# Patient Record
Sex: FEMALE | Race: WHITE | NOT HISPANIC OR LATINO | Employment: FULL TIME | URBAN - METROPOLITAN AREA
[De-identification: names, ages, dates, MRNs, and addresses within clinical notes are randomized per-mention and may not be internally consistent; named-entity substitution may affect disease eponyms.]

---

## 2016-07-20 LAB — PAP-ABSTRACT: NORMAL

## 2017-03-07 ENCOUNTER — TRANSFERRED RECORDS (OUTPATIENT)
Dept: HEALTH INFORMATION MANAGEMENT | Facility: CLINIC | Age: 27
End: 2017-03-07

## 2017-11-27 LAB — PHQ9 SCORE: 13

## 2018-01-02 ENCOUNTER — TRANSFERRED RECORDS (OUTPATIENT)
Dept: HEALTH INFORMATION MANAGEMENT | Facility: CLINIC | Age: 28
End: 2018-01-02

## 2018-01-08 ENCOUNTER — TRANSFERRED RECORDS (OUTPATIENT)
Dept: HEALTH INFORMATION MANAGEMENT | Facility: CLINIC | Age: 28
End: 2018-01-08

## 2018-01-08 LAB
C TRACH DNA SPEC QL PROBE+SIG AMP: NEGATIVE
N GONORRHOEA DNA SPEC QL PROBE+SIG AMP: NEGATIVE
PAP-ABSTRACT: NORMAL
SPECIMEN DESCRIP: NORMAL
SPECIMEN DESCRIPTION: NORMAL

## 2018-08-01 ENCOUNTER — TRANSFERRED RECORDS (OUTPATIENT)
Dept: MULTI SPECIALTY CLINIC | Facility: CLINIC | Age: 28
End: 2018-08-01

## 2018-08-01 LAB — PAP SMEAR - HIM PATIENT REPORTED: NEGATIVE

## 2018-10-01 LAB — PHQ9 SCORE: 8

## 2018-11-02 ENCOUNTER — TRANSFERRED RECORDS (OUTPATIENT)
Dept: HEALTH INFORMATION MANAGEMENT | Facility: CLINIC | Age: 28
End: 2018-11-02

## 2019-01-29 ENCOUNTER — TRANSFERRED RECORDS (OUTPATIENT)
Dept: HEALTH INFORMATION MANAGEMENT | Facility: CLINIC | Age: 29
End: 2019-01-29

## 2019-01-29 LAB
ALT SERPL-CCNC: 13 IU/L (ref 5–46)
AST SERPL-CCNC: 14 IU/L (ref 10–41)
CHOLEST SERPL-MCNC: 169 MG/DL
CREAT SERPL-MCNC: 0.62 MG/DL (ref 0.6–1.4)
GFR SERPL CREATININE-BSD FRML MDRD: 123 ML/MIN/1.73M2
GLUCOSE SERPL-MCNC: 76 MG/DL (ref 65–99)
HBA1C MFR BLD: 7.8 % (ref 0–5.6)
HDLC SERPL-MCNC: 52 MG/DL
LDLC SERPL CALC-MCNC: 98 MG/DL
NONHDLC SERPL-MCNC: 117 MG/DL
POTASSIUM SERPL-SCNC: 4.1 MMOL/L (ref 3.6–5.3)
TRIGL SERPL-MCNC: 94 MG/DL

## 2019-02-05 LAB — PHQ9 SCORE: 7

## 2019-02-06 ENCOUNTER — TRANSFERRED RECORDS (OUTPATIENT)
Dept: HEALTH INFORMATION MANAGEMENT | Facility: CLINIC | Age: 29
End: 2019-02-06

## 2019-05-06 ENCOUNTER — TRANSFERRED RECORDS (OUTPATIENT)
Dept: HEALTH INFORMATION MANAGEMENT | Facility: CLINIC | Age: 29
End: 2019-05-06

## 2019-05-06 ENCOUNTER — MEDICAL CORRESPONDENCE (OUTPATIENT)
Dept: HEALTH INFORMATION MANAGEMENT | Facility: CLINIC | Age: 29
End: 2019-05-06

## 2019-05-06 LAB — PHQ9 SCORE: 4

## 2019-08-30 ENCOUNTER — OFFICE VISIT (OUTPATIENT)
Dept: FAMILY MEDICINE | Facility: CLINIC | Age: 29
End: 2019-08-30
Payer: COMMERCIAL

## 2019-08-30 VITALS
BODY MASS INDEX: 22.4 KG/M2 | DIASTOLIC BLOOD PRESSURE: 76 MMHG | SYSTOLIC BLOOD PRESSURE: 118 MMHG | TEMPERATURE: 97.9 F | HEIGHT: 71 IN | WEIGHT: 160 LBS

## 2019-08-30 DIAGNOSIS — Z00.00 ROUTINE GENERAL MEDICAL EXAMINATION AT A HEALTH CARE FACILITY: Primary | ICD-10-CM

## 2019-08-30 DIAGNOSIS — E10.65 TYPE 1 DIABETES MELLITUS WITH HYPERGLYCEMIA (H): ICD-10-CM

## 2019-08-30 DIAGNOSIS — F33.42 RECURRENT MAJOR DEPRESSIVE DISORDER, IN FULL REMISSION (H): ICD-10-CM

## 2019-08-30 DIAGNOSIS — F41.9 ANXIETY: ICD-10-CM

## 2019-08-30 PROCEDURE — 99204 OFFICE O/P NEW MOD 45 MIN: CPT | Performed by: FAMILY MEDICINE

## 2019-08-30 RX ORDER — ALPRAZOLAM 0.25 MG
0.25 TABLET ORAL DAILY PRN
Qty: 20 TABLET | Refills: 0 | Status: SHIPPED | OUTPATIENT
Start: 2019-08-30 | End: 2020-01-02

## 2019-08-30 RX ORDER — ALPRAZOLAM 0.25 MG
TABLET ORAL
COMMUNITY
Start: 2019-02-05 | End: 2020-01-02

## 2019-08-30 ASSESSMENT — ANXIETY QUESTIONNAIRES
6. BECOMING EASILY ANNOYED OR IRRITABLE: SEVERAL DAYS
7. FEELING AFRAID AS IF SOMETHING AWFUL MIGHT HAPPEN: NOT AT ALL
3. WORRYING TOO MUCH ABOUT DIFFERENT THINGS: SEVERAL DAYS
GAD7 TOTAL SCORE: 5
1. FEELING NERVOUS, ANXIOUS, OR ON EDGE: SEVERAL DAYS
2. NOT BEING ABLE TO STOP OR CONTROL WORRYING: SEVERAL DAYS
5. BEING SO RESTLESS THAT IT IS HARD TO SIT STILL: NOT AT ALL
IF YOU CHECKED OFF ANY PROBLEMS ON THIS QUESTIONNAIRE, HOW DIFFICULT HAVE THESE PROBLEMS MADE IT FOR YOU TO DO YOUR WORK, TAKE CARE OF THINGS AT HOME, OR GET ALONG WITH OTHER PEOPLE: SOMEWHAT DIFFICULT

## 2019-08-30 ASSESSMENT — PATIENT HEALTH QUESTIONNAIRE - PHQ9
5. POOR APPETITE OR OVEREATING: SEVERAL DAYS
SUM OF ALL RESPONSES TO PHQ QUESTIONS 1-9: 4

## 2019-08-30 ASSESSMENT — MIFFLIN-ST. JEOR: SCORE: 1551.89

## 2019-08-30 NOTE — Clinical Note
This smart phrase will be removed from your smart phrase list on September 9th. Please use the system smart phrase: .abstractdata which has been updated.Please abstract the following data from this visit with this patient into the appropriate field in Epic:Tests that can be patient reported without a hard copy:Pap smear done on this date: 8/2018 (approximately), by this group: in [place], results were normal. Other Tests found in the patient's chart through Chart Review/Care Everywhere:Note to Abstraction: If this section is blank, no results were found via Chart Review/Care Everywhere.

## 2019-08-30 NOTE — PATIENT INSTRUCTIONS
I would like to see in about 4 months to follow up on your mood.  If things are going well, then we can see each other every 6 months.    Please call to schedule your eye exam.    Patient Education     [facility]   Discharged by : Ariana Harris MA    Paper scripts provided to patient : no     If you have any questions regarding your visit please contact your care team:     Team Gold                Clinic Hours Telephone Number     Dr. Abiola Ross, CNP 7am-7pm  Monday - Thursday   7am-5pm  Fridays  [phone]   (Appointment scheduling available 24/7)     RN Line  [phone] option 2     Urgent Care - [place] - 11am-9pm Monday-Friday Saturday-Sunday- 9am-5pm     [place] -   5pm-9pm Monday-Friday Saturday-Sunday- 9am-5pm    [phone] - [place]    [phone] - [place]     For a Price Quote for your services, please call our Consumer Price Line at [phone].     What options do I have for visits at the clinic other than the traditional office visit?     To expand how we care for you, many of our providers are utilizing electronic visits (e-visits) and telephone visits, when medically appropriate, for interactions with their patients rather than a visit in the clinic. We also offer nurse visits for many medical concerns. Just like any other service, we will bill your insurance company for this type of visit based on time spent on the phone with your provider. Not all insurance companies cover these visits. Please check with your medical insurance if this type of visit is covered. You will be responsible for any charges that are not paid by your insurance.   E-visits via Pandorama: generally incur a $45.00 fee.     Telephone visits:  Time spent on the phone: *charged based on time that is spent on the phone in increments of 10 minutes. Estimated cost:   5-10 mins $30.00   11-20 mins. $59.00   21-30 mins.  $85.00     Use Huixiaoer (secure email communication and access to your chart) to send your primary care provider a message or make an appointment. Ask someone on your Team how to sign up for Huixiaoer.     As always, Thank you for trusting us with your health care needs!    [facility]:    Scheduling Appointments  [facility]  Call: [phone]    [facility]  Call: [phone]    [facility]  Call: [phone]    For Gastroenterology referrals   [facility]   Clinics and Surgery Center, 4th Floor   [address] [place] [address]   Appointments: [phone]    WHERE TO GO FOR CARE?  Clinic    Make an appointment if you:       Are sick (cold, cough, flu, sore throat, earache or in pain).       Have a small injury (sprain, small cut, burn or broken bone).       Need a physical exam, Pap smear, vaccine or prescription refill.       Have questions about your health or medicines.    To reach us:      Call [phone] ([phone]). Open 24 hours every day. (For counseling services, call [phone].)    Log into Huixiaoer at [email or web address]. (Visit [email or web address] to create an account.) Hospital emergency room    An emergency is a serious or life- threatening problem that must be treated right away.    Call [phone] or get to the hospital if you have:      Very bad or sudden:            - Chest pain or pressure         - Bleeding         - Head or belly pain         - Dizziness or trouble seeing, walking or                          Speaking      Problems breathing      Blood in your vomit or you are coughing up blood      A major injury (knocked out, loss of a finger or limb, rape, broken bone protruding from skin)    A mental health crisis. (Or call the Mental Health Crisis line at 1-664.653.9103 or Suicide Prevention Hotline at 1-518.250.1757.)    Open 24 hours every day. You don't need an  appointment.     Urgent care    Visit urgent care for sickness or small injuries when the clinic is closed. You don't need an appointment. To check hours or find an urgent care near you, visit [email or web address]. Online care    Get [facility] from [facility] for more than 70 common problems, like colds, allergies and infections. Open 24 hours every day at:   [email or web address]   Need help deciding?    For advice about where to be seen, you may call your clinic and ask to speak with a nurse. We're here for you 24 hours every day.         If you are deaf or hard of hearing, please let us know. We provide many free services including sign language interpreters, oral interpreters, TTYs, telephone amplifiers, note takers and written materials.

## 2019-08-30 NOTE — PROGRESS NOTES
Subjective     Preeti Fox is a 29 year old female who presents to clinic today for the following health issues:    HPI     New patient presenting to the clinic to establish care. She moved [facility] about 2-3 months ago. 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     Diabetes Follow-up  Gets medication from endocrinologist Dr. Darlene Rivero in [place]. Wears glucose monitor on arm. She notes that her last A1C was 7.9. Her biggest pitfalls with managing diabetes is taking CGm for about 1.5 years.     How often are you checking your blood sugar? Three times daily    What time of day are you checking your blood sugars (select all that apply)?  When she wakes up, before a meal and at bedtime    Have you had any blood sugars above 200?  Yes     Have you had any blood sugars below 70?  Yes     What symptoms do you notice when your blood sugar is low?  Shaky and Other: sweaty palms    What concerns do you have today about your diabetes? None, Blood sugar is often over 200 and Low blood sugar     Do you have any of these symptoms? (Select all that apply)  No numbness or tingling in feet.  No redness, sores or blisters on feet.  No complaints of excessive thirst.  No reports of blurry vision.  No significant changes to weight.     Have you had a diabetic eye exam in the last 12 months? No  over due will need referral been under 2 years. Had lasik a year and a half ago      BP Readings from Last 2 Encounters:   08/30/19 118/76     No results found for: A1C, LDL    Diabetes Management Resources  Depression and Anxiety Follow-Up  Patient on sertraline for about 6 years, medication dose adjusted last year.  She notes anxiety since her childhood years. Patient is taking alprazolam as needed. Symptoms more present since the move. However, her anxiety is under control.     How are you doing with your depression since your last visit? No change pretty fine    How are you doing with your anxiety since  your last visit?  About the same    Are you having other symptoms that might be associated with depression or anxiety? No    Have you had a significant life event? OTHER: moved here from [place]     Do you have any concerns with your use of alcohol or other drugs? No      IUD in place and expiring next month. OBGYN appointment scheduled for next week.    Other Concerns  Patient would like an ophthalmology referral.       Social History     Tobacco Use     Smoking status: Never Smoker     Smokeless tobacco: Never Used   Substance Use Topics     Alcohol use: Yes     Comment: 1-2 drinks per week      Drug use: Never     PHQ 8/30/2019   PHQ-9 Total Score 4   Q9: Thoughts of better off dead/self-harm past 2 weeks Not at all     ROSANNA-7 SCORE 8/30/2019   Total Score 5         Suicide Assessment Five-step Evaluation and Treatment (SAFE-T)      How many servings of fruits and vegetables do you eat daily?  4 or more    On average, how many sweetened beverages do you drink each day (soda, juice, sweet tea, etc)?    How many days per week do you miss taking your medication? 0-1    What makes it hard for you to take your medications?  remembering to take    Patient Active Problem List   Diagnosis     Type 1 diabetes mellitus with hyperglycemia (H)     Anxiety     Recurrent major depressive disorder, in full remission (H)     History reviewed. No pertinent surgical history.    Social History     Tobacco Use     Smoking status: Never Smoker     Smokeless tobacco: Never Used   Substance Use Topics     Alcohol use: Yes     Comment: 1-2 drinks per week      Family History   Problem Relation Age of Onset     Anxiety Disorder Mother      Depression Mother      Lung Cancer Father          Current Outpatient Medications   Medication Sig Dispense Refill     ALPRAZolam (XANAX) 0.25 MG tablet        ALPRAZolam (XANAX) 0.25 MG tablet Take 1 tablet (0.25 mg) by mouth daily as needed for anxiety 20 tablet 0     HUMALOG 100 UNIT/ML injection INJ 90  "UNITS SC D VIA INSULIN PUMP  3     sertraline (ZOLOFT) 50 MG tablet TK 1 T PO D UTD  1     sertraline (ZOLOFT) 50 MG tablet Take 1 tablet (50 mg) by mouth daily 90 tablet 3     BP Readings from Last 3 Encounters:   08/30/19 118/76    Wt Readings from Last 3 Encounters:   08/30/19 72.6 kg (160 lb)              Reviewed and updated as needed this visit by Provider    Review of Systems   ROS COMP: Constitutional, HEENT, cardiovascular, pulmonary, GI, , musculoskeletal, neuro, skin, endocrine and psych systems are negative, except as otherwise noted.    This document serves as a record of the services and decisions personally performed by Roshni Cortez MD. It was created on her behalf by Janeen Duran, a trained medical scribe. The creation of this document is based on the provider's statements to the medical scribe. Janeen Duran August 30, 2019 10:55 AM        Objective    /76 (BP Location: Right arm, Patient Position: Sitting, Cuff Size: Adult Regular)   Temp 97.9  F (36.6  C) (Oral)   Ht 1.803 m (5' 11\")   Wt 72.6 kg (160 lb)   BMI 22.32 kg/m    Body mass index is 22.32 kg/m .  Physical Exam   GENERAL: healthy, alert and no distress  EYES: Eyes grossly normal to inspection, PERRL and conjunctivae and sclerae normal  HENT: ear canals and TM's normal, nose and mouth without ulcers or lesions  NECK: no adenopathy, no asymmetry, masses, or scars and thyroid normal to palpation  RESP: lungs clear to auscultation - no rales, rhonchi or wheezes  CV: regular rate and rhythm, normal S1 S2, no S3 or S4, no murmur, click or rub, no peripheral edema and peripheral pulses strong  ABDOMEN: soft, nontender, no hepatosplenomegaly, no masses and bowel sounds normal  MS: no gross musculoskeletal defects noted, no edema  SKIN: no suspicious lesions or rashes  PSYCH: mentation appears normal, affect normal/bright    Diagnostic Test Results:  Labs reviewed in Epic        Assessment & Plan     (Z00.00) Routine " general medical examination at a health care facility  (primary encounter diagnosis)  Comment: Negative screening exam; up-to-date on preventive services.  Plan: Follow in 1 year for physical   Will be seeing GYN for IUD replacement soon.  Is not due for pap as reporting last pap was within past year in [place].  Will obtain old records with [facility]    (E10.65) Type 1 diabetes mellitus with hyperglycemia (H)  Comment: Patient followed by endocrinology. She notes that her most recent A1C was 7.9. She is also wishing to schedule an eye exam and would like a referral.   Plan: OPHTHALMOLOGY ADULT REFERRAL        Continue follow ups with Dr. Darlene Rivero at [facility]    (F41.9) Anxiety  Comment: Refill requested. Patient taking medication as needed and uses sparingly.  checked,  Plan: sertraline (ZOLOFT) 50 MG tablet, ALPRAZolam         (XANAX) 0.25 MG tablet        Refill provided.  Advised to follow up in 4 months for anxiety    (F33.42) Recurrent major depressive disorder, in full remission (H)  Comment: Stable. Refill requested.  Plan: sertraline (ZOLOFT) 50 MG tablet        Follow up in 4 months, if stable then can go to q 6 months.         Patient Instructions     I would like to see in about 4 months to follow up on your mood.  If things are going well, then we can see each other every 6 months.    Please call to schedule your eye exam.    Patient Education     [facility]   Discharged by : Ariana Harris MA    Paper scripts provided to patient : no     If you have any questions regarding your visit please contact your care team:     Team Gold                Clinic Hours Telephone Number     Dr. Abiola Ross, CNP 7am-7pm  Monday - Thursday   7am-5pm  Fridays  [phone]   (Appointment scheduling available 24/7)     RN Line  [phone] option 2     Urgent Care - [place] - 11am-9pm  Monday-Friday Saturday-Sunday- 9am-5pm     [place] -   5pm-9pm Monday-Friday Saturday-Sunday- 9am-5pm    [phone] - [place]    [phone] - [place]     For a Price Quote for your services, please call our Consumer Price Line at [phone].     What options do I have for visits at the clinic other than the traditional office visit?     To expand how we care for you, many of our providers are utilizing electronic visits (e-visits) and telephone visits, when medically appropriate, for interactions with their patients rather than a visit in the clinic. We also offer nurse visits for many medical concerns. Just like any other service, we will bill your insurance company for this type of visit based on time spent on the phone with your provider. Not all insurance companies cover these visits. Please check with your medical insurance if this type of visit is covered. You will be responsible for any charges that are not paid by your insurance.   E-visits via Anchor Intelligence: generally incur a $45.00 fee.     Telephone visits:  Time spent on the phone: *charged based on time that is spent on the phone in increments of 10 minutes. Estimated cost:   5-10 mins $30.00   11-20 mins. $59.00   21-30 mins. $85.00     Use Bar Passt (secure email communication and access to your chart) to send your primary care provider a message or make an appointment. Ask someone on your Team how to sign up for Anchor Intelligence.     As always, Thank you for trusting us with your health care needs!    [facility]:    Scheduling Appointments  [facility]ju[facility]ard [facility]  Call: [phone]    [facility]  Call: [phone]    [facility]  Call: [phone]    For Gastroenterology referrals   [facility]enter[facility]   Clinics and Surgery [place], 4th Floor   [address] [place] [id number]   Appointments: [phone]    WHERE TO GO FOR CARE?  Clinic    Make an  appointment if you:       Are sick (cold, cough, flu, sore throat, earache or in pain).       Have a small injury (sprain, small cut, burn or broken bone).       Need a physical exam, Pap smear, vaccine or prescription refill.       Have questions about your health or medicines.    To reach us:      Call [phone] ([phone]). Open 24 hours every day. (For counseling services, call [phone].)    Log into [email or web address] at [employer]. (Visit ZÃ¼m XR to create an account.) Hospital emergency room    An emergency is a serious or life- threatening problem that must be treated right away.    Call [phone] or get to the hospital if you have:      Very bad or sudden:            - Chest pain or pressure         - Bleeding         - Head or belly pain         - Dizziness or trouble seeing, walking or                          Speaking      Problems breathing      Blood in your vomit or you are coughing up blood      A major injury (knocked out, loss of a finger or limb, rape, broken bone protruding from skin)    A mental health crisis. (Or call the Mental Health Crisis line at 1-950.829.7063 or Suicide Prevention Hotline at 1-229.980.3903.)    Open 24 hours every day. You don't need an appointment.     Urgent care    Visit urgent care for sickness or small injuries when the clinic is closed. You don't need an appointment. To check hours or find an urgent care near you, visit [email or web address]. Online care    Get [facility] care from [facility] for more than 70 common problems, like colds, allergies and infections. Open 24 hours every day at:   [email or web address]   Need help deciding?    For advice about where to be seen, you may call your clinic and ask to speak with a nurse. We're here for you 24 hours every day.         If you are deaf or hard of hearing, please let us know. We provide many free services including sign language interpreters, oral interpreters, TTYs, telephone amplifiers, note takers and written  materials.               Return in about 4 months (around 12/30/2019) for Annual Adult Physical, Mood/Mental Health Visit.    The information in this document, created by the medical scribe for me, accurately reflects the services I personally performed and the decisions made by me. I have reviewed and approved this document for accuracy.     Roshni Nevarez MD  [facility]

## 2019-08-31 ASSESSMENT — ANXIETY QUESTIONNAIRES: GAD7 TOTAL SCORE: 5

## 2019-09-03 ENCOUNTER — OFFICE VISIT (OUTPATIENT)
Dept: OBGYN | Facility: CLINIC | Age: 29
End: 2019-09-03
Payer: COMMERCIAL

## 2019-09-03 VITALS
BODY MASS INDEX: 25.11 KG/M2 | SYSTOLIC BLOOD PRESSURE: 136 MMHG | HEIGHT: 67 IN | HEART RATE: 101 BPM | DIASTOLIC BLOOD PRESSURE: 76 MMHG | WEIGHT: 160 LBS | OXYGEN SATURATION: 100 %

## 2019-09-03 DIAGNOSIS — Z30.432 ENCOUNTER FOR IUD REMOVAL: ICD-10-CM

## 2019-09-03 DIAGNOSIS — Z30.430 ENCOUNTER FOR IUD INSERTION: Primary | ICD-10-CM

## 2019-09-03 LAB — HCG UR QL: NEGATIVE

## 2019-09-03 PROCEDURE — 81025 URINE PREGNANCY TEST: CPT | Performed by: OBSTETRICS & GYNECOLOGY

## 2019-09-03 PROCEDURE — 58300 INSERT INTRAUTERINE DEVICE: CPT | Performed by: OBSTETRICS & GYNECOLOGY

## 2019-09-03 PROCEDURE — 58301 REMOVE INTRAUTERINE DEVICE: CPT | Performed by: OBSTETRICS & GYNECOLOGY

## 2019-09-03 ASSESSMENT — MIFFLIN-ST. JEOR: SCORE: 1488.39

## 2019-09-03 NOTE — NURSING NOTE
"Chief Complaint   Patient presents with     Contraception     IUD REMOVAL AND REINSERTION. LOT: NDC: EXP: .       Initial /76   Pulse 101   Ht 1.702 m (5' 7\")   Wt 72.6 kg (160 lb)   SpO2 100%   Breastfeeding? No   BMI 25.06 kg/m   Estimated body mass index is 25.06 kg/m  as calculated from the following:    Height as of this encounter: 1.702 m (5' 7\").    Weight as of this encounter: 72.6 kg (160 lb).  BP completed using cuff size: regular    Questioned patient about current smoking habits.  Pt. has never smoked.      No obstetric history on file.    The following HM Due: Vaccinations: TDAP      The following patient reported/Care Every where data was sent to:  P ABSTRACT QUALITY INITIATIVES [71765]  N/A      patient has appointment for today              "

## 2019-09-03 NOTE — PROGRESS NOTES
"S:  Preeti presents for Mary remove/replace.  Planning pregnancy in next 3 years.    O:  Vitals:    19 1330   BP: 136/76   Pulse: 101   SpO2: 100%   Weight: 72.6 kg (160 lb)   Height: 1.702 m (5' 7\")     Gen: well appearing    A/P:  29 year old  with Mary IUD for remove/replace.  Removed with forceps due to missing strings  Replaced without difficulty.   RTC for removal when ready to conceive. Start PNVs prior.    Procedure IUD REMOVAL:    Bimanual exam was performed.  The IUD strings were not palpable.  Speculum introduced with patient in the dorsal lithotomy position.  The IUD string was not visualized.  The IUD was removed with the Rhame packing forceps, multiple attempts.  Pt tolerated well.      IUD INSERTION PROCEDURE    Preeti Fox is a 29 year old female  who presents for Mary IUD insertion.  Indication for IUD insertion is contraception.  No LMP recorded. (Menstrual status: IUD). .  The patient is currently using Mary IUD for contraception.  She is in a monogamous sexual relationship.     No results found for: PAP    Results for orders placed or performed in visit on 19   HCG qualitative urine   Result Value Ref Range    HCG Qual Urine Negative NEG^Negative       A complete discussion of the risks and benefits of IUD use and the details of the insertion procedure was held with the patient.    All questions were answered.  A consent form was signed.    Prior to the beginning of the procedure the team paused to verify the patient's identity, as well as the procedure to be performed and the site.  All equipment required was ready and available. The patient was positioned appropriately.     IUD Lot # LOT: [id number] NDC: 72493-531-46 EXP: 2022.    The patient was placed in low lithotomy.  A bimanual exam was performed and the uterus noted to be anteverted.  A speculum was placed and the cervix swabbed with Betadine.  A tenaculum was placed on the anterior cervical lip. A " paracervical block was performed.  The fundus sounded to 8 cm. The Mary IUD was placed to the uterine fundus without difficulty.  The strings were cut to 3 cms.  The tenaculum was removed and hemostasis was ensured.  The speculum was removed.  The patient tolerated the procedure well.    PLAN:   The patient was asked to contact the clinic for any fever/chills/severe pelvic or abdominal pain or heavy bleeding. She was instructed in how to palpate her IUD strings.    FOLLOW-UP:  She was asked to follow up prn, and for her routine annual screening.

## 2019-09-16 ENCOUNTER — OFFICE VISIT (OUTPATIENT)
Dept: OPHTHALMOLOGY | Facility: CLINIC | Age: 29
End: 2019-09-16
Payer: COMMERCIAL

## 2019-09-16 DIAGNOSIS — H52.13 MYOPIA OF BOTH EYES: ICD-10-CM

## 2019-09-16 DIAGNOSIS — E10.65 TYPE 1 DIABETES MELLITUS WITH HYPERGLYCEMIA (H): ICD-10-CM

## 2019-09-16 DIAGNOSIS — E10.3393 MODERATE NONPROLIFERATIVE DIABETIC RETINOPATHY OF BOTH EYES WITHOUT MACULAR EDEMA ASSOCIATED WITH TYPE 1 DIABETES MELLITUS (H): Primary | ICD-10-CM

## 2019-09-16 DIAGNOSIS — Z98.890 S/P LASIK SURGERY: ICD-10-CM

## 2019-09-16 DIAGNOSIS — H52.222 REGULAR ASTIGMATISM OF LEFT EYE: ICD-10-CM

## 2019-09-16 PROCEDURE — 92004 COMPRE OPH EXAM NEW PT 1/>: CPT | Performed by: STUDENT IN AN ORGANIZED HEALTH CARE EDUCATION/TRAINING PROGRAM

## 2019-09-16 PROCEDURE — 92015 DETERMINE REFRACTIVE STATE: CPT | Performed by: STUDENT IN AN ORGANIZED HEALTH CARE EDUCATION/TRAINING PROGRAM

## 2019-09-16 ASSESSMENT — VISUAL ACUITY
OD_SC+: -1
METHOD: SNELLEN - LINEAR
CORRECTION_TYPE: GLASSES
OD_SC: 20/20
OS_SC: 20/20

## 2019-09-16 ASSESSMENT — CONF VISUAL FIELD
OD_NORMAL: 1
METHOD: COUNTING FINGERS
OS_NORMAL: 1

## 2019-09-16 ASSESSMENT — REFRACTION_MANIFEST
OS_CYLINDER: +0.25
OS_SPHERE: -0.25
OS_AXIS: 152
OD_SPHERE: -0.50
OD_CYLINDER: SPHERE

## 2019-09-16 ASSESSMENT — TONOMETRY
IOP_METHOD: APPLANATION
OS_IOP_MMHG: 13
OD_IOP_MMHG: 12

## 2019-09-16 ASSESSMENT — EXTERNAL EXAM - LEFT EYE: OS_EXAM: NORMAL

## 2019-09-16 ASSESSMENT — CUP TO DISC RATIO
OD_RATIO: 0.35
OS_RATIO: 0.35

## 2019-09-16 ASSESSMENT — EXTERNAL EXAM - RIGHT EYE: OD_EXAM: NORMAL

## 2019-09-16 ASSESSMENT — SLIT LAMP EXAM - LIDS
COMMENTS: NORMAL
COMMENTS: NORMAL

## 2019-09-16 NOTE — PROGRESS NOTES
Current Eye Medications:  none     Subjective:  Complete eye exam. Vision is doing well both eyes. No eye pain or discomfort in either eye.    History of Lasik both eyes about 2 years ago.   Diabetic for 23 years. Blood sugar has been doing well, last A1C was done about 1 month ago was 7.6 or 7.8  Lab Results   Component Value Date    A1C 7.8 01/29/2019     She has a history of having a corneal ulcer from sleeping in contact lenses, then had LASIK both eyes after that resolved.     Objective:  See Ophthalmology Exam.       Assessment:  Preeti Fox is a 29 year old female who presents with:   Encounter Diagnoses   Name Primary?     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    Aristeo Monroe MD  [phone]

## 2019-09-16 NOTE — LETTER
9/16/2019       RE: Preeti Fox  [address]  [place] [address]      Dear Dr. Cortez,    Thank you for referring your patient, Preeti Fox, to the [facility].     She has moderate non-proliferative diabetic retinopathy in both eyes. Please see a copy of my visit note below.     Current Eye Medications:  none     Subjective:  Complete eye exam. Vision is doing well both eyes. No eye pain or discomfort in either eye.    History of Lasik both eyes about 2 years ago.   Diabetic for 23 years. Blood sugar has been doing well, last A1C was done about 1 month ago was 7.6 or 7.8  Lab Results   Component Value Date    A1C 7.8 01/29/2019     She has a history of having a corneal ulcer from sleeping in contact lenses, then had LASIK both eyes after that resolved.     Objective:  See Ophthalmology Exam.       Assessment:  Preeti Fox is a 29 year old female who presents with:   Encounter Diagnoses   Name Primary?     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    Aristeo Monroe MD  [phone]        Again, thank you for allowing me to participate in the care of your patient.        Sincerely,        Aristeo Monroe MD

## 2019-09-16 NOTE — PATIENT INSTRUCTIONS
Glasses prescription given - optional to update    Keep blood sugars and blood pressure under good control.    Aristeo Monroe MD  [phone]    Patient Education   Diabetes weakens the blood vessels all over the body, including the eyes. Damage to the blood vessels in the eyes can cause swelling or bleeding into part of the eye (called the retina). This is called diabetic retinopathy (MICHAEL-tin--puh-thee). If not treated, this disease can cause vision loss or blindness.   Symptoms may include blurred or distorted vision, but many people have no symptoms. It's important to see your eye doctor regularly to check for problems.   Early treatment and good control can help protect your vision. Here are the things you can do to help prevent vision loss:      1. Keep your blood sugar levels under tight control.      2. Bring high blood pressure under control.      3. No smoking.      4. Have yearly dilated eye exams.

## 2020-01-02 ENCOUNTER — OFFICE VISIT (OUTPATIENT)
Dept: FAMILY MEDICINE | Facility: CLINIC | Age: 30
End: 2020-01-02
Payer: COMMERCIAL

## 2020-01-02 VITALS
TEMPERATURE: 97.5 F | SYSTOLIC BLOOD PRESSURE: 126 MMHG | BODY MASS INDEX: 24.8 KG/M2 | WEIGHT: 158 LBS | DIASTOLIC BLOOD PRESSURE: 80 MMHG | HEIGHT: 67 IN | HEART RATE: 100 BPM

## 2020-01-02 DIAGNOSIS — Z11.4 SCREENING FOR HIV (HUMAN IMMUNODEFICIENCY VIRUS): ICD-10-CM

## 2020-01-02 DIAGNOSIS — F33.42 RECURRENT MAJOR DEPRESSIVE DISORDER, IN FULL REMISSION (H): ICD-10-CM

## 2020-01-02 DIAGNOSIS — E10.65 TYPE 1 DIABETES MELLITUS WITH HYPERGLYCEMIA (H): Primary | ICD-10-CM

## 2020-01-02 DIAGNOSIS — S46.912A MUSCLE STRAIN OF LEFT SCAPULAR REGION, INITIAL ENCOUNTER: ICD-10-CM

## 2020-01-02 DIAGNOSIS — F41.9 ANXIETY: ICD-10-CM

## 2020-01-02 LAB — HBA1C MFR BLD: 8.4 % (ref 0–5.6)

## 2020-01-02 PROCEDURE — 87389 HIV-1 AG W/HIV-1&-2 AB AG IA: CPT | Performed by: FAMILY MEDICINE

## 2020-01-02 PROCEDURE — 84443 ASSAY THYROID STIM HORMONE: CPT | Performed by: FAMILY MEDICINE

## 2020-01-02 PROCEDURE — 83036 HEMOGLOBIN GLYCOSYLATED A1C: CPT | Performed by: FAMILY MEDICINE

## 2020-01-02 PROCEDURE — 36415 COLL VENOUS BLD VENIPUNCTURE: CPT | Performed by: FAMILY MEDICINE

## 2020-01-02 PROCEDURE — 99214 OFFICE O/P EST MOD 30 MIN: CPT | Performed by: FAMILY MEDICINE

## 2020-01-02 PROCEDURE — 82043 UR ALBUMIN QUANTITATIVE: CPT | Performed by: FAMILY MEDICINE

## 2020-01-02 PROCEDURE — 80048 BASIC METABOLIC PNL TOTAL CA: CPT | Performed by: FAMILY MEDICINE

## 2020-01-02 RX ORDER — ALPRAZOLAM 0.25 MG
0.25 TABLET ORAL DAILY PRN
Qty: 20 TABLET | Refills: 0 | Status: SHIPPED | OUTPATIENT
Start: 2020-01-02 | End: 2020-09-11

## 2020-01-02 ASSESSMENT — ANXIETY QUESTIONNAIRES
GAD7 TOTAL SCORE: 5
6. BECOMING EASILY ANNOYED OR IRRITABLE: SEVERAL DAYS
IF YOU CHECKED OFF ANY PROBLEMS ON THIS QUESTIONNAIRE, HOW DIFFICULT HAVE THESE PROBLEMS MADE IT FOR YOU TO DO YOUR WORK, TAKE CARE OF THINGS AT HOME, OR GET ALONG WITH OTHER PEOPLE: SOMEWHAT DIFFICULT
2. NOT BEING ABLE TO STOP OR CONTROL WORRYING: SEVERAL DAYS
3. WORRYING TOO MUCH ABOUT DIFFERENT THINGS: SEVERAL DAYS
1. FEELING NERVOUS, ANXIOUS, OR ON EDGE: SEVERAL DAYS
5. BEING SO RESTLESS THAT IT IS HARD TO SIT STILL: NOT AT ALL
7. FEELING AFRAID AS IF SOMETHING AWFUL MIGHT HAPPEN: NOT AT ALL

## 2020-01-02 ASSESSMENT — PATIENT HEALTH QUESTIONNAIRE - PHQ9
5. POOR APPETITE OR OVEREATING: SEVERAL DAYS
SUM OF ALL RESPONSES TO PHQ QUESTIONS 1-9: 8

## 2020-01-02 ASSESSMENT — MIFFLIN-ST. JEOR: SCORE: 1479.31

## 2020-01-02 NOTE — PATIENT INSTRUCTIONS
Please call Dr. Richards's office in [place] to set up a diabetes appointment.    If your left shoulder/neck pain is not improving in the next 7-10 days, please call or send me a message and we can get you set up to see a physical therapist.

## 2020-01-02 NOTE — PROGRESS NOTES
Subjective     Preeti Fox is a 29 year old female who presents to clinic today for the following health issues:    HPI   Depression and Anxiety Follow-Up    How are you doing with your depression since your last visit? No change    How are you doing with your anxiety since your last visit?  No change    Are you having other symptoms that might be associated with depression or anxiety? No    Have you had a significant life event? No     Do you have any concerns with your use of alcohol or other drugs? No    Social History     Tobacco Use     Smoking status: Never Smoker     Smokeless tobacco: Never Used   Substance Use Topics     Alcohol use: Yes     Comment: 1-2 drinks per week      Drug use: Never     PHQ 8/30/2019   PHQ-9 Total Score 4   Q9: Thoughts of better off dead/self-harm past 2 weeks Not at all     ROSANNA-7 SCORE 8/30/2019   Total Score 5     Last PHQ-9 1/2/2020   1.  Little interest or pleasure in doing things 1   2.  Feeling down, depressed, or hopeless 2   3.  Trouble falling or staying asleep, or sleeping too much 2   4.  Feeling tired or having little energy 1   5.  Poor appetite or overeating 0   6.  Feeling bad about yourself 2   7.  Trouble concentrating 0   8.  Moving slowly or restless 0   Q9: Thoughts of better off dead/self-harm past 2 weeks 0   PHQ-9 Total Score 8   Difficulty at work, home, or with people Somewhat difficult     ROSANNA-7  1/2/2020   1. Feeling nervous, anxious, or on edge 1   2. Not being able to stop or control worrying 1   3. Worrying too much about different things 1   4. Trouble relaxing 1   5. Being so restless that it is hard to sit still 0   6. Becoming easily annoyed or irritable 1   7. Feeling afraid, as if something awful might happen 0   ROSANNA-7 Total Score 5   If you checked any problems, how difficult have they made it for you to do your work, take care of things at home, or get along with other people? Somewhat difficult         Suicide Assessment Five-step Evaluation  "and Treatment (SAFE-T)      How many servings of fruits and vegetables do you eat daily?  2-3    On average, how many sweetened beverages do you drink each day (Examples: soda, juice, sweet tea, etc.  Do NOT count diet or artificially sweetened beverages)?   0    How many days per week do you miss taking your medication? 0    Was traveling for work - had to stay in [place] for a month.  That caused significant worsening of her anxiety.  She increased her zoloft to 100 mg during that time which helped.     Now she is home and does not anticipate traveling again for a while for work.    Used lorazepam some more while being gone.  But has not had to use any while she has been home.      Last visit to endocrine was 8/2019.  Is supposed to go back every 4-6 months.  But would like to switch to a new endocrinologist.    Has had some pain in left neck and around left shoulder blade for about 2.5 weeks.  Felt like she may have pulled something while at work.  But isn't sure, woke up the next morning with soreness.  Has been improving, but is not resolved.  No numbness or tingling down arm.  Full range of motion of left arm, but sometimes will trigger pain.  Heat helps.    BP Readings from Last 3 Encounters:   01/02/20 126/80   09/03/19 136/76   08/30/19 118/76    Wt Readings from Last 3 Encounters:   01/02/20 71.7 kg (158 lb)   09/03/19 72.6 kg (160 lb)   08/30/19 72.6 kg (160 lb)                    Reviewed and updated as needed this visit by Provider  Allergies  Meds  Problems         Review of Systems   ROS COMP: Constitutional, HEENT, cardiovascular, pulmonary, gi and gu systems are negative, except as otherwise noted.      Objective    /80   Pulse 100   Temp 97.5  F (36.4  C) (Oral)   Ht 1.702 m (5' 7\")   Wt 71.7 kg (158 lb)   BMI 24.75 kg/m    Body mass index is 24.75 kg/m .  Physical Exam   GENERAL: healthy, alert and no distress  MS: LUE exam shows normal strength and muscle mass, no deformities and " tenderness of left upper periscapular muscles.  Tightness of left upper trapezius muscles. Full range of motion of left shoulder without pain.  PSYCH: mentation appears normal, affect normal/bright    Diagnostic Test Results:  Labs reviewed in Epic  No results found for this or any previous visit (from the past 24 hour(s)).        Assessment & Plan     (E10.65) Type 1 diabetes mellitus with hyperglycemia (H)    Comment:   Plan: ENDOCRINOLOGY ADULT REFERRAL, Hemoglobin A1c,         Basic metabolic panel, TSH with free T4 reflex,        Albumin Random Urine Quantitative with Creat         Ratio        Desires to see new endocrinologist.  Referral provided.    Will update labs today - in anticipation of that visit.    (F41.9) Anxiety  Comment: worsened while away from home for one month, now improved.  And overall feels improved in general   Plan: ALPRAZolam (XANAX) 0.25 MG tablet         checked. Uses alprazolam infrequently typically.    (F33.42) Recurrent major depressive disorder, in full remission (H)  Comment: on zoloft, doing well overall  Plan: Continue current medication      (S46.652A) Muscle strain of left scapular region, initial encounter  Comment:   Plan: demonstrated some home exercises for stretching.  Advised heat after work, but not while in bed at night.  If not improving over next 7-10 days have advised her to call or message me and will refer to PT    (Z11.4) Screening for HIV (human immunodeficiency virus)  Comment: agrees to screening.  Plan: HIV Antigen Antibody Combo       adjust therapy based on labs           See Patient Instructions    Return in about 6 months (around 7/2/2020) for Mood/Mental Health Visit.    Roshni Nevarez MD  [facility]

## 2020-01-03 ENCOUNTER — TELEPHONE (OUTPATIENT)
Dept: FAMILY MEDICINE | Facility: CLINIC | Age: 30
End: 2020-01-03

## 2020-01-03 LAB
ANION GAP SERPL CALCULATED.3IONS-SCNC: 7 MMOL/L (ref 3–14)
BUN SERPL-MCNC: 16 MG/DL (ref 7–30)
CALCIUM SERPL-MCNC: 8.3 MG/DL (ref 8.5–10.1)
CHLORIDE SERPL-SCNC: 106 MMOL/L (ref 94–109)
CO2 SERPL-SCNC: 23 MMOL/L (ref 20–32)
CREAT SERPL-MCNC: 0.7 MG/DL (ref 0.52–1.04)
CREAT UR-MCNC: 36 MG/DL
GFR SERPL CREATININE-BSD FRML MDRD: >90 ML/MIN/{1.73_M2}
GLUCOSE SERPL-MCNC: 415 MG/DL (ref 70–99)
HIV 1+2 AB+HIV1 P24 AG SERPL QL IA: NONREACTIVE
MICROALBUMIN UR-MCNC: <5 MG/L
MICROALBUMIN/CREAT UR: NORMAL MG/G CR (ref 0–25)
POTASSIUM SERPL-SCNC: 4.2 MMOL/L (ref 3.4–5.3)
SODIUM SERPL-SCNC: 136 MMOL/L (ref 133–144)
TSH SERPL DL<=0.005 MIU/L-ACNC: 1.11 MU/L (ref 0.4–4)

## 2020-01-03 ASSESSMENT — ANXIETY QUESTIONNAIRES: GAD7 TOTAL SCORE: 5

## 2020-01-03 NOTE — TELEPHONE ENCOUNTER
Called pt due to critical BS of 415  Pt is aware, she ate chinese yesterday  States under control at this time.    Jessica Gutierrez MD

## 2020-01-21 ENCOUNTER — MYC MEDICAL ADVICE (OUTPATIENT)
Dept: FAMILY MEDICINE | Facility: CLINIC | Age: 30
End: 2020-01-21

## 2020-01-21 DIAGNOSIS — F33.42 RECURRENT MAJOR DEPRESSIVE DISORDER, IN FULL REMISSION (H): ICD-10-CM

## 2020-01-21 DIAGNOSIS — F41.9 ANXIETY: ICD-10-CM

## 2020-01-21 NOTE — TELEPHONE ENCOUNTER
Routing to PCP to please advise.    Patient would like to increase her sertraline increased to 100 mg.  Patient has been taking 100 mg recently and is finding improvement in her symptoms.    Last OV 1/2/20   PHQ9 - 8  ROSANNA-7- 5    Medication pended if agreeable.    Lubna Del Castillo RN

## 2020-01-22 RX ORDER — SERTRALINE HYDROCHLORIDE 100 MG/1
100 TABLET, FILM COATED ORAL DAILY
Qty: 90 TABLET | Refills: 1 | Status: SHIPPED | OUTPATIENT
Start: 2020-01-22 | End: 2020-09-04

## 2020-01-22 NOTE — TELEPHONE ENCOUNTER
Patient/family was instructed to return call to [facility], directly on the RN Call back line at [phone].    Deshawn Malik RN

## 2020-01-22 NOTE — TELEPHONE ENCOUNTER
Patient returns call to RN line, was notified of increase in med dose and Rx sent with understanding voiced.  She'll f/u with PCP in clinic in early July as previously recommended per last visit note.    Marlin Joyner RN

## 2020-03-11 ENCOUNTER — HEALTH MAINTENANCE LETTER (OUTPATIENT)
Age: 30
End: 2020-03-11

## 2020-08-14 ENCOUNTER — MYC MEDICAL ADVICE (OUTPATIENT)
Dept: FAMILY MEDICINE | Facility: CLINIC | Age: 30
End: 2020-08-14

## 2020-08-14 DIAGNOSIS — L98.9 SKIN LESION: Primary | ICD-10-CM

## 2020-08-17 NOTE — TELEPHONE ENCOUNTER
Patient was last seen by PCP 1/2/20 for diabetes.    I see she has video visit with endocrine 8/19/20.   Has current endocrine referral dated 1/2/20.     Plan:    See Patient Instructions     Return in about 6 months (around 7/2/2020) for Mood/Mental Health Visit.     Roshni Nevarez MD  [facility]    I routed [employer] message back to patient advising she is due for office visit with PCP for mental health visit.    Routed to PCP to address request for dermatology referral, sounds like skin check.    Kimberly Goldman RN  [facility]

## 2020-08-17 NOTE — TELEPHONE ENCOUNTER
Called patient and also sent a mychart message with Dermatology referral information and also letting patient know she needs to schedule a virtual mood follow up with Dr Cortez.    Ruthy Cardoza

## 2020-08-19 ENCOUNTER — VIRTUAL VISIT (OUTPATIENT)
Dept: ENDOCRINOLOGY | Facility: CLINIC | Age: 30
End: 2020-08-19
Payer: COMMERCIAL

## 2020-08-19 DIAGNOSIS — E11.3299 NPDR (NONPROLIFERATIVE DIABETIC RETINOPATHY) (H): ICD-10-CM

## 2020-08-19 DIAGNOSIS — E10.65 TYPE 1 DIABETES MELLITUS WITH HYPERGLYCEMIA (H): Primary | ICD-10-CM

## 2020-08-19 PROCEDURE — 99203 OFFICE O/P NEW LOW 30 MIN: CPT | Mod: 95 | Performed by: INTERNAL MEDICINE

## 2020-08-19 NOTE — NURSING NOTE
"Chief Complaint   Patient presents with     New Patient     DM       Initial There were no vitals taken for this visit. Estimated body mass index is 24.75 kg/m  as calculated from the following:    Height as of 1/2/20: 1.702 m (5' 7\").    Weight as of 1/2/20: 71.7 kg (158 lb).  BP completed using cuff size: NA (Not Taken)  Medications and allergies reviewed.      Geri JARAMILLO MA    "

## 2020-08-19 NOTE — PROGRESS NOTES
"Preeti Fox is a 29 year old female who is being evaluated via a billable video visit.      The patient has been notified of following:     \"This video visit will be conducted via a call between you and your physician/provider. We have found that certain health care needs can be provided without the need for an in-person physical exam.  This service lets us provide the care you need with a video conversation.  If a prescription is necessary we can send it directly to your pharmacy.  If lab work is needed we can place an order for that and you can then stop by our lab to have the test done at a later time.    Video visits are billed at different rates depending on your insurance coverage.  Please reach out to your insurance provider with any questions.    If during the course of the call the physician/provider feels a video visit is not appropriate, you will not be charged for this service.\"    Patient has given verbal consent for Video visit? Yes  How would you like to obtain your AVS? MyChart  If you are dropped from the video visit, the video invite should be resent to: Other e-mail: Virtual Web  Will anyone else be joining your video visit? No        Video-Visit Details    Type of service:  Video Visit    Video Start Time: 10:58 AM  Video End Time: 11:22 AM    Originating Location (pt. Location): Home    Distant Location (provider location):  [facility]     Platform used for Video Visit: Norm      CC: DM.     HPI:   Patient presents for management of DM.   Diagnosed at age 6.     She is using a Medtronic 670 G pump.   She does not use auto-mode and tends to not wear her CGM much.   She shields not feel the CGM helps much as she can sense when she is heading low.   She does not have a [employer] account.     Notes she runs a higher basal rate as she does not bolus for her food much.   She does not drop low when she boluses.     ROS: 10 point ROS neg other than the symptoms noted above in the HPI.    PMH: "   Patient Active Problem List   Diagnosis     Type 1 diabetes mellitus with hyperglycemia (H)     Anxiety     Recurrent major depressive disorder, in full remission (H)       Meds:  Current Outpatient Medications   Medication     ALPRAZolam (XANAX) 0.25 MG tablet     HUMALOG 100 UNIT/ML injection     sertraline (ZOLOFT) 100 MG tablet     Current Facility-Administered Medications   Medication     levonorgestrel (FIDEL) 13.5 MG IUD 13.5 mg     FHX:   Great grandmother had type 1 DM.     SHX:   for [place].   Moved from [place] 1 year ago.     Exam:   GENERAL: Healthy, alert and no distress  EYES: Eyes grossly normal to inspection.  No discharge or erythema, or obvious scleral/conjunctival abnormalities.  HENT: Normal cephalic/atraumatic.  External ears, nose and mouth without ulcers or lesions.  No nasal drainage visible.  RESP: No audible wheeze, cough, or visible cyanosis.  No visible retractions or increased work of breathing.    MS: No gross musculoskeletal defects noted.  Normal range of motion.  No visible edema.  SKIN: Visible skin clear. No significant rash, abnormal pigmentation or lesions.  NEURO: Cranial nerves grossly intact.  Mentation and speech appropriate for age.  PSYCH: Mentation appears normal, affect normal/bright, judgement and insight intact, normal speech and appearance well-groomed.      A/P:   Type 1 DM - Needs help with using her pump and CGM. Admits to being over-reliant on her basal rate. She admits to avoiding looking at her glucoses because she does not like seeing the reading. She is getting  and wants to have children. Discussed how goal HbA1C is 6.5% or less.   -I placed her in contact with a  with type 1 DM.   -Schedule labs.   -Patient provided permission to connect her with Aundrea Marti.   -ASA not indicated.  -BP: normal on last check.  -Lipids: HDL 52, Trg 94, LDL 98 in 1/2019. Statin not indicated.   Repeat lab.   -Microalbumin negative in 1/2020. ACEi not  indicated.   -TSH normal in 1/2020.   -Eyes: moderate NDPR in 9/2019. Waiting to get back on regular insurance and off [employer].   -Smoking: none.         Dada Richards MD on 8/19/2020 at 11:23 AM

## 2020-08-20 DIAGNOSIS — E10.65 TYPE 1 DIABETES MELLITUS WITH HYPERGLYCEMIA (H): ICD-10-CM

## 2020-08-20 LAB — HBA1C MFR BLD: 9.4 % (ref 0–5.6)

## 2020-08-20 PROCEDURE — 36415 COLL VENOUS BLD VENIPUNCTURE: CPT | Performed by: INTERNAL MEDICINE

## 2020-08-20 PROCEDURE — 80061 LIPID PANEL: CPT | Performed by: INTERNAL MEDICINE

## 2020-08-20 PROCEDURE — 83036 HEMOGLOBIN GLYCOSYLATED A1C: CPT | Performed by: INTERNAL MEDICINE

## 2020-08-21 LAB
CHOLEST SERPL-MCNC: 193 MG/DL
HDLC SERPL-MCNC: 39 MG/DL
LDLC SERPL CALC-MCNC: 132 MG/DL
NONHDLC SERPL-MCNC: 154 MG/DL
TRIGL SERPL-MCNC: 108 MG/DL

## 2020-08-24 ENCOUNTER — MYC MEDICAL ADVICE (OUTPATIENT)
Dept: ENDOCRINOLOGY | Facility: CLINIC | Age: 30
End: 2020-08-24

## 2020-08-31 DIAGNOSIS — F41.9 ANXIETY: ICD-10-CM

## 2020-08-31 DIAGNOSIS — F33.42 RECURRENT MAJOR DEPRESSIVE DISORDER, IN FULL REMISSION (H): ICD-10-CM

## 2020-08-31 NOTE — TELEPHONE ENCOUNTER
Routing refill request to provider for review/approval because:  PHQ-9 score:    PHQ 1/2/2020   PHQ-9 Total Score 8   Q9: Thoughts of better off dead/self-harm past 2 weeks Not at all     Needs review.     Eunice Glynn, RN, BSN, PHN  [facility] [place]

## 2020-09-01 NOTE — TELEPHONE ENCOUNTER
Please recheck PHQ-9   If score below 5 okay to refill.  If score 5 or above, please provide tyrone refill and schedule virtual visit.

## 2020-09-04 RX ORDER — SERTRALINE HYDROCHLORIDE 100 MG/1
100 TABLET, FILM COATED ORAL DAILY
Qty: 90 TABLET | Refills: 0 | Status: SHIPPED | OUTPATIENT
Start: 2020-09-04 | End: 2020-09-11

## 2020-09-04 NOTE — TELEPHONE ENCOUNTER
"PHQ-9 score:    PHQ 9/3/2020   PHQ-9 Total Score 9   Q9: Thoughts of better off dead/self-harm past 2 weeks Not at all             Routing refill request to provider for review/approval because:  PHQ9 > 5    Requested Prescriptions   Pending Prescriptions Disp Refills     sertraline (ZOLOFT) 100 MG tablet 90 tablet 1     Sig: Take 1 tablet (100 mg) by mouth daily       SSRIs Protocol Failed - 9/3/2020  2:52 PM        Failed - PHQ-9 score less than 5 in past 6 months     Please review last PHQ-9 score.           Failed - No positive pregnancy test in last 12 months        Passed - Medication is active on med list        Passed - Patient is age 18 or older        Passed - No active pregnancy on record        Passed - Recent (6 mo) or future (30 days) visit within the authorizing provider's specialty     Patient had office visit in the last 6 months or has a visit in the next 30 days with authorizing provider or within the authorizing provider's specialty.  See \"Patient Info\" tab in inbasket, or \"Choose Columns\" in Meds & Orders section of the refill encounter.                           "

## 2020-09-11 ENCOUNTER — VIRTUAL VISIT (OUTPATIENT)
Dept: FAMILY MEDICINE | Facility: CLINIC | Age: 30
End: 2020-09-11
Payer: COMMERCIAL

## 2020-09-11 DIAGNOSIS — F41.9 ANXIETY: ICD-10-CM

## 2020-09-11 DIAGNOSIS — F33.42 RECURRENT MAJOR DEPRESSIVE DISORDER, IN FULL REMISSION (H): ICD-10-CM

## 2020-09-11 PROCEDURE — 99213 OFFICE O/P EST LOW 20 MIN: CPT | Mod: 95 | Performed by: FAMILY MEDICINE

## 2020-09-11 RX ORDER — SERTRALINE HYDROCHLORIDE 100 MG/1
150 TABLET, FILM COATED ORAL DAILY
Qty: 135 TABLET | Refills: 1 | Status: SHIPPED | OUTPATIENT
Start: 2020-09-11 | End: 2020-11-10

## 2020-09-11 RX ORDER — ALPRAZOLAM 0.25 MG
0.25 TABLET ORAL DAILY PRN
Qty: 20 TABLET | Refills: 0 | Status: SHIPPED | OUTPATIENT
Start: 2020-09-11 | End: 2020-11-10

## 2020-09-11 ASSESSMENT — PATIENT HEALTH QUESTIONNAIRE - PHQ9
5. POOR APPETITE OR OVEREATING: SEVERAL DAYS
SUM OF ALL RESPONSES TO PHQ QUESTIONS 1-9: 7

## 2020-09-11 ASSESSMENT — ANXIETY QUESTIONNAIRES
7. FEELING AFRAID AS IF SOMETHING AWFUL MIGHT HAPPEN: NOT AT ALL
1. FEELING NERVOUS, ANXIOUS, OR ON EDGE: SEVERAL DAYS
6. BECOMING EASILY ANNOYED OR IRRITABLE: NEARLY EVERY DAY
3. WORRYING TOO MUCH ABOUT DIFFERENT THINGS: NEARLY EVERY DAY
5. BEING SO RESTLESS THAT IT IS HARD TO SIT STILL: NOT AT ALL
GAD7 TOTAL SCORE: 11
IF YOU CHECKED OFF ANY PROBLEMS ON THIS QUESTIONNAIRE, HOW DIFFICULT HAVE THESE PROBLEMS MADE IT FOR YOU TO DO YOUR WORK, TAKE CARE OF THINGS AT HOME, OR GET ALONG WITH OTHER PEOPLE: SOMEWHAT DIFFICULT
2. NOT BEING ABLE TO STOP OR CONTROL WORRYING: NEARLY EVERY DAY

## 2020-09-11 NOTE — PROGRESS NOTES
"Preeti Fox is a 30 year old female who is being evaluated via a billable video visit.      The patient has been notified of following:     \"This video visit will be conducted via a call between you and your physician/provider. We have found that certain health care needs can be provided without the need for an in-person physical exam.  This service lets us provide the care you need with a video conversation.  If a prescription is necessary we can send it directly to your pharmacy.  If lab work is needed we can place an order for that and you can then stop by our lab to have the test done at a later time.    Video visits are billed at different rates depending on your insurance coverage.  Please reach out to your insurance provider with any questions.    If during the course of the call the physician/provider feels a video visit is not appropriate, you will not be charged for this service.\"    Patient has given verbal consent for Video visit? Yes  How would you like to obtain your AVS? MyChart  If you are dropped from the video visit, the video invite should be resent to: Text to cell phone: [phone]  Will anyone else be joining your video visit? No    Subjective     Preeti Fox is a 30 year old female who presents today via video visit for the following health issues:    HPI    Depression and Anxiety Follow-Up    How are you doing with your depression since your last visit? No change    How are you doing with your anxiety since your last visit?  No change    Are you having other symptoms that might be associated with depression or anxiety? No    Have you had a significant life event? Job Concerns, lost job in March     Do you have any concerns with your use of alcohol or other drugs? No    Social History     Tobacco Use     Smoking status: Never Smoker     Smokeless tobacco: Never Used   Substance Use Topics     Alcohol use: Yes     Comment: 1-2 drinks per week      Drug use: Never     PHQ 8/30/2019 " 1/2/2020 9/3/2020   PHQ-9 Total Score 4 8 9   Q9: Thoughts of better off dead/self-harm past 2 weeks Not at all Not at all Not at all     ROSANNA-7 SCORE 8/30/2019 1/2/2020   Total Score 5 5     Last PHQ-9 9/11/2020   1.  Little interest or pleasure in doing things 1   2.  Feeling down, depressed, or hopeless 1   3.  Trouble falling or staying asleep, or sleeping too much 0   4.  Feeling tired or having little energy 3   5.  Poor appetite or overeating 0   6.  Feeling bad about yourself 1   7.  Trouble concentrating 1   8.  Moving slowly or restless 0   Q9: Thoughts of better off dead/self-harm past 2 weeks 0   PHQ-9 Total Score 7   Difficulty at work, home, or with people Somewhat difficult     ROSANNA-7  9/11/2020   1. Feeling nervous, anxious, or on edge 1   2. Not being able to stop or control worrying 3   3. Worrying too much about different things 3   4. Trouble relaxing 1   5. Being so restless that it is hard to sit still 0   6. Becoming easily annoyed or irritable 3   7. Feeling afraid, as if something awful might happen 0   ROSANNA-7 Total Score 11   If you checked any problems, how difficult have they made it for you to do your work, take care of things at home, or get along with other people? Somewhat difficult       Suicide Assessment Five-step Evaluation and Treatment (SAFE-T)      How many servings of fruits and vegetables do you eat daily?  2-3    On average, how many sweetened beverages do you drink each day (Examples: soda, juice, sweet tea, etc.  Do NOT count diet or artificially sweetened beverages)?   0    How many days per week do you exercise enough to make your heart beat faster? 3 or less    How many minutes a day do you exercise enough to make your heart beat faster? 9 or less  How many days per week do you miss taking your medication? 1    What makes it hard for you to take your medications?  remembering to take         Video Start Time: 11:28 AM    Because of loss of job in March she did feel more  down.  She didn't realize how much she relied on work for focus and motivation.  She is now working part time at a restaurant.  And that is going well.  But she still feels she is in a transition.  Is living with her fiance and he has been very supportive.    Review of Systems   Constitutional, HEENT, cardiovascular, pulmonary, gi and gu systems are negative, except as otherwise noted.      Objective           Vitals:  No vitals were obtained today due to virtual visit.    Physical Exam     GENERAL: Healthy, alert and no distress  EYES: Eyes grossly normal to inspection.  No discharge or erythema, or obvious scleral/conjunctival abnormalities.  RESP: No audible wheeze, cough, or visible cyanosis.  No visible retractions or increased work of breathing.    SKIN: Visible skin clear. No significant rash, abnormal pigmentation or lesions.  NEURO: Cranial nerves grossly intact.  Mentation and speech appropriate for age.  PSYCH: Mentation appears normal, affect normal/bright, judgement and insight intact, normal speech and appearance well-groomed.              Assessment & Plan     Anxiety  Increased.  Uses alprazolam very sparingly. But likes to have some on hand for panic symptoms.   checked.  Refill provided.  - sertraline (ZOLOFT) 100 MG tablet; Take 1.5 tablets (150 mg) by mouth daily  - ALPRAZolam (XANAX) 0.25 MG tablet; Take 1 tablet (0.25 mg) by mouth daily as needed for anxiety    Recurrent major depressive disorder, in full remission (H)  Will increase dosing to 150mg to better control low mood and increased anxiety.  Follow up in 6-8 weeks.  - sertraline (ZOLOFT) 100 MG tablet; Take 1.5 tablets (150 mg) by mouth daily       See Patient Instructions    No follow-ups on file.    Roshni Nevarez MD  [facility]      Video-Visit Details    Type of service:  Video Visit    Video End Time:11:34 AM    Originating Location (pt. Location): Home    Distant Location (provider location):  [place]  [facility]     Platform used for Video Visit: Norm

## 2020-09-11 NOTE — PATIENT INSTRUCTIONS
Let's increase your zoloft to 150 mg daily (which is 1.5 tablets daily).  And then let's plan to see each other (via video) in 6-8 weeks to touch base about how that dosage change is helping.

## 2020-09-12 ASSESSMENT — ANXIETY QUESTIONNAIRES: GAD7 TOTAL SCORE: 11

## 2020-09-16 ENCOUNTER — TELEPHONE (OUTPATIENT)
Dept: DERMATOLOGY | Facility: CLINIC | Age: 30
End: 2020-09-16

## 2020-09-16 NOTE — TELEPHONE ENCOUNTER
I called to speak with the patient and see if she has any lesions of concern or if she just wanted a base line skin exam. Patient states that she has multiple family history of melanoma including her mother. Patient has a lesion of concern. I will keep appointment in clinic, patient advised.     Geri OTT CMA

## 2020-09-22 ENCOUNTER — OFFICE VISIT (OUTPATIENT)
Dept: DERMATOLOGY | Facility: CLINIC | Age: 30
End: 2020-09-22
Payer: COMMERCIAL

## 2020-09-22 DIAGNOSIS — Z80.8 FAMILY HISTORY OF MELANOMA: ICD-10-CM

## 2020-09-22 DIAGNOSIS — L73.9 FOLLICULITIS: ICD-10-CM

## 2020-09-22 DIAGNOSIS — D23.9 DERMATOFIBROMA: ICD-10-CM

## 2020-09-22 DIAGNOSIS — D22.9 MULTIPLE BENIGN NEVI: ICD-10-CM

## 2020-09-22 DIAGNOSIS — D48.9 NEOPLASM OF UNCERTAIN BEHAVIOR: Primary | ICD-10-CM

## 2020-09-22 ASSESSMENT — PAIN SCALES - GENERAL
PAINLEVEL: NO PAIN (0)
PAINLEVEL: NO PAIN (0)

## 2020-09-22 NOTE — LETTER
9/22/2020       RE: Preeti Fox  [address] [place] [facility] [address]     Dear Colleague,    Thank you for referring your patient, Preeti Fox, to the [facility] DERMATOLOGY at [facility]. Please see a copy of my visit note below.    Formerly [facility] Dermatology Note      Dermatology Problem List:  1. Family history of melanoma.  - Mother, maternal uncle, 2 grandparents (maternal GM and paternal GM)  - Also breast cancer in maternal GM and paternal GM; colon cancer in maternal GM and maternal GF  - No pancreatic or ovarian cancer  - Consider genetics referral  2. Folliculitis. BP 5% wash.  # Neoplasm of uncertain behavior, right thigh, s/p shave bx 9/22/2020.    CC:   Chief Complaint   Patient presents with     Skin Check     Preeti is here today for a skin check family hx of melanoma          Encounter Date: Sep 22, 2020    History of Present Illness:  Ms. Preeti Fox is a 30 year old female who presents for evaluation of skin check and family history of melanoma. Patient has many relatives with melanoma including mom, maternal uncle, and 2 grandparents (maternal GM and paternal GM). She also has family history of  breast cancer in maternal GM and paternal GM and colon cancer in maternal GM and maternal GF. No pancreatic or ovarian cancer.    She notes a few concerns today. Has a few spots on her lefts that are newer. None are changing or growing. Never has had prior biopsies. No personal history of skin cancer.    Otherwise feeling well, no additional skin concerns.     Past Medical History:   Patient Active Problem List   Diagnosis     Type 1 diabetes mellitus with hyperglycemia (H)     Anxiety     Recurrent major depressive disorder, in full remission (H)     Past Medical History:   Diagnosis Date     Anxiety      Depressive disorder      Major depression      Type 1 diabetes mellitus with hyperglycemia (H)      History reviewed. No pertinent surgical  history.    Social History:  Patient reports that she has never smoked. She has never used smokeless tobacco. She reports current alcohol use. She reports that she does not use drugs.   She is engaged, will be getting  next June.    Family History:  Family History   Problem Relation Age of Onset     Anxiety Disorder Mother      Depression Mother         Mother     Skin Cancer Mother      Melanoma Mother      Lung Cancer Father      Melanoma Maternal Grandmother      Skin Cancer Maternal Grandmother      Melanoma Paternal Grandmother      Skin Cancer Paternal Grandmother      Melanoma Maternal Uncle      Skin Cancer Maternal Uncle      Glaucoma No family hx of      Macular Degeneration No family hx of        Medications:  Current Outpatient Medications   Medication Sig Dispense Refill     ALPRAZolam (XANAX) 0.25 MG tablet Take 1 tablet (0.25 mg) by mouth daily as needed for anxiety 20 tablet 0     HUMALOG 100 UNIT/ML injection INJ 90 UNITS SC D VIA INSULIN PUMP  3     sertraline (ZOLOFT) 100 MG tablet Take 1.5 tablets (150 mg) by mouth daily 135 tablet 1     No Known Allergies      Review of Systems:  -Constitutional: Otherwise feeling well today, in usual state of health.  -Skin: As above in HPI. No additional skin concerns.    Physical exam:  GEN: This is a well developed, well-nourished female in no acute distress, in a pleasant mood.    SKIN: Total skin excluding the undergarment areas was performed. The exam included the head/face, neck, both arms, chest, back, abdomen, both legs, digits and/or nails.   -Hernandez skin type: II  -many light brown macules on trunk and extremities, overall uniform and reassuring.  -light pink macules/papules and pustules on back diffusely.  -right thigh with slightly firm brown somewhat irregular papule, no clear network on dermoscopy.  -There is a firm tan/flesh colored papule that dimples with lateral pressure on the left thigh.  -No other lesions of concern on areas  examined.     Impression/Plan:  1. Neoplasm of uncertain behavior, R thigh. Given new appearance, offered bx versus photomonitoring. She would prefer bx. Ddx DF versus top of cyst versus dysplastic nevus?   - Shave biopsy:  After discussion of benefits and risks including but not limited to bleeding/bruising, pain/swelling, infection, scar, incomplete removal, nerve damage/numbness, recurrence, and non-diagnostic biopsy, written consent, verbal consent and photographs were obtained. Time-out was performed. The area was cleaned with isopropyl alcohol. 0.5ml of 1% lidocaine with 1:100,000 epinephrine was injected to obtain adequate anesthesia. A shave biopsy was performed. Hemostasis was achieved with aluminium chloride. Vaseline and a sterile dressing were applied. The patient tolerated the procedure and no complications were noted. The patient was provided with verbal and written post care instructions.    2. Multiple benign nevi. Counseled on ABCDEs of melanoma and sun protection. Asked patient to return sooner if noticing changing or symptomatic lesions.    3. Family history of melanoma.   - Recommended to consider genetic testing, she is considering this currently and may decide to proceed; she will let me know if she needs referral    4. Folliculitis.  - Start benzoyl peroxide 5% wash daily in shower. Counseled can bleach towels and clothing.    5. Benign lesions: dermatofibroma. Discussed the natural history and benign nature of this lesion. Reassurance provided that no additional treatment is necessary.     Follow-up in 1 year, earlier for new or changing lesions.       Staff Involved:  Staff Only    Mikki Trevino MD    Department of Dermatology  [facility]: Phone: [phone], Fax: [phone]  9/24/2020

## 2020-09-22 NOTE — NURSING NOTE
Lidocaine-epinephrine 1-1:837946 % injection   1.5mL once for one use, starting 9/22/2020 ending 9/22/2020,  2mL disp, R-0, injection  Injected by Nano Baker LPN

## 2020-09-22 NOTE — PATIENT INSTRUCTIONS
Skin looks good today!  We will check one spot.    Scar on left thigh = dermatofibroma.    For back, can start benzoyl peroxide 5% wash daily in shower. This can be purchased over the counter at [employer] or [employer] ([employer] makes this product). It can be found in a purple tube in the acne aisle. Be sure to rinse thoroughly with use as it can bleach towels and clothing.    Keep an eye out for changing moles, let me know if any issues, otherwise return in 1 year, sooner if concerns.    The ABCDEs of Melanoma  Asymmetry, Border (irregularity), Color (not uniform, changes in color), Diameter (greater than 6 mm which is about the size of a pencil eraser), and Evolving (any changes in preexisting moles)    Skin cancer can develop anywhere on the skin. Ask someone for help when checking your skin, especially in hard to see places. If you notice a mole different from others, or that changes, enlarges, itches, or bleeds (even if it is small), you should see a dermatologist.      Wound Care After a Biopsy    What is a skin biopsy?  A skin biopsy allows the doctor to examine a very small piece of tissue under the microscope to determine the diagnosis and the best treatment for the skin condition. A local anesthetic (numbing medicine)  is injected with a very small needle into the skin area to be tested. A small piece of skin is taken from the area. Sometimes a suture (stitch) is used.     What are the risks of a skin biopsy?  I will experience scar, bleeding, swelling, pain, crusting and redness. I may experience incomplete removal or recurrence. Risks of this procedure are excessive bleeding, bruising, infection, nerve damage, numbness, thick (hypertrophic or keloidal) scar and non-diagnostic biopsy.    How should I care for my wound for the first 24 hours?    Keep the wound dry and covered for 24 hours    If it bleeds, hold direct pressure on the area for 15 minutes. If bleeding does not stop then go to the emergency  room    Avoid strenuous exercise the first 1-2 days or as your doctor instructs you    How should I care for the wound after 24 hours?    After 24 hours, remove the bandage    You may bathe or shower as normal    If you had a scalp biopsy, you can shampoo as usual and can use shower water to clean the biopsy site daily    Clean the wound twice a day with gentle soap and water    Do not scrub, be gentle    Apply white petroleum/Vaseline after cleaning the wound with a cotton swab or a clean finger, and keep the site covered with a Bandaid /bandage. Bandages are not necessary with a scalp biopsy    If you are unable to cover the site with a Bandaid /bandage, re-apply ointment 2-3 times a day to keep the site moist. Moisture will help with healing    Avoid strenuous activity for first 1-2 days    Avoid lakes, rivers, pools, and oceans until the stitches are removed or the site is healed    How do I clean my wound?    Wash hands thoroughly with soap or use hand  before all wound care    Clean the wound with gentle soap and water    Apply white petroleum/Vaseline  to wound after it is clean    Replace the Bandaid /bandage to keep the wound covered for the first few days or as instructed by your doctor    If you had a scalp biopsy, warm shower water to the area on a daily basis should suffice    What should I use to clean my wound?     Cotton-tipped applicators (Qtips )    White petroleum jelly (Vaseline ). Use a clean new container and use Q-tips to apply.    Bandaids   as needed    Gentle soap     How should I care for my wound long term?    Do not get your wound dirty    Keep up with wound care for one week or until the area is healed.    A small scab will form and fall off by itself when the area is completely healed. The area will be red and will become pink in color as it heals. Sun protection is very important for how your scar will turn out. Sunscreen with an SPF 30 or greater is recommended once the area  is healed.    If you have stitches, stitches need to be removed in 14 days. You may return to our clinic for this or you may have it done locally at your doctor s office.    You should have some soreness but it should be mild and slowly go away over several days. Talk to your doctor about using tylenol for pain,    When should I call my doctor?  If you have increased:     Pain or swelling    Pus or drainage (clear or slightly yellow drainage is ok)    Temperature over 100F    Spreading redness or warmth around wound    When will I hear about my results?  The biopsy results can take 2-3 weeks to come back. The clinic will call you with the results, send you a [employer] message, or have you schedule a follow-up clinic or phone time to discuss the results. Contact our clinics if you do not hear from us in 3 weeks.     Who should I call with questions?    [facility]: [phone]     [facility]: [phone]    For urgent needs outside of business hours call the [facility] at [phone] and ask for the dermatology resident on call

## 2020-09-22 NOTE — NURSING NOTE
Dermatology Rooming Note    Preeti Fox's goals for this visit include:   Chief Complaint   Patient presents with     Skin Check     Preeti is here today for a skin check family hx of melanoma      COLTEN Alfaro

## 2020-09-24 LAB — COPATH REPORT: NORMAL

## 2020-09-24 NOTE — PROGRESS NOTES
[facility] Dermatology Note      Dermatology Problem List:  1. Family history of melanoma.  - Mother, maternal uncle, 2 grandparents (maternal GM and paternal GM)  - Also breast cancer in maternal GM and paternal GM; colon cancer in maternal GM and maternal GF  - No pancreatic or ovarian cancer  - Consider genetics referral  2. Folliculitis. BP 5% wash.  # Neoplasm of uncertain behavior, right thigh, s/p shave bx 9/22/2020.    CC:   Chief Complaint   Patient presents with     Skin Check     Preeti is here today for a skin check family hx of melanoma          Encounter Date: Sep 22, 2020    History of Present Illness:  Ms. Preeti Fox is a 30 year old female who presents for evaluation of skin check and family history of melanoma. Patient has many relatives with melanoma including mom, maternal uncle, and 2 grandparents (maternal GM and paternal GM). She also has family history of  breast cancer in maternal GM and paternal GM and colon cancer in maternal GM and maternal GF. No pancreatic or ovarian cancer.    She notes a few concerns today. Has a few spots on her lefts that are newer. None are changing or growing. Never has had prior biopsies. No personal history of skin cancer.    Otherwise feeling well, no additional skin concerns.     Past Medical History:   Patient Active Problem List   Diagnosis     Type 1 diabetes mellitus with hyperglycemia (H)     Anxiety     Recurrent major depressive disorder, in full remission (H)     Past Medical History:   Diagnosis Date     Anxiety      Depressive disorder      Major depression      Type 1 diabetes mellitus with hyperglycemia (H)      History reviewed. No pertinent surgical history.    Social History:  Patient reports that she has never smoked. She has never used smokeless tobacco. She reports current alcohol use. She reports that she does not use drugs.   She is engaged, will be getting  next June.    Family History:  Family History    Problem Relation Age of Onset     Anxiety Disorder Mother      Depression Mother         Mother     Skin Cancer Mother      Melanoma Mother      Lung Cancer Father      Melanoma Maternal Grandmother      Skin Cancer Maternal Grandmother      Melanoma Paternal Grandmother      Skin Cancer Paternal Grandmother      Melanoma Maternal Uncle      Skin Cancer Maternal Uncle      Glaucoma No family hx of      Macular Degeneration No family hx of        Medications:  Current Outpatient Medications   Medication Sig Dispense Refill     ALPRAZolam (XANAX) 0.25 MG tablet Take 1 tablet (0.25 mg) by mouth daily as needed for anxiety 20 tablet 0     HUMALOG 100 UNIT/ML injection INJ 90 UNITS SC D VIA INSULIN PUMP  3     sertraline (ZOLOFT) 100 MG tablet Take 1.5 tablets (150 mg) by mouth daily 135 tablet 1     No Known Allergies      Review of Systems:  -Constitutional: Otherwise feeling well today, in usual state of health.  -Skin: As above in HPI. No additional skin concerns.    Physical exam:  GEN: This is a well developed, well-nourished female in no acute distress, in a pleasant mood.    SKIN: Total skin excluding the undergarment areas was performed. The exam included the head/face, neck, both arms, chest, back, abdomen, both legs, digits and/or nails.   -Hernandez skin type: II  -many light brown macules on trunk and extremities, overall uniform and reassuring.  -light pink macules/papules and pustules on back diffusely.  -right thigh with slightly firm brown somewhat irregular papule, no clear network on dermoscopy.  -There is a firm tan/flesh colored papule that dimples with lateral pressure on the left thigh.  -No other lesions of concern on areas examined.     Impression/Plan:  1. Neoplasm of uncertain behavior, R thigh. Given new appearance, offered bx versus photomonitoring. She would prefer bx. Ddx DF versus top of cyst versus dysplastic nevus?   - Shave biopsy:  After discussion of benefits and risks including  but not limited to bleeding/bruising, pain/swelling, infection, scar, incomplete removal, nerve damage/numbness, recurrence, and non-diagnostic biopsy, written consent, verbal consent and photographs were obtained. Time-out was performed. The area was cleaned with isopropyl alcohol. 0.5ml of 1% lidocaine with 1:100,000 epinephrine was injected to obtain adequate anesthesia. A shave biopsy was performed. Hemostasis was achieved with aluminium chloride. Vaseline and a sterile dressing were applied. The patient tolerated the procedure and no complications were noted. The patient was provided with verbal and written post care instructions.    2. Multiple benign nevi. Counseled on ABCDEs of melanoma and sun protection. Asked patient to return sooner if noticing changing or symptomatic lesions.    3. Family history of melanoma.   - Recommended to consider genetic testing, she is considering this currently and may decide to proceed; she will let me know if she needs referral    4. Folliculitis.  - Start benzoyl peroxide 5% wash daily in shower. Counseled can bleach towels and clothing.    5. Benign lesions: dermatofibroma. Discussed the natural history and benign nature of this lesion. Reassurance provided that no additional treatment is necessary.     Follow-up in 1 year, earlier for new or changing lesions.       Staff Involved:  Staff Only    Mikki Trevino MD    Department of Dermatology  [facility]: Phone: [phone], Fax: [phone]  9/24/2020

## 2020-11-01 ENCOUNTER — MYC MEDICAL ADVICE (OUTPATIENT)
Dept: FAMILY MEDICINE | Facility: CLINIC | Age: 30
End: 2020-11-01

## 2020-11-01 DIAGNOSIS — F41.9 ANXIETY: Primary | ICD-10-CM

## 2020-11-01 DIAGNOSIS — F33.42 RECURRENT MAJOR DEPRESSIVE DISORDER, IN FULL REMISSION (H): ICD-10-CM

## 2020-11-02 NOTE — TELEPHONE ENCOUNTER
I have signed the referral for counseling.    And at her last virtual visit, I recommended that she and I have a virtual visit again 6-8 weeks from her last visit, because we adjusted medication.  Can you be sure we help set that up?

## 2020-11-02 NOTE — TELEPHONE ENCOUNTER
Patient scheduled for video visit on 11/10/20 at 3pm. [employer] sent and closing encounter.    Marlin Joyner RN

## 2020-11-10 ENCOUNTER — VIRTUAL VISIT (OUTPATIENT)
Dept: FAMILY MEDICINE | Facility: CLINIC | Age: 30
End: 2020-11-10
Payer: COMMERCIAL

## 2020-11-10 DIAGNOSIS — F41.9 ANXIETY: ICD-10-CM

## 2020-11-10 DIAGNOSIS — F33.42 RECURRENT MAJOR DEPRESSIVE DISORDER, IN FULL REMISSION (H): ICD-10-CM

## 2020-11-10 PROCEDURE — 99213 OFFICE O/P EST LOW 20 MIN: CPT | Mod: 95 | Performed by: FAMILY MEDICINE

## 2020-11-10 RX ORDER — ALPRAZOLAM 0.25 MG
0.25 TABLET ORAL DAILY PRN
Qty: 20 TABLET | Refills: 0 | Status: SHIPPED | OUTPATIENT
Start: 2020-11-10 | End: 2021-01-14

## 2020-11-10 RX ORDER — SERTRALINE HYDROCHLORIDE 100 MG/1
200 TABLET, FILM COATED ORAL DAILY
Qty: 180 TABLET | Refills: 1 | Status: SHIPPED | OUTPATIENT
Start: 2020-11-10 | End: 2021-05-09

## 2020-11-10 ASSESSMENT — PATIENT HEALTH QUESTIONNAIRE - PHQ9
SUM OF ALL RESPONSES TO PHQ QUESTIONS 1-9: 10
5. POOR APPETITE OR OVEREATING: MORE THAN HALF THE DAYS

## 2020-11-10 ASSESSMENT — ANXIETY QUESTIONNAIRES
6. BECOMING EASILY ANNOYED OR IRRITABLE: SEVERAL DAYS
3. WORRYING TOO MUCH ABOUT DIFFERENT THINGS: MORE THAN HALF THE DAYS
5. BEING SO RESTLESS THAT IT IS HARD TO SIT STILL: SEVERAL DAYS
GAD7 TOTAL SCORE: 11
1. FEELING NERVOUS, ANXIOUS, OR ON EDGE: MORE THAN HALF THE DAYS
2. NOT BEING ABLE TO STOP OR CONTROL WORRYING: MORE THAN HALF THE DAYS
IF YOU CHECKED OFF ANY PROBLEMS ON THIS QUESTIONNAIRE, HOW DIFFICULT HAVE THESE PROBLEMS MADE IT FOR YOU TO DO YOUR WORK, TAKE CARE OF THINGS AT HOME, OR GET ALONG WITH OTHER PEOPLE: SOMEWHAT DIFFICULT
7. FEELING AFRAID AS IF SOMETHING AWFUL MIGHT HAPPEN: SEVERAL DAYS

## 2020-11-10 NOTE — PROGRESS NOTES
"Preeti Fox is a 30 year old female who is being evaluated via a billable video visit.      The patient has been notified of following:     \"This video visit will be conducted via a call between you and your physician/provider. We have found that certain health care needs can be provided without the need for an in-person physical exam.  This service lets us provide the care you need with a video conversation.  If a prescription is necessary we can send it directly to your pharmacy.  If lab work is needed we can place an order for that and you can then stop by our lab to have the test done at a later time.    Video visits are billed at different rates depending on your insurance coverage.  Please reach out to your insurance provider with any questions.    If during the course of the call the physician/provider feels a video visit is not appropriate, you will not be charged for this service.\"    Patient has given verbal consent for Video visit? Yes  How would you like to obtain your AVS? MyChart  If you are dropped from the video visit, the video invite should be resent to: Mychart  Will anyone else be joining your video visit? No    Subjective     Preeti Fox is a 30 year old female who presents today via video visit for the following health issues:    HPI           Depression and Anxiety Follow-Up    How are you doing with your depression since your last visit? Slightly worse     How are you doing with your anxiety since your last visit?  Same or worse     Are you having other symptoms that might be associated with depression or anxiety? Hard time sleeping due to her anxiety. Gets 5-6 hours of sleep on average. Hard time falling asleep. Uses melatonin gummies sometimes    Have you had a significant life event? Work status.    Do you have any concerns with your use of alcohol or other drugs? No     Loved her previous job.  But was laid off in March due to COVID.  Is working part time, but hasn't found other " work.  Isn't certain of what kind of other work she might want to look for.  Lives with her fiance, who is supportive.    Has to apply for new health insurance, and finds that stressful.      Has 4-5 tablet left of Alprazolam    No therapy opening til December    Has had alprazolam on hand for anxiety since age 18.    Social History     Tobacco Use     Smoking status: Never Smoker     Smokeless tobacco: Never Used   Substance Use Topics     Alcohol use: Yes     Comment: 1-2 drinks per week      Drug use: Never     PHQ 1/2/2020 9/3/2020 9/11/2020   PHQ-9 Total Score 8 9 7   Q9: Thoughts of better off dead/self-harm past 2 weeks Not at all Not at all Not at all     ROSANNA-7 SCORE 8/30/2019 1/2/2020 9/11/2020   Total Score 5 5 11     Last PHQ-9 11/10/2020   1.  Little interest or pleasure in doing things 1   2.  Feeling down, depressed, or hopeless 2   3.  Trouble falling or staying asleep, or sleeping too much 2   4.  Feeling tired or having little energy 1   5.  Poor appetite or overeating 0   6.  Feeling bad about yourself 3   7.  Trouble concentrating 0   8.  Moving slowly or restless 1   Q9: Thoughts of better off dead/self-harm past 2 weeks 0   PHQ-9 Total Score 10   Difficulty at work, home, or with people Somewhat difficult     ROSANNA-7  11/10/2020   1. Feeling nervous, anxious, or on edge 2   2. Not being able to stop or control worrying 2   3. Worrying too much about different things 2   4. Trouble relaxing 2   5. Being so restless that it is hard to sit still 1   6. Becoming easily annoyed or irritable 1   7. Feeling afraid, as if something awful might happen 1   ROSANNA-7 Total Score 11   If you checked any problems, how difficult have they made it for you to do your work, take care of things at home, or get along with other people? Somewhat difficult       Suicide Assessment Five-step Evaluation and Treatment (SAFE-T)        How many days per week do you miss taking your medication? 0           Video Start Time:  3:17 pm        Review of Systems   Constitutional, HEENT, cardiovascular, pulmonary, gi and gu systems are negative, except as otherwise noted.      Objective           Vitals:  No vitals were obtained today due to virtual visit.    Physical Exam     GENERAL: Healthy, alert and no distress  EYES: Eyes grossly normal to inspection.  No discharge or erythema, or obvious scleral/conjunctival abnormalities.  RESP: No audible wheeze, cough, or visible cyanosis.  No visible retractions or increased work of breathing.    SKIN: Visible skin clear. No significant rash, abnormal pigmentation or lesions.  NEURO: Cranial nerves grossly intact.  Mentation and speech appropriate for age.  PSYCH: Mentation appears normal, affect normal/bright, judgement and insight intact, normal speech and appearance well-groomed.              Assessment & Plan     Anxiety  situationally worsened.  Does have therapy appointment set up.  Will increase zoloft to 200 mg.  And consider adding sunlamp  Uses xanax sparingly.  Has needed it more recently due to situational stressors.  - ALPRAZolam (XANAX) 0.25 MG tablet  Dispense: 20 tablet; Refill: 0  - sertraline (ZOLOFT) 100 MG tablet  Dispense: 180 tablet; Refill: 1    Recurrent major depressive disorder, in full remission (H)  As above  - sertraline (ZOLOFT) 100 MG tablet  Dispense: 180 tablet; Refill: 1          See Patient Instructions    Return in about 8 weeks (around 1/5/2021) for Mood/Mental Health Visit.    Roshni Nevarez MD  [facility]      Video-Visit Details    Type of service:  Video Visit    Video End Time:3:29 PM    Originating Location (pt. Location): Home    Distant Location (provider location):  [facility]     Platform used for Video Visit: [employer]

## 2020-11-11 ENCOUNTER — TELEPHONE (OUTPATIENT)
Dept: FAMILY MEDICINE | Facility: CLINIC | Age: 30
End: 2020-11-11

## 2020-11-11 ASSESSMENT — ANXIETY QUESTIONNAIRES: GAD7 TOTAL SCORE: 11

## 2020-11-11 NOTE — TELEPHONE ENCOUNTER
Roshni Cortez MD  [facility] [employer]             Please assist with scheduling virtual visit in 8 weeks      Copied from CC chart.    Ruthy Cardoza

## 2020-11-11 NOTE — TELEPHONE ENCOUNTER
Left detail message on voice mail. Informed Preeti that I was looking at PCP next opening which would be  01/14/2020 at 2:40. I already scheduled her an appointment. Told her to call us back if she needs to reschedule.

## 2020-12-16 ENCOUNTER — VIRTUAL VISIT (OUTPATIENT)
Dept: PSYCHOLOGY | Facility: CLINIC | Age: 30
End: 2020-12-16
Attending: FAMILY MEDICINE
Payer: COMMERCIAL

## 2020-12-16 DIAGNOSIS — F33.9 EPISODE OF RECURRENT MAJOR DEPRESSIVE DISORDER, UNSPECIFIED DEPRESSION EPISODE SEVERITY (H): Primary | ICD-10-CM

## 2020-12-16 DIAGNOSIS — F41.9 ANXIETY: ICD-10-CM

## 2020-12-16 PROCEDURE — 90791 PSYCH DIAGNOSTIC EVALUATION: CPT | Mod: 95 | Performed by: PSYCHOLOGIST
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
## 2020-12-22 PROBLEM — F33.9 RECURRENT MAJOR DEPRESSIVE DISORDER (H): Status: ACTIVE | Noted: 2019-08-30

## 2020-12-22 ASSESSMENT — COLUMBIA-SUICIDE SEVERITY RATING SCALE - C-SSRS
5. HAVE YOU STARTED TO WORK OUT OR WORKED OUT THE DETAILS OF HOW TO KILL YOURSELF? DO YOU INTEND TO CARRY OUT THIS PLAN?: NO
4. HAVE YOU HAD THESE THOUGHTS AND HAD SOME INTENTION OF ACTING ON THEM?: NO
6. HAVE YOU EVER DONE ANYTHING, STARTED TO DO ANYTHING, OR PREPARED TO DO ANYTHING TO END YOUR LIFE?: NO
6. HAVE YOU EVER DONE ANYTHING, STARTED TO DO ANYTHING, OR PREPARED TO DO ANYTHING TO END YOUR LIFE?: NO
TOTAL  NUMBER OF INTERRUPTED ATTEMPTS LIFETIME: NO
2. HAVE YOU ACTUALLY HAD ANY THOUGHTS OF KILLING YOURSELF?: NO
TOTAL  NUMBER OF ABORTED OR SELF INTERRUPTED ATTEMPTS PAST 3 MONTHS: NO
5. HAVE YOU STARTED TO WORK OUT OR WORKED OUT THE DETAILS OF HOW TO KILL YOURSELF? DO YOU INTEND TO CARRY OUT THIS PLAN?: NO
2. HAVE YOU ACTUALLY HAD ANY THOUGHTS OF KILLING YOURSELF LIFETIME?: NO
4. HAVE YOU HAD THESE THOUGHTS AND HAD SOME INTENTION OF ACTING ON THEM?: NO
1. IN THE PAST MONTH, HAVE YOU WISHED YOU WERE DEAD OR WISHED YOU COULD GO TO SLEEP AND NOT WAKE UP?: NO
TOTAL  NUMBER OF ABORTED OR SELF INTERRUPTED ATTEMPTS PAST LIFETIME: NO
ATTEMPT PAST THREE MONTHS: NO
1. IN THE PAST MONTH, HAVE YOU WISHED YOU WERE DEAD OR WISHED YOU COULD GO TO SLEEP AND NOT WAKE UP?: NO
3. HAVE YOU BEEN THINKING ABOUT HOW YOU MIGHT KILL YOURSELF?: NO
TOTAL  NUMBER OF INTERRUPTED ATTEMPTS PAST 3 MONTHS: NO
ATTEMPT LIFETIME: NO

## 2020-12-29 ENCOUNTER — VIRTUAL VISIT (OUTPATIENT)
Dept: PSYCHOLOGY | Facility: CLINIC | Age: 30
End: 2020-12-29
Payer: COMMERCIAL

## 2020-12-29 DIAGNOSIS — F41.9 ANXIETY: ICD-10-CM

## 2020-12-29 DIAGNOSIS — F33.9 EPISODE OF RECURRENT MAJOR DEPRESSIVE DISORDER, UNSPECIFIED DEPRESSION EPISODE SEVERITY (H): Primary | ICD-10-CM

## 2020-12-29 PROCEDURE — 90834 PSYTX W PT 45 MINUTES: CPT | Mod: 95 | Performed by: PSYCHOLOGIST
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
## 2021-01-03 ENCOUNTER — HEALTH MAINTENANCE LETTER (OUTPATIENT)
Age: 31
End: 2021-01-03

## 2021-01-12 ENCOUNTER — VIRTUAL VISIT (OUTPATIENT)
Dept: PSYCHOLOGY | Facility: CLINIC | Age: 31
End: 2021-01-12
Payer: COMMERCIAL

## 2021-01-12 DIAGNOSIS — F33.9 EPISODE OF RECURRENT MAJOR DEPRESSIVE DISORDER, UNSPECIFIED DEPRESSION EPISODE SEVERITY (H): Primary | ICD-10-CM

## 2021-01-12 DIAGNOSIS — F41.9 ANXIETY: ICD-10-CM

## 2021-01-12 PROCEDURE — 90834 PSYTX W PT 45 MINUTES: CPT | Mod: 95 | Performed by: PSYCHOLOGIST
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
## 2021-01-14 ENCOUNTER — VIRTUAL VISIT (OUTPATIENT)
Dept: FAMILY MEDICINE | Facility: CLINIC | Age: 31
End: 2021-01-14
Payer: COMMERCIAL

## 2021-01-14 DIAGNOSIS — F33.9 EPISODE OF RECURRENT MAJOR DEPRESSIVE DISORDER, UNSPECIFIED DEPRESSION EPISODE SEVERITY (H): Primary | ICD-10-CM

## 2021-01-14 DIAGNOSIS — F41.9 ANXIETY: ICD-10-CM

## 2021-01-14 PROCEDURE — 99213 OFFICE O/P EST LOW 20 MIN: CPT | Mod: 95 | Performed by: FAMILY MEDICINE

## 2021-01-14 RX ORDER — ALPRAZOLAM 0.25 MG
0.25 TABLET ORAL DAILY PRN
Qty: 20 TABLET | Refills: 0 | Status: SHIPPED | OUTPATIENT
Start: 2021-01-14 | End: 2021-09-28

## 2021-01-14 ASSESSMENT — PATIENT HEALTH QUESTIONNAIRE - PHQ9
SUM OF ALL RESPONSES TO PHQ QUESTIONS 1-9: 10
5. POOR APPETITE OR OVEREATING: NEARLY EVERY DAY

## 2021-01-14 ASSESSMENT — ANXIETY QUESTIONNAIRES
5. BEING SO RESTLESS THAT IT IS HARD TO SIT STILL: SEVERAL DAYS
1. FEELING NERVOUS, ANXIOUS, OR ON EDGE: MORE THAN HALF THE DAYS
6. BECOMING EASILY ANNOYED OR IRRITABLE: SEVERAL DAYS
IF YOU CHECKED OFF ANY PROBLEMS ON THIS QUESTIONNAIRE, HOW DIFFICULT HAVE THESE PROBLEMS MADE IT FOR YOU TO DO YOUR WORK, TAKE CARE OF THINGS AT HOME, OR GET ALONG WITH OTHER PEOPLE: SOMEWHAT DIFFICULT
GAD7 TOTAL SCORE: 11
7. FEELING AFRAID AS IF SOMETHING AWFUL MIGHT HAPPEN: NOT AT ALL
3. WORRYING TOO MUCH ABOUT DIFFERENT THINGS: MORE THAN HALF THE DAYS
2. NOT BEING ABLE TO STOP OR CONTROL WORRYING: MORE THAN HALF THE DAYS

## 2021-01-14 NOTE — PATIENT INSTRUCTIONS
Please be sure to schedule your follow up with Dr. Richards.    And I will look forward to seeing you in about 4 months

## 2021-01-14 NOTE — PROGRESS NOTES
Preeti is a 30 year old who is being evaluated via a billable video visit.      How would you like to obtain your AVS? MyChart  If the video visit is dropped, the invitation should be resent by: Text to cell phone: [phone]  Will anyone else be joining your video visit? No    Video Start Time: 2:44 PM  Assessment & Plan     Anxiety  Well controlled.  Has only used about 10 of her last RX of xanax.  But likes to be sure she keeps some on hand.  Has been working with therapist every 2 weeks and is finding it helpful.  Also doing well on 200 mg zoloft and not having any side effects.  Continue current medication    - ALPRAZolam (XANAX) 0.25 MG tablet; Take 1 tablet (0.25 mg) by mouth daily as needed for anxiety    Episode of recurrent major depressive disorder, unspecified depression episode severity (H)  Continue current medication                             Follow up in 4 months    No follow-ups on file.    Roshni Nevarez MD  [facility]     Preeti is a 30 year old who presents to clinic today for the following health issues     HPI       Depression and Anxiety Follow-Up    How are you doing with your depression since your last visit? Improved     How are you doing with your anxiety since your last visit?  Improved     Are you having other symptoms that might be associated with depression or anxiety? No    Have you had a significant life event? Job Concerns     Do you have any concerns with your use of alcohol or other drugs? No    Social History     Tobacco Use     Smoking status: Never Smoker     Smokeless tobacco: Never Used   Substance Use Topics     Alcohol use: Yes     Comment: 1-2 drinks per week      Drug use: Never     PHQ 9/3/2020 9/11/2020 11/10/2020   PHQ-9 Total Score 9 7 10   Q9: Thoughts of better off dead/self-harm past 2 weeks Not at all Not at all Not at all   Some encounter information is confidential and restricted. Go to Review Flowsheets  activity to see all data.     ROSANNA-7 SCORE 9/11/2020 11/10/2020 12/14/2020   Total Score - - 17 (severe anxiety)   Total Score 11 11 -   Some encounter information is confidential and restricted. Go to Review Flowsheets activity to see all data.     Last PHQ-9 1/14/2021   1.  Little interest or pleasure in doing things 1   2.  Feeling down, depressed, or hopeless 2   3.  Trouble falling or staying asleep, or sleeping too much 3   4.  Feeling tired or having little energy 1   5.  Poor appetite or overeating 0   6.  Feeling bad about yourself 2   7.  Trouble concentrating 1   8.  Moving slowly or restless 0   Q9: Thoughts of better off dead/self-harm past 2 weeks 0   PHQ-9 Total Score 10   Difficulty at work, home, or with people Somewhat difficult     ROSANNA-7  1/14/2021   1. Feeling nervous, anxious, or on edge 2   2. Not being able to stop or control worrying 2   3. Worrying too much about different things 2   4. Trouble relaxing 3   5. Being so restless that it is hard to sit still 1   6. Becoming easily annoyed or irritable 1   7. Feeling afraid, as if something awful might happen 0   ROSANNA-7 Total Score 11   If you checked any problems, how difficult have they made it for you to do your work, take care of things at home, or get along with other people? Somewhat difficult       Suicide Assessment Five-step Evaluation and Treatment (SAFE-T)            Review of Systems   Constitutional, HEENT, cardiovascular, pulmonary, gi and gu systems are negative, except as otherwise noted.      Objective           Vitals:  No vitals were obtained today due to virtual visit.    Physical Exam   GENERAL: Healthy, alert and no distress  EYES: Eyes grossly normal to inspection.  No discharge or erythema, or obvious scleral/conjunctival abnormalities.  RESP: No audible wheeze, cough, or visible cyanosis.  No visible retractions or increased work of breathing.    SKIN: Visible skin clear. No significant rash, abnormal pigmentation or  lesions.  NEURO: Cranial nerves grossly intact.  Mentation and speech appropriate for age.  PSYCH: Mentation appears normal, affect normal/bright, judgement and insight intact, normal speech and appearance well-groomed.                Video-Visit Details    Type of service:  Video Visit    Video End Time:2:52 PM    Originating Location (pt. Location): Home    Distant Location (provider location):  [facility]     Platform used for Video Visit: DevinWell

## 2021-01-14 NOTE — Clinical Note
Please schedule Preeti with me in about 4 months to follow up on mood - can also schedule physical at that time if she is willing to come in.  Otherwise we can do video visit for mood if she prefers not to come in just yet.

## 2021-01-15 ASSESSMENT — ANXIETY QUESTIONNAIRES: GAD7 TOTAL SCORE: 11

## 2021-01-18 ENCOUNTER — TELEPHONE (OUTPATIENT)
Dept: FAMILY MEDICINE | Facility: CLINIC | Age: 31
End: 2021-01-18

## 2021-01-18 NOTE — TELEPHONE ENCOUNTER
Roshni Cortez MD  [facility] [employer]             Please schedule Preeti with me in about 4 months to follow up on mood - can also schedule physical at that time if she is willing to come in.   Otherwise we can do video visit for mood if she prefers not to come in just yet.      Copied from CC chart.    Ruthy Cardoza [facility]

## 2021-01-18 NOTE — TELEPHONE ENCOUNTER
Called patient and asked if I could help her scheduled. She said this was not a good time for her to schedule and she would call back and schedule later today.    SALO Holland  [facility]

## 2021-01-26 ENCOUNTER — VIRTUAL VISIT (OUTPATIENT)
Dept: PSYCHOLOGY | Facility: CLINIC | Age: 31
End: 2021-01-26
Payer: COMMERCIAL

## 2021-01-26 DIAGNOSIS — F33.9 EPISODE OF RECURRENT MAJOR DEPRESSIVE DISORDER, UNSPECIFIED DEPRESSION EPISODE SEVERITY (H): Primary | ICD-10-CM

## 2021-01-26 PROCEDURE — 90834 PSYTX W PT 45 MINUTES: CPT | Mod: 95 | Performed by: PSYCHOLOGIST
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
## 2021-02-09 ENCOUNTER — VIRTUAL VISIT (OUTPATIENT)
Dept: PSYCHOLOGY | Facility: CLINIC | Age: 31
End: 2021-02-09
Payer: COMMERCIAL

## 2021-02-09 DIAGNOSIS — F33.9 EPISODE OF RECURRENT MAJOR DEPRESSIVE DISORDER, UNSPECIFIED DEPRESSION EPISODE SEVERITY (H): Primary | ICD-10-CM

## 2021-02-09 DIAGNOSIS — F41.9 ANXIETY: ICD-10-CM

## 2021-02-09 PROCEDURE — 90834 PSYTX W PT 45 MINUTES: CPT | Mod: 95 | Performed by: PSYCHOLOGIST
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
## 2021-02-23 ENCOUNTER — VIRTUAL VISIT (OUTPATIENT)
Dept: PSYCHOLOGY | Facility: CLINIC | Age: 31
End: 2021-02-23
Payer: COMMERCIAL

## 2021-02-23 DIAGNOSIS — F33.9 EPISODE OF RECURRENT MAJOR DEPRESSIVE DISORDER, UNSPECIFIED DEPRESSION EPISODE SEVERITY (H): Primary | ICD-10-CM

## 2021-02-23 DIAGNOSIS — F41.9 ANXIETY: ICD-10-CM

## 2021-02-23 PROCEDURE — 90834 PSYTX W PT 45 MINUTES: CPT | Mod: 95 | Performed by: PSYCHOLOGIST
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
## 2021-03-23 ENCOUNTER — VIRTUAL VISIT (OUTPATIENT)
Dept: PSYCHOLOGY | Facility: CLINIC | Age: 31
End: 2021-03-23
Payer: COMMERCIAL

## 2021-03-23 DIAGNOSIS — F33.9 EPISODE OF RECURRENT MAJOR DEPRESSIVE DISORDER, UNSPECIFIED DEPRESSION EPISODE SEVERITY (H): Primary | ICD-10-CM

## 2021-03-23 PROCEDURE — 90834 PSYTX W PT 45 MINUTES: CPT | Mod: 95 | Performed by: PSYCHOLOGIST
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
## 2021-04-25 ENCOUNTER — HEALTH MAINTENANCE LETTER (OUTPATIENT)
Age: 31
End: 2021-04-25

## 2021-05-04 ENCOUNTER — VIRTUAL VISIT (OUTPATIENT)
Dept: PSYCHOLOGY | Facility: CLINIC | Age: 31
End: 2021-05-04
Payer: COMMERCIAL

## 2021-05-04 DIAGNOSIS — F33.9 EPISODE OF RECURRENT MAJOR DEPRESSIVE DISORDER, UNSPECIFIED DEPRESSION EPISODE SEVERITY (H): Primary | ICD-10-CM

## 2021-05-04 PROCEDURE — 90834 PSYTX W PT 45 MINUTES: CPT | Mod: 95 | Performed by: PSYCHOLOGIST
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
## 2021-05-06 ENCOUNTER — TELEPHONE (OUTPATIENT)
Dept: FAMILY MEDICINE | Facility: CLINIC | Age: 31
End: 2021-05-06

## 2021-05-06 DIAGNOSIS — F33.42 RECURRENT MAJOR DEPRESSIVE DISORDER, IN FULL REMISSION (H): ICD-10-CM

## 2021-05-06 DIAGNOSIS — F41.9 ANXIETY: ICD-10-CM

## 2021-05-07 NOTE — TELEPHONE ENCOUNTER
Routing refill request to provider for review/approval because:  PHQ    PHQ-9 score:    PHQ 1/14/2021   PHQ-9 Total Score 10   Q9: Thoughts of better off dead/self-harm past 2 weeks Not at all   Some encounter information is confidential and restricted. Go to Review Flowsheets activity to see all data.                     Pending Prescriptions:                       Disp   Refills    sertraline (ZOLOFT) 100 MG tablet          180 ta*1        Sig: Take 2 tablets (200 mg) by mouth daily        Deshawn Malik RN

## 2021-05-09 RX ORDER — SERTRALINE HYDROCHLORIDE 100 MG/1
200 TABLET, FILM COATED ORAL DAILY
Qty: 180 TABLET | Refills: 0 | Status: SHIPPED | OUTPATIENT
Start: 2021-05-09 | End: 2021-08-09

## 2021-05-11 ENCOUNTER — MEDICAL CORRESPONDENCE (OUTPATIENT)
Dept: HEALTH INFORMATION MANAGEMENT | Facility: CLINIC | Age: 31
End: 2021-05-11

## 2021-05-19 ENCOUNTER — TELEPHONE (OUTPATIENT)
Dept: ENDOCRINOLOGY | Facility: CLINIC | Age: 31
End: 2021-05-19

## 2021-05-19 ENCOUNTER — MYC MEDICAL ADVICE (OUTPATIENT)
Dept: ENDOCRINOLOGY | Facility: CLINIC | Age: 31
End: 2021-05-19

## 2021-05-19 DIAGNOSIS — E10.65 TYPE 1 DIABETES MELLITUS WITH HYPERGLYCEMIA (H): Primary | ICD-10-CM

## 2021-05-19 NOTE — TELEPHONE ENCOUNTER
Reason for Call: Request for an order or referral:    Order or referral being requested: Prior Autorization for Ominopod`    Date needed: as soon as possible    Has the patient been seen by the PCP for this problem? Not Applicable    Additional comments: [facility] called and stated they sent over the request 7 days and would like to know if approved or disapproved.  It will not be covered otherwise.    Phone number Patient can be reached at:  Other phone number:  [phone]    Best Time:  Anytime    Can we leave a detailed message on this number?  YES    Call taken on 5/19/2021 at 9:56 AM by Marylin Garner

## 2021-05-19 NOTE — TELEPHONE ENCOUNTER
Fwd to team to see if any word has been heard.    Nelia BRENNAN RN Specialty Triage 5/19/2021 1:36 PM

## 2021-05-19 NOTE — LETTER
Preeti Fox  [address] [facility] [address]      June 2, 2021      To whom it may concern,    I am writing in appeal of a decision to deny coverage for the Omnipod Dash pods. Denial states patient must try Lifescan or Yajaira meters first. The Omnipod Dash is not a meter, it is an insulin pump system. Therefore, the reason for denial is not valid as the two are not equivalent systems. Please reconsider your decision.       Sincerely,    Dada Richards MD

## 2021-05-19 NOTE — TELEPHONE ENCOUNTER
Endo department has not received anything in regards to approval or denial. Still waiting a response.     Geri JARAMILLO MA

## 2021-05-20 RX ORDER — PROCHLORPERAZINE 25 MG/1
SUPPOSITORY RECTAL
Qty: 3 EACH | Refills: 11 | Status: SHIPPED | OUTPATIENT
Start: 2021-05-20 | End: 2022-06-11

## 2021-05-20 NOTE — TELEPHONE ENCOUNTER
Sensor pended okay to [facility]? Instructions for getting started with the G6 sensor?    Nelia BRENNAN RN Specialty Triage 5/20/2021 7:31 AM

## 2021-05-21 ENCOUNTER — TELEPHONE (OUTPATIENT)
Dept: FAMILY MEDICINE | Facility: CLINIC | Age: 31
End: 2021-05-21

## 2021-05-21 NOTE — TELEPHONE ENCOUNTER
Prior Authorization Retail Medication Request    Medication/Dose: Continuous Blood Gluc Sensor (DEXCOM G6 SENSOR) MISC  ICD code (if different than what is on RX):  unknown  Previously Tried and Failed:  unknown  Rationale:  unknown    Insurance Name:  unknown  Insurance ID:  Key: [id number]      Pharmacy Information (if different than what is on RX)  Name:  [facility]  Phone:  [phone]

## 2021-05-24 NOTE — TELEPHONE ENCOUNTER
Central Prior Authorization Team   Phone: [phone]      Prior Authorization Approval    Authorization Effective Date: 4/24/2021  Authorization Expiration Date: 5/23/2024  Medication: Continuous Blood Gluc Sensor (DEXCOM G6 SENSOR) MISC - APPROVED  Approved Dose/Quantity: 3 FOR 30  Reference #:     Insurance Company: [email or web address] - Phone [phone] Fax [phone]  Expected CoPay:       CoPay Card Available:      Foundation Assistance Needed:    Which Pharmacy is filling the prescription (Not needed for infusion/clinic administered): [id number] - [facility] [address] AT [facility] [address] [facility]  Pharmacy Notified: Yes  Patient Notified: Yes (**Instructed pharmacy to notify patient when script is ready to /ship.**)

## 2021-05-25 RX ORDER — INSULIN PUMP CART,CONT INF,RF
1 CARTRIDGE (EA) SUBCUTANEOUS
Qty: 3 EACH | Refills: 11 | Status: SHIPPED | OUTPATIENT
Start: 2021-05-25 | End: 2022-04-14

## 2021-05-25 NOTE — TELEPHONE ENCOUNTER
There is no order for this in the patient's chart. Please update chart with current directions and diagnosis and route back to PA Team.

## 2021-05-25 NOTE — TELEPHONE ENCOUNTER
Received fax from [facility] (Ph. [phone]):  Please clarify if the paient is using Omnipod dash or Omnipod [place]. Thank you.

## 2021-05-25 NOTE — TELEPHONE ENCOUNTER
Received fax from pharmacy stating patient requires Prior Authorization for Omnipod 5 Timothy Pods.     Insurance information:   Name:   Phone number:   ID number:     Initiate prior authorization or change medication? Dash/CMM Key: [id number]    If a prior authorization is to be initiated, please list the following:    -any medications the patient has tried and failed or any contraindications.  -is the patient currently on this medication, or has tried before?  -What is the diagnosis?  -Justification or other information that may be helpful.

## 2021-05-27 ENCOUNTER — TELEPHONE (OUTPATIENT)
Dept: ENDOCRINOLOGY | Facility: CLINIC | Age: 31
End: 2021-05-27

## 2021-05-27 NOTE — TELEPHONE ENCOUNTER
Central Prior Authorization Team   Phone: [phone]      PA Initiation    Medication: Omnipod 5 Timothy Pods - INITIATED  Insurance Company: [employer] - Phone [phone] Fax [phone]  Pharmacy Filling the Rx: [id number] - [facility] [address] AT [facility] [address]  Filling Pharmacy Phone: [phone]  Filling Pharmacy Fax: [phone]  Start Date: 5/27/2021

## 2021-05-27 NOTE — TELEPHONE ENCOUNTER
PA team can we look into her status of her omnipod dash PA? Thank you    Nelia BRENNAN RN Specialty Triage 5/27/2021 2:45 PM

## 2021-06-01 ENCOUNTER — TELEPHONE (OUTPATIENT)
Dept: ENDOCRINOLOGY | Facility: CLINIC | Age: 31
End: 2021-06-01

## 2021-06-01 NOTE — TELEPHONE ENCOUNTER
Called insurance.   They are transferring me to [facility].   [phone].  Appeal placed.     Dada Richards MD on 6/1/2021 at 1:46 PM

## 2021-06-01 NOTE — TELEPHONE ENCOUNTER
Central Prior Authorization Team   Phone: [phone]      Denied per CMM - insurance will refax denial letter.

## 2021-06-01 NOTE — TELEPHONE ENCOUNTER
Central Prior Authorization Team   Phone: [phone]      PRIOR AUTHORIZATION DENIED    Medication: Omnipod 5 Timothy Pods - DENIED    Denial Date: 5/27/2021    Denial Rational:       Appeal Information:     [phone]   PHONE [phone]

## 2021-06-29 ENCOUNTER — VIRTUAL VISIT (OUTPATIENT)
Dept: PSYCHOLOGY | Facility: CLINIC | Age: 31
End: 2021-06-29
Payer: COMMERCIAL

## 2021-06-29 DIAGNOSIS — F41.9 ANXIETY: ICD-10-CM

## 2021-06-29 DIAGNOSIS — F33.9 EPISODE OF RECURRENT MAJOR DEPRESSIVE DISORDER, UNSPECIFIED DEPRESSION EPISODE SEVERITY (H): Primary | ICD-10-CM

## 2021-06-29 PROCEDURE — 90834 PSYTX W PT 45 MINUTES: CPT | Mod: 95 | Performed by: PSYCHOLOGIST
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
## 2021-06-29 NOTE — PROGRESS NOTES
Error - double notes      Tika Diaz, LP  June 29, 2021

## 2021-07-26 ENCOUNTER — E-VISIT (OUTPATIENT)
Dept: OBGYN | Facility: CLINIC | Age: 31
End: 2021-07-26
Payer: COMMERCIAL

## 2021-07-26 DIAGNOSIS — N89.8 VAGINAL DISCHARGE: Primary | ICD-10-CM

## 2021-07-26 PROCEDURE — 99207 PR NON-BILLABLE SERV PER CHARTING: CPT | Performed by: OBSTETRICS & GYNECOLOGY

## 2021-07-28 ENCOUNTER — OFFICE VISIT (OUTPATIENT)
Dept: OBGYN | Facility: CLINIC | Age: 31
End: 2021-07-28
Payer: COMMERCIAL

## 2021-07-28 VITALS
WEIGHT: 150 LBS | HEART RATE: 100 BPM | SYSTOLIC BLOOD PRESSURE: 138 MMHG | BODY MASS INDEX: 23.54 KG/M2 | OXYGEN SATURATION: 97 % | HEIGHT: 67 IN | DIASTOLIC BLOOD PRESSURE: 98 MMHG

## 2021-07-28 DIAGNOSIS — B96.89 BV (BACTERIAL VAGINOSIS): ICD-10-CM

## 2021-07-28 DIAGNOSIS — Z01.411 ENCOUNTER FOR GYNECOLOGICAL EXAMINATION WITH ABNORMAL FINDING: Primary | ICD-10-CM

## 2021-07-28 DIAGNOSIS — Z12.4 SCREENING FOR MALIGNANT NEOPLASM OF CERVIX: ICD-10-CM

## 2021-07-28 DIAGNOSIS — N89.8 VAGINAL DISCHARGE: ICD-10-CM

## 2021-07-28 DIAGNOSIS — N76.0 BV (BACTERIAL VAGINOSIS): ICD-10-CM

## 2021-07-28 DIAGNOSIS — E10.65 TYPE 1 DIABETES MELLITUS WITH HYPERGLYCEMIA (H): ICD-10-CM

## 2021-07-28 LAB
CLUE CELLS: PRESENT
TRICHOMONAS, WET PREP: ABNORMAL
WBC'S/HIGH POWER FIELD, WET PREP: ABNORMAL
YEAST, WET PREP: ABNORMAL

## 2021-07-28 PROCEDURE — 87591 N.GONORRHOEAE DNA AMP PROB: CPT | Performed by: OBSTETRICS & GYNECOLOGY

## 2021-07-28 PROCEDURE — 87491 CHLMYD TRACH DNA AMP PROBE: CPT | Performed by: OBSTETRICS & GYNECOLOGY

## 2021-07-28 PROCEDURE — 99213 OFFICE O/P EST LOW 20 MIN: CPT | Mod: 25 | Performed by: OBSTETRICS & GYNECOLOGY

## 2021-07-28 PROCEDURE — 99395 PREV VISIT EST AGE 18-39: CPT | Performed by: OBSTETRICS & GYNECOLOGY

## 2021-07-28 PROCEDURE — G0145 SCR C/V CYTO,THINLAYER,RESCR: HCPCS | Performed by: OBSTETRICS & GYNECOLOGY

## 2021-07-28 PROCEDURE — 87210 SMEAR WET MOUNT SALINE/INK: CPT | Performed by: OBSTETRICS & GYNECOLOGY

## 2021-07-28 RX ORDER — METRONIDAZOLE 7.5 MG/G
1 GEL VAGINAL DAILY
Qty: 70 G | Refills: 0 | Status: SHIPPED | OUTPATIENT
Start: 2021-07-28 | End: 2021-08-02

## 2021-07-28 ASSESSMENT — PATIENT HEALTH QUESTIONNAIRE - PHQ9
SUM OF ALL RESPONSES TO PHQ QUESTIONS 1-9: 10
5. POOR APPETITE OR OVEREATING: SEVERAL DAYS

## 2021-07-28 ASSESSMENT — ANXIETY QUESTIONNAIRES
GAD7 TOTAL SCORE: 10
5. BEING SO RESTLESS THAT IT IS HARD TO SIT STILL: MORE THAN HALF THE DAYS
1. FEELING NERVOUS, ANXIOUS, OR ON EDGE: MORE THAN HALF THE DAYS
7. FEELING AFRAID AS IF SOMETHING AWFUL MIGHT HAPPEN: NOT AT ALL
6. BECOMING EASILY ANNOYED OR IRRITABLE: SEVERAL DAYS
2. NOT BEING ABLE TO STOP OR CONTROL WORRYING: MORE THAN HALF THE DAYS
3. WORRYING TOO MUCH ABOUT DIFFERENT THINGS: MORE THAN HALF THE DAYS

## 2021-07-28 ASSESSMENT — MIFFLIN-ST. JEOR: SCORE: 1438.03

## 2021-07-28 NOTE — PATIENT INSTRUCTIONS
Thank you for choosing us for your care. I think an in-clinic visit would be best next steps based on your symptoms. Please schedule a clinic appointment; you won t be charged for this eVisit.      You can schedule an appointment right here in [facility], or call [phone]

## 2021-07-28 NOTE — PROGRESS NOTES
Preeti is a 30 year old  female who presents for annual exam.     Menses are rare.  No LMP recorded. (Menstrual status: IUD).. Using IUD for contraception.  She is not currently considering pregnancy.  Besides routine health maintenance,  she would like to discuss vaginal odor.  Has developed over the last several days.  Accompanied by vaginal discharge.  Has never had this previously.  Has Mary IUD in place.  Feels strings are longer than previously.   has mentioned that he can feel them more than previously.    Recently  and hoping to start trying to conceive after the new year.  Has not had any evaluation of her diabetes over the last year.  Aware that her last hemoglobin A1c was significantly higher than would be recommended prior to conception.  Has started continuous glucose monitoring during the last year.  Feels too nervous to get labs done today as she feels like she has to psych herself up for blood draws.  She does not remember when her last EKG was.  She feels like it was probably in high school.  Same with echocardiogram.  Blood pressure is significantly elevated today.  She states that this has not been true in the past.  She feels it is a function of getting nervous at the doctor's office.  She does not have a blood pressure cuff at home.  GYNECOLOGIC HISTORY:  Menarche:   Preeti is sexually active with 1male partner(s) and is currently in monogamous relationship.    History sexually transmitted infections:Chlamydia  STI testing offered?  Accepted  TYSON exposure: Unknown  History of abnormal Pap smear: NO - age 21-29 PAP every 3 years recommended  Family history of breast CA: Yes (Please explain): pgm  Family history of uterine/ovarian CA: yes-pgm    Family history of colon CA: Yes (Please explain): mgm, mgf, pgm    HEALTH MAINTENANCE:  Cholesterol: (  Cholesterol   Date Value Ref Range Status   2020 193 <200 mg/dL Final   2019 169 <=199 mg/dL Final    History of  abnormal lipids: No  Mammo: na . History of abnormal Mammo: No.  Regular Self Breast Exams: No  Calcium/Vitamin D intake: source:  dairy, dietary supplement(s) Adequate? Yes  TSH: (  TSH   Date Value Ref Range Status   2020 1.11 0.40 - 4.00 mU/L Final    )  Pap; (No results found for: PAP )    HISTORY:  OB History    Para Term  AB Living   0 0 0 0 0 0   SAB TAB Ectopic Multiple Live Births   0 0 0 0 0     Past Medical History:   Diagnosis Date     Anxiety      Depressive disorder      Major depression      Type 1 diabetes mellitus with hyperglycemia (H)      No past surgical history on file.  Family History   Problem Relation Age of Onset     Anxiety Disorder Mother      Depression Mother         Mother     Skin Cancer Mother      Melanoma Mother      Lung Cancer Father      Melanoma Maternal Grandmother      Skin Cancer Maternal Grandmother      Melanoma Paternal Grandmother      Skin Cancer Paternal Grandmother      Melanoma Maternal Uncle      Skin Cancer Maternal Uncle      Glaucoma No family hx of      Macular Degeneration No family hx of      Social History     Socioeconomic History     Marital status: Single     Spouse name: None     Number of children: None     Years of education: None     Highest education level: None   Occupational History     None   Tobacco Use     Smoking status: Never Smoker     Smokeless tobacco: Never Used   Substance and Sexual Activity     Alcohol use: Yes     Comment: 1-2 drinks per week      Drug use: Never     Sexual activity: Yes     Partners: Male     Birth control/protection: I.U.D.   Other Topics Concern     Parent/sibling w/ CABG, MI or angioplasty before 65F 55M? No   Social History Narrative     None     Social Determinants of Health     Financial Resource Strain:      Difficulty of Paying Living Expenses:    Food Insecurity:      Worried About Running Out of Food in the Last Year:      Ran Out of Food in the Last Year:    Transportation Needs:       Lack of Transportation (Medical):      Lack of Transportation (Non-Medical):    Physical Activity:      Days of Exercise per Week:      Minutes of Exercise per Session:    Stress:      Feeling of Stress :    Social Connections:      Frequency of Communication with Friends and Family:      Frequency of Social Gatherings with Friends and Family:      Attends [employer] Services:      Active Member of Clubs or Organizations:      Attends Club or Organization Meetings:      Marital Status:    Intimate Partner Violence:      Fear of Current or Ex-Partner:      Emotionally Abused:      Physically Abused:      Sexually Abused:        Current Outpatient Medications:      ALPRAZolam (XANAX) 0.25 MG tablet, Take 1 tablet (0.25 mg) by mouth daily as needed for anxiety, Disp: 20 tablet, Rfl: 0     Continuous Blood Gluc Sensor (DEXCOM G6 SENSOR) MISC, Change every 10 days., Disp: 3 each, Rfl: 11     Continuous Blood Gluc Transmit (DEXCOM G6 TRANSMITTER) MISC, Change every 3 months., Disp: 1 each, Rfl: 3     HUMALOG 100 UNIT/ML injection, INJ 90 UNITS SC D VIA INSULIN PUMP, Disp: 8 vial, Rfl: 3     Insulin Disposable Pump (OMNIPOD 5 PACK) MISC, 1 each every 3 days, Disp: 3 each, Rfl: 11     sertraline (ZOLOFT) 100 MG tablet, Take 2 tablets (200 mg) by mouth daily, Disp: 180 tablet, Rfl: 0    Current Facility-Administered Medications:      levonorgestrel (FIDEL) 13.5 MG IUD 13.5 mg, 1 each, Intrauterine, Once, Lubna Glynn MD   No Known Allergies    Past medical, surgical, social and family history were reviewed and updated in EPIC.    ROS:   C:     NEGATIVE for fever, chills, change in weight  I:       NEGATIVE for worrisome rashes, moles or lesions  E:     NEGATIVE for vision changes or irritation  E/M: NEGATIVE for ear, mouth and throat problems  R:     NEGATIVE for significant cough or SOB  CV:   NEGATIVE for chest pain, palpitations or peripheral edema  GI:     NEGATIVE for nausea, abdominal pain, heartburn, or  "change in bowel habits  :   NEGATIVE for frequency, dysuria, hematuria, vaginal discharge, or irregular bleeding  M:     NEGATIVE for significant arthralgias or myalgia  N:      NEGATIVE for weakness, dizziness or paresthesias  E:      NEGATIVE for temperature intolerance, skin/hair changes  P:      NEGATIVE for changes in mood or affect.    EXAM:  Pulse 100   Ht 1.702 m (5' 7\")   Wt 68 kg (150 lb)   SpO2 97%   BMI 23.49 kg/m     BMI: Body mass index is 23.49 kg/m .  Constitutional: healthy, alert and no distress  Head: Normocephalic. No masses, lesions, tenderness or abnormalities  Neck: Neck supple. Trachea midline. No adenopathy. Thyroid symmetric, normal size.   Cardiovascular: RRR.   Respiratory: Negative.   Breast: Breasts reveal mild symmetric fibrocystic densities, but there are no dominant, discrete, fixed or suspicious masses found.  Gastrointestinal: Abdomen soft, non-tender, non-distended. No masses, organomegaly.  :  Vulva:  No external lesions, normal female hair distribution, no inguinal adenopathy.    Urethra:  Midline, non-tender, well supported, no discharge  Vagina:  Moist, pink, thin grey vaginal discharge, no lesions. IUD strings at os - kind of crinkly. Wrapped them back around cervix with swab.   Uterus:  Normal size, anteverted , non-tender, freely mobile.  Ovaries:  No masses appreciated, non-tender, mobile  Rectal Exam: deferred  Musculoskeletal: extremities normal  Skin: no suspicious lesions or rashes  Psychiatric: Affect appropriate, cooperative,mentation appears normal.     COUNSELING:   Reviewed preventive health counseling, as reflected in patient instructions       Regular exercise       Healthy diet/nutrition       Family planning   reports that she has never smoked. She has never used smokeless tobacco.    Body mass index is 23.49 kg/m .    FRAX Risk Assessment    ASSESSMENT:  30 year old female with satisfactory annual exam  (Z01.411) Encounter for gynecological " examination with abnormal finding  (primary encounter diagnosis)  Comment:   Plan: Pap today.   Not up-to-date on health maintenance for diabetes.  Will return to endocrinologist.    (N89.8) Vaginal discharge  Comment:   Plan: Wet preparation, CHLAMYDIA TRACHOMATIS PCR,         NEISSERIA GONORRHOEA PCR, CANCELED: NEISSERIA         GONORRHOEA PCR, CANCELED: CHLAMYDIA TRACHOMATIS        PCR            (E10.65) Type 1 diabetes mellitus with hyperglycemia (H)  Comment:   Plan: Discussed preconception optimization of diabetes for best outcomes during pregnancy.  Discussed pregestational diabetes is a high risk condition in pregnancy.  Recommend returning to her endocrinologist and completing comprehensive diabetes care now to prepare for pregnancy.  Recommend A1c less than 6 ideally prior to pregnancy.  Should have updated TSH, EKG, ophthalmology evaluation prior to pregnancy.  Recommend getting a blood pressure cuff and checking her blood pressures at home.    (Z12.4) Screening for malignant neoplasm of cervix  Comment:   Plan: Pap imaged thin layer screen reflex to HPV if         ASCUS - recommend age 25 - 29            (N76.0,  B96.89) BV (bacterial vaginosis)  Comment:   Plan: metroNIDAZOLE (METROGEL) 0.75 % vaginal gel        Discussed bacterial vaginosis as evidenced by clue cells on wet prep and discussed options for management.  She would like to proceed with MetroGel.  Cautioned patient on occurrence of thick chunky discharge nearing completion of MetroGel course, which is normal.    Recent Results (from the past 24 hour(s))   Wet preparation    Collection Time: 07/28/21  2:10 PM    Specimen: Vagina; Swab   Result Value Ref Range    Trichomonas Absent Absent    Yeast Absent Absent    Clue Cells Present (A) Absent    WBCs/high power field 1+ (A) None

## 2021-07-29 LAB
C TRACH DNA SPEC QL NAA+PROBE: NEGATIVE
N GONORRHOEA DNA SPEC QL NAA+PROBE: NEGATIVE

## 2021-07-29 ASSESSMENT — ANXIETY QUESTIONNAIRES: GAD7 TOTAL SCORE: 10

## 2021-07-30 LAB
BKR LAB AP GYN ADEQUACY: NORMAL
BKR LAB AP GYN INTERPRETATION: NORMAL
BKR LAB AP HPV REFLEX: NORMAL
BKR LAB AP PREVIOUS ABNORMAL: NORMAL
PATH REPORT.COMMENTS IMP SPEC: NORMAL
PATH REPORT.RELEVANT HX SPEC: NORMAL

## 2021-08-08 DIAGNOSIS — F41.9 ANXIETY: ICD-10-CM

## 2021-08-08 DIAGNOSIS — F33.42 RECURRENT MAJOR DEPRESSIVE DISORDER, IN FULL REMISSION (H): ICD-10-CM

## 2021-08-09 RX ORDER — SERTRALINE HYDROCHLORIDE 100 MG/1
TABLET, FILM COATED ORAL
Qty: 180 TABLET | Refills: 0 | Status: SHIPPED | OUTPATIENT
Start: 2021-08-09 | End: 2021-09-28

## 2021-08-09 NOTE — TELEPHONE ENCOUNTER
Routing refill request to provider for review/approval because:  Patient needs to be seen because:  recent 6 month or future 30 day visit- pt scheduled for follow up 9/28/21    PHQ9 less then 5 in past 6 months    [facility] Follow-up to PHQ 11/10/2020 1/14/2021 7/28/2021   PHQ-9 9. Suicide Ideation past 2 weeks Not at all Not at all Not at all   Some encounter information is confidential and restricted. Go to Review Flowsheets activity to see all data.       Holly Brownlee RN

## 2021-08-15 ENCOUNTER — HEALTH MAINTENANCE LETTER (OUTPATIENT)
Age: 31
End: 2021-08-15

## 2021-09-08 ENCOUNTER — TELEPHONE (OUTPATIENT)
Dept: FAMILY MEDICINE | Facility: CLINIC | Age: 31
End: 2021-09-08

## 2021-09-14 ENCOUNTER — OFFICE VISIT (OUTPATIENT)
Dept: DERMATOLOGY | Facility: CLINIC | Age: 31
End: 2021-09-14
Payer: COMMERCIAL

## 2021-09-14 DIAGNOSIS — Z80.8 FAMILY HISTORY OF MELANOMA: ICD-10-CM

## 2021-09-14 DIAGNOSIS — D48.5 NEOPLASM OF UNCERTAIN BEHAVIOR OF SKIN: ICD-10-CM

## 2021-09-14 DIAGNOSIS — D22.9 MULTIPLE BENIGN NEVI: ICD-10-CM

## 2021-09-14 DIAGNOSIS — D48.9 NEOPLASM OF UNCERTAIN BEHAVIOR: Primary | ICD-10-CM

## 2021-09-14 PROCEDURE — 99213 OFFICE O/P EST LOW 20 MIN: CPT | Mod: 25 | Performed by: DERMATOLOGY

## 2021-09-14 PROCEDURE — 11102 TANGNTL BX SKIN SINGLE LES: CPT | Performed by: DERMATOLOGY

## 2021-09-14 PROCEDURE — 88305 TISSUE EXAM BY PATHOLOGIST: CPT | Performed by: DERMATOLOGY

## 2021-09-14 PROCEDURE — 11103 TANGNTL BX SKIN EA SEP/ADDL: CPT | Performed by: DERMATOLOGY

## 2021-09-14 ASSESSMENT — PAIN SCALES - GENERAL: PAINLEVEL: NO PAIN (0)

## 2021-09-14 NOTE — NURSING NOTE
Chief Complaint   Patient presents with     Skin Check     annual     Derm Problem     moles     PT states she has a mole under her right breast, on her right thigh and on her back. Pt states the moles have been there around 8 years.  Family history of melanoma, mother, maternal uncle and 2 grandparents.

## 2021-09-14 NOTE — PATIENT INSTRUCTIONS
Wound Care After a Biopsy    What is a skin biopsy?  A skin biopsy allows the doctor to examine a very small piece of tissue under the microscope to determine the diagnosis and the best treatment for the skin condition. A local anesthetic (numbing medicine)  is injected with a very small needle into the skin area to be tested. A small piece of skin is taken from the area. Sometimes a suture (stitch) is used.     What are the risks of a skin biopsy?  I will experience scar, bleeding, swelling, pain, crusting and redness. I may experience incomplete removal or recurrence. Risks of this procedure are excessive bleeding, bruising, infection, nerve damage, numbness, thick (hypertrophic or keloidal) scar and non-diagnostic biopsy.    How should I care for my wound for the first 24 hours?    Keep the wound dry and covered for 24 hours    If it bleeds, hold direct pressure on the area for 15 minutes. If bleeding does not stop then go to the emergency room    Avoid strenuous exercise the first 1-2 days or as your doctor instructs you    How should I care for the wound after 24 hours?    After 24 hours, remove the bandage    You may bathe or shower as normal    If you had a scalp biopsy, you can shampoo as usual and can use shower water to clean the biopsy site daily    Clean the wound twice a day with gentle soap and water    Do not scrub, be gentle    Apply white petroleum/Vaseline after cleaning the wound with a cotton swab or a clean finger, and keep the site covered with a Bandaid /bandage. Bandages are not necessary with a scalp biopsy    If you are unable to cover the site with a Bandaid /bandage, re-apply ointment 2-3 times a day to keep the site moist. Moisture will help with healing    Avoid strenuous activity for first 1-2 days    Avoid lakes, rivers, pools, and oceans until the stitches are removed or the site is healed    How do I clean my wound?    Wash hands thoroughly with soap or use hand  before all  wound care    Clean the wound with gentle soap and water    Apply white petroleum/Vaseline  to wound after it is clean    Replace the Bandaid /bandage to keep the wound covered for the first few days or as instructed by your doctor    If you had a scalp biopsy, warm shower water to the area on a daily basis should suffice    What should I use to clean my wound?     Cotton-tipped applicators (Qtips )    White petroleum jelly (Vaseline ). Use a clean new container and use Q-tips to apply.    Bandaids   as needed    Gentle soap     How should I care for my wound long term?    Do not get your wound dirty    Keep up with wound care for one week or until the area is healed.    A small scab will form and fall off by itself when the area is completely healed. The area will be red and will become pink in color as it heals. Sun protection is very important for how your scar will turn out. Sunscreen with an SPF 30 or greater is recommended once the area is healed.    If you have stitches, stitches need to be removed in 14 days. You may return to our clinic for this or you may have it done locally at your doctor s office.    You should have some soreness but it should be mild and slowly go away over several days. Talk to your doctor about using tylenol for pain,    When should I call my doctor?  If you have increased:     Pain or swelling    Pus or drainage (clear or slightly yellow drainage is ok)    Temperature over 100F    Spreading redness or warmth around wound    When will I hear about my results?  The biopsy results can take 2 weeks to come back.  Your results will automatically release to [employer] before your provider has even reviewed them.  The clinic will call you with the results, send you a [employer] [place] [employer] message, or have you schedule a follow-up clinic or phone time to discuss the results.  Contact our clinics if you do not hear from us in 2 weeks.    Who should I call with questions?    [facility],  [place]: [phone]    [facility]: [phone]    For urgent needs outside of business hours call the [facility] at [phone] and ask for the dermatology resident on call      Patient Education     Checking for Skin Cancer  You can find cancer early by checking your skin each month. There are 3 kinds of skin cancer. They are melanoma, basal cell carcinoma, and squamous cell carcinoma. Doing monthly skin checks is the best way to find new marks or skin changes. Follow the instructions below for checking your skin.   The ABCDEs of checking moles for melanoma   Check your moles or growths for signs of melanoma using ABCDE:     Asymmetry: the sides of the mole or growth don t match    Border: the edges are ragged, notched, or blurred    Color: the color within the mole or growth varies    Diameter: the mole or growth is larger than 6 mm (size of a pencil eraser)    Evolving: the size, shape, or color of the mole or growth is changing (evolving is not shown in the images below)    Checking for other types of skin cancer  Basal cell carcinoma or squamous cell carcinoma have symptoms such as:       A spot or mole that looks different from all other marks on your skin    Changes in how an area feels, such as itching, tenderness, or pain    Changes in the skin's surface, such as oozing, bleeding, or scaliness    A sore that does not heal    New swelling or redness beyond the border of a mole    Who s at risk?  Anyone can get skin cancer. But you are at greater risk if you have:     Fair skin, light-colored hair, or light-colored eyes    Many moles or abnormal moles on your skin    A history of sunburns from sunlight or tanning beds    A family history of skin cancer    A history of exposure to radiation or chemicals    A weakened immune system  If you have had skin cancer in the past, you are at risk for recurring skin cancer.   How to check your skin  Do your monthly skin  checkups in front of a full-length mirror. Check all parts of your body, including your:     Head (ears, face, neck, and scalp)    Torso (front, back, and sides)    Arms (tops, undersides, upper, and lower armpits)    Hands (palms, backs, and fingers, including under the nails)    Buttocks and genitals    Legs (front, back, and sides)    Feet (tops, soles, toes, including under the nails, and between toes)  If you have a lot of moles, take digital photos of them each month. Make sure to take photos both up close and from a distance. These can help you see if any moles change over time.   Most skin changes are not cancer. But if you see any changes in your skin, call your doctor right away. Only he or she can diagnose a problem. If you have skin cancer, seeing your doctor can be the first step toward getting the treatment that could save your life.   [employer] last reviewed this educational content on 4/1/2019 2000-2020 [employer]. [address] [place] [address]. All rights reserved. This information is not intended as a substitute for professional medical care. Always follow your healthcare professional's instructions.       When should I call my doctor?    If you are worsening or not improving, please, contact us or seek urgent care as noted below.     Who should I call with questions (adults)?    [facility] (adult and pediatric): [phone]    [facility] (adult): [phone]    For urgent needs outside of business hours call the [facility] at [phone] and ask for the dermatology resident on call to be paged    If this is a medical emergency and you are unable to reach an ER, Call [phone]    Who should I call with questions (pediatric)?  [facility]- Pediatric Dermatology  Dr. Jen Escobar, Dr. Mago Garcia, Dr. Sabiha Friend, TAHMINA Win, Dr. Denisha Ac, Dr. Cecy Willis  & Dr. Reese Mariee  Non-urgent nurse triage line; [phone]- Tonya and Maria Eugenia RN Care Coordinators   Deepali (/Complex ) [phone]    If you need a prescription refill, please contact your pharmacy. Refills are approved or denied by our Physicians during normal business hours, Monday through Fridays  Per office policy, refills will not be granted if you have not been seen within the past year (or sooner depending on your child's condition)    Scheduling Information:  Pediatric Appointment Scheduling and Call Center [phone]  Radiology Scheduling- [phone]  Sedation Unit Scheduling- [phone]  [place] Scheduling- General [phone]; Pediatric Dermatology [phone]  Main  Services: [phone]  Albanian: [phone]  Liberian: [phone]  Hmong/Prydeinig/Cameroonian: [phone]  Preadmission Nursing Department Fax Number: [phone] (Fax all pre-operative paperwork to this number)    For urgent matters arising during evenings, weekends, or holidays that cannot wait for normal business hours please call [phone] and ask for the dermatology resident on call to be paged.

## 2021-09-14 NOTE — LETTER
9/14/2021       RE: Preeti Leiva  [address]  [facility] [address]     Dear Colleague,    Thank you for referring your patient, Preeti Leiva, to the [facility] DERMATOLOGY [facility]IC [place] at [facility]. Please see a copy of my visit note below.    [facility] Dermatology Note  Encounter Date: Sep 14, 2021  Office Visit     Dermatology Problem List:  #. Family history of melanoma.  - Mother, maternal uncle, 2 grandparents (maternal GM and paternal GM)  - Also breast cancer in maternal GM and paternal GM; colon cancer in maternal GM and maternal GF, someone also with ovarian cancer  - No pancreatic cancer  - Genetics referral 9/14/21  #. Folliculitis. BP 5% wash.  #. Intradermal melanocytic nevus with congenital features, right thigh, s/p shave bx 9/22/2020.  ____________________________________________    Assessment & Plan:    # NUBs:  - L medial thigh, s/p shave bx 9/14/21 - ddx symptomatic DF  - R inframammary fold, s/p shave bx 9/14/21 - ddx symptomatic nevus  - Central mid back, s/p shave bx 9/14/21 - ddx symptomatic nevus    #. Family history of melanoma as well as breast and ovarian cancer in multiple family members.  - Genetics referral    # Multiple benign nevi.   - No concerning lesions today  - Counseled on ABCDEs of melanoma and sun protection - recommend SPF 30 or higher with frequent application   - Return sooner if noticing changing or symptomatic lesions    Procedures Performed:   - Shave biopsy procedure note, location(s): see above. After discussion of benefits and risks including but not limited to bleeding, infection, scar, incomplete removal, recurrence, and non-diagnostic biopsy, written consent and photographs were obtained. The area was cleaned with isopropyl alcohol. 0.5mL of 1% lidocaine with epinephrine was injected to obtain adequate anesthesia of lesion(s). Shave biopsy at site(s) performed.  Hemostasis was achieved with aluminium chloride. Petrolatum ointment and a sterile dressing were applied. The patient tolerated the procedure and no complications were noted. The patient was provided with verbal and written post care instructions.     Follow-up: 1 year, sooner if concerns.     Staff and Scribe:     Provider Disclosure:   The documentation recorded by the scribe accurately reflects the services I personally performed and the decisions made by me.    Mikki Trevino MD    Department of Dermatology  [facility]: Phone: [phone], Fax: [phone]  9/23/2021       Scribe Disclosure:  I, Yuko Pettylokesh, am serving as a scribe to document services personally performed by Mikki Trevino MD based on data collection and the provider's statements to me.   ____________________________________________    CC: Skin Check (annual) and Derm Problem (moles)      HPI:  Ms. Preeti Leiva is a(n) 31 year old female who presents today as a return patient for FBSE.    The patient was last seen on 9/22/20, at which time a NUB on her right thigh was biopsied, which revealed intradermal melanocytic nevus with congenital features. She was also advised to start BPO wash daily for treatment of folliculitis, and had an otherwise benign FBSE.    Today, the patient makes note of two spots of concern. The first is on her left thigh which has been present for about 8 years now, it initially grew and has not changed recently, and it is often irritated by clothing and bathing. The second spot is below her right breast and is often caught on her bra, causing her discomfort. There is a similar spot on her back which also gets caught on her bra.     Patient is otherwise feeling well, without additional skin concerns. She recently got .     Labs Reviewed:  N/A    Physical Exam:  Vitals: There were no vitals taken for this  visit.  SKIN: Full body skin exam excluding the genitals was performed including face, scalp, neck, ears, chest, back, bilateral arms, hands, bilateral legs, feet, and buttocks.   - Left medial thigh pink papule with central scar-like area  - Central mid back fleshy pedunculated papule  - Right inframammary fold fleshy pedunculated papule  - Multiple regular brown pigmented macules and papules are identified on the trunk and extremities.   - No other lesions of concern on areas examined.     Medications:  Current Outpatient Medications   Medication     ALPRAZolam (XANAX) 0.25 MG tablet     Continuous Blood Gluc Sensor (DEXCOM G6 SENSOR) MISC     Continuous Blood Gluc Transmit (DEXCOM G6 TRANSMITTER) MISC     HUMALOG 100 UNIT/ML injection     Insulin Disposable Pump (OMNIPOD 5 PACK) MISC     sertraline (ZOLOFT) 100 MG tablet     Current Facility-Administered Medications   Medication     levonorgestrel (FIDEL) 13.5 MG IUD 13.5 mg        Past Medical History:   Patient Active Problem List   Diagnosis     Type 1 diabetes mellitus with hyperglycemia (H)     Anxiety     Recurrent major depressive disorder (H)     Past Medical History:   Diagnosis Date     Anxiety      Depressive disorder      Major depression      Type 1 diabetes mellitus with hyperglycemia (H)         CC No referring provider defined for this encounter. on close of this encounter.

## 2021-09-14 NOTE — PROGRESS NOTES
[facility] Dermatology Note  Encounter Date: Sep 14, 2021  Office Visit     Dermatology Problem List:  #. Family history of melanoma.  - Mother, maternal uncle, 2 grandparents (maternal GM and paternal GM)  - Also breast cancer in maternal GM and paternal GM; colon cancer in maternal GM and maternal GF, someone also with ovarian cancer  - No pancreatic cancer  - Genetics referral 9/14/21  #. Folliculitis. BP 5% wash.  #. Intradermal melanocytic nevus with congenital features, right thigh, s/p shave bx 9/22/2020.  ____________________________________________    Assessment & Plan:    # NUBs:  - L medial thigh, s/p shave bx 9/14/21 - ddx symptomatic DF  - R inframammary fold, s/p shave bx 9/14/21 - ddx symptomatic nevus  - Central mid back, s/p shave bx 9/14/21 - ddx symptomatic nevus    #. Family history of melanoma as well as breast and ovarian cancer in multiple family members.  - Genetics referral    # Multiple benign nevi.   - No concerning lesions today  - Counseled on ABCDEs of melanoma and sun protection - recommend SPF 30 or higher with frequent application   - Return sooner if noticing changing or symptomatic lesions    Procedures Performed:   - Shave biopsy procedure note, location(s): see above. After discussion of benefits and risks including but not limited to bleeding, infection, scar, incomplete removal, recurrence, and non-diagnostic biopsy, written consent and photographs were obtained. The area was cleaned with isopropyl alcohol. 0.5mL of 1% lidocaine with epinephrine was injected to obtain adequate anesthesia of lesion(s). Shave biopsy at site(s) performed. Hemostasis was achieved with aluminium chloride. Petrolatum ointment and a sterile dressing were applied. The patient tolerated the procedure and no complications were noted. The patient was provided with verbal and written post care instructions.     Follow-up: 1 year, sooner if concerns.     Staff and Scribe:     Provider  Disclosure:   The documentation recorded by the scribe accurately reflects the services I personally performed and the decisions made by me.    Mikki Trevino MD    Department of Dermatology  [facility]: Phone: [phone], Fax: [phone]  9/23/2021       Scribe Disclosure:  I, Yuko Obrien, am serving as a scribe to document services personally performed by Mikki Trevino MD based on data collection and the provider's statements to me.   ____________________________________________    CC: Skin Check (annual) and Derm Problem (moles)      HPI:  Ms. Preeti Leiva is a(n) 31 year old female who presents today as a return patient for FBSE.    The patient was last seen on 9/22/20, at which time a NUB on her right thigh was biopsied, which revealed intradermal melanocytic nevus with congenital features. She was also advised to start BPO wash daily for treatment of folliculitis, and had an otherwise benign FBSE.    Today, the patient makes note of two spots of concern. The first is on her left thigh which has been present for about 8 years now, it initially grew and has not changed recently, and it is often irritated by clothing and bathing. The second spot is below her right breast and is often caught on her bra, causing her discomfort. There is a similar spot on her back which also gets caught on her bra.     Patient is otherwise feeling well, without additional skin concerns. She recently got .     Labs Reviewed:  N/A    Physical Exam:  Vitals: There were no vitals taken for this visit.  SKIN: Full body skin exam excluding the genitals was performed including face, scalp, neck, ears, chest, back, bilateral arms, hands, bilateral legs, feet, and buttocks.   - Left medial thigh pink papule with central scar-like area  - Central mid back fleshy pedunculated papule  - Right inframammary fold fleshy  pedunculated papule  - Multiple regular brown pigmented macules and papules are identified on the trunk and extremities.   - No other lesions of concern on areas examined.     Medications:  Current Outpatient Medications   Medication     ALPRAZolam (XANAX) 0.25 MG tablet     Continuous Blood Gluc Sensor (DEXCOM G6 SENSOR) MISC     Continuous Blood Gluc Transmit (DEXCOM G6 TRANSMITTER) MISC     HUMALOG 100 UNIT/ML injection     Insulin Disposable Pump (OMNIPOD 5 PACK) MISC     sertraline (ZOLOFT) 100 MG tablet     Current Facility-Administered Medications   Medication     levonorgestrel (FIDEL) 13.5 MG IUD 13.5 mg        Past Medical History:   Patient Active Problem List   Diagnosis     Type 1 diabetes mellitus with hyperglycemia (H)     Anxiety     Recurrent major depressive disorder (H)     Past Medical History:   Diagnosis Date     Anxiety      Depressive disorder      Major depression      Type 1 diabetes mellitus with hyperglycemia (H)         CC No referring provider defined for this encounter. on close of this encounter.

## 2021-09-16 LAB
PATH REPORT.COMMENTS IMP SPEC: NORMAL
PATH REPORT.COMMENTS IMP SPEC: NORMAL
PATH REPORT.FINAL DX SPEC: NORMAL
PATH REPORT.GROSS SPEC: NORMAL
PATH REPORT.MICROSCOPIC SPEC OTHER STN: NORMAL
PATH REPORT.RELEVANT HX SPEC: NORMAL

## 2021-09-24 ENCOUNTER — MYC MEDICAL ADVICE (OUTPATIENT)
Dept: FAMILY MEDICINE | Facility: CLINIC | Age: 31
End: 2021-09-24

## 2021-09-24 NOTE — TELEPHONE ENCOUNTER
Routing My Chart message to Dr. Richards's team.      Patient requesting clearance for nelda diving due to diabetes.    Appears Dr. Richards is the one that manages this.    RN replied to patient via [employer]. See message for details.     Feliz Tyler RN, BSN, PHN  [facility]: [place]

## 2021-09-28 ENCOUNTER — OFFICE VISIT (OUTPATIENT)
Dept: FAMILY MEDICINE | Facility: CLINIC | Age: 31
End: 2021-09-28
Payer: COMMERCIAL

## 2021-09-28 VITALS
BODY MASS INDEX: 21.56 KG/M2 | SYSTOLIC BLOOD PRESSURE: 118 MMHG | TEMPERATURE: 98.1 F | HEART RATE: 91 BPM | WEIGHT: 154 LBS | OXYGEN SATURATION: 100 % | HEIGHT: 71 IN | DIASTOLIC BLOOD PRESSURE: 70 MMHG

## 2021-09-28 DIAGNOSIS — F41.9 ANXIETY: ICD-10-CM

## 2021-09-28 DIAGNOSIS — E10.65 TYPE 1 DIABETES MELLITUS WITH HYPERGLYCEMIA (H): ICD-10-CM

## 2021-09-28 DIAGNOSIS — Z11.59 NEED FOR HEPATITIS C SCREENING TEST: ICD-10-CM

## 2021-09-28 DIAGNOSIS — Z00.00 ROUTINE GENERAL MEDICAL EXAMINATION AT A HEALTH CARE FACILITY: Primary | ICD-10-CM

## 2021-09-28 DIAGNOSIS — M26.609 TMJ (TEMPOROMANDIBULAR JOINT SYNDROME): ICD-10-CM

## 2021-09-28 DIAGNOSIS — Z97.5 IUD (INTRAUTERINE DEVICE) IN PLACE: ICD-10-CM

## 2021-09-28 DIAGNOSIS — F33.42 RECURRENT MAJOR DEPRESSIVE DISORDER, IN FULL REMISSION (H): ICD-10-CM

## 2021-09-28 DIAGNOSIS — Z13.220 SCREENING FOR HYPERLIPIDEMIA: ICD-10-CM

## 2021-09-28 LAB
HBA1C MFR BLD: 7.2 % (ref 0–5.6)
HCV AB SERPL QL IA: NONREACTIVE

## 2021-09-28 PROCEDURE — 90471 IMMUNIZATION ADMIN: CPT | Performed by: FAMILY MEDICINE

## 2021-09-28 PROCEDURE — 99395 PREV VISIT EST AGE 18-39: CPT | Mod: 25 | Performed by: FAMILY MEDICINE

## 2021-09-28 PROCEDURE — 36415 COLL VENOUS BLD VENIPUNCTURE: CPT | Performed by: FAMILY MEDICINE

## 2021-09-28 PROCEDURE — 86803 HEPATITIS C AB TEST: CPT | Performed by: FAMILY MEDICINE

## 2021-09-28 PROCEDURE — 80048 BASIC METABOLIC PNL TOTAL CA: CPT | Performed by: FAMILY MEDICINE

## 2021-09-28 PROCEDURE — 90686 IIV4 VACC NO PRSV 0.5 ML IM: CPT | Performed by: FAMILY MEDICINE

## 2021-09-28 PROCEDURE — 99214 OFFICE O/P EST MOD 30 MIN: CPT | Mod: 25 | Performed by: FAMILY MEDICINE

## 2021-09-28 PROCEDURE — 83036 HEMOGLOBIN GLYCOSYLATED A1C: CPT | Performed by: FAMILY MEDICINE

## 2021-09-28 PROCEDURE — 80061 LIPID PANEL: CPT | Performed by: FAMILY MEDICINE

## 2021-09-28 PROCEDURE — 82043 UR ALBUMIN QUANTITATIVE: CPT | Performed by: FAMILY MEDICINE

## 2021-09-28 RX ORDER — SERTRALINE HYDROCHLORIDE 100 MG/1
TABLET, FILM COATED ORAL
Qty: 180 TABLET | Refills: 1 | Status: SHIPPED | OUTPATIENT
Start: 2021-09-28 | End: 2022-03-10

## 2021-09-28 RX ORDER — ALPRAZOLAM 0.25 MG
0.25 TABLET ORAL DAILY PRN
Qty: 10 TABLET | Refills: 0 | Status: SHIPPED | OUTPATIENT
Start: 2021-09-28 | End: 2021-11-15

## 2021-09-28 ASSESSMENT — ENCOUNTER SYMPTOMS
FEVER: 0
HEADACHES: 1
SORE THROAT: 0
DIARRHEA: 0
NERVOUS/ANXIOUS: 1
HEMATOCHEZIA: 0
DIZZINESS: 0
DYSURIA: 0
NAUSEA: 0
ARTHRALGIAS: 0
CHILLS: 0
JOINT SWELLING: 0
MYALGIAS: 0
EYE PAIN: 0
WEAKNESS: 0
HEMATURIA: 0
SHORTNESS OF BREATH: 0
PARESTHESIAS: 0
FREQUENCY: 0
CONSTIPATION: 0
COUGH: 0
HEARTBURN: 0
PALPITATIONS: 0
BREAST MASS: 0
ABDOMINAL PAIN: 0

## 2021-09-28 ASSESSMENT — ANXIETY QUESTIONNAIRES
2. NOT BEING ABLE TO STOP OR CONTROL WORRYING: NEARLY EVERY DAY
3. WORRYING TOO MUCH ABOUT DIFFERENT THINGS: NEARLY EVERY DAY
GAD7 TOTAL SCORE: 13
7. FEELING AFRAID AS IF SOMETHING AWFUL MIGHT HAPPEN: NOT AT ALL
1. FEELING NERVOUS, ANXIOUS, OR ON EDGE: MORE THAN HALF THE DAYS
6. BECOMING EASILY ANNOYED OR IRRITABLE: MORE THAN HALF THE DAYS
IF YOU CHECKED OFF ANY PROBLEMS ON THIS QUESTIONNAIRE, HOW DIFFICULT HAVE THESE PROBLEMS MADE IT FOR YOU TO DO YOUR WORK, TAKE CARE OF THINGS AT HOME, OR GET ALONG WITH OTHER PEOPLE: SOMEWHAT DIFFICULT
5. BEING SO RESTLESS THAT IT IS HARD TO SIT STILL: MORE THAN HALF THE DAYS

## 2021-09-28 ASSESSMENT — PATIENT HEALTH QUESTIONNAIRE - PHQ9
SUM OF ALL RESPONSES TO PHQ QUESTIONS 1-9: 9
5. POOR APPETITE OR OVEREATING: SEVERAL DAYS

## 2021-09-28 ASSESSMENT — MIFFLIN-ST. JEOR: SCORE: 1509.41

## 2021-09-28 NOTE — LETTER
My Depression Action Plan  Name: Preeti Leiva   Date of Birth 1990  Date: 9/28/2021    My doctor: Roshni Cortez   My clinic: [address] [id number] [phone]          GREEN    ZONE   Good Control    What it looks like:     Things are going generally well. You have normal ups and downs. You may even feel depressed from time to time, but bad moods usually last less than a day.   What you need to do:  1. Continue to care for yourself (see self care plan)  2. Check your depression survival kit and update it as needed  3. Follow your physician s recommendations including any medication.  4. Do not stop taking medication unless you consult with your physician first.           YELLOW         ZONE Getting Worse    What it looks like:     Depression is starting to interfere with your life.     It may be hard to get out of bed; you may be starting to isolate yourself from others.    Symptoms of depression are starting to last most all day and this has happened for several days.     You may have suicidal thoughts but they are not constant.   What you need to do:     1. Call your care team. Your response to treatment will improve if you keep your care team informed of your progress. Yellow periods are signs an adjustment may need to be made.     2. Continue your self-care.  Just get dressed and ready for the day.  Don't give yourself time to talk yourself out of it.    3. Talk to someone in your support network.    4. Open up your Depression Self-Care Plan/Wellness Kit.           RED    ZONE Medical Alert - Get Help    What it looks like:     Depression is seriously interfering with your life.     You may experience these or other symptoms: You can t get out of bed most days, can t work or engage in other necessary activities, you have trouble taking care of basic hygiene, or basic responsibilities, thoughts of suicide or  death that will not go away, self-injurious behavior.     What you need to do:  1. Call your care team and request a same-day appointment. If they are not available (weekends or after hours) call your local crisis line, emergency room or 911.          Depression Self-Care Plan / Wellness Kit    Many people find that medication and therapy are helpful treatments for managing depression. In addition, making small changes to your everyday life can help to boost your mood and improve your wellbeing. Below are some tips for you to consider. Be sure to talk with your medical provider and/or behavioral health consultant if your symptoms are worsening or not improving.     Sleep   Sleep hygiene  means all of the habits that support good, restful sleep. It includes maintaining a consistent bedtime and wake time, using your bedroom only for sleeping or sex, and keeping the bedroom dark and free of distractions like a computer, smartphone, or television.     Develop a Healthy Routine  Maintain good hygiene. Get out of bed in the morning, make your bed, brush your teeth, take a shower, and get dressed. Don t spend too much time viewing media that makes you feel stressed. Find time to relax each day.    Exercise  Get some form of exercise every day. This will help reduce pain and release endorphins, the  feel good  chemicals in your brain. It can be as simple as just going for a walk or doing some gardening, anything that will get you moving.      Diet  Strive to eat healthy foods, including fruits and vegetables. Drink plenty of water. Avoid excessive sugar, caffeine, alcohol, and other mood-altering substances.     Stay Connected with Others  Stay in touch with friends and family members.    Manage Your Mood  Try deep breathing, massage therapy, biofeedback, or meditation. Take part in fun activities when you can. Try to find something to smile about each day.     Psychotherapy  Be open to working with a therapist if your  provider recommends it.     Medication  Be sure to take your medication as prescribed. Most anti-depressants need to be taken every day. It usually takes several weeks for medications to work. Not all medicines work for all people. It is important to follow-up with your provider to make sure you have a treatment plan that is working for you. Do not stop your medication abruptly without first discussing it with your provider.    Crisis Resources   These hotlines are for both adults and children. They and are open 24 hours a day, 7 days a week unless noted otherwise.      National Suicide Prevention Lifeline   4-517-889-MZOC (6096)      Crisis Text Line    www.crisistextline.org  Text HOME to 532854 from anywhere in the [place], anytime, about any type of crisis. A live, trained crisis counselor will receive the text and respond quickly.      [facility] Lifeline for LGBTQ Youth  A national crisis intervention and suicide lifeline for LGBTQ youth under 25. Provides a safe place to talk without judgement. Call 1-562.135.3878; text START to 650133 or visit www.thetrevorproject.org to talk to a trained counselor.      For [facility] crisis numbers, visit the [facility] website at:  https://[place].gov/dhs/people-we-serve/adults/health-care/mental-health/resources/crisis-contacts.jsp

## 2021-09-28 NOTE — PROGRESS NOTES
SUBJECTIVE:   CC: Preeti Leiva is an 31 year old woman who presents for preventive health visit.       Patient has been advised of split billing requirements and indicates understanding: Yes  Healthy Habits:     Getting at least 3 servings of Calcium per day:  Yes    Bi-annual eye exam:  Yes    Dental care twice a year:  NO    Sleep apnea or symptoms of sleep apnea:  None    Diet:  Regular (no restrictions)    Frequency of exercise:  2-3 days/week    Duration of exercise:  15-30 minutes    Taking medications regularly:  No    Medication side effects:  None    PHQ-2 Total Score: 2    Additional concerns today:  No    Had dental work done a few months ago - had a crown done.  And has noticed jaw pain and headaches since then.  Headaches around her temple areas.  Crunchy foods make it worse.    Has very light periods due to her IUD.    Non-fasting: snacking  Has dexcom - and a new pump. Seems to have been helping. Can see her sugar on her apple watch.    Has not seen endocrine yet this year.    Today's PHQ-2 Score:   PHQ-2 ( 1999 [employer]) 9/28/2021   Q1: Little interest or pleasure in doing things 1   Q2: Feeling down, depressed or hopeless 1   PHQ-2 Score 2   Q1: Little interest or pleasure in doing things Several days   Q2: Feeling down, depressed or hopeless Several days   PHQ-2 Score 2       Abuse: Current or Past (Physical, Sexual or Emotional) - No  Do you feel safe in your environment? Yes    Have you ever done Advance Care Planning? (For example, a Health Directive, POLST, or a discussion with a medical provider or your loved ones about your wishes): No, advance care planning information given to patient to review.  Patient declined advance care planning discussion at this time.    Social History     Tobacco Use     Smoking status: Never Smoker     Smokeless tobacco: Never Used   Substance Use Topics     Alcohol use: Yes     Comment:  5 drinks per week      If you drink alcohol do you typically have >3  drinks per day or >7 drinks per week? No    Alcohol Use 9/28/2021   Prescreen: >3 drinks/day or >7 drinks/week? No   Prescreen: >3 drinks/day or >7 drinks/week? -       Reviewed orders with patient.  Reviewed health maintenance and updated orders accordingly - Yes  Lab work is in process    Breast Cancer Screening:  Any new diagnosis of family breast, ovarian, or bowel cancer? No    FHS-7:   Breast CA Risk Assessment (FHS-7) 9/28/2021   Did any of your first-degree relatives have breast or ovarian cancer? No   Did any of your relatives have bilateral breast cancer? No   Did any man in your family have breast cancer? No   Did any woman in your family have breast and ovarian cancer? No   Did any woman in your family have breast cancer before age 50 y? Unknown   Do you have 2 or more relatives with breast and/or ovarian cancer? No   Do you have 2 or more relatives with breast and/or bowel cancer? Yes       Patient under 40 years of age: Routine Mammogram Screening not recommended.   Pertinent mammograms are reviewed under the imaging tab.    History of abnormal Pap smear: NO - age 30- 65 PAP every 3 years recommended  PAP / HPV Latest Ref Rng & Units 7/28/2021   PAP   Negative for Intraepithelial Lesion or Malignancy (NILM)     Reviewed and updated as needed this visit by clinical staff  Tobacco  Allergies  Meds   Med Hx  Surg Hx  Fam Hx  Soc Hx        Reviewed and updated as needed this visit by Provider        Fam Hx             Review of Systems   Constitutional: Negative for chills and fever.   HENT: Negative for congestion, ear pain, hearing loss and sore throat.    Eyes: Negative for pain and visual disturbance.   Respiratory: Negative for cough and shortness of breath.    Cardiovascular: Negative for chest pain, palpitations and peripheral edema.   Gastrointestinal: Negative for abdominal pain, constipation, diarrhea, heartburn, hematochezia and nausea.   Breasts:  Negative for tenderness, breast mass and  "discharge.   Genitourinary: Negative for dysuria, frequency, genital sores, hematuria, pelvic pain, urgency, vaginal bleeding and vaginal discharge.   Musculoskeletal: Negative for arthralgias, joint swelling and myalgias.   Skin: Negative for rash.   Neurological: Positive for headaches. Negative for dizziness, weakness and paresthesias.   Psychiatric/Behavioral: Positive for mood changes. The patient is nervous/anxious.           OBJECTIVE:   /70 (BP Location: Right arm, Patient Position: Sitting, Cuff Size: Adult Regular)   Pulse 91   Temp 98.1  F (36.7  C) (Oral)   Ht 1.795 m (5' 10.67\")   Wt 69.9 kg (154 lb)   SpO2 100%   BMI 21.68 kg/m    Physical Exam  GENERAL: healthy, alert and no distress  EYES: Eyes grossly normal to inspection, PERRL and conjunctivae and sclerae normal  HENT: normal cephalic/atraumatic, ear canals and TM's normal and tenderness over TMJ's   NECK: no adenopathy, no asymmetry, masses, or scars and thyroid normal to palpation  RESP: lungs clear to auscultation - no rales, rhonchi or wheezes  BREAST: normal without masses, tenderness or nipple discharge and no palpable axillary masses or adenopathy  CV: regular rate and rhythm, normal S1 S2, no S3 or S4, no murmur, click or rub, no peripheral edema and peripheral pulses strong  ABDOMEN: soft, nontender, no hepatosplenomegaly, no masses and bowel sounds normal  MS: no gross musculoskeletal defects noted, no edema  SKIN: no suspicious lesions or rashes  SKIN: no suspicious lesions or rashes and healing biopsy site on left thigh  NEURO: Normal strength and tone, mentation intact and speech normal  PSYCH: mentation appears normal, affect normal/bright    Diagnostic Test Results:  Labs reviewed in Epic    ASSESSMENT/PLAN:   (Z00.00) Routine general medical examination at a health care facility  (primary encounter diagnosis)  Comment: stable health  Plan: discussed anticipated skin healing  Discussed warning signs/symptoms for which " "she needs followup.      (E10.65) Type 1 diabetes mellitus with hyperglycemia (H)  Comment: overdue for endocrine visit  Plan: HEMOGLOBIN A1C, BASIC METABOLIC PANEL, Albumin         Random Urine Quantitative with Creat Ratio,         Lipid panel reflex to direct LDL Non-fasting,         Adult Endocrinology Referral        Encouraged to schedule.    (F41.9) Anxiety  Comment: stable, uses xanax infrequently.  Plan: ALPRAZolam (XANAX) 0.25 MG tablet, sertraline         (ZOLOFT) 100 MG tablet        Continue current medication      (F33.42) Recurrent major depressive disorder, in full remission (H)  Comment: doing well  Plan: sertraline (ZOLOFT) 100 MG tablet        Continue current medication      (Z11.59) Need for hepatitis C screening test  Comment: consents to testing, as advised by CDC guidelines   Plan: Hepatitis C Screen Reflex to HCV RNA Quant and         Genotype        adjust therapy based on labs      (Z13.220) Screening for hyperlipidemia  Comment:   Plan: adjust therapy based on labs      (M26.609) TMJ (temporomandibular joint syndrome)  Comment: discussed symptomatic relief  Plan: patient will contact her dentist.  But if he does not feel comfortable managing she can contact me for referral to TMJ specialist     (Z97.5) IUD (intrauterine device) in place  Comment:   Plan:     Patient has been advised of split billing requirements and indicates understanding: Yes  COUNSELING:  Reviewed preventive health counseling, as reflected in patient instructions    Estimated body mass index is 21.68 kg/m  as calculated from the following:    Height as of this encounter: 1.795 m (5' 10.67\").    Weight as of this encounter: 69.9 kg (154 lb).        She reports that she has never smoked. She has never used smokeless tobacco.      Counseling Resources:  ATP IV Guidelines  Pooled Cohorts Equation Calculator  Breast Cancer Risk Calculator  BRCA-Related Cancer Risk Assessment: FHS-7 Tool  FRAX Risk Assessment  ICSI " Preventive Guidelines  Dietary Guidelines for Americans, 2010  USDA's MyPlate  ASA Prophylaxis  Lung CA Screening    Roshni Nevarez MD  [facility]

## 2021-09-28 NOTE — PATIENT INSTRUCTIONS

## 2021-09-29 LAB
ANION GAP SERPL CALCULATED.3IONS-SCNC: 5 MMOL/L (ref 3–14)
BUN SERPL-MCNC: 16 MG/DL (ref 7–30)
CALCIUM SERPL-MCNC: 9.2 MG/DL (ref 8.5–10.1)
CHLORIDE BLD-SCNC: 106 MMOL/L (ref 94–109)
CHOLEST SERPL-MCNC: 174 MG/DL
CO2 SERPL-SCNC: 28 MMOL/L (ref 20–32)
CREAT SERPL-MCNC: 0.72 MG/DL (ref 0.52–1.04)
CREAT UR-MCNC: 296 MG/DL
FASTING STATUS PATIENT QL REPORTED: NO
GFR SERPL CREATININE-BSD FRML MDRD: >90 ML/MIN/1.73M2
GLUCOSE BLD-MCNC: 99 MG/DL (ref 70–99)
HDLC SERPL-MCNC: 48 MG/DL
LDLC SERPL CALC-MCNC: 110 MG/DL
MICROALBUMIN UR-MCNC: 40 MG/L
MICROALBUMIN/CREAT UR: 13.51 MG/G CR (ref 0–25)
NONHDLC SERPL-MCNC: 126 MG/DL
POTASSIUM BLD-SCNC: 3.6 MMOL/L (ref 3.4–5.3)
SODIUM SERPL-SCNC: 139 MMOL/L (ref 133–144)
TRIGL SERPL-MCNC: 78 MG/DL

## 2021-09-29 ASSESSMENT — ANXIETY QUESTIONNAIRES: GAD7 TOTAL SCORE: 13

## 2021-10-26 ENCOUNTER — FCC EXTENDED DOCUMENTATION (OUTPATIENT)
Dept: PSYCHOLOGY | Facility: CLINIC | Age: 31
End: 2021-10-26

## 2021-10-27 NOTE — PATIENT INSTRUCTIONS
Farhan Álvarez,    I will be leaving the agency. It was great working with you!    Future care that you need canl be covered by other Health Care Providers. Just contact the intake office: [phone].      Take care,  Tika Diaz
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
## 2021-11-03 ENCOUNTER — VIRTUAL VISIT (OUTPATIENT)
Dept: ONCOLOGY | Facility: CLINIC | Age: 31
End: 2021-11-03
Attending: DERMATOLOGY
Payer: COMMERCIAL

## 2021-11-03 DIAGNOSIS — Z80.0 FAMILY HISTORY OF COLON CANCER: ICD-10-CM

## 2021-11-03 DIAGNOSIS — Z80.49 FAMILY HISTORY OF UTERINE CANCER: ICD-10-CM

## 2021-11-03 DIAGNOSIS — Z80.3 FAMILY HISTORY OF MALIGNANT NEOPLASM OF BREAST: Primary | ICD-10-CM

## 2021-11-03 DIAGNOSIS — Z80.8 FAMILY HISTORY OF MELANOMA: ICD-10-CM

## 2021-11-03 PROCEDURE — 96040 HC GENETIC COUNSELING, EACH 30 MINUTES: CPT | Mod: GT,95 | Performed by: GENETIC COUNSELOR, MS

## 2021-11-03 NOTE — PATIENT INSTRUCTIONS
Assessing Cancer Risk  Only about 5-10% of cancers are thought to be due to an inherited cancer susceptibility gene.    These families often have:    Several people with the same or related types of cancer    Cancers diagnosed at a young age (before age 50)    Individuals with more than one primary cancer    Multiple generations of the family affected with cancer    Some people may be candidates for genetic testing of more than one gene.  For these families, genetic testing using a cancer panel may be offered.  These panels will test different genes known to increase the risk for breast, ovarian, uterine, and/or other cancers. All of the genes discussed below have published clinical management guidelines for individuals who are found to carry a mutation. The purpose of this handout is to serve as a brief summary of the genes analyzed by the panels used to inquire about hereditary breast and gynecologic cancer:  CHANTAL, BRCA1, BRCA2, BRIP1, CDH1, CHEK2, MLH1, MSH2, MSH6, PMS2, EPCAM, PTEN, PALB2, RAD51C, RAD51D, and TP53.  ______________________________________________________________________________  Hereditary Breast and Ovarian Cancer Syndrome   (BRCA1 and BRCA2)  A single mutation in one of the copies of BRCA1 or BRCA2 increases the risk for breast and ovarian cancer, among others.  The risk for pancreatic cancer and melanoma may also be slightly increased in some families.  The chart below shows the chance that someone with a BRCA mutation would develop cancer in his or her lifetime1,2,3,4.        A person s ethnic background is also important to consider, as individuals of Ashkenazi Oriental orthodox ancestry have a higher chance of having a BRCA gene mutation.  There are three BRCA mutations that occur more frequently in this population.    Otero Syndrome   (MLH1, MSH2, MSH6, PMS2, and EPCAM)  Currently five genes are known to cause Otero Syndrome: MLH1, MSH2, MSH6, PMS2, and EPCAM.  A single mutation in one of the  Otero Syndrome genes increases the risk for colon, endometrial, ovarian, and stomach cancers.  Other cancers that occur less commonly in Otero Syndrome include urinary tract, skin, and brain cancers.  The chart below shows the chance that a person with Otero syndrome would develop cancer in his or her lifetime5.      *Cancer risk varies depending on Otero syndrome gene found    Cowden Syndrome   (PTEN)  Cowden syndrome is a hereditary condition that increases the risk for breast, thyroid, endometrial, colon, and kidney cancer.  Cowden syndrome is caused by a mutation in the PTEN gene.  A single mutation in one of the copies of PTEN causes Cowden syndrome and increases cancer risk.  The chart below shows the chance that someone with a PTEN mutation would develop cancer in their lifetime6,7.  Other benign features seen in some individuals with Cowden syndrome include benign skin lesions (facial papules, keratoses, lipomas), learning disability, autism, thyroid nodules, colon polyps, and larger head size.      *One recent study found breast cancer risk to be increased to 85%    Li-Fraumeni Syndrome   (TP53)  Li-Fraumeni Syndrome (LFS) is a cancer predisposition syndrome caused by a mutation in the TP53 gene. A single mutation in one of the copies of TP53 increases the risk for multiple cancers. Individuals with LFS are at an increased risk for developing cancer at a young age. The lifetime risk for development of a LFS-associated cancer is 50% by age 30 and 90% by age 60.   Core Cancers: Sarcomas, Breast, Brain, Lung, Leukemias/Lymphomas, Adrenocortical carcinomas  Other Cancers: Gastrointestinal, Thyroid, Skin, Genitourinary    Hereditary Diffuse Gastric Cancer   (CDH1)  Currently, one gene is known to cause hereditary diffuse gastric cancer (HDGC): CDH1.  Individuals with HDGC are at increased risk for diffuse gastric cancer and lobular breast cancer. Of people diagnosed with HDGC, 30-50% have a mutation in the CDH1  gene.  This suggests there are likely other genes that may cause HDGC that have not been identified yet.      Lifetime Cancer Risks    General Population HDGC    Diffuse Gastric  <1% ~80%   Breast 12% 39-52%         Additional Genes  CHANTAL  CHANTAL is a moderate-risk breast cancer gene. Women who have a mutation in CHANTAL can have between a 2-4 fold increased risk for breast cancer compared to the general population8. CHANTAL mutations have also been associated with increased risk for pancreatic cancer, however an estimate of this cancer risk is not well understood9. Individuals who inherit two CHANTAL mutations have a condition called ataxia-telangiectasia (AT).  This rare autosomal recessive condition affects the nervous system and immune system, and is associated with progressive cerebellar ataxia beginning in childhood.  Individuals with ataxia-telangiectasia often have a weakened immune system and have an increased risk for childhood cancers.    PALB2  Mutations in PALB2 have been shown to increase the risk of breast cancer up to 33-58% in some families; where individuals fall within this risk range is dependent upon family [id number]. PALB2 mutations have also been associated with increased risk for pancreatic cancer, although this risk has not been quantified yet.  Individuals who inherit two PALB2 mutations--one from their mother and one from their father--have a condition called Fanconi Anemia.  This rare autosomal recessive condition is associated with short stature, developmental delay, bone marrow failure, and increased risk for childhood cancers.    CHEK2   CHEK2 is a moderate-risk breast cancer gene.  Women who have a mutation in CHEK2 have around a 2-fold increased risk for breast cancer compared to the general population, and this risk may be higher depending upon family history.11,12,13 Mutations in CHEK2 have also been shown to increase the risk of a number of other cancers, including colon and prostate, however  these cancer risks are currently not well understood.    BRIP1, RAD51C and RAD51D  Mutations in BRIP1, RAD51C, and RAD51D have been shown to increase the risk of ovarian cancer and possibly female breast cancer as well14,15 .       Lifetime Cancer Risk    General Population BRIP1 RAD51C RAD51D   Ovarian 1-2% ~5-8% ~5-9% ~7-15%           Inheritance  All of the cancer syndromes reviewed above are inherited in an autosomal dominant pattern.  This means that if a parent has a mutation, each of his or her children will have a 50% chance of inheriting that same mutation.  Therefore, each child--male or female--would have a 50% chance of being at increased risk for developing cancer.      Image obtained from Genetics Home Reference, 2013     Mutations in some genes can occur de noel, which means that a person s mutation occurred for the first time in them and was not inherited from a parent.  Now that they have the mutation, however, it can be passed on to future generations.    Genetic Testing  Genetic testing involves a blood test and will look at the genetic information in the CHANTAL, BRCA1, BRCA2, BRIP1, CDH1, CHEK2, MLH1, MSH2, MSH6, PMS2, EPCAM, PTEN, PALB2, RAD51C, RAD51D, and TP53 genes for any harmful mutations that are associated with increased cancer risk.  If possible, it is recommended that the person(s) who has had cancer be tested before other family members.  That person will give us the most useful information about whether or not a specific gene is associated with the cancer in the family.    Results  There are three possible results of genetic testing:    Positive--a harmful mutation was identified in one or more of the genes    Negative--no mutation was identified in any of the genes on this panel    Variant of unknown significance--a variation in one of the genes was identified, but it is unclear how this impacts cancer risk in the family    Advantages and Disadvantages   There are advantages and  disadvantages to genetic testing.    Advantages    May clarify your cancer risk    Can help you make medical decisions    May explain the cancers in your family    May give useful information to your family members (if you share your results)    Disadvantages    Possible negative emotional impact of learning about inherited cancer risk    Uncertainty in interpreting a negative test result in some situations    Possible genetic discrimination concerns (see below)    Genetic Information Nondiscrimination Act (BABS)  BABS is a federal law that protects individuals from health insurance or employment discrimination based on a genetic test result alone.  Although rare, there are currently no legal discrimination protections in terms of life insurance, long term care, or disability insurances.  Visit the [employer] [place] website to learn more.    Reducing Cancer Risk  All of the genes described above have nationally recognized cancer screening guidelines that would be recommended for individuals who test positive.  In addition to increased cancer screening, surgeries may be offered or recommended to reduce cancer risk.  Recommendations are based upon an individual s genetic test result as well as their personal and family history of cancer.    Questions to Think About Regarding Genetic Testing:    What effect will the test result have on me and my relationship with my family members if I have an inherited gene mutation?  If I don t have a gene mutation?    Should I share my test results, and how will my family react to this news, which may also affect them?    Are my children ready to learn new information that may one day affect their own health?    Hereditary Cancer Resources    FORCE: Facing Our Risk of Cancer Empowered facingourrisk.org   Bright Pink bebrightpink.org   Li-Fraumeni Syndrome Association lfsassociation.org   PTEN World PTENworld.com   No stomach for cancer, Inc.  nostomachforcancer.org   Stomach cancer relief network Scrnet.org   Collaborative Group of the Americas on Inherited Colorectal Cancer (CGA) cgaicc.com    Cancer Care cancercare.org   American Cancer Society (ACS) cancer.org   National Cancer [place] (NCI) cancer.gov     Please call us if you have any questions or concerns.   Cancer Risk Management Program [phone] ([phone])  ? Phoenix Bermudez, MS, [facility] [phone]  ? Sagrario Gtz, MS, [facility]  [phone]  ? Juana Gary, MS, [facility]  [phone]  ? Nisa Piter, MS, [facility] [phone]  ? Vicky Sepideh, MS, [facility] [phone]  ? Mehnaz Marques, MS, [facility]  [phone]  ? Viviana Murphy, MS  [phone]    References  1. Ca BRENNAN, Brianne PDP, Dina S, Jeremie CHANG, Leora JE, Suyapa JL, Winston N, Anegl H, Shantell O, Kaylin A, Siri B, Radijada P, Manviri S, Alesia DM, Eller N, Barbara E, Dasha H, Ruben E, Svetlana J, Gronpepe J, Elver B, Medhat H, Thorlacius S, Eerola H, Nevwilianna H, Mariela K, Mary OP. Average risks of breast and ovarian cancer associated with BRCA1 or BRCA2 mutations detected in case series unselected for family history: a combined analysis of 222 studies. Am J Hum [place]ell. 2003;72:1117-30.  2. Emilia N, Betty M, Rj G.  BRCA1 and BRCA2 Hereditary Breast and Ovarian Cancer. Gene Reviews online. 2013.  3. Randolph YC, Nimo S, Agusto G, Shah S. Breast cancer risk among male BRCA1 and BRCA2 mutation carriers. J Natl Cancer Inst. 2007;99:1811-4.  4. Wally BARROS, Alonso I, Cholo J, Melina E, Tim ER, Stephany F. Risk of breast cancer in male BRCA2 carriers. J Med Maribell. 2010;47:710-1.  5. National Comprehensive Cancer Network. Clinical practice guidelines in oncology, colorectal cancer screening. Available online (registration required). 2015.  6. Red STOUT, Maliha J, Abida J, Aaron LA, Ryland RAMÍREZ, Eng C. Lifetime cancer risks in individuals with germline PTEN mutations. Clin Cancer Res. 2012;18:400-7.  7. Pilarski R. Cowden  Syndrome: A Critical Review of the Clinical Literature. J Maribell . 2009:18:13-27.  8. Raheem A, Sid D, Enid S, Anayeli P, Doron T, Ryan M, Prince B, Christine H, Martinez R, Amena K, Raza L, Wally DG, Alesia D, Enrike DF, Edilia MR, The Breast Cancer Susceptibility Collaboration (UK) & Saskia BOYLE. CHANTAL mutations that cause ataxia-telangiectasia are breast cancer susceptibility alleles. Nature Genetics. 2006;38:873-875  9. Indio N , Cori Y, Vernell J, Shabnam L, Tucker GM , Patrick ML, Gallinger S, Siegel AG, Syngal S, Kellie ML, Hanna J , Garcia R, Preston SZ, Kyung JR, Pinky VE, Latoya M, Voalfred B, Ganga N, Adrienne RH, Jovan KW, and Kirstie AP. CHANTAL mutations in patients with hereditary pancreatic cancer. Cancer Discover. 2012;2:41-46  10. Ca DIAZ, et al. Breast-Cancer Risk in Families with Mutations in PALB2. NEJM. 2014; 371(6):497-506.  11. CHEK2 Breast Cancer Case-Control Consortium. CHEK2*1100delC and susceptibility to breast cancer: A collaborative analysis involving 10,860 breast cancer cases and 9,065 controls from 10 studies. Am J Hum [place], 74 (2004), pp. 3000-6824  12. Laxmi T, Migue S, Danyell K, et al. Spectrum of Mutations in BRCA1, BRCA2, CHEK2, and TP53 in Families at High Risk of Breast Cancer. DARRYL. 2006;295(12):0334-1291.   13. Jason C, Roger D, Duran A, et al. Risk of breast cancer in women with a CHEK2 mutation with and without a family history of breast cancer. J Clin Oncol. 2011;29:1177-2234.  14. Dheeraj H, Eliseo E, Venkat SJ, et al. Contribution of germline mutations in the RAD51B, RAD51C, and RAD51D genes to ovarian cancer in the population. J Clin Oncol. 2015;33(26):9800-9938. Doi:10.1200/JCO.2015.61.2408.  15. Lucas T, Altagracia CABRAL, Paula P, et al. Mutations in BRIP1 confer high risk of ovarian cancer. Holly Maribell. 2011;43(11):8974-0574. doi:10.1038/ng.955.

## 2021-11-03 NOTE — PROGRESS NOTES
11/3/2021    Referring Provider: Mikki Trevino MD    Presenting Information:   I met with Preeti Leiva today for genetic counseling at the Cancer Risk Management Program (virtual visit) to discuss her family history of melanoma, breast, colon and uterine cancers.  She is here today to review this history, cancer screening recommendations, and available genetic testing options.    Personal History:  Preeti is a 31 year old female. She has a diagnosis of Type 1 Diabetes.  She had her first menstrual period at ate 17 and has her ovaries, fallopian tubes and uterus in place.  She is interested in genetic counseling/testing for family planning.  Preeti sees a dermatologist annually and has had several benign moles.      Family History: (Please see scanned pedigree for detailed family history information)    Preeti does not currently have biological children.    She has one brother, age 35, who has two daughters (ages 3 and 1)    Her mother is 59 and has had skin cancer     One maternal uncle had a history of melanoma diagnosed in his 50's-60's    Her maternal grandmother was diagnosed with colon cancer at age 45 and passed away at age 83    Her maternal grandfather had a history of colon cancer diagnosed at age 65, and passed away at age 81    Her father passed away at age 56 and had a history of lung cancer and smoking    One paternal aunt was diagnosed with breast cancer at age 50 and is now 65    One paternal aunt was diagnosed with uterine cancer at age 50 and is now 68    One paternal aunt is 70 and has a history of diabetes.     Her paternal grandmother was diagnosed with breast cancer at age 45 and passed away at age 90    Her paternal grandfather had a history of lung cancer diagnosed at age 75    Her ethnicity is Anguillan/Ukrainian and less than 10% Ashkenazi Moravian (based on 23 and Me).      Discussion:    Preeti's family history of several cancers, including early onset breast, uterine and colon, is  suggestive of a possible hereditary cancer syndrome(s).    We reviewed the features of sporadic, familial, and hereditary cancers. Based on her family history, Preeti meets current National Comprehensive Cancer Network (NCCN) criteria for genetic testing of high risk breast cancer susceptibility genes (including BRCA1, BRCA2, PTEN, TP53, CDH1, PALB2).      We discussed the natural history and genetics of Hereditary Breast and Ovarian Cancer syndrome caused by mutations in the BRCA1/2 genes. A detailed handout regarding hereditary breast and gynecologic cancer and the information we discussed can be found in the after visit summary. Topics included: inheritance pattern, cancer risks, cancer screening recommendations, and also risks, benefits and limitations of testing.    We discussed that there are additional genes that could cause increased risk for melanoma, breast, gynecologic, and colon cancer. As many of these genes present with overlapping features in a family and accurate cancer risk cannot always be established based upon the pedigree analysis alone, it would be reasonable for Preeti to consider panel genetic testing to analyze multiple genes at once.    We discussed available testing, including genes related to the cancers in her family, and expanded panel options.  Preeti elected to proceed with a custom panel of genes related to breast, gynecologic, colorectal, melanoma, and related cancers (CustomNext-Cancer).    Verbal consent was obtained and genetic testing for BRCA1/2 with the 40 gene CustomNext-Cancer (CancerNext + MelanomaNext) panel will be ordered through [facility]: APC, CHANTAL, AXIN2, BAP1, BARD1, BMPR1A, BRCA1, BRCA2, BRIP1, CDH1, CDK4, CDKN2A, CHEK2, DICER1, EPCAM, GREM1, HOXB13, MITF, MLH1, MSH2, MSH3, MSH6, MUTYH, NBN, NF1, NTHL1, PALB2, PMS2, POLD1, POLE, POT1, PTEN, RAD51C, RAD51D, RB1, RECQL, SMAD4, SMARCA4, STK11, TP53.  A saliva kit will be provided by mail from [employer].   Test turn around time: 3-4 weeks.     Medical Management: For Preeti, we reviewed that the information from genetic testing may determine:    additional cancer screening for which Preeti may qualify (i.e. mammogram and breast MRI, more frequent colonoscopies, more frequent dermatologic exams, etc.),    options for risk reducing surgeries Preeti could consider (i.e. bilateral mastectomy, surgery to remove her ovaries and/or uterus, etc.),      and targeted chemotherapies for certain cancers in the future (i.e. immunotherapy for individuals with Otero syndrome, PARP inhibitors, etc.).     These recommendations will be discussed in detail once genetic testing is completed.     Plan:  1) Today Preeti elected to proceed with a custom panel of genes related to the cancers in her family (CustomNext-Cancer, a combination of CancerNext and MelanomaNext).  2) This information should be available in 4 weeks.  3) Preeti will return to clinic to discuss the results.  She is also interested in preconception genetic counseling, and a visit with maternal fetal medicine can be requested (referral needed from primary care provider/MD)    Face to face time: 45 minutes    Juana Gary MS, [facility]  Licensed, Certified Genetic Counselor  [facility]  Phone: [phone]

## 2021-11-03 NOTE — LETTER
11/3/2021         RE: Preeti Leiva  [address] [facility] [address]        Dear Colleague,    Thank you for referring your patient, Preeti Leiva, to the [facility]. Please see a copy of my visit note below.    11/3/2021    Referring Provider: Mikki Trevino MD    Presenting Information:   I met with Preeti Leiva today for genetic counseling at the Cancer Risk Management Program (virtual visit) to discuss her family history of melanoma, breast, colon and uterine cancers.  She is here today to review this history, cancer screening recommendations, and available genetic testing options.    Personal History:  Preeti is a 31 year old female. She has a diagnosis of Type 1 Diabetes.  She had her first menstrual period at ate 17 and has her ovaries, fallopian tubes and uterus in place.  She is interested in genetic counseling/testing for family planning.  Preeti sees a dermatologist annually and has had several benign moles.      Family History: (Please see scanned pedigree for detailed family history information)    Preeti does not currently have biological children.    She has one brother, age 35, who has two daughters (ages 3 and 1)    Her mother is 59 and has had skin cancer     One maternal uncle had a history of melanoma diagnosed in his 50's-60's    Her maternal grandmother was diagnosed with colon cancer at age 45 and passed away at age 83    Her maternal grandfather had a history of colon cancer diagnosed at age 65, and passed away at age 81    Her father passed away at age 56 and had a history of lung cancer and smoking    One paternal aunt was diagnosed with breast cancer at age 50 and is now 65    One paternal aunt was diagnosed with uterine cancer at age 50 and is now 68    One paternal aunt is 70 and has a history of diabetes.     Her paternal grandmother was diagnosed with breast cancer at age 45 and passed away at age 90    Her paternal  grandfather had a history of lung cancer diagnosed at age 75    Her ethnicity is Georgian/[place] and less than 10% Ashkenazi Moravian (based on 23 and Me).      Discussion:    Preeti's family history of several cancers, including early onset breast, uterine and colon, is suggestive of a possible hereditary cancer syndrome(s).    We reviewed the features of sporadic, familial, and hereditary cancers. Based on her family history, Preeti meets current National Comprehensive Cancer Network (NCCN) criteria for genetic testing of high risk breast cancer susceptibility genes (including BRCA1, BRCA2, PTEN, TP53, CDH1, PALB2).      We discussed the natural history and genetics of Hereditary Breast and Ovarian Cancer syndrome caused by mutations in the BRCA1/2 genes. A detailed handout regarding hereditary breast and gynecologic cancer and the information we discussed can be found in the after visit summary. Topics included: inheritance pattern, cancer risks, cancer screening recommendations, and also risks, benefits and limitations of testing.    We discussed that there are additional genes that could cause increased risk for melanoma, breast, gynecologic, and colon cancer. As many of these genes present with overlapping features in a family and accurate cancer risk cannot always be established based upon the pedigree analysis alone, it would be reasonable for Preeti to consider panel genetic testing to analyze multiple genes at once.    We discussed available testing, including genes related to the cancers in her family, and expanded panel options.  Preeti elected to proceed with a custom panel of genes related to breast, gynecologic, colorectal, melanoma, and related cancers (CustomNext-Cancer).    Verbal consent was obtained and genetic testing for BRCA1/2 with the 40 gene CustomNext-Cancer (CancerNext + MelanomaNext) panel will be ordered through [employer]: APC, CHANTAL, AXIN2, BAP1, BARD1, BMPR1A, BRCA1, BRCA2, BRIP1,  CDH1, CDK4, CDKN2A, CHEK2, DICER1, EPCAM, GREM1, HOXB13, MITF, MLH1, MSH2, MSH3, MSH6, MUTYH, NBN, NF1, NTHL1, PALB2, PMS2, POLD1, POLE, POT1, PTEN, RAD51C, RAD51D, RB1, RECQL, SMAD4, SMARCA4, STK11, TP53.  A saliva kit will be provided by mail from [employer].  Test turn around time: 3-4 weeks.     Medical Management: For Preeti, we reviewed that the information from genetic testing may determine:    additional cancer screening for which Preeti may qualify (i.e. mammogram and breast MRI, more frequent colonoscopies, more frequent dermatologic exams, etc.),    options for risk reducing surgeries Preeti could consider (i.e. bilateral mastectomy, surgery to remove her ovaries and/or uterus, etc.),      and targeted chemotherapies for certain cancers in the future (i.e. immunotherapy for individuals with Otero syndrome, PARP inhibitors, etc.).     These recommendations will be discussed in detail once genetic testing is completed.     Plan:  1) Today Preeti elected to proceed with a custom panel of genes related to the cancers in her family (CustomNext-Cancer, a combination of CancerNext and MelanomaNext).  2) This information should be available in 4 weeks.  3) Preeti will return to clinic to discuss the results.  She is also interested in preconception genetic counseling, and a visit with maternal fetal medicine can be requested (referral needed from primary care provider/MD)    Face to face time: 45 minutes    Juana Gary MS, [facility] [place]  Licensed, Certified Genetic Counselor  [facility]  Phone: [phone]                Again, thank you for allowing me to participate in the care of your patient.        Sincerely,        Juana Gary GC

## 2021-11-22 ENCOUNTER — MEDICAL CORRESPONDENCE (OUTPATIENT)
Dept: HEALTH INFORMATION MANAGEMENT | Facility: CLINIC | Age: 31
End: 2021-11-22
Payer: COMMERCIAL

## 2021-11-23 ENCOUNTER — OFFICE VISIT (OUTPATIENT)
Dept: ENDOCRINOLOGY | Facility: CLINIC | Age: 31
End: 2021-11-23
Attending: FAMILY MEDICINE
Payer: COMMERCIAL

## 2021-11-23 VITALS
WEIGHT: 156.2 LBS | SYSTOLIC BLOOD PRESSURE: 144 MMHG | DIASTOLIC BLOOD PRESSURE: 84 MMHG | BODY MASS INDEX: 21.87 KG/M2 | HEART RATE: 107 BPM | OXYGEN SATURATION: 97 % | HEIGHT: 71 IN

## 2021-11-23 DIAGNOSIS — E10.65 TYPE 1 DIABETES MELLITUS WITH HYPERGLYCEMIA (H): ICD-10-CM

## 2021-11-23 DIAGNOSIS — Z46.81 INSULIN PUMP TITRATION: Primary | ICD-10-CM

## 2021-11-23 PROCEDURE — 99214 OFFICE O/P EST MOD 30 MIN: CPT | Performed by: INTERNAL MEDICINE

## 2021-11-23 ASSESSMENT — MIFFLIN-ST. JEOR: SCORE: 1524.65

## 2021-11-23 NOTE — PROGRESS NOTES
"S:   Patient presents for management of DM.   Diagnosed at age 6.     Now on Omni Pod and DEXCOM.     More fluctuation in readings recently.   She has had an URTI.     Also concerned that she is giving insulin pre-meal but still seeing significant rise in glucose once she begins to eat.     Pump Settings:  Basal - MN 1.0, 0700 1.5  Carb - 13  Bolus - 50  Active insulin time - 4 hours  Meter:  TDI 36.5.  82% is basal.    She is calculating the doses in her head and putting them into the pump.     CGM: large PP rises.     ROS: 10 point ROS neg other than the symptoms noted above in the HPI.    Exam:   Vital signs:      BP: (!) 144/84 Pulse: 107     SpO2: 97 %     Height: 180.3 cm (5' 11\") Weight: 70.9 kg (156 lb 3.2 oz)  Estimated body mass index is 21.79 kg/m  as calculated from the following:    Height as of this encounter: 1.803 m (5' 11\").    Weight as of this encounter: 70.9 kg (156 lb 3.2 oz).  Gen: In NAD.   HEENT: no proptosis or lid lag, EOMI, no palpable thyroid tissue.  Card: S1 S2 RRR no m/r/g.  Pulm: CTA b/l.   Ext: no LE edema.   Neuro: no tremor, +2 DTR's. Normal monofilament.       A/P:   Type 1 DM - Needs help with using her pump and CGM. Admits to being over-reliant on her basal rate. She admits to avoiding looking at her glucoses because she does not like seeing the reading. She is getting  and wants to have children. Discussed how goal HbA1C is 6.5% or less.   In 11/2021, now on Omni Pod and DEXCOM. She is calculating boluses on her own and not telling the pump her glucose or carb data. Long discussion of pump functions and how it will lead to improved control. Of note, she sometimes skips boluses if glucose lower and eating a small meal. Basal total is 30 units. Thus ICR would be 8 if she were getting ~50% of her insulin from bolus insulin.   -I would highly suggest you try inputting your carb data and glucose data (from the DEXCOM) into your pump.   -No change to settings today.   -Labs in " 3 months.   -ASA not indicated.  -BP: mild elevation. Repeat 130/84.   -Lipids: HDL 52, Trg 94, LDL 98 in 1/2019. Statin not indicated.    in 9/2021. No statin needed.   -Microalbumin negative in 1/2020. ACEi not indicated.    Negative in 9/2021.   -TSH normal in 1/2020.    Check with next labs.   -Eyes: moderate NDPR in 9/2019. Waiting to get back on regular insurance and off Cobra.  Schedule an eye exam when able.   -Smoking: none.     Insulin pump - as above.     Dada Richards MD on 11/24/2021 at 7:06 AM

## 2021-11-23 NOTE — LETTER
"    11/23/2021         RE: Preeti Leiva  [address] [address]        Dear Colleague,    Thank you for referring your patient, Preeti Leiva, to the [facility]. Please see a copy of my visit note below.    S:   Patient presents for management of DM.   Diagnosed at age 6.     Now on Omni Pod and DEXCOM.     More fluctuation in readings recently.   She has had an URTI.     Also concerned that she is giving insulin pre-meal but still seeing significant rise in glucose once she begins to eat.     Pump Settings:  Basal - MN 1.0, 0700 1.5  Carb - 13  Bolus - 50  Active insulin time - 4 hours  Meter:  TDI 36.5.  82% is basal.    She is calculating the doses in her head and putting them into the pump.     CGM: large PP rises.     ROS: 10 point ROS neg other than the symptoms noted above in the HPI.    Exam:   Vital signs:      BP: (!) 144/84 Pulse: 107     SpO2: 97 %     Height: 180.3 cm (5' 11\") Weight: 70.9 kg (156 lb 3.2 oz)  Estimated body mass index is 21.79 kg/m  as calculated from the following:    Height as of this encounter: 1.803 m (5' 11\").    Weight as of this encounter: 70.9 kg (156 lb 3.2 oz).  Gen: In NAD.   HEENT: no proptosis or lid lag, EOMI, no palpable thyroid tissue.  Card: S1 S2 RRR no m/r/g.  Pulm: CTA b/l.   Ext: no LE edema.   Neuro: no tremor, +2 DTR's. Normal monofilament.       A/P:   Type 1 DM - Needs help with using her pump and CGM. Admits to being over-reliant on her basal rate. She admits to avoiding looking at her glucoses because she does not like seeing the reading. She is getting  and wants to have children. Discussed how goal HbA1C is 6.5% or less.   In 11/2021, now on Omni Pod and DEXCOM. She is calculating boluses on her own and not telling the pump her glucose or carb data. Long discussion of pump functions and how it will lead to improved control. Of note, she sometimes skips boluses if glucose lower and eating a small meal. " Basal total is 30 units. Thus ICR would be 8 if she were getting ~50% of her insulin from bolus insulin.   -I would highly suggest you try inputting your carb data and glucose data (from the DEXCOM) into your pump.   -No change to settings today.   -Labs in 3 months.   -ASA not indicated.  -BP: mild elevation. Repeat 130/84.   -Lipids: HDL 52, Trg 94, LDL 98 in 1/2019. Statin not indicated.    in 9/2021. No statin needed.   -Microalbumin negative in 1/2020. ACEi not indicated.    Negative in 9/2021.   -TSH normal in 1/2020.    Check with next labs.   -Eyes: moderate NDPR in 9/2019. Waiting to get back on regular insurance and off Cobra.  Schedule an eye exam when able.   -Smoking: none.     Insulin pump - as above.     Dada Richards MD on 11/24/2021 at 7:06 AM                  Again, thank you for allowing me to participate in the care of your patient.        Sincerely,        Dada Richards MD

## 2021-11-23 NOTE — PATIENT INSTRUCTIONS
-Schedule an eye exam when able.   -I would highly suggest you try inputting your carb data and glucose data (from the DEXCOM) into your pump.   -No change to settings today.   -Labs in 3 months.   -Send me a My Chart Message in 4 weeks after you have uploaded to [employer].

## 2021-12-03 ENCOUNTER — VIRTUAL VISIT (OUTPATIENT)
Dept: FAMILY MEDICINE | Facility: CLINIC | Age: 31
End: 2021-12-03
Payer: COMMERCIAL

## 2021-12-03 ENCOUNTER — TELEPHONE (OUTPATIENT)
Dept: FAMILY MEDICINE | Facility: CLINIC | Age: 31
End: 2021-12-03

## 2021-12-03 DIAGNOSIS — F33.9 EPISODE OF RECURRENT MAJOR DEPRESSIVE DISORDER, UNSPECIFIED DEPRESSION EPISODE SEVERITY (H): Primary | ICD-10-CM

## 2021-12-03 DIAGNOSIS — F41.9 ANXIETY: ICD-10-CM

## 2021-12-03 PROCEDURE — 99214 OFFICE O/P EST MOD 30 MIN: CPT | Mod: 95 | Performed by: FAMILY MEDICINE

## 2021-12-03 RX ORDER — HYDROXYZINE HYDROCHLORIDE 10 MG/1
10 TABLET, FILM COATED ORAL EVERY 8 HOURS PRN
Qty: 60 TABLET | Refills: 0 | Status: SHIPPED | OUTPATIENT
Start: 2021-12-03 | End: 2022-04-25

## 2021-12-03 ASSESSMENT — ANXIETY QUESTIONNAIRES
7. FEELING AFRAID AS IF SOMETHING AWFUL MIGHT HAPPEN: SEVERAL DAYS
GAD7 TOTAL SCORE: 15
GAD7 TOTAL SCORE: 15
2. NOT BEING ABLE TO STOP OR CONTROL WORRYING: NEARLY EVERY DAY
6. BECOMING EASILY ANNOYED OR IRRITABLE: MORE THAN HALF THE DAYS
GAD7 TOTAL SCORE: 15
4. TROUBLE RELAXING: MORE THAN HALF THE DAYS
3. WORRYING TOO MUCH ABOUT DIFFERENT THINGS: NEARLY EVERY DAY
5. BEING SO RESTLESS THAT IT IS HARD TO SIT STILL: SEVERAL DAYS
7. FEELING AFRAID AS IF SOMETHING AWFUL MIGHT HAPPEN: SEVERAL DAYS
8. IF YOU CHECKED OFF ANY PROBLEMS, HOW DIFFICULT HAVE THESE MADE IT FOR YOU TO DO YOUR WORK, TAKE CARE OF THINGS AT HOME, OR GET ALONG WITH OTHER PEOPLE?: VERY DIFFICULT
1. FEELING NERVOUS, ANXIOUS, OR ON EDGE: NEARLY EVERY DAY

## 2021-12-03 ASSESSMENT — PATIENT HEALTH QUESTIONNAIRE - PHQ9
10. IF YOU CHECKED OFF ANY PROBLEMS, HOW DIFFICULT HAVE THESE PROBLEMS MADE IT FOR YOU TO DO YOUR WORK, TAKE CARE OF THINGS AT HOME, OR GET ALONG WITH OTHER PEOPLE: VERY DIFFICULT
SUM OF ALL RESPONSES TO PHQ QUESTIONS 1-9: 16
SUM OF ALL RESPONSES TO PHQ QUESTIONS 1-9: 16

## 2021-12-03 NOTE — PROGRESS NOTES
Preeti is a 31 year old who is being evaluated via a billable video visit.      How would you like to obtain your AVS? MyChart  If the video visit is dropped, the invitation should be resent by: Send to e-mail at: [email or web address]  Will anyone else be joining your video visit? No      Video Start Time: 11:39 AM    Assessment & Plan     (F33.9) Episode of recurrent major depressive disorder, unspecified depression episode severity (H)  (primary encounter diagnosis)  Comment: situationally worsened  Plan: MENTAL HEALTH REFERRAL  - Adult; Outpatient         Treatment; Individual/Couples/Family/Group         Therapy/Health Psychology; [facility] [phone]; We will contact you to         schedule the appointment or please call with         any questions        Will connect her with a therapist more urgently    (F41.9) Anxiety  Comment: see above  Plan: MENTAL HEALTH REFERRAL  - Adult; Outpatient         Treatment; Individual/Couples/Family/Group         Therapy/Health Psychology; [facility] [phone]; We will contact you to         schedule the appointment or please call with         any questions, hydrOXYzine (ATARAX) 10 MG         tablet        I discussed with the patient risks and benefits of the new medications prescribed including potential side effects.  The patient had opportunity to ask questions and is comfortable with and interested in medications as prescribed.  And will closely follow                Depression Screening Follow Up    PHQ 12/3/2021   PHQ-9 Total Score 16   Q9: Thoughts of better off dead/self-harm past 2 weeks Several days   F/U: Thoughts of suicide or self-harm No   F/U: Safety concerns No     Last PHQ-9 12/3/2021   1.  Little interest or pleasure in doing things 2   2.  Feeling down, depressed, or hopeless 2   3.  Trouble falling or staying asleep, or sleeping too much 2   4.  Feeling tired or having little energy 2   5.  Poor  appetite or overeating 2   6.  Feeling bad about yourself 2   7.  Trouble concentrating 2   8.  Moving slowly or restless 1   Q9: Thoughts of better off dead/self-harm past 2 weeks 1   PHQ-9 Total Score 16   Difficulty at work, home, or with people -   In the past two weeks have you had thoughts of suicide or self harm? No   Do you have concerns about your personal safety or the safety of others? No               Follow Up      Follow Up Actions Taken  Mental Health Referral placed  close follow up with me    Discussed the following ways the patient can remain in a safe environment:  be around others      Return today (on 12/3/2021) for Mood/Mental Health, with me, using a video visit.    Roshni Nevarez MD  [facility]    Anna Álvarez is a 31 year old who presents for the following health issues   Patient notes she had a traumatic event occur during young childhood.   And kept that to herself until last year  - when she told her brother.  That telling has now lead to some other changes in her life - for which she is carrying guilt (unnecessary guilt)    She also would like to make a career change but is finding that stressful at this time.    History of Present Illness       Mental Health Follow-up:  Patient presents to follow-up on Depression & Anxiety.Patient's depression since last visit has been:  Worse  The patient is having other symptoms associated with depression.  Patient's anxiety since last visit has been:  Worse  The patient is having other symptoms associated with anxiety.  Any significant life events: job concerns and other  Patient is feeling anxious or having panic attacks.  Patient has no concerns about alcohol or drug use.     Social History  Tobacco Use    Smoking status: Never Smoker    Smokeless tobacco: Never Used  Vaping Use    Vaping Use: Never used  Alcohol use: Yes    Comment:  5 drinks per week   Drug use: Never      Today's PHQ-9         PHQ-9 Total  Score:     (P) 16   PHQ-9 Q9 Thoughts of better off dead/self-harm past 2 weeks :   (P) Several days   Thoughts of suicide or self harm:  (P) No   Self-harm Plan:        Self-harm Action:          Safety concerns for self or others: (P) No         She eats 0-1 servings of fruits and vegetables daily.She consumes 1 sweetened beverage(s) daily.She exercises with enough effort to increase her heart rate 9 or less minutes per day.  She exercises with enough effort to increase her heart rate 3 or less days per week. She is missing 1 dose(s) of medications per week.  She is not taking prescribed medications regularly due to remembering to take.     Patient does not wish to hurt herself/does not have a suicide plan.  She sometimes wishes she could just escape her current situation.          Review of Systems   Constitutional, HEENT, cardiovascular, pulmonary, gi and gu systems are negative, except as otherwise noted.      Objective           Vitals:  No vitals were obtained today due to virtual visit.    Physical Exam   GENERAL: healthy, alert, no distress and tearful  EYES: Eyes grossly normal to inspection.  No discharge or erythema, or obvious scleral/conjunctival abnormalities.  RESP: No audible wheeze, cough, or visible cyanosis.  No visible retractions or increased work of breathing.    SKIN: Visible skin clear. No significant rash, abnormal pigmentation or lesions.  NEURO: Cranial nerves grossly intact.  Mentation and speech appropriate for age.  PSYCH: Mentation appears normal, affect normal/bright, judgement and insight intact, normal speech and appearance well-groomed.                Video-Visit Details    Type of service:  Video Visit    Video End Time:11:58 AM    Originating Location (pt. Location): Home    Distant Location (provider location):  [facility]     Platform used for Video Visit: [employer]

## 2021-12-04 ASSESSMENT — ANXIETY QUESTIONNAIRES: GAD7 TOTAL SCORE: 15

## 2021-12-04 ASSESSMENT — PATIENT HEALTH QUESTIONNAIRE - PHQ9: SUM OF ALL RESPONSES TO PHQ QUESTIONS 1-9: 16

## 2021-12-06 ENCOUNTER — TELEPHONE (OUTPATIENT)
Dept: FAMILY MEDICINE | Facility: CLINIC | Age: 31
End: 2021-12-06

## 2021-12-06 ENCOUNTER — VIRTUAL VISIT (OUTPATIENT)
Dept: ONCOLOGY | Facility: CLINIC | Age: 31
End: 2021-12-06
Attending: GENETIC COUNSELOR, MS
Payer: COMMERCIAL

## 2021-12-06 DIAGNOSIS — Z80.49 FAMILY HISTORY OF UTERINE CANCER: ICD-10-CM

## 2021-12-06 DIAGNOSIS — Z80.8 FAMILY HISTORY OF MELANOMA: ICD-10-CM

## 2021-12-06 DIAGNOSIS — E10.65 TYPE 1 DIABETES MELLITUS WITH HYPERGLYCEMIA (H): Primary | ICD-10-CM

## 2021-12-06 DIAGNOSIS — Z80.3 FAMILY HISTORY OF MALIGNANT NEOPLASM OF BREAST: Primary | ICD-10-CM

## 2021-12-06 DIAGNOSIS — Z80.0 FAMILY HISTORY OF COLON CANCER: ICD-10-CM

## 2021-12-06 PROCEDURE — 999N000069 HC STATISTIC GENETIC COUNSELING, < 16 MIN: Mod: GT,95 | Performed by: GENETIC COUNSELOR, MS

## 2021-12-06 NOTE — LETTER
12/6/2021         RE: Preeti Leiva  [address] [address]        Dear Colleague,    Thank you for referring your patient, Preeti Leiva, to the [facility]. Please see a copy of my visit note below.    12/6/2021    Referring Provider: Mikki Trevino MD    Presenting Information:  Preeti Leiva returned to the Cancer Risk Management Program (virtual visit) to discuss her genetic testing results.  She was previously seen for genetic counseling 11/3/2021 to discuss her family history of breast, melanoma, colon and uterine cancer, and elected to proceed with genetic testing through [employer] ([facility]ve[facility]-Cancer 40 gene panel).    Genetic Testing Result: LILLIAM Álvarez is negative for mutations in the 40 gene CustomNext-Cancer panel through [employer].  No pathogenic mutations, variants of unknown significance, or gross deletions or duplications were detected.    Genes Analyzed (40 total): APC, CHANTAL, AXIN2, BAP1, BARD1, BMPR1A, BRCA1, BRCA2, BRIP1, CDH1, CDK4, CDKN2A, CHEK2, DICER1, MLH1, MSH2, MSH3, MSH6, MUTYH, NBN, NF1, NTHL1, PALB2, PMS2, POT1, PTEN, RAD51C, RAD51D, RB1, RECQL, SMAD4, SMARCA4, STK11 and TP53 (sequencing and deletion/duplication); HOXB13, MITF, POLD1 and POLE (sequencing only); EPCAM and GREM1 (deletion/duplication only).     A copy of the test report can be found in the Laboratory tab, dated 11/11/2021.     Interpretation:  We discussed several different interpretations of this negative test result.    1. One explanation may be that there is a different gene or combination of genes and environment that are associated with the cancers in this family.  2. It remains possible that a cancer susceptibility gene may be in Preeti's family which she did not inherit.    3. There is also a small possibility that there is a mutation in one of these genes, and the testing laboratory could not find it with their current testing methods.        Screening:  Based on this negative test result, it is important for Preeti and her relatives to refer back to the family history for appropriate cancer screening.      Preeti and her close female relatives remain at increased risk for breast cancer given their family history. Breast cancer screening is generally recommended to begin approximately 10 years younger than the earliest age of breast cancer diagnosis in the family, or at age 40, whichever comes first. Due to her paternal grandmother's breast cancer diagnosis at age 45, it would be reasonable for Preeti to start breast screening at age 36.  Breast screening options should be discussed with an individual's primary care provider and a genetic counselor, to determine at what age to begin screening, what screening is appropriate, and if additional screening (such as breast MRI) is necessary based on personal/family history factors.      Based on her personal and family history, Preeit has a 18.4-19.5% lifetime risk of developing breast cancer based on the Mark and Roxana8 model. Preeti does not currently meet National Comprehensive Cancer Network (NCCN) guidelines for high risk breast screening (offered to women with a 20% lifetime risk or higher, 10 years prior to the youngest breast cancer in the family).  We discussed that these recommendations should be revisited once Preeti is 36.  Preeti is encouraged to discuss breast screening with her physicians.     Other population cancer screening options, such as those recommended by the American Cancer Society and the National Comprehensive Cancer Network (NCCN), are also appropriate for Preeti at this time. These screening recommendations may change if there are changes to Preeti's personal and/or family history of cancer. Final screening recommendations should be made by each individual's primary care provider.    Inheritance:  We reviewed the autosomal dominant inheritance of mutations in these  40 genes.  We discussed that she cannot pass on a currently identifiable mutation in any of the 40 genes tested.  Mutations in these genes do not skip generations.      Additional Testing Considerations:  Although Preeti's genetic testing result was negative, other relatives may still carry a gene mutation associated with cancer risk. Genetic counseling is recommended for close relatives on both sides of her family due to the significant history of cancer (including her maternal family history of early onset colon cancer, and paternal family history of breast and uterine cancer).  If any of these relatives do pursue genetic testing, Preeti is encouraged to contact me so that we may review the impact of their test results on her.    Summary:  We do not have an explanation for Preeti's family history of cancer. While no genetic changes were identified, Preeti may still be at risk for certain cancers due to family history, environmental factors, or other genetic causes not identified by this test.  Because of that, it is important that she continue with cancer screening based on her personal and family history as discussed above.    Genetic testing is rapidly advancing, and new cancer susceptibility genes will most likely be identified in the future. Therefore, I encouraged Preeti to contact me annually or if there are changes in her personal or family history. This may change how we assess her cancer risk, screening, and the testing we would offer.    Plan:  1.  I provided Preeti with her genetic test results as part of the Cancer Risk Management Program.  2. She plans to follow-up with her managing physicians.  3. She should contact me regularly, or sooner if her family history changes.    If Preeti has any further questions, I encouraged her to contact me at [phone].    Time spent face to face: 5 minutes    Juana Gary MS, [facility] [place]  Licensed, Certified Genetic Counselor  [facility]

## 2021-12-06 NOTE — LETTER
Cancer Risk Management  Program [facility]  [facility]  Mailing Address  Cancer Risk Management Program  [address]  [place] [address]    New patient appointments  [phone]  December 6, 2021    Preeti Leiva  [address]  [facility] [address]      Dear Preeti,    It was a pleasure meeting with you on 12/6/2021. Here is a copy of the progress note from our discussion. If you have any additional questions, please feel free to call.    Referring Provider: Mikki Trevino MD    Presenting Information:  Preeti Leiva returned to the Cancer Risk Management Program (virtual visit) to discuss her genetic testing results.  She was previously seen for genetic counseling 11/3/2021 to discuss her family history of breast, melanoma, colon and uterine cancer, and elected to proceed with genetic testing through [employer] (CustomNext-Cancer 40 gene panel).    Genetic Testing Result: NEGATIVE  Preeti is negative for mutations in the 40 gene CustomNext-Cancer panel through [employer].  No pathogenic mutations, variants of unknown significance, or gross deletions or duplications were detected.    Genes Analyzed (40 total): APC, CHANTAL, AXIN2, BAP1, BARD1, BMPR1A, BRCA1, BRCA2, BRIP1, CDH1, CDK4, CDKN2A, CHEK2, DICER1, MLH1, MSH2, MSH3, MSH6, MUTYH, NBN, NF1, NTHL1, PALB2, PMS2, POT1, PTEN, RAD51C, RAD51D, RB1, RECQL, SMAD4, SMARCA4, STK11 and TP53 (sequencing and deletion/duplication); HOXB13, MITF, POLD1 and POLE (sequencing only); EPCAM and GREM1 (deletion/duplication only).     Interpretation:  We discussed several different interpretations of this negative test result.    1. One explanation may be that there is a different gene or combination of genes and environment that are  associated with the cancers in this family.  2. It remains possible that a cancer susceptibility gene may be in Preeti's family which she did not inherit.    3. There is also a small possibility that there is a mutation in one of these genes, and the testing laboratory could not find it with their current testing methods.       Screening:  Based on this negative test result, it is important for Preeti and her relatives to refer back to the family history for appropriate cancer screening.      Preeti and her close female relatives remain at increased risk for breast cancer given their family history. Breast cancer screening is generally recommended to begin approximately 10 years younger than the earliest age of breast cancer diagnosis in the family, or at age 40, whichever comes first. Due to her paternal grandmother's breast cancer diagnosis at age 45, it would be reasonable for Preeti to start breast screening at age 36.  Breast screening options should be discussed with an individual's primary care provider and a genetic counselor, to determine at what age to begin screening, what screening is appropriate, and if additional screening (such as breast MRI) is necessary based on personal/family history factors.      Based on her personal and family history, Preeti has a 18.4-19.5% lifetime risk of developing breast cancer based on the Mark and IBISv8 model. Preeti does not currently meet National Comprehensive Cancer Network (NCCN) guidelines for high risk breast screening (offered to women with a 20% lifetime risk or higher, 10 years prior to the youngest breast cancer in the family).  We discussed that these recommendations should be revisited once Preeti is 36.  Preeti is encouraged to discuss breast screening with her physicians.     Other population cancer screening options, such as those recommended by the American Cancer Society and the National Comprehensive Cancer Network (NCCN), are also  appropriate for Preeti at this time. These screening recommendations may change if there are changes to Preeti's personal and/or family history of cancer. Final screening recommendations should be made by each individual's primary care provider.    Inheritance:  We reviewed the autosomal dominant inheritance of mutations in these 40 genes.  We discussed that she cannot pass on a currently identifiable mutation in any of the 40 genes tested.  Mutations in these genes do not skip generations.      Additional Testing Considerations:  Although Preeti's genetic testing result was negative, other relatives may still carry a gene mutation associated with cancer risk. Genetic counseling is recommended for close relatives on both sides of her family due to the significant history of cancer (including her maternal family history of early onset colon cancer, and paternal family history of breast and uterine cancer).  If any of these relatives do pursue genetic testing, Preeti is encouraged to contact me so that we may review the impact of their test results on her.    Summary:  We do not have an explanation for Preeti's family history of cancer. While no genetic changes were identified, Preeti may still be at risk for certain cancers due to family history, environmental factors, or other genetic causes not identified by this test.  Because of that, it is important that she continue with cancer screening based on her personal and family history as discussed above.    Genetic testing is rapidly advancing, and new cancer susceptibility genes will most likely be identified in the future. Therefore, I encouraged Preeti to contact me annually or if there are changes in her personal or family history. This may change how we assess her cancer risk, screening, and the testing we would offer.    Plan:  1.  I provided Preeti with her genetic test results as part of the Cancer Risk Management Program.  2. She plans to follow-up  with her managing physicians.  3. She should contact me regularly, or sooner if her family history changes.    If Preeti has any further questions, I encouraged her to contact me at [phone].    Juana Gary MS, [facility]  Licensed, Certified Genetic Counselor  [facility]

## 2021-12-06 NOTE — TELEPHONE ENCOUNTER
PA Initiation    Medication: HUMALOG 100 UNIT/ML injection  Insurance Company: [employer] - Phone [phone] Fax [phone]  Pharmacy Filling the Rx: [id number] [place] [address] AT [facility] [address] [facility]  Filling Pharmacy Phone: [phone]  Filling Pharmacy Fax: [phone]  Start Date: 12/6/2021

## 2021-12-06 NOTE — TELEPHONE ENCOUNTER
Prior Authorization Retail Medication Request    Medication/Dose:   ICD code (if different than what is on RX):  E10.65  Previously Tried and Failed:    Rationale:      Insurance Name:  [facility] PLANS  Insurance ID:  [id number]       Pharmacy Information (if different than what is on RX)  Name:  [facility]  Phone:  [phone]

## 2021-12-06 NOTE — PROGRESS NOTES
12/6/2021    Referring Provider: Mikki Trevino MD    Presenting Information:  Preeti Leiva returned to the Cancer Risk Management Program (virtual visit) to discuss her genetic testing results.  She was previously seen for genetic counseling 11/3/2021 to discuss her family history of breast, melanoma, colon and uterine cancer, and elected to proceed with genetic testing through [employer] (CustomNext-Cancer 40 gene panel).    Genetic Testing Result: NEGATIVE  Preeti is negative for mutations in the 40 gene CustomNext-Cancer panel through [employer].  No pathogenic mutations, variants of unknown significance, or gross deletions or duplications were detected.    Genes Analyzed (40 total): APC, CHANTAL, AXIN2, BAP1, BARD1, BMPR1A, BRCA1, BRCA2, BRIP1, CDH1, CDK4, CDKN2A, CHEK2, DICER1, MLH1, MSH2, MSH3, MSH6, MUTYH, NBN, NF1, NTHL1, PALB2, PMS2, POT1, PTEN, RAD51C, RAD51D, RB1, RECQL, SMAD4, SMARCA4, STK11 and TP53 (sequencing and deletion/duplication); HOXB13, MITF, POLD1 and POLE (sequencing only); EPCAM and GREM1 (deletion/duplication only).     A copy of the test report can be found in the Laboratory tab, dated 11/11/2021.     Interpretation:  We discussed several different interpretations of this negative test result.    1. One explanation may be that there is a different gene or combination of genes and environment that are associated with the cancers in this family.  2. It remains possible that a cancer susceptibility gene may be in Preeti's family which she did not inherit.    3. There is also a small possibility that there is a mutation in one of these genes, and the testing laboratory could not find it with their current testing methods.       Screening:  Based on this negative test result, it is important for Preeti and her relatives to refer back to the family history for appropriate cancer screening.      Preeti and her close female relatives remain at increased risk for breast cancer given  their family history. Breast cancer screening is generally recommended to begin approximately 10 years younger than the earliest age of breast cancer diagnosis in the family, or at age 40, whichever comes first. Due to her paternal grandmother's breast cancer diagnosis at age 45, it would be reasonable for Preeti to start breast screening at age 36.  Breast screening options should be discussed with an individual's primary care provider and a genetic counselor, to determine at what age to begin screening, what screening is appropriate, and if additional screening (such as breast MRI) is necessary based on personal/family history factors.      Based on her personal and family history, Preeti has a 18.4-19.5% lifetime risk of developing breast cancer based on the Mark and ALYSSAv8 model. Preeti does not currently meet National Comprehensive Cancer Network (NCCN) guidelines for high risk breast screening (offered to women with a 20% lifetime risk or higher, 10 years prior to the youngest breast cancer in the family).  We discussed that these recommendations should be revisited once Preeti is 36.  Preeti is encouraged to discuss breast screening with her physicians.     Other population cancer screening options, such as those recommended by the American Cancer Society and the National Comprehensive Cancer Network (NCCN), are also appropriate for Preeti at this time. These screening recommendations may change if there are changes to Preeti's personal and/or family history of cancer. Final screening recommendations should be made by each individual's primary care provider.    Inheritance:  We reviewed the autosomal dominant inheritance of mutations in these 40 genes.  We discussed that she cannot pass on a currently identifiable mutation in any of the 40 genes tested.  Mutations in these genes do not skip generations.      Additional Testing Considerations:  Although Preeti's genetic testing result was negative,  other relatives may still carry a gene mutation associated with cancer risk. Genetic counseling is recommended for close relatives on both sides of her family due to the significant history of cancer (including her maternal family history of early onset colon cancer, and paternal family history of breast and uterine cancer).  If any of these relatives do pursue genetic testing, Preeti is encouraged to contact me so that we may review the impact of their test results on her.    Summary:  We do not have an explanation for Preeti's family history of cancer. While no genetic changes were identified, Preeti may still be at risk for certain cancers due to family history, environmental factors, or other genetic causes not identified by this test.  Because of that, it is important that she continue with cancer screening based on her personal and family history as discussed above.    Genetic testing is rapidly advancing, and new cancer susceptibility genes will most likely be identified in the future. Therefore, I encouraged Preeti to contact me annually or if there are changes in her personal or family history. This may change how we assess her cancer risk, screening, and the testing we would offer.    Plan:  1.  I provided Preeti with her genetic test results as part of the Cancer Risk Management Program.  2. She plans to follow-up with her managing physicians.  3. She should contact me regularly, or sooner if her family history changes.    If Preeti has any further questions, I encouraged her to contact me at [phone].    Time spent face to face: 5 minutes    Juana Gary MS, [facility]  Licensed, Certified Genetic Counselor  [facility]

## 2021-12-07 NOTE — TELEPHONE ENCOUNTER
PRIOR AUTHORIZATION DENIED    Medication: HUMALOG 100 UNIT/ML injection    Denial Date: 12/7/2021    Denial Rationale:         Appeal Information: If provider would like to appeal this decision we will need a detailed letter of medical necessity to start the process. Then re-route this request back to the PA pool.

## 2021-12-08 NOTE — TELEPHONE ENCOUNTER
Routing to RN to send Rx for Novolog per insurance purposes.       Ok to change to novolog.   Dada Richards MD on 12/8/2021 at 11:39 AM         Vicky Cortez MA

## 2021-12-08 NOTE — TELEPHONE ENCOUNTER
Ok to change insulin to formulary option Novolog or to appeal denial? Please advise.     Vicky Cortez MA

## 2021-12-08 NOTE — TELEPHONE ENCOUNTER
ERX CHANGED TO 90U every day VA PUMP. Note to pharmacy replacement for her humalog.    Nelia BRENNAN RN Specialty Triage 12/8/2021 12:42 PM

## 2022-01-30 ENCOUNTER — HEALTH MAINTENANCE LETTER (OUTPATIENT)
Age: 32
End: 2022-01-30

## 2022-02-10 ENCOUNTER — OFFICE VISIT (OUTPATIENT)
Dept: OBGYN | Facility: CLINIC | Age: 32
End: 2022-02-10
Payer: COMMERCIAL

## 2022-02-10 VITALS
SYSTOLIC BLOOD PRESSURE: 134 MMHG | HEIGHT: 71 IN | HEART RATE: 117 BPM | BODY MASS INDEX: 21.42 KG/M2 | WEIGHT: 153 LBS | DIASTOLIC BLOOD PRESSURE: 90 MMHG

## 2022-02-10 DIAGNOSIS — Z30.432 ENCOUNTER FOR IUD REMOVAL: Primary | ICD-10-CM

## 2022-02-10 DIAGNOSIS — E10.65 TYPE 1 DIABETES MELLITUS WITH HYPERGLYCEMIA (H): ICD-10-CM

## 2022-02-10 LAB — HBA1C MFR BLD: 8.6 % (ref 0–5.6)

## 2022-02-10 PROCEDURE — 99212 OFFICE O/P EST SF 10 MIN: CPT | Mod: 25 | Performed by: OBSTETRICS & GYNECOLOGY

## 2022-02-10 PROCEDURE — 83036 HEMOGLOBIN GLYCOSYLATED A1C: CPT | Performed by: OBSTETRICS & GYNECOLOGY

## 2022-02-10 PROCEDURE — 36415 COLL VENOUS BLD VENIPUNCTURE: CPT | Performed by: OBSTETRICS & GYNECOLOGY

## 2022-02-10 PROCEDURE — 84443 ASSAY THYROID STIM HORMONE: CPT | Performed by: OBSTETRICS & GYNECOLOGY

## 2022-02-10 PROCEDURE — 58301 REMOVE INTRAUTERINE DEVICE: CPT | Performed by: OBSTETRICS & GYNECOLOGY

## 2022-02-10 ASSESSMENT — MIFFLIN-ST. JEOR: SCORE: 1510.13

## 2022-02-10 NOTE — PROGRESS NOTES
Assessment & Plan     Encounter for IUD removal  Start PNV  Start tracking cycles  H/o irregular menses then 10+ years of contraception.   Let me know in 3-4 months if periods irregular.  - REMOVE INTRAUTERINE DEVICE    Type 1 diabetes mellitus with hyperglycemia (H)  Discussed options for endocrinology  - Hemoglobin A1c; Future    Ordering of each unique test             No follow-ups on file.    Lubna Glynn MD  [facility] EMERALD Álvarez is a 31 year old who presents for the following health issues     HPI   Presents for iud removal  Ready to try to conceive  Not taking PNV  Recent A1c improved from previous  Just found out her endocrinologist is leaving the practice. She has liked him and has not had a good experience with previous endocrinologists (outside of [place]).     Past Medical History:   Diagnosis Date     Anxiety      Depressive disorder      Major depression      Type 1 diabetes mellitus with hyperglycemia (H)        No past surgical history on file.    Family History   Problem Relation Age of Onset     Anxiety Disorder Mother      Depression Mother         Mother     Skin Cancer Mother      Melanoma Mother      Lung Cancer Father      Melanoma Maternal Grandmother      Skin Cancer Maternal Grandmother      Melanoma Paternal Grandmother      Skin Cancer Paternal Grandmother      Melanoma Maternal Uncle      Skin Cancer Maternal Uncle      Glaucoma No family hx of      Macular Degeneration No family hx of        Social History     Socioeconomic History     Marital status: Single     Spouse name: Not on file     Number of children: Not on file     Years of education: Not on file     Highest education level: Not on file   Occupational History     Not on file   Tobacco Use     Smoking status: Never Smoker     Smokeless tobacco: Never Used   Vaping Use     Vaping Use: Never used   Substance and Sexual Activity     Alcohol use: Yes     Comment:  5 drinks  "per week      Drug use: Never     Sexual activity: Yes     Partners: Male     Birth control/protection: I.U.D.   Other Topics Concern     Parent/sibling w/ CABG, MI or angioplasty before 65F 55M? No   Social History Narrative     Not on file     Social Determinants of Health     Financial Resource Strain: Not on file   Food Insecurity: Not on file   Transportation Needs: Not on file   Physical Activity: Not on file   Stress: Not on file   Social Connections: Not on file   Intimate Partner Violence: Not on file   Housing Stability: Not on file       Current Outpatient Medications   Medication     Continuous Blood Gluc Sensor (DEXCOM G6 SENSOR) MISC     Continuous Blood Gluc Transmit (DEXCOM G6 TRANSMITTER) MISC     Glucagon, rDNA, (GLUCAGON EMERGENCY) 1 MG KIT     HUMALOG 100 UNIT/ML injection     hydrOXYzine (ATARAX) 10 MG tablet     insulin aspart (NOVOLOG VIAL) 100 UNITS/ML vial     Insulin Disposable Pump (OMNIPOD 5 PACK) MISC     sertraline (ZOLOFT) 100 MG tablet     ALPRAZolam (XANAX) 0.25 MG tablet     Current Facility-Administered Medications   Medication     levonorgestrel (FIDEL) 13.5 MG IUD 13.5 mg        No Known Allergies      Review of Systems   Constitutional, HEENT, cardiovascular, pulmonary, gi and gu systems are negative, except as otherwise noted.      Objective    BP (!) 134/90 (BP Location: Right arm, Patient Position: Sitting, Cuff Size: Adult Regular)   Pulse 117   Ht 1.803 m (5' 11\")   Wt 69.4 kg (153 lb)   BMI 21.34 kg/m    Body mass index is 21.34 kg/m .  Physical Exam   GENERAL: healthy, alert and no distress   (female): normal female external genitalia, normal urethral meatus, vaginal mucosa, normal cervix/adnexa/uterus without masses or discharge  MS: no gross musculoskeletal defects noted, no edema  PSYCH: mentation appears normal, affect normal/bright            Procedure IUD REMOVAL:    Bimanual exam was performed.  The IUD strings were palpable.  Speculum introduced with patient " in the dorsal lithotomy position.  The IUD string was visualized and grasped.  The IUD was removed easily.  Pt tolerated well.

## 2022-02-13 LAB — TSH SERPL DL<=0.005 MIU/L-ACNC: 1.2 MU/L (ref 0.4–4)

## 2022-03-01 DIAGNOSIS — E10.65 TYPE 1 DIABETES MELLITUS WITH HYPERGLYCEMIA (H): ICD-10-CM

## 2022-03-04 NOTE — CONFIDENTIAL NOTE
Pending Prescriptions:                       Disp   Refills    insulin lispro (HUMALOG VIAL) 100 UNIT/ML*80 mL  0            Sig: INJECT 90 UNITS UNDER THE SKIN EVERY DAY VIA           INSULIN PUMP    RN refilled medication per G Refill Protocol.       Anika VELASCO RN, Specialty Clinic 03/04/22 10:37 AM

## 2022-03-07 ENCOUNTER — TELEPHONE (OUTPATIENT)
Dept: ENDOCRINOLOGY | Facility: CLINIC | Age: 32
End: 2022-03-07
Payer: COMMERCIAL

## 2022-03-09 ENCOUNTER — TELEPHONE (OUTPATIENT)
Dept: FAMILY MEDICINE | Facility: CLINIC | Age: 32
End: 2022-03-09
Payer: COMMERCIAL

## 2022-03-09 DIAGNOSIS — F33.42 RECURRENT MAJOR DEPRESSIVE DISORDER, IN FULL REMISSION (H): ICD-10-CM

## 2022-03-09 DIAGNOSIS — E10.65 TYPE 1 DIABETES MELLITUS WITH HYPERGLYCEMIA (H): ICD-10-CM

## 2022-03-09 DIAGNOSIS — F41.9 ANXIETY: ICD-10-CM

## 2022-03-09 NOTE — CONFIDENTIAL NOTE
Pending Prescriptions:                       Disp   Refills    insulin lispro (HUMALOG) 100 UNIT/ML vial*80 mL  0            Sig: INJECT 90 UNITS UNDER THE SKIN DAILY VIA PUMP    Just refilled, patient has to establish care with new endocrinologist.

## 2022-03-10 RX ORDER — SERTRALINE HYDROCHLORIDE 100 MG/1
TABLET, FILM COATED ORAL
Qty: 180 TABLET | Refills: 1 | Status: SHIPPED | OUTPATIENT
Start: 2022-03-10 | End: 2022-11-10

## 2022-03-10 NOTE — TELEPHONE ENCOUNTER
She is due for follow up with me to check in - can you please assist with scheduling a virtual visit?    And did she ever receive a call from mental health to schedule therapy/counseling?  If not, I can place another referral - and I apologize.

## 2022-03-10 NOTE — TELEPHONE ENCOUNTER
Routing refill request to provider for review/approval because:  PHQ-9    PHQ-9 score:    PHQ 12/3/2021   PHQ-9 Total Score 16   Q9: Thoughts of better off dead/self-harm past 2 weeks Several days   F/U: Thoughts of suicide or self-harm No   F/U: Safety concerns No   Some encounter information is confidential and restricted. Go to Review Flowsheets activity to see all data.                      Pending Prescriptions:                       Disp   Refills    sertraline (ZOLOFT) 100 MG tablet [Pharmac*180 ta*1        Sig: TAKE 2 TABLETS(200 MG) BY MOUTH DAILY        Deshawn Malik RN

## 2022-03-11 NOTE — TELEPHONE ENCOUNTER
I placed another mental health referral.  So they should reach out to her to schedule. Or she can all them at [phone].

## 2022-03-11 NOTE — TELEPHONE ENCOUNTER
Called and advised patient of number to call to schedule mental health appointment when she is able as below. Patient expressed understanding and took down number.    Supriya DIAS CMA

## 2022-03-14 ENCOUNTER — TELEPHONE (OUTPATIENT)
Dept: BEHAVIORAL HEALTH | Facility: CLINIC | Age: 32
End: 2022-03-14
Payer: COMMERCIAL

## 2022-03-14 NOTE — TELEPHONE ENCOUNTER
Spoke with pt to remind them about their video visit on Wednesday 3/16/22 at 10:30 am, that will be connect through via My Chart.    Writer also mention to pt to login and complete e-check in questions 15 - 30 min prior to appt time.

## 2022-03-14 NOTE — TELEPHONE ENCOUNTER
PA Initiation    Medication: Insulin Disposable Pump (OMNIPOD 5 PACK) MISC  Insurance Company:    Pharmacy Filling the Rx: [id number] - [facility] [address] AT [facility] OF [address] [facility]  Filling Pharmacy Phone: [phone]  Filling Pharmacy Fax:    Start Date: 3/14/2022

## 2022-03-14 NOTE — TELEPHONE ENCOUNTER
Prior Authorization Not Needed per Insurance    Medication: Insulin Disposable Pump (OMNIPOD 5 PACK) MISC  Insurance Company:    Expected CoPay:      Pharmacy Filling the Rx: [facility][id number] - [facility] [address] AT [facility] OF [place] & [facility]  Pharmacy Notified: YesComment:  PHARMACY TECHNICIAN TRIED TO RUN THOUGH [facility] TOO SOON JUST FILLED 3/7/2022  Patient Notified: YesComment:  PHARMACY HAS ALREADY NOTIFIED     CALLED PHARMACY TO HAVE SUBMIT CLAIM TO VERIFY MESSAGE RECEIVED ABOVE FROM [facility]. PHARMACY STATED ALREADY FILLED 3/7/2022 THROUGH INSURANCE AND HAVE NOTIFIED PATIENT.

## 2022-03-16 ENCOUNTER — VIRTUAL VISIT (OUTPATIENT)
Dept: PSYCHOLOGY | Facility: CLINIC | Age: 32
End: 2022-03-16
Attending: FAMILY MEDICINE
Payer: COMMERCIAL

## 2022-03-16 DIAGNOSIS — F41.9 ANXIETY: ICD-10-CM

## 2022-03-16 DIAGNOSIS — F33.42 RECURRENT MAJOR DEPRESSIVE DISORDER, IN FULL REMISSION (H): ICD-10-CM

## 2022-03-16 PROCEDURE — 90834 PSYTX W PT 45 MINUTES: CPT | Mod: GT | Performed by: SOCIAL WORKER

## 2022-03-16 ASSESSMENT — PATIENT HEALTH QUESTIONNAIRE - PHQ9
SUM OF ALL RESPONSES TO PHQ QUESTIONS 1-9: 12
10. IF YOU CHECKED OFF ANY PROBLEMS, HOW DIFFICULT HAVE THESE PROBLEMS MADE IT FOR YOU TO DO YOUR WORK, TAKE CARE OF THINGS AT HOME, OR GET ALONG WITH OTHER PEOPLE: SOMEWHAT DIFFICULT
SUM OF ALL RESPONSES TO PHQ QUESTIONS 1-9: 12
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
## 2022-03-17 ASSESSMENT — PATIENT HEALTH QUESTIONNAIRE - PHQ9: SUM OF ALL RESPONSES TO PHQ QUESTIONS 1-9: 12

## 2022-03-26 ASSESSMENT — ANXIETY QUESTIONNAIRES
2. NOT BEING ABLE TO STOP OR CONTROL WORRYING: NEARLY EVERY DAY
GAD7 TOTAL SCORE: 11
4. TROUBLE RELAXING: SEVERAL DAYS
1. FEELING NERVOUS, ANXIOUS, OR ON EDGE: MORE THAN HALF THE DAYS
GAD7 TOTAL SCORE: 11
5. BEING SO RESTLESS THAT IT IS HARD TO SIT STILL: SEVERAL DAYS
6. BECOMING EASILY ANNOYED OR IRRITABLE: SEVERAL DAYS
3. WORRYING TOO MUCH ABOUT DIFFERENT THINGS: NEARLY EVERY DAY
7. FEELING AFRAID AS IF SOMETHING AWFUL MIGHT HAPPEN: NOT AT ALL
7. FEELING AFRAID AS IF SOMETHING AWFUL MIGHT HAPPEN: NOT AT ALL
GAD7 TOTAL SCORE: 11

## 2022-03-27 ASSESSMENT — ANXIETY QUESTIONNAIRES: GAD7 TOTAL SCORE: 11

## 2022-03-28 ENCOUNTER — VIRTUAL VISIT (OUTPATIENT)
Dept: PSYCHOLOGY | Facility: CLINIC | Age: 32
End: 2022-03-28
Payer: COMMERCIAL

## 2022-03-28 DIAGNOSIS — F33.42 RECURRENT MAJOR DEPRESSIVE DISORDER, IN FULL REMISSION (H): ICD-10-CM

## 2022-03-28 DIAGNOSIS — F41.9 ANXIETY: Primary | ICD-10-CM

## 2022-03-28 PROCEDURE — 90837 PSYTX W PT 60 MINUTES: CPT | Mod: GT | Performed by: SOCIAL WORKER
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
## 2022-04-14 DIAGNOSIS — O24.911 DIABETES MELLITUS AFFECTING PREGNANCY IN FIRST TRIMESTER: Primary | ICD-10-CM

## 2022-04-18 ENCOUNTER — LAB (OUTPATIENT)
Dept: LAB | Facility: CLINIC | Age: 32
End: 2022-04-18
Payer: COMMERCIAL

## 2022-04-18 ENCOUNTER — VIRTUAL VISIT (OUTPATIENT)
Dept: PSYCHOLOGY | Facility: CLINIC | Age: 32
End: 2022-04-18
Payer: COMMERCIAL

## 2022-04-18 DIAGNOSIS — O24.911 DIABETES MELLITUS AFFECTING PREGNANCY IN FIRST TRIMESTER: ICD-10-CM

## 2022-04-18 DIAGNOSIS — F41.9 ANXIETY: Primary | ICD-10-CM

## 2022-04-18 DIAGNOSIS — F33.9 EPISODE OF RECURRENT MAJOR DEPRESSIVE DISORDER, UNSPECIFIED DEPRESSION EPISODE SEVERITY (H): ICD-10-CM

## 2022-04-18 PROCEDURE — 36415 COLL VENOUS BLD VENIPUNCTURE: CPT

## 2022-04-18 PROCEDURE — 84702 CHORIONIC GONADOTROPIN TEST: CPT

## 2022-04-18 PROCEDURE — 90837 PSYTX W PT 60 MINUTES: CPT | Mod: GT | Performed by: SOCIAL WORKER
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
## 2022-04-19 LAB — B-HCG SERPL-ACNC: 503 IU/L (ref 0–5)

## 2022-04-21 ENCOUNTER — LAB (OUTPATIENT)
Dept: LAB | Facility: CLINIC | Age: 32
End: 2022-04-21
Payer: COMMERCIAL

## 2022-04-21 DIAGNOSIS — O24.911 DIABETES MELLITUS AFFECTING PREGNANCY IN FIRST TRIMESTER: ICD-10-CM

## 2022-04-21 PROCEDURE — 84702 CHORIONIC GONADOTROPIN TEST: CPT

## 2022-04-21 PROCEDURE — 36415 COLL VENOUS BLD VENIPUNCTURE: CPT

## 2022-04-22 LAB — B-HCG SERPL-ACNC: 1597 IU/L (ref 0–5)

## 2022-04-25 ENCOUNTER — TELEPHONE (OUTPATIENT)
Dept: FAMILY MEDICINE | Facility: CLINIC | Age: 32
End: 2022-04-25
Payer: COMMERCIAL

## 2022-04-25 DIAGNOSIS — F41.9 ANXIETY: ICD-10-CM

## 2022-04-25 RX ORDER — HYDROXYZINE HYDROCHLORIDE 10 MG/1
TABLET, FILM COATED ORAL
Qty: 60 TABLET | Refills: 0 | Status: SHIPPED | OUTPATIENT
Start: 2022-04-25 | End: 2022-09-21

## 2022-04-25 NOTE — TELEPHONE ENCOUNTER
Routing refill request to provider for review/approval because:  Per last refill request 3/9/22, patient overdue for follow up visit with PCP, did not schedule.     Willing to continue refill?     Eunice Glynn RN, BSN, PHN  [facility]: [place]

## 2022-04-27 ENCOUNTER — PRENATAL OFFICE VISIT (OUTPATIENT)
Dept: NURSING | Facility: CLINIC | Age: 32
End: 2022-04-27
Payer: COMMERCIAL

## 2022-04-27 VITALS — WEIGHT: 153 LBS | BODY MASS INDEX: 21.42 KG/M2 | HEIGHT: 71 IN

## 2022-04-27 DIAGNOSIS — O09.91 ENCOUNTER FOR SUPERVISION OF HIGH RISK PREGNANCY IN FIRST TRIMESTER, ANTEPARTUM: Primary | ICD-10-CM

## 2022-04-27 DIAGNOSIS — Z23 NEED FOR TDAP VACCINATION: ICD-10-CM

## 2022-04-27 PROCEDURE — 99207 PR NO CHARGE NURSE ONLY: CPT

## 2022-04-27 NOTE — PROGRESS NOTES
Important Information for Provider:     [facility] ob nurse intake for first pregnancy. High risk pregnancy, patient is type 1 diabetic.  Handouts reviewed and mailed. Ordered genetic screening/ consult.  Ultrasound scheduled for 5/10/2022 with Dr Paris to review results. NOB with Dr Glynn at [facility] 6/09/2022    Caffeine intake/servings daily - 2 diet sodas  Calcium intake/servings daily - 3  Exercise 5 times weekly - describe ; walks at work, precautions given  Sunscreen used - Yes  Seatbelts used - Yes  Guns stored in the home - No  Self Breast Exam - Yes  Pap test up to date -  Yes  Eye exam up to date -  Yes  Dental exam up to date -  Yes  Immunizations reviewed and up to date - Yes  Abuse: Current or Past (Physical, Sexual or Emotional) - No  Do you feel safe in your environment - Yes  Do you cope well with stress - Yes  Do you suffer from insomnia - No        Prenatal OB Questionnaire  Patient supplied answers from flow sheet for:  Prenatal OB Questionnaire.  Past Medical History  Have you ever recieved care for your mental health? : (!) Yes  Have you ever been in a major accident or suffered serious trauma?: No  Within the last year, has anyone hit, slapped, kicked or otherwise hurt you?: No  In the last year, has anyone forced you to have sex when you didn't want to?: No    Past Medical History 2   Have you ever received a blood transfusion?: No  Would you accept a blood transfusion if was medically recommended?: Yes  Does anyone in your home smoke?: No   Is your blood type Rh negative?: Unknown  Have you ever ?: No  Have you been hospitalized for a nonsurgical reason excluding normal delivery?: No  Have you ever had an abnormal pap smear?: No    Past Medical History (Continued)  Do you have a history of abnormalities of the uterus?: No  Did your mother take TYSON or any other hormones when she was pregnant with you?: No  Do you have any other problems we have not asked about which you feel may be  important to this pregnancy?: No              Allergies as of 4/27/2022:    Allergies as of 04/27/2022     (No Known Allergies)       Current medications are:  Current Outpatient Medications   Medication Sig Dispense Refill     Continuous Blood Gluc Sensor (DEXCOM G6 SENSOR) MISC Change every 10 days. 3 each 11     Continuous Blood Gluc Transmit (DEXCOM G6 TRANSMITTER) MISC Change every 3 months. 1 each 3     Glucagon, rDNA, (GLUCAGON EMERGENCY) 1 MG KIT Inject 1 mg as directed daily as needed (hypoglycemia) 2 kit 11     insulin aspart (NOVOLOG VIAL) 100 UNITS/ML vial INJ 90 UNITS every day VIA PUMP 60 mL 3     Insulin Disposable Pump (OMNIPOD DASH 5 PACK PODS) MISC 1 pod every 72 hours 30 each 0     Prenatal Vit-DSS-Fe Cbn-FA (PRENATAL AD PO)        sertraline (ZOLOFT) 100 MG tablet TAKE 2 TABLETS(200 MG) BY MOUTH DAILY 180 tablet 1     hydrOXYzine (ATARAX) 10 MG tablet TAKE 1 TABLET(10 MG) BY MOUTH EVERY 8 HOURS AS NEEDED FOR ANXIETY (Patient not taking: Reported on 4/27/2022) 60 tablet 0     insulin lispro (HUMALOG VIAL) 100 UNIT/ML vial INJECT 90 UNITS UNDER THE SKIN EVERY DAY VIA INSULIN PUMP (Patient not taking: Reported on 4/27/2022) 80 mL 0         Early ultrasound screening tool:    Does patient have irregular periods?  No  Did patient use hormonal birth control in the three months prior to positive urine pregnancy test? No  Is the patient breastfeeding?  No  Is the patient 10 weeks or greater at time of education visit?  No

## 2022-05-10 ENCOUNTER — VIRTUAL VISIT (OUTPATIENT)
Dept: FAMILY MEDICINE | Facility: CLINIC | Age: 32
End: 2022-05-10
Payer: COMMERCIAL

## 2022-05-10 ENCOUNTER — ANCILLARY PROCEDURE (OUTPATIENT)
Dept: ULTRASOUND IMAGING | Facility: CLINIC | Age: 32
End: 2022-05-10
Attending: OBSTETRICS & GYNECOLOGY
Payer: COMMERCIAL

## 2022-05-10 ENCOUNTER — TRANSCRIBE ORDERS (OUTPATIENT)
Dept: MATERNAL FETAL MEDICINE | Facility: CLINIC | Age: 32
End: 2022-05-10

## 2022-05-10 ENCOUNTER — OFFICE VISIT (OUTPATIENT)
Dept: OBGYN | Facility: CLINIC | Age: 32
End: 2022-05-10
Attending: OBSTETRICS & GYNECOLOGY
Payer: COMMERCIAL

## 2022-05-10 ENCOUNTER — VIRTUAL VISIT (OUTPATIENT)
Dept: PSYCHOLOGY | Facility: CLINIC | Age: 32
End: 2022-05-10
Payer: COMMERCIAL

## 2022-05-10 VITALS
BODY MASS INDEX: 22.23 KG/M2 | HEART RATE: 81 BPM | SYSTOLIC BLOOD PRESSURE: 128 MMHG | WEIGHT: 159.4 LBS | DIASTOLIC BLOOD PRESSURE: 82 MMHG

## 2022-05-10 DIAGNOSIS — F41.9 ANXIETY: Primary | ICD-10-CM

## 2022-05-10 DIAGNOSIS — O09.90 HIGH-RISK PREGNANCY, UNSPECIFIED TRIMESTER: ICD-10-CM

## 2022-05-10 DIAGNOSIS — O09.91 ENCOUNTER FOR SUPERVISION OF HIGH RISK PREGNANCY IN FIRST TRIMESTER, ANTEPARTUM: ICD-10-CM

## 2022-05-10 DIAGNOSIS — E10.65 TYPE 1 DIABETES MELLITUS WITH HYPERGLYCEMIA (H): ICD-10-CM

## 2022-05-10 DIAGNOSIS — O26.90 PREGNANCY RELATED CONDITION: Primary | ICD-10-CM

## 2022-05-10 DIAGNOSIS — F33.9 EPISODE OF RECURRENT MAJOR DEPRESSIVE DISORDER, UNSPECIFIED DEPRESSION EPISODE SEVERITY (H): ICD-10-CM

## 2022-05-10 DIAGNOSIS — F33.9 EPISODE OF RECURRENT MAJOR DEPRESSIVE DISORDER, UNSPECIFIED DEPRESSION EPISODE SEVERITY (H): Primary | ICD-10-CM

## 2022-05-10 DIAGNOSIS — O24.911 DIABETES MELLITUS AFFECTING PREGNANCY IN FIRST TRIMESTER: ICD-10-CM

## 2022-05-10 LAB
ABO/RH(D): NORMAL
ALBUMIN UR-MCNC: NEGATIVE MG/DL
ANTIBODY SCREEN: NEGATIVE
APPEARANCE UR: CLEAR
BILIRUB UR QL STRIP: NEGATIVE
COLOR UR AUTO: YELLOW
ERYTHROCYTE [DISTWIDTH] IN BLOOD BY AUTOMATED COUNT: 13.3 % (ref 10–15)
GLUCOSE UR STRIP-MCNC: >=1000 MG/DL
HBV SURFACE AG SERPL QL IA: NONREACTIVE
HCT VFR BLD AUTO: 40.8 % (ref 35–47)
HCV AB SERPL QL IA: NONREACTIVE
HGB BLD-MCNC: 13.9 G/DL (ref 11.7–15.7)
HGB UR QL STRIP: NEGATIVE
HIV 1+2 AB+HIV1 P24 AG SERPL QL IA: NONREACTIVE
KETONES UR STRIP-MCNC: NEGATIVE MG/DL
LEUKOCYTE ESTERASE UR QL STRIP: NEGATIVE
MCH RBC QN AUTO: 30 PG (ref 26.5–33)
MCHC RBC AUTO-ENTMCNC: 34.1 G/DL (ref 31.5–36.5)
MCV RBC AUTO: 88 FL (ref 78–100)
NITRATE UR QL: NEGATIVE
PH UR STRIP: 5.5 [PH] (ref 5–7)
PLATELET # BLD AUTO: 201 10E3/UL (ref 150–450)
RBC # BLD AUTO: 4.64 10E6/UL (ref 3.8–5.2)
RUBV IGG SERPL QL IA: 1.22 INDEX
RUBV IGG SERPL QL IA: POSITIVE
SP GR UR STRIP: <=1.005 (ref 1–1.03)
SPECIMEN EXPIRATION DATE: NORMAL
T PALLIDUM AB SER QL: NONREACTIVE
UROBILINOGEN UR STRIP-ACNC: 0.2 E.U./DL
WBC # BLD AUTO: 7.3 10E3/UL (ref 4–11)

## 2022-05-10 PROCEDURE — 86803 HEPATITIS C AB TEST: CPT | Performed by: OBSTETRICS & GYNECOLOGY

## 2022-05-10 PROCEDURE — 86780 TREPONEMA PALLIDUM: CPT | Performed by: OBSTETRICS & GYNECOLOGY

## 2022-05-10 PROCEDURE — 81003 URINALYSIS AUTO W/O SCOPE: CPT | Performed by: OBSTETRICS & GYNECOLOGY

## 2022-05-10 PROCEDURE — 76801 OB US < 14 WKS SINGLE FETUS: CPT | Performed by: OBSTETRICS & GYNECOLOGY

## 2022-05-10 PROCEDURE — 87086 URINE CULTURE/COLONY COUNT: CPT | Performed by: OBSTETRICS & GYNECOLOGY

## 2022-05-10 PROCEDURE — 87389 HIV-1 AG W/HIV-1&-2 AB AG IA: CPT | Performed by: OBSTETRICS & GYNECOLOGY

## 2022-05-10 PROCEDURE — 99213 OFFICE O/P EST LOW 20 MIN: CPT | Mod: GT | Performed by: FAMILY MEDICINE

## 2022-05-10 PROCEDURE — 85027 COMPLETE CBC AUTOMATED: CPT | Performed by: OBSTETRICS & GYNECOLOGY

## 2022-05-10 PROCEDURE — 86900 BLOOD TYPING SEROLOGIC ABO: CPT | Performed by: OBSTETRICS & GYNECOLOGY

## 2022-05-10 PROCEDURE — 99213 OFFICE O/P EST LOW 20 MIN: CPT | Performed by: OBSTETRICS & GYNECOLOGY

## 2022-05-10 PROCEDURE — 86850 RBC ANTIBODY SCREEN: CPT | Performed by: OBSTETRICS & GYNECOLOGY

## 2022-05-10 PROCEDURE — 86901 BLOOD TYPING SEROLOGIC RH(D): CPT | Performed by: OBSTETRICS & GYNECOLOGY

## 2022-05-10 PROCEDURE — 86762 RUBELLA ANTIBODY: CPT | Performed by: OBSTETRICS & GYNECOLOGY

## 2022-05-10 PROCEDURE — 36415 COLL VENOUS BLD VENIPUNCTURE: CPT | Performed by: OBSTETRICS & GYNECOLOGY

## 2022-05-10 PROCEDURE — 90837 PSYTX W PT 60 MINUTES: CPT | Mod: GT | Performed by: SOCIAL WORKER

## 2022-05-10 PROCEDURE — 87340 HEPATITIS B SURFACE AG IA: CPT | Performed by: OBSTETRICS & GYNECOLOGY

## 2022-05-10 ASSESSMENT — ANXIETY QUESTIONNAIRES
7. FEELING AFRAID AS IF SOMETHING AWFUL MIGHT HAPPEN: SEVERAL DAYS
7. FEELING AFRAID AS IF SOMETHING AWFUL MIGHT HAPPEN: SEVERAL DAYS
3. WORRYING TOO MUCH ABOUT DIFFERENT THINGS: SEVERAL DAYS
7. FEELING AFRAID AS IF SOMETHING AWFUL MIGHT HAPPEN: SEVERAL DAYS
5. BEING SO RESTLESS THAT IT IS HARD TO SIT STILL: MORE THAN HALF THE DAYS
7. FEELING AFRAID AS IF SOMETHING AWFUL MIGHT HAPPEN: SEVERAL DAYS
2. NOT BEING ABLE TO STOP OR CONTROL WORRYING: MORE THAN HALF THE DAYS
GAD7 TOTAL SCORE: 10
2. NOT BEING ABLE TO STOP OR CONTROL WORRYING: MORE THAN HALF THE DAYS
6. BECOMING EASILY ANNOYED OR IRRITABLE: SEVERAL DAYS
4. TROUBLE RELAXING: SEVERAL DAYS
GAD7 TOTAL SCORE: 10
8. IF YOU CHECKED OFF ANY PROBLEMS, HOW DIFFICULT HAVE THESE MADE IT FOR YOU TO DO YOUR WORK, TAKE CARE OF THINGS AT HOME, OR GET ALONG WITH OTHER PEOPLE?: SOMEWHAT DIFFICULT
1. FEELING NERVOUS, ANXIOUS, OR ON EDGE: MORE THAN HALF THE DAYS
GAD7 TOTAL SCORE: 10
6. BECOMING EASILY ANNOYED OR IRRITABLE: SEVERAL DAYS
5. BEING SO RESTLESS THAT IT IS HARD TO SIT STILL: MORE THAN HALF THE DAYS
1. FEELING NERVOUS, ANXIOUS, OR ON EDGE: MORE THAN HALF THE DAYS
GAD7 TOTAL SCORE: 10
8. IF YOU CHECKED OFF ANY PROBLEMS, HOW DIFFICULT HAVE THESE MADE IT FOR YOU TO DO YOUR WORK, TAKE CARE OF THINGS AT HOME, OR GET ALONG WITH OTHER PEOPLE?: SOMEWHAT DIFFICULT
3. WORRYING TOO MUCH ABOUT DIFFERENT THINGS: SEVERAL DAYS
4. TROUBLE RELAXING: SEVERAL DAYS

## 2022-05-10 ASSESSMENT — PATIENT HEALTH QUESTIONNAIRE - PHQ9
10. IF YOU CHECKED OFF ANY PROBLEMS, HOW DIFFICULT HAVE THESE PROBLEMS MADE IT FOR YOU TO DO YOUR WORK, TAKE CARE OF THINGS AT HOME, OR GET ALONG WITH OTHER PEOPLE: SOMEWHAT DIFFICULT
SUM OF ALL RESPONSES TO PHQ QUESTIONS 1-9: 11
10. IF YOU CHECKED OFF ANY PROBLEMS, HOW DIFFICULT HAVE THESE PROBLEMS MADE IT FOR YOU TO DO YOUR WORK, TAKE CARE OF THINGS AT HOME, OR GET ALONG WITH OTHER PEOPLE: SOMEWHAT DIFFICULT

## 2022-05-10 NOTE — Clinical Note
Hey - I see that you are seeing Preeti for her high risk pregnancy. I am working on getting her into endocrinology within the next month.  Happy to help with any mood symptoms as needed during pregnancy.

## 2022-05-10 NOTE — PROGRESS NOTES
S; Preeti Leiva is a 31 year old  at 8w0d who presents after ultrasound today.  She reports overall feeling ok, mild nausea in the morning, seems to be managed for now with sparkling water. She denies pain or bleeding.  She is a T1DM, her endocrinologist left in February and she has attempted to get in with Dr. Duenas, but first available (with any endo) is in august.  She can tell she is more resistant to her insulin already and is worried about not having endo care.  She is also interested in genetic screening, most likely the NIPT test.    O: /82   Pulse 81   Wt 72.3 kg (159 lb 6.4 oz)   LMP 03/15/2022   BMI 22.23 kg/m      Gen: NAD    Dating (mm/dd/yyyy):   LMP: 03/15/22                 EDC:  22  GA by LMP:         8w1d     Current Scan On:  5/10/2022          EDC:  22  GA by Current Scan:        7w5d  The calculation of the gestational age by current scan was based on CRL.     Anatomy Scan:  Reyes gestation.     Biometry:  CRL                       1.4 cm                  7w5d                      Yolk Sac               2.5 mm                                                   Fetal heart activity:  Rate and rhythm is within normal limits.  Fetal heart rate: 162 bpm  Findings: Viable IUP     Maternal Structures:  Cervix: The cervix appears long and closed.  Right Ovary: Wnl  Left Ovary: Wnl     Impression:   Early reyes viable intrauterine pregnancy with yolk sac and cardiac activity, measures 7w 5d by today's ultrasound, Estimated Date of Delivery remains Dec 20, 2022 .     Edel Olivera MD     A/P: Live IUP at 8w0d, T!DM   Reviewed us report and confirmation of EDC.  Will send message to [place] to help get her established with new endocrinologist asap.  MFM referral for genetic screening.  RTC for scheduled new ob visit    KELVIN GUTIÉRREZ MD
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
## 2022-05-10 NOTE — Clinical Note
Farhan Perry,  I'm not sure if you can help or not, but I thought I would try.  This patient is newly pregnant and type 1 DM.  She was previously seeing Dr. Richards at [facility], but he has left and she attempted to make an appointment with endo at [facility] and the first appointment available is in august.  Would you be able to help facilitate a visit this month?  I know this isn't your job, but we haven't had much luck helping her on our end. Thank you!  Jacinta Paris

## 2022-05-10 NOTE — PROGRESS NOTES
Preeti is a 31 year old who is being evaluated via a billable video visit.      How would you like to obtain your AVS? MyChart  If the video visit is dropped, the invitation should be resent by: Send to e-mail at: [email or web address]  Will anyone else be joining your video visit? No      Video Start Time: 5:07 PM    Assessment & Plan     (F33.9) Episode of recurrent major depressive disorder, unspecified depression episode severity (H)  (primary encounter diagnosis)  Comment: stable mood  Plan: working with psychologist and would like to continue medication.  Continue current medication      (E10.65) Type 1 diabetes mellitus with hyperglycemia (H)  Comment: needs a new endocrinologist  Plan: given pregnancy, needs endocrinology consultation soon.  Will assist with helping to find a new endocrinologist.    (O09.90) High-risk pregnancy, unspecified trimester  Comment: 8 weeks pregnant, will be seeing Dr. Glynn  Plan: congratulated her.    Will assist with mood and medication modification during pregnancy, as Dr. Glynn needs me to.                  No follow-ups on file.    Roshni Nevarez MD  [facility]    Anna Álvarez is a 31 year old who presents for the following health issues     History of Present Illness       Mental Health Follow-up:  Patient presents to follow-up on Depression & Anxiety.Patient's depression since last visit has been:  Medium  The patient is not having other symptoms associated with depression.  Patient's anxiety since last visit has been:  Medium  The patient is not having other symptoms associated with anxiety.  Any significant life events: relationship concerns and health concerns  Patient is feeling anxious or having panic attacks.  Patient has no concerns about alcohol or drug use.       Today's PHQ-9         PHQ-9 Total Score: 11  PHQ-9 Q9 Thoughts of better off dead/self-harm past 2 weeks :   (P) Not at all    How difficult have these problems made  it for you to do your work, take care of things at home, or get along with other people: Somewhat difficult    Today's ROSANAN-7 Score: 10    She eats 2-3 servings of fruits and vegetables daily.She consumes 1 sweetened beverage(s) daily.She exercises with enough effort to increase her heart rate 9 or less minutes per day.  She exercises with enough effort to increase her heart rate 3 or less days per week.   She is taking medications regularly.       Anxiety Follow-Up    How are you doing with your anxiety since your last visit? No change    Are you having other symptoms that might be associated with anxiety? No    Have you had a significant life event? OTHER: 8 wk pregnent      Are you feeling depressed? No    Do you have any concerns with your use of alcohol or other drugs? No    Social History     Tobacco Use     Smoking status: Never Smoker     Smokeless tobacco: Never Used   Vaping Use     Vaping Use: Never used   Substance Use Topics     Alcohol use: Yes     Comment:  5 drinks per week      Drug use: Never     ROSANNA-7 SCORE 3/26/2022 5/10/2022 5/10/2022   Total Score 11 (moderate anxiety) - 10 (moderate anxiety)   Total Score 11 10 10   Some encounter information is confidential and restricted. Go to Review [place] activity to see all data.     PHQ 3/16/2022 5/10/2022 5/10/2022   PHQ-9 Total Score 12 11 11   Q9: Thoughts of better off dead/self-harm past 2 weeks Not at all Not at all Not at all   F/U: Thoughts of suicide or self-harm - - -   F/U: Safety concerns - - -   Some encounter information is confidential and restricted. Go to Review [place] activity to see all data.     Last PHQ-9 5/10/2022   1.  Little interest or pleasure in doing things 1   2.  Feeling down, depressed, or hopeless 1   3.  Trouble falling or staying asleep, or sleeping too much 2   4.  Feeling tired or having little energy 3   5.  Poor appetite or overeating 1   6.  Feeling bad about yourself 3   7.  Trouble concentrating 0   8.   Moving slowly or restless 0   Q9: Thoughts of better off dead/self-harm past 2 weeks 0   PHQ-9 Total Score 11   Difficulty at work, home, or with people -   In the past two weeks have you had thoughts of suicide or self harm? -   Do you have concerns about your personal safety or the safety of others? -   Some encounter information is confidential and restricted. Go to Review [employer] activity to see all data.     ROSANNA-7  5/10/2022   1. Feeling nervous, anxious, or on edge 2   2. Not being able to stop or control worrying 2   3. Worrying too much about different things 1   4. Trouble relaxing 1   5. Being so restless that it is hard to sit still 2   6. Becoming easily annoyed or irritable 1   7. Feeling afraid, as if something awful might happen 1   ROSANNA-7 Total Score 10   If you checked any problems, how difficult have they made it for you to do your work, take care of things at home, or get along with other people? -   Some encounter information is confidential and restricted. Go to Review [employer]she[employer] activity to see all data.           Review of Systems   Constitutional, HEENT, cardiovascular, pulmonary, gi and gu systems are negative, except as otherwise noted.      Objective    Vitals - Patient Reported  Systolic (Patient Reported): 128  Diastolic (Patient Reported): 82  Blood pressure taken with manual cuff (will exclude from quality measure): Yes        Physical Exam   GENERAL: Healthy, alert and no distress  EYES: Eyes grossly normal to inspection.  No discharge or erythema, or obvious scleral/conjunctival abnormalities.  RESP: No audible wheeze, cough, or visible cyanosis.  No visible retractions or increased work of breathing.    SKIN: Visible skin clear. No significant rash, abnormal pigmentation or lesions.  NEURO: Cranial nerves grossly intact.  Mentation and speech appropriate for age.  PSYCH: Mentation appears normal, affect normal/bright, judgement and insight intact, normal speech and appearance  well-groomed.                Video-Visit Details    Type of service:  Video Visit    Video End Time:5:18 PM    Originating Location (pt. Location): Home    Distant Location (provider location):  [facility]     Platform used for Video Visit: [address]    Answers for HPI/ROS submitted by the patient on 5/10/2022  If you checked off any problems, how difficult have these problems made it for you to do your work, take care of things at home, or get along with other people?: Somewhat difficult  PHQ9 TOTAL SCORE: 11  ROSANNA 7 TOTAL SCORE: 10  Depression/Anxiety: Depression & Anxiety  Status since last visit:: medium  Anxiety since last: : medium  Other associated symptoms of depression:: No  Other associated symotome: : No  Significant life event: : relationship concerns, health concerns  Anxious:: Yes  Current substance use:: No  How many servings of fruits and vegetables do you eat daily?: 2-3  On average, how many sweetened beverages do you drink each day (Examples: soda, juice, sweet tea, etc.  Do NOT count diet or artificially sweetened beverages)?: 1  How many minutes a day do you exercise enough to make your heart beat faster?: 9 or less  How many days a week do you exercise enough to make your heart beat faster?: 3 or less  How many days per week do you miss taking your medication?: 0

## 2022-05-11 ENCOUNTER — MYC MEDICAL ADVICE (OUTPATIENT)
Dept: EDUCATION SERVICES | Facility: CLINIC | Age: 32
End: 2022-05-11
Payer: COMMERCIAL

## 2022-05-11 ASSESSMENT — ANXIETY QUESTIONNAIRES
GAD7 TOTAL SCORE: 10
GAD7 TOTAL SCORE: 10

## 2022-05-11 ASSESSMENT — PATIENT HEALTH QUESTIONNAIRE - PHQ9
SUM OF ALL RESPONSES TO PHQ QUESTIONS 1-9: 11
SUM OF ALL RESPONSES TO PHQ QUESTIONS 1-9: 11

## 2022-05-12 LAB — BACTERIA UR CULT: NORMAL

## 2022-05-15 ENCOUNTER — NURSE TRIAGE (OUTPATIENT)
Dept: NURSING | Facility: CLINIC | Age: 32
End: 2022-05-15
Payer: COMMERCIAL

## 2022-05-15 NOTE — TELEPHONE ENCOUNTER
OB Triage Call    Is patient's OB/Midwife with the [facility] or [facility]? [facility]- Proceed with triage     Reason for call: Patient has a fever, abdominal pain, nausea, vomiting, and is a type one diabetic    Assessment:     Started feeling ill yesterday  8W5D    Fever today of 101 about 10 min ago - took tylenol   Nausea and vomiting  3 times of vomiting  Stomach pain as well that does not go away when she throws up   Ate a Yogurt today  No sick contacts   Took COVID test and was negative  Patient is a type 1 diabetic   XIOMY 22    Plan: Page out to OB Oncall- Lubna Glynn MD at 2:37 pm  Awaiting call back.   Call returned at 2:41 pm  Per Lubna Glynn MD: Patient should be seen in the ER today for evaluation and should be seen at the [facility].     Patient's primary OB Provider is Lubna Glynn.      Per protocol recommendations patient to be seen in the ER or PCP triage-  Patient would like to have the oncall provider paged        8w5d    Estimated Date of Delivery: Dec 20, 2022        OB History    Para Term  AB Living   1 0 0 0 0 0   SAB IAB Ectopic Multiple Live Births   0 0 0 0 0      # Outcome Date GA Lbr Ar/2nd Weight Sex Delivery Anes PTL Lv   1 Current                No results found for: GBS       Elba Mccartney RN 05/15/22 2:38 PM  [facility] Nurse Advisor              Reason for Disposition    [1] Fever > 100.0 F (37.8 C) AND [2] diabetes mellitus or weak immune system (e.g., HIV positive, cancer chemo, splenectomy, organ transplant, chronic steroids)    Additional Information    Negative: Shock suspected (e.g., cold/pale/clammy skin, too weak to stand, low BP, rapid pulse)    Negative: Difficult to awaken or acting confused (e.g., disoriented, slurred speech)    Negative: Rash with purple (or blood-colored) spots or dots    Negative: Sounds like a life-threatening emergency to the triager    Negative: [1] Abdominal pain AND [2] pregnant > 20 weeks    Negative:  [1] Abdominal pain AND [2] pregnant < 20 weeks    Negative: Fever onset within 24 hours of receiving vaccine    Negative: Other symptom is present, see that guideline  (e.g., sore throat, earache, diarrhea, vomiting)    Negative: [1] Headache AND [2] stiff neck (can't touch chin to chest)    Negative: Difficulty breathing    Negative: IV drug abuse    Negative: Fever > 104 F (40 C)    Negative: [1] Fever > 100.0 F (37.8 C) AND [2] indwelling urinary catheter (e.g., Bcaa, Coude)    Protocols used: PREGNANCY - FEVER-A-AH

## 2022-05-16 NOTE — TELEPHONE ENCOUNTER
Call to Preeti today to follow-up. She did not go to ED. Feels better today. Fever is 100.3, has not taken Tylenol. Denies vomiting last night or today. Is able to eat small meals. Negative COVID test yesterday. Is going to [facility] today for flu and strep tests. RN advised to continue to monitor from home, routing to MD for further recommendation if needed.  Hanna Sierra RN

## 2022-05-17 NOTE — TELEPHONE ENCOUNTER
Scheduled patient with Endocrinology & diabetes ed in coordinated visit.     Lindsey Benjamin RD, LD, [facility]

## 2022-05-18 ENCOUNTER — ALLIED HEALTH/NURSE VISIT (OUTPATIENT)
Dept: EDUCATION SERVICES | Facility: CLINIC | Age: 32
End: 2022-05-18
Payer: COMMERCIAL

## 2022-05-18 ENCOUNTER — OFFICE VISIT (OUTPATIENT)
Dept: ENDOCRINOLOGY | Facility: CLINIC | Age: 32
End: 2022-05-18
Payer: COMMERCIAL

## 2022-05-18 VITALS
WEIGHT: 159.3 LBS | SYSTOLIC BLOOD PRESSURE: 117 MMHG | BODY MASS INDEX: 22.22 KG/M2 | DIASTOLIC BLOOD PRESSURE: 82 MMHG | HEART RATE: 79 BPM

## 2022-05-18 DIAGNOSIS — O24.011 PRE-EXISTING TYPE 1 DIABETES MELLITUS DURING PREGNANCY IN FIRST TRIMESTER: ICD-10-CM

## 2022-05-18 DIAGNOSIS — E10.65 TYPE 1 DIABETES MELLITUS WITH HYPERGLYCEMIA (H): Primary | ICD-10-CM

## 2022-05-18 PROCEDURE — 95251 CONT GLUC MNTR ANALYSIS I&R: CPT | Performed by: INTERNAL MEDICINE

## 2022-05-18 PROCEDURE — G0108 DIAB MANAGE TRN  PER INDIV: HCPCS | Performed by: DIETITIAN, REGISTERED

## 2022-05-18 PROCEDURE — 99215 OFFICE O/P EST HI 40 MIN: CPT | Performed by: INTERNAL MEDICINE

## 2022-05-18 RX ORDER — INSULIN PMP CART,AUT,G6/7,CNTR
1 EACH SUBCUTANEOUS ONCE
Qty: 1 KIT | Refills: 0 | Status: SHIPPED | OUTPATIENT
Start: 2022-05-18 | End: 2022-05-18

## 2022-05-18 RX ORDER — INSULIN PMP CART,AUT,G6/7,CNTR
1 EACH SUBCUTANEOUS
Qty: 30 EACH | Refills: 11 | Status: SHIPPED | OUTPATIENT
Start: 2022-05-18 | End: 2022-07-15

## 2022-05-18 ASSESSMENT — ENCOUNTER SYMPTOMS
ALTERED TEMPERATURE REGULATION: 0
CHILLS: 0
BOWEL INCONTINENCE: 0
ABDOMINAL PAIN: 0
DECREASED APPETITE: 0
FEVER: 0
BLOOD IN STOOL: 0
PANIC: 0
HALLUCINATIONS: 0
VOMITING: 1
NERVOUS/ANXIOUS: 1
POLYDIPSIA: 0
WEIGHT LOSS: 0
INCREASED ENERGY: 0
DECREASED CONCENTRATION: 0
RECTAL PAIN: 0
WEIGHT GAIN: 0
DIARRHEA: 0
FATIGUE: 1
HEARTBURN: 0
CONSTIPATION: 0
INSOMNIA: 0
JAUNDICE: 0
POLYPHAGIA: 0
NIGHT SWEATS: 0
NAUSEA: 1
BLOATING: 0
DEPRESSION: 1

## 2022-05-18 NOTE — LETTER
5/18/2022         RE: Preeti Leiva  [address]  [facility] [address]        Dear Colleague,    Thank you for referring your patient, Preeti Leiva, to the [facility]. Please see a copy of my visit note below.    Diabetes Self-Management Education & Support    Presents for: Insulin Pump and CGM Review    Type of Visit: In Person    How would patient like to obtain AVS? Printed     ASSESSMENT:    Patient comes in for joint visit with Dr. Thomas, Endocrinology and writer. Patient is 9w1d pregnant. She had worked previously with Dr. Richards but has not seen Endocrinology since he left [facility]. She uses an Omnipod DASH but Dexcom data is not linked to her DASH. She is not entering blood sugars or carbohydrates into pump, just estimating units to take. She is aware that she needs to make this adjustment to get blood sugars in better control. She is able to verbalize strategies to carb count but often gets sidetracked when preparing to sit down for a meal. She is looking forward to switching to Omnipod 5 closed-loop system, disappointed to learn she won't yet be able to use her phone. She has a month or more of DASH pods so is not planning to make the change right away. For now will work on entering blood sugars & carbs into pump, using bolus calculator to more accurately dose her insulin via pump.     Patient's most recent   Lab Results   Component Value Date    A1C 8.6 02/10/2022    A1C 9.4 08/20/2020    is not meeting goal of <6.5% during pregnancy     Diabetes knowledge and skills assessment:   Patient is knowledgeable in diabetes management concepts related to: Healthy Eating, Monitoring and Taking Medication    Continue education with the following diabetes management concepts: Healthy Eating, Being Active, Monitoring, Taking Medication, Problem Solving, Reducing Risks and Healthy Coping    Based on learning assessment above, most appropriate setting for further  diabetes education would be: Group class or Individual setting.    INTERVENTIONS:    Education provided today on:  AADE Self-Care Behaviors:  Diabetes Pathophysiology - related to pregnancy   Healthy Eating: carbohydrate counting, consistency in amount, composition, and timing of food intake and label reading  Monitoring: log and interpret results and frequency of monitoring  Taking Medication: when to take medications    Opportunities for ongoing education and support in diabetes-self management were discussed. Pt verbalized understanding of concepts discussed and recommendations provided today.       Education Materials Provided:  Carbohydrate Counting          PLAN  Reached out to Brian Ramirez RN, [facility] to help with Dexcom pairing, uploading to [employer] remotely & transition to Omnipod 5  Patient to enter blood sugars & carbohydrates into PDM consistently   Patient to send [employer] message weekly to request review of pump data   Patient to follow up with Dr. Thomas monthly, directed her to  & appts scheduled today   Next week, consider adjustment in ISF 1:50 --> 1:30, per Dr. Thomas's recommendation.     Topics to cover at upcoming visits: Problem Solving, Reducing Risks and Healthy Coping  Follow-up: 5/25/22 via [employer]     See Goals Section for co-developed, patient-stated behavior change goals.  AVS provided to patient today.          SUBJECTIVE / OBJECTIVE:  Presents for: Insulin Pump and CGM Review  Accompanied by: Self  Diabetes education in the past 24mo: No  Focus of Visit: CGM, Insulin Pump  Type of Pump visit: Pump Review  Type of CGM visit: Personal CGM Follow-up  Diabetes type: Type 1  Disease course: Getting harder to manage  How confident are you filling out medical forms by yourself:: Not Assessed  Other concerns:: None  Cultural Influences/Ethnic Background:  Not  or     Diabetes Symptoms & Complications:     Complications assessed today?: No    Patient Problem List and  "Family Medical History reviewed for relevant medical history, current medical status, and diabetes risk factors.    Vitals:  LMP 03/15/2022   Estimated body mass index is 22.22 kg/m  as calculated from the following:    Height as of 4/27/22: 1.803 m (5' 11\").    Weight as of an earlier encounter on 5/18/22: 72.3 kg (159 lb 4.8 oz).   Last 3 BP:   BP Readings from Last 3 Encounters:   05/18/22 117/82   05/10/22 128/82   02/10/22 (!) 134/90       History   Smoking Status     Never Smoker   Smokeless Tobacco     Never Used       Labs:  Lab Results   Component Value Date    A1C 8.6 02/10/2022    A1C 9.4 08/20/2020     Lab Results   Component Value Date    GLC 99 09/28/2021     01/02/2020     Lab Results   Component Value Date     09/28/2021     08/20/2020     HDL Cholesterol   Date Value Ref Range Status   08/20/2020 39 (L) >49 mg/dL Final     Direct Measure HDL   Date Value Ref Range Status   09/28/2021 48 (L) >=50 mg/dL Final   ]  GFR Estimate   Date Value Ref Range Status   09/28/2021 >90 >60 mL/min/1.73m2 Final     Comment:     As of July 11, 2021, eGFR is calculated by the CKD-EPI creatinine equation, without race adjustment. eGFR can be influenced by muscle mass, exercise, and diet. The reported eGFR is an estimation only and is only applicable if the renal function is stable.   01/02/2020 >90 >60 mL/min/[1.73_m2] Final     Comment:     Non  GFR Calc  Starting 12/18/2018, serum creatinine based estimated GFR (eGFR) will be   calculated using the Chronic Kidney Disease Epidemiology Collaboration   (CKD-EPI) equation.       GFR Estimate If Black   Date Value Ref Range Status   01/02/2020 >90 >60 mL/min/[1.73_m2] Final     Comment:      GFR Calc  Starting 12/18/2018, serum creatinine based estimated GFR (eGFR) will be   calculated using the Chronic Kidney Disease Epidemiology Collaboration   (CKD-EPI) equation.       Lab Results   Component Value Date    CR 0.72 " 09/28/2021    CR 0.70 01/02/2020     No results found for: MICROALBUMIN    Healthy Eating:  Healthy Eating Assessed Today: Yes  Cultural/Yazidism diet restrictions?: No  Meal planning/habits: Carb counting  Meals include: Breakfast, Lunch, Dinner  Breakfast: peach chobani yogurt, Crystal Light  Lunch: 12p - rice/pasta, protein, Diet Coke  Dinner:  [employer] freezer meal  Beverages: Diet soda (Used to drink 8-9 Diet Cokes daily, now down to 2/day)    Being Active:  Being Active Assessed Today: No    Monitoring:  Monitoring Assessed Today: No  Blood Glucose Meter: CGM  Times checking blood sugar at home (number): 5+  Times checking blood sugar at home (per): Day    Glucose data:  See Dr. Thomas's note for blood sugar & pump data     Taking Medications:  Diabetes Medication(s)     Diabetic Other       Glucagon, rDNA, (GLUCAGON EMERGENCY) 1 MG KIT    Inject 1 mg as directed daily as needed (hypoglycemia)    Insulin       insulin lispro (HUMALOG VIAL) 100 UNIT/ML vial    INJECT 70 UNITS UNDER THE SKIN EVERY DAY VIA INSULIN PUMP     insulin aspart (NOVOLOG VIAL) 100 UNITS/ML vial    INJ 90 UNITS every day VIA PUMP              Taking Medication Assessed Today: Yes  Current Treatments: Insulin Pump  Problems taking diabetes medications regularly?: Yes  Diabetes medication side effects?: No    Problem Solving:  Problem Solving Assessed Today: Yes  Is the patient at risk for hypoglycemia?: Yes  Is the patient at risk for DKA?: Yes              Reducing Risks:  Reducing Risks Assessed Today: No    Healthy Coping:  Healthy Coping Assessed Today: Yes  Emotional response to diabetes: Ready to learn, Concern for health and well-being  Informal Support system:: Spouse  Stage of change: ACTION (Actively working towards change)  Support resources: None  Patient Activation Measure Survey Score:  No flowsheet data found.          Lindsey Benjamin RD, LD, [facility]   Time Spent: 45 minutes  Encounter Type: Individual        Any diabetes  medication dose changes were made via the Certified Diabetes Care & Education Protocol in collaboration with the patient's referring provider. A copy of this encounter was shared with the provider.

## 2022-05-18 NOTE — LETTER
"    5/18/2022         RE: Preeti Leiva  [address] [facility] [address]        Dear Colleague,    Thank you for referring your patient, Preeti Leiva, to the [facility] [place]. Please see a copy of my visit note below.    Preeti Leiva is a 31 year old yo female here for follow-up of Diabetes Mellitus in pregnancy. She was previously a patient of Dr Richards, last seen Nov 2021.     Currently: 9w1d  Estimated Date of Delivery: Dec 20, 2022  Baby: unknown, but will find out with serum chromosome screen      1) Diabetes Mellitus in pregnancy    Diabetes History:  Diagnosis: Age 6  Hospitalizations: DKA, 2008 most recently, difficult time in college managing   Previous Regimens: almost always on pump, most recently Medtronic/Guardian, but didn't use automode-- kicked out frequently and didn't work through this  Current Regimen: Omnipod/Dexcom-- calculating insulin dosing basing on \"number of units\" vs actual carb counting, does not enter in BG for correction dosing.   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   Administering small, frequent boluses, frequently dropping low with stacking of insulin    Diet:  24hr Recall:  Breakfast- chobani peach yogurt 17g CHO- 3u, anita mildred breakfast sandwich 30g CHO-- 3-4u  Lunch- Breaded chicken breast, mac and cheese-   Dinner-  Snacks-  Beverages-    BG check- DEXCOM  Trends-                       BP Readings from Last 3 Encounters:   05/18/22 117/82   05/10/22 128/82   02/10/22 (!) 134/90       Lab Results   Component Value Date    A1C 8.6 02/10/2022    A1C 7.2 09/28/2021    A1C 9.4 08/20/2020    A1C 8.4 01/02/2020    A1C 7.8 01/29/2019       Wt Readings " from Last 3 Encounters:   05/18/22 72.3 kg (159 lb 4.8 oz)   05/10/22 72.3 kg (159 lb 6.4 oz)   04/27/22 69.4 kg (153 lb)       Current Outpatient Medications   Medication Sig Dispense Refill     Continuous Blood Gluc Sensor (DEXCOM G6 SENSOR) MISC Change every 10 days. 3 each 11     Continuous Blood Gluc Transmit (DEXCOM G6 TRANSMITTER) MISC Change every 3 months. 1 each 3     Glucagon, rDNA, (GLUCAGON EMERGENCY) 1 MG KIT Inject 1 mg as directed daily as needed (hypoglycemia) 2 kit 11     hydrOXYzine (ATARAX) 10 MG tablet TAKE 1 TABLET(10 MG) BY MOUTH EVERY 8 HOURS AS NEEDED FOR ANXIETY 60 tablet 0     Insulin Disposable Pump (OMNIPOD DASH 5 PACK PODS) MISC 1 pod every 72 hours 30 each 0     insulin lispro (HUMALOG VIAL) 100 UNIT/ML vial INJECT 90 UNITS UNDER THE SKIN EVERY DAY VIA INSULIN PUMP 80 mL 0     Prenatal Vit-DSS-Fe Cbn-FA (PRENATAL AD PO)        sertraline (ZOLOFT) 100 MG tablet TAKE 2 TABLETS(200 MG) BY MOUTH DAILY 180 tablet 1     insulin aspart (NOVOLOG VIAL) 100 UNITS/ML vial INJ 90 UNITS every day VIA PUMP 60 mL 3          REVIEW OF SYSTEMS:   ROS: 10 point ROS neg other than the symptoms noted above in the HPI.      EXAM:  Vitals: /82   Pulse 79   Wt 72.3 kg (159 lb 4.8 oz)   LMP 03/15/2022   BMI 22.22 kg/m      BMIE= Body mass index is 22.22 kg/m .  Exam:  Constitutional: healthy, alert, no acute distress  Head: Normocephalic. No masses, lesions, no exophthalmos/proptosis  ENT: normal thyroid, no palpable nodules, no cervical lymph nodes  Respiratory: nonlabored  Gastrointestinal: Abdomen soft, non-tender.  Musculoskeletal: extremities normal- no gross deformities noted, gait normal and normal muscle tone  Skin: no suspicious lesions or rashes  Neurologic: Gait normal. sensation grossly intact  Psychiatric: mentation appears normal, calm    ASSESSMENT/PLAN:  No diagnosis found.    1) Diabetes Mellitus in pregnancy  - Glucose Control- NOT at goal, overall hyperglycemia, but unable to  correct these down and doing multiple small boluses to do so.  To start, will increase basal rate across the board by 20%   -12a-7a -- 1u--> 1.2u   - 7a-12a-- 1.2u--> 1.6u  Increase I:CHO ratio-- 1:10  Next step-- should have ISF of 1:30, (currently at 1:50), but I don't want to make too many changes at once so ISF would be next step for adjustment  Patient open to trial of Omnipod 5-- we reviewed it has not been studied yet in pregnant patients, but can trial to see if she has some benefit from the SmartAdjust algorithm. RX sent to [facility]  - Reviewed blood sugar goals in pregnancy. Glucose goals during pregnancy according to the American Diabetes Association:  Fasting glucose 70-95 mg/dL AND  One-hour postprandial glucose 110-140 mg/dL or  Two-hour postprandial glucose 100-120 mg/dL  - Encouraged at least 30min of activity daily.  - Baseline labs for initial pregnancy visit: Basic Metabolic Panel, BUN/Cr, TSH, LFT s, CBC, UA, Spot Urine for Creatinine, A1C-- only one outstanding is BMP, ordered today  - She is NOT uptodate with ophthalmology evaluation. Instructed to return now  - We reviewed risks of uncontrolled hyperglycemia during pregnancy, including but not limited to: gestational hypertension/preeclampsia, increased cesarian section rate, macrosomia, shoulder dystocia, still birth, and long term risk of development of diabetes in grown child    RTC- every 4 weeks until delivery. Follow weekly with diabetes educator.      A total of 40 minutes were spent today 05/18/22 on this visit including chart review, history and counseling, documentation and other activities as detailed above.         Answers for HPI/ROS submitted by the patient on 5/18/2022  General Symptoms: Yes  Skin Symptoms: No  HENT Symptoms: No  EYE SYMPTOMS: No  HEART SYMPTOMS: No  LUNG SYMPTOMS: No  INTESTINAL SYMPTOMS: Yes  URINARY SYMPTOMS: No  GYNECOLOGIC SYMPTOMS: No  BREAST SYMPTOMS: No  SKELETAL SYMPTOMS: No  BLOOD SYMPTOMS:  No  NERVOUS SYSTEM SYMPTOMS: No  MENTAL HEALTH SYMPTOMS: Yes  Fever: No  Loss of appetite: No  Weight loss: No  Weight gain: No  Fatigue: Yes  Night sweats: No  Chills: No  Increased stress: No  Excessive hunger: No  Excessive thirst: No  Feeling hot or cold when others believe the temperature is normal: No  Loss of height: No  Post-operative complications: No  Surgical site pain: No  Hallucinations: No  Change in or Loss of Energy: No  Hyperactivity: No  Confusion: No  Heart burn or indigestion: No  Nausea: Yes  Vomiting: Yes  Abdominal pain: No  Bloating: No  Constipation: No  Diarrhea: No  Blood in stool: No  Black stools: No  Rectal or Anal pain: No  Fecal incontinence: No  Yellowing of skin or eyes: No  Vomit with blood: No  Change in stools: No  Nervous or Anxious: Yes  Depression: Yes  Trouble sleeping: No  Trouble thinking or concentrating: No  Mood changes: No  Panic attacks: No          Again, thank you for allowing me to participate in the care of your patient.        Sincerely,        Shanon Thomas MD

## 2022-05-18 NOTE — PATIENT INSTRUCTIONS
Glucose goals during pregnancy according to the American Diabetes Association:    Fasting glucose 70-95 mg/dL AND    One-hour postprandial glucose 110-140 mg/dL or  Two-hour postprandial glucose 100-120 mg/dL

## 2022-05-18 NOTE — PATIENT INSTRUCTIONS
-Send us a message weekly to have us check in on your blood sugars & pump reports  -I will have Brian Ramirez from Omnipod reach out to help with set up in pairing your PDM & Dexcom info, as well as to help with Omnipod 5 transition  -Enter both blood sugars & carbohydrates into your PDM for boluses! Use Calorie Mariano, nutrition labels, Google to get carb counts. Let me know if you have more questions.     Call or send a ApiFix message with any questions or concerns    Lindsey Benjamin RD, LD, [facility]   Diabetes Education Triage Line: [phone]  Diabetes Education Appointment [phone]

## 2022-05-18 NOTE — PROGRESS NOTES
"Preeti Leiva is a 31 year old yo female here for follow-up of Diabetes Mellitus in pregnancy. She was previously a patient of Dr Richards, last seen Nov 2021.     Currently: 9w1d  Estimated Date of Delivery: Dec 20, 2022  Baby: unknown, but will find out with serum chromosome screen      1) Diabetes Mellitus in pregnancy    Diabetes History:  Diagnosis: Age 6  Hospitalizations: DKA, 2008 most recently, difficult time in college managing   Previous Regimens: almost always on pump, most recently Medtronic/Guardian, but didn't use automode-- kicked out frequently and didn't work through this  Current Regimen: Omnipod/Dexcom-- calculating insulin dosing basing on \"number of units\" vs actual carb counting, does not enter in BG for correction dosing.   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   Administering small, frequent boluses, frequently dropping low with stacking of insulin    Diet:  24hr Recall:  Breakfast- chobani peach yogurt 17g CHO- 3u, anita mildred breakfast sandwich 30g CHO-- 3-4u  Lunch- Breaded chicken breast, mac and cheese-   Dinner-  Snacks-  Beverages-    BG check- DEXCOM  Trends-                       BP Readings from Last 3 Encounters:   05/18/22 117/82   05/10/22 128/82   02/10/22 (!) 134/90       Lab Results   Component Value Date    A1C 8.6 02/10/2022    A1C 7.2 09/28/2021    A1C 9.4 08/20/2020    A1C 8.4 01/02/2020    A1C 7.8 01/29/2019       Wt Readings from Last 3 Encounters:   05/18/22 72.3 kg (159 lb 4.8 oz)   05/10/22 72.3 kg (159 lb 6.4 oz)   04/27/22 69.4 kg (153 lb)       Current Outpatient Medications   Medication Sig Dispense Refill     Continuous Blood Gluc Sensor (DEXCOM G6 SENSOR) MISC Change every 10 " days. 3 each 11     Continuous Blood Gluc Transmit (DEXCOM G6 TRANSMITTER) MISC Change every 3 months. 1 each 3     Glucagon, rDNA, (GLUCAGON EMERGENCY) 1 MG KIT Inject 1 mg as directed daily as needed (hypoglycemia) 2 kit 11     hydrOXYzine (ATARAX) 10 MG tablet TAKE 1 TABLET(10 MG) BY MOUTH EVERY 8 HOURS AS NEEDED FOR ANXIETY 60 tablet 0     Insulin Disposable Pump (OMNIPOD DASH 5 PACK PODS) MISC 1 pod every 72 hours 30 each 0     insulin lispro (HUMALOG VIAL) 100 UNIT/ML vial INJECT 90 UNITS UNDER THE SKIN EVERY DAY VIA INSULIN PUMP 80 mL 0     Prenatal Vit-DSS-Fe Cbn-FA (PRENATAL AD PO)        sertraline (ZOLOFT) 100 MG tablet TAKE 2 TABLETS(200 MG) BY MOUTH DAILY 180 tablet 1     insulin aspart (NOVOLOG VIAL) 100 UNITS/ML vial INJ 90 UNITS every day VIA PUMP 60 mL 3          REVIEW OF SYSTEMS:   ROS: 10 point ROS neg other than the symptoms noted above in the HPI.      EXAM:  Vitals: /82   Pulse 79   Wt 72.3 kg (159 lb 4.8 oz)   LMP 03/15/2022   BMI 22.22 kg/m      BMIE= Body mass index is 22.22 kg/m .  Exam:  Constitutional: healthy, alert, no acute distress  Head: Normocephalic. No masses, lesions, no exophthalmos/proptosis  ENT: normal thyroid, no palpable nodules, no cervical lymph nodes  Respiratory: nonlabored  Gastrointestinal: Abdomen soft, non-tender.  Musculoskeletal: extremities normal- no gross deformities noted, gait normal and normal muscle tone  Skin: no suspicious lesions or rashes  Neurologic: Gait normal. sensation grossly intact  Psychiatric: mentation appears normal, calm    ASSESSMENT/PLAN:  No diagnosis found.    1) Diabetes Mellitus in pregnancy  - Glucose Control- NOT at goal, overall hyperglycemia, but unable to correct these down and doing multiple small boluses to do so.  To start, will increase basal rate across the board by 20%   -12a-7a -- 1u--> 1.2u   - 7a-12a-- 1.2u--> 1.6u  Increase I:CHO ratio-- 1:10  Next step-- should have ISF of 1:30, (currently at 1:50), but I  don't want to make too many changes at once so ISF would be next step for adjustment  Patient open to trial of Omnipod 5-- we reviewed it has not been studied yet in pregnant patients, but can trial to see if she has some benefit from the SmartAdjust algorithm. RX sent to [facility]  - Reviewed blood sugar goals in pregnancy. Glucose goals during pregnancy according to the American Diabetes Association:  Fasting glucose 70-95 mg/dL AND  One-hour postprandial glucose 110-140 mg/dL or  Two-hour postprandial glucose 100-120 mg/dL  - Encouraged at least 30min of activity daily.  - Baseline labs for initial pregnancy visit: Basic Metabolic Panel, BUN/Cr, TSH, LFT s, CBC, UA, Spot Urine for Creatinine, A1C-- only one outstanding is BMP, ordered today  - She is NOT uptodate with ophthalmology evaluation. Instructed to return now  - We reviewed risks of uncontrolled hyperglycemia during pregnancy, including but not limited to: gestational hypertension/preeclampsia, increased cesarian section rate, macrosomia, shoulder dystocia, still birth, and long term risk of development of diabetes in grown child    RTC- every 4 weeks until delivery. Follow weekly with diabetes educator.      A total of 40 minutes were spent today 05/18/22 on this visit including chart review, history and counseling, documentation and other activities as detailed above.         Answers for HPI/ROS submitted by the patient on 5/18/2022  General Symptoms: Yes  Skin Symptoms: No  HENT Symptoms: No  EYE SYMPTOMS: No  HEART SYMPTOMS: No  LUNG SYMPTOMS: No  INTESTINAL SYMPTOMS: Yes  URINARY SYMPTOMS: No  GYNECOLOGIC SYMPTOMS: No  BREAST SYMPTOMS: No  SKELETAL SYMPTOMS: No  BLOOD SYMPTOMS: No  NERVOUS SYSTEM SYMPTOMS: No  MENTAL HEALTH SYMPTOMS: Yes  Fever: No  Loss of appetite: No  Weight loss: No  Weight gain: No  Fatigue: Yes  Night sweats: No  Chills: No  Increased stress: No  Excessive hunger: No  Excessive thirst: No  Feeling hot or cold when  others believe the temperature is normal: No  Loss of height: No  Post-operative complications: No  Surgical site pain: No  Hallucinations: No  Change in or Loss of Energy: No  Hyperactivity: No  Confusion: No  Heart burn or indigestion: No  Nausea: Yes  Vomiting: Yes  Abdominal pain: No  Bloating: No  Constipation: No  Diarrhea: No  Blood in stool: No  Black stools: No  Rectal or Anal pain: No  Fecal incontinence: No  Yellowing of skin or eyes: No  Vomit with blood: No  Change in stools: No  Nervous or Anxious: Yes  Depression: Yes  Trouble sleeping: No  Trouble thinking or concentrating: No  Mood changes: No  Panic attacks: No

## 2022-05-18 NOTE — PROGRESS NOTES
Diabetes Self-Management Education & Support    Presents for: Insulin Pump and CGM Review    Type of Visit: In Person    How would patient like to obtain AVS? Printed     ASSESSMENT:    Patient comes in for joint visit with Dr. Thomas, Endocrinology and writer. Patient is 9w1d pregnant. She had worked previously with Dr. Richards but has not seen Endocrinology since he left [facility]. She uses an Omnipod DASH but Dexcom data is not linked to her DASH. She is not entering blood sugars or carbohydrates into pump, just estimating units to take. She is aware that she needs to make this adjustment to get blood sugars in better control. She is able to verbalize strategies to carb count but often gets sidetracked when preparing to sit down for a meal. She is looking forward to switching to Omnipod 5 closed-loop system, disappointed to learn she won't yet be able to use her phone. She has a month or more of DASH pods so is not planning to make the change right away. For now will work on entering blood sugars & carbs into pump, using bolus calculator to more accurately dose her insulin via pump.     Patient's most recent   Lab Results   Component Value Date    A1C 8.6 02/10/2022    A1C 9.4 08/20/2020    is not meeting goal of <6.5% during pregnancy     Diabetes knowledge and skills assessment:   Patient is knowledgeable in diabetes management concepts related to: Healthy Eating, Monitoring and Taking Medication    Continue education with the following diabetes management concepts: Healthy Eating, Being Active, Monitoring, Taking Medication, Problem Solving, Reducing Risks and Healthy Coping    Based on learning assessment above, most appropriate setting for further diabetes education would be: Group class or Individual setting.    INTERVENTIONS:    Education provided today on:  AADE Self-Care Behaviors:  Diabetes Pathophysiology - related to pregnancy   Healthy Eating: carbohydrate counting, consistency in amount, composition,  "and timing of food intake and label reading  Monitoring: log and interpret results and frequency of monitoring  Taking Medication: when to take medications    Opportunities for ongoing education and support in diabetes-self management were discussed. Pt verbalized understanding of concepts discussed and recommendations provided today.       Education Materials Provided:  Carbohydrate Counting          PLAN  Reached out to Brian Ramirez RN, [facility] to help with Dexcom pairing, uploading to [employer] remotely & transition to Omnipod 5  Patient to enter blood sugars & carbohydrates into PDM consistently   Patient to send Tricycle message weekly to request review of pump data   Patient to follow up with Dr. Thomas monthly, directed her to  & appts scheduled today   Next week, consider adjustment in ISF 1:50 --> 1:30, per Dr. Thomas's recommendation.     Topics to cover at upcoming visits: Problem Solving, Reducing Risks and Healthy Coping  Follow-up: 5/25/22 via T[employer]ycle     See Goals Section for co-developed, patient-stated behavior change goals.  AVS provided to patient today.          SUBJECTIVE / OBJECTIVE:  Presents for: Insulin Pump and CGM Review  Accompanied by: Self  Diabetes education in the past 24mo: No  Focus of Visit: CGM, Insulin Pump  Type of Pump visit: Pump Review  Type of CGM visit: Personal CGM Follow-up  Diabetes type: Type 1  Disease course: Getting harder to manage  How confident are you filling out medical forms by yourself:: Not Assessed  Other concerns:: None  Cultural Influences/Ethnic Background:  Not  or     Diabetes Symptoms & Complications:     Complications assessed today?: No    Patient Problem List and Family Medical History reviewed for relevant medical history, current medical status, and diabetes risk factors.    Vitals:  LMP 03/15/2022   Estimated body mass index is 22.22 kg/m  as calculated from the following:    Height as of 4/27/22: 1.803 m (5' 11\").    Weight " as of an earlier encounter on 5/18/22: 72.3 kg (159 lb 4.8 oz).   Last 3 BP:   BP Readings from Last 3 Encounters:   05/18/22 117/82   05/10/22 128/82   02/10/22 (!) 134/90       History   Smoking Status     Never Smoker   Smokeless Tobacco     Never Used       Labs:  Lab Results   Component Value Date    A1C 8.6 02/10/2022    A1C 9.4 08/20/2020     Lab Results   Component Value Date    GLC 99 09/28/2021     01/02/2020     Lab Results   Component Value Date     09/28/2021     08/20/2020     HDL Cholesterol   Date Value Ref Range Status   08/20/2020 39 (L) >49 mg/dL Final     Direct Measure HDL   Date Value Ref Range Status   09/28/2021 48 (L) >=50 mg/dL Final   ]  GFR Estimate   Date Value Ref Range Status   09/28/2021 >90 >60 mL/min/1.73m2 Final     Comment:     As of July 11, 2021, eGFR is calculated by the CKD-EPI creatinine equation, without race adjustment. eGFR can be influenced by muscle mass, exercise, and diet. The reported eGFR is an estimation only and is only applicable if the renal function is stable.   01/02/2020 >90 >60 mL/min/[1.73_m2] Final     Comment:     Non  GFR Calc  Starting 12/18/2018, serum creatinine based estimated GFR (eGFR) will be   calculated using the Chronic Kidney Disease Epidemiology Collaboration   (CKD-EPI) equation.       GFR Estimate If Black   Date Value Ref Range Status   01/02/2020 >90 >60 mL/min/[1.73_m2] Final     Comment:      GFR Calc  Starting 12/18/2018, serum creatinine based estimated GFR (eGFR) will be   calculated using the Chronic Kidney Disease Epidemiology Collaboration   (CKD-EPI) equation.       Lab Results   Component Value Date    CR 0.72 09/28/2021    CR 0.70 01/02/2020     No results found for: MICROALBUMIN    Healthy Eating:  Healthy Eating Assessed Today: Yes  Cultural/Tenriism diet restrictions?: No  Meal planning/habits: Carb counting  Meals include: Breakfast, Lunch, Dinner  Breakfast: peach  chobani yogurt, Crystal Light  Lunch: 12p - rice/pasta, protein, Diet Coke  Dinner:  H[employer] freezer meal  Beverages: Diet soda (Used to drink 8-9 Diet Cokes daily, now down to 2/day)    Being Active:  Being Active Assessed Today: No    Monitoring:  Monitoring Assessed Today: No  Blood Glucose Meter: CGM  Times checking blood sugar at home (number): 5+  Times checking blood sugar at home (per): Day    Glucose data:  See Dr. Thomas's note for blood sugar & pump data     Taking Medications:  Diabetes Medication(s)     Diabetic Other       Glucagon, rDNA, (GLUCAGON EMERGENCY) 1 MG KIT    Inject 1 mg as directed daily as needed (hypoglycemia)    Insulin       insulin lispro (HUMALOG VIAL) 100 UNIT/ML vial    INJECT 70 UNITS UNDER THE SKIN EVERY DAY VIA INSULIN PUMP     insulin aspart (NOVOLOG VIAL) 100 UNITS/ML vial    INJ 90 UNITS every day VIA PUMP              Taking Medication Assessed Today: Yes  Current Treatments: Insulin Pump  Problems taking diabetes medications regularly?: Yes  Diabetes medication side effects?: No    Problem Solving:  Problem Solving Assessed Today: Yes  Is the patient at risk for hypoglycemia?: Yes  Is the patient at risk for DKA?: Yes              Reducing Risks:  Reducing Risks Assessed Today: No    Healthy Coping:  Healthy Coping Assessed Today: Yes  Emotional response to diabetes: Ready to learn, Concern for health and well-being  Informal Support system:: Spouse  Stage of change: ACTION (Actively working towards change)  Support resources: None  Patient Activation Measure Survey Score:  No flowsheet data found.          Lindsey Benjamin RD, LD, CDCES   Time Spent: 45 minutes  Encounter Type: Individual        Any diabetes medication dose changes were made via the Certified Diabetes Care & Education Protocol in collaboration with the patient's referring provider. A copy of this encounter was shared with the provider.

## 2022-05-21 ENCOUNTER — HEALTH MAINTENANCE LETTER (OUTPATIENT)
Age: 32
End: 2022-05-21

## 2022-05-23 ENCOUNTER — VIRTUAL VISIT (OUTPATIENT)
Dept: OBGYN | Facility: CLINIC | Age: 32
End: 2022-05-23
Payer: COMMERCIAL

## 2022-05-23 DIAGNOSIS — U07.1 COVID-19 AFFECTING PREGNANCY IN FIRST TRIMESTER: Primary | ICD-10-CM

## 2022-05-23 DIAGNOSIS — O98.511 COVID-19 AFFECTING PREGNANCY IN FIRST TRIMESTER: Primary | ICD-10-CM

## 2022-05-23 PROCEDURE — 99212 OFFICE O/P EST SF 10 MIN: CPT | Mod: TEL | Performed by: OBSTETRICS & GYNECOLOGY

## 2022-05-23 NOTE — PROGRESS NOTES
Preeti is a 31 year old who is being evaluated via a billable telephone visit.      What phone number would you like to be contacted at? [phone]  How would you like to obtain your AVS? MyChart    Assessment & Plan     COVID-19 affecting pregnancy in first trimester  Discussed COVID in pregnancy  Given timeline I suspect she has been COVID positive since first onset of symptoms.   Discussed quarantine through end of week to be cautious.   Symptoms improving - reviewed precautions  Will be getting ultrasound monitoring of pregnancy anyway due to diabetes.   Out of time range for getting Paxlovid.        10 minutes spent on the date of the encounter doing patient visit and documentation            No follow-ups on file.    Lubna Glynn MD  [facility]    Subjective   Preeti is a 31 year old who presents for the following health issues     HPI       Preeti BOYLE Olivercristelolinda  confirmed new positive COVID-19 diagnosis PCR or Antigen test         Concern for COVID-19  Exactly how many days ago did these symptoms start? 5/14/22- 2x tested and negative, first positive test was Saturday 5/21  (If asymptomatic schedule phone visit within 3 days, mild symptoms for 1-4 days schedule same day phone visit with on call provider)     Are any of the following symptoms significant for you?    New or worsening difficulty breathing? No    Worsening cough? Yes, I am coughing up mucus.    Fever or chills? Yes, the highest temperature was 100.6    Headache: YES    Sore throat: no    Chest pain: no    Diarrhea: no    Body aches? YES     What treatments has patient tried? Acetaminophen   Does patient live in a nursing home, group home, or shelter? no  Does patient have a way to get food/medications during quarantined? Yes, I have a friend or family member who can help me.    Now starting to feel better, no longer febrile.   Blood sugars have been in range.                   Review of Systems    Constitutional, HEENT, cardiovascular, pulmonary, gi and gu systems are negative, except as otherwise noted.      Objective           Vitals:  No vitals were obtained today due to virtual visit.    Physical Exam   healthy, alert and no distress  PSYCH: Alert and oriented times 3; coherent speech, normal   rate and volume, able to articulate logical thoughts, able   to abstract reason, no tangential thoughts, no hallucinations   or delusions  Her affect is normal  RESP: No cough, no audible wheezing, able to talk in full sentences  Remainder of exam unable to be completed due to telephone visits                Phone call duration: 7 minutes

## 2022-05-25 ENCOUNTER — VIRTUAL VISIT (OUTPATIENT)
Dept: PSYCHOLOGY | Facility: CLINIC | Age: 32
End: 2022-05-25
Payer: COMMERCIAL

## 2022-05-25 ENCOUNTER — MYC MEDICAL ADVICE (OUTPATIENT)
Dept: EDUCATION SERVICES | Facility: CLINIC | Age: 32
End: 2022-05-25
Payer: COMMERCIAL

## 2022-05-25 DIAGNOSIS — F41.9 ANXIETY: Primary | ICD-10-CM

## 2022-05-25 PROCEDURE — 90837 PSYTX W PT 60 MINUTES: CPT | Mod: GT | Performed by: SOCIAL WORKER
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
## 2022-05-25 NOTE — TELEPHONE ENCOUNTER
Type 1 Diabetes + Pregnant Follow-up    Subjective/Objective:    Preeti Leiva sent in blood glucose log for review. Last date of communication was: 5/18/22.    Type 1 diabetes is being managed with diet, activity and medications    Taking diabetes medications:   yes:     Diabetes Medication(s)     Diabetic Other       Glucagon, rDNA, (GLUCAGON EMERGENCY) 1 MG KIT    Inject 1 mg as directed daily as needed (hypoglycemia)    Insulin       insulin aspart (NOVOLOG VIAL) 100 UNITS/ML vial    INJ 90 UNITS every day VIA PUMP     insulin lispro (HUMALOG VIAL) 100 UNIT/ML vial    INJECT 70 UNITS UNDER THE SKIN EVERY DAY VIA INSULIN PUMP          Estimated Date of Delivery: Dec 20, 2022    BG/Food Log:                     Assessment:    Blood sugars have improved significantly with patient entering carbs & blood sugars into pump. More frequent hypoglycemia but still only 3% of time in 55-69 mg/dL range. Fasting blood sugars appear to be well above </=95 mg/dL over the past week. Seeing a consistent spike around lunchtime as well. Basal-bolus ratio still heavily favors basal. Will continue to adjust I:C ratio today to help with post-meal spikes and encourage entering blood sugars for corrections when high.     Lows on occasion appear to be from correction with blood sugars in the pump and/or manual boluses. Will hold off on ISF adjustment today and continue to watch blood sugar trends.       Plan/Response:  Recommend changes in pump settings:  Insulin to carb ratio  Add 11a-9p - 1u:9g  No change this week to basal rates or ISF, likely will need increase in basal rates x 24 hrs at next check in & possibly adjust ISF  Follow-up in 1 week.  See [email or web address] message for patient communication    Lindsey Benjamin RD, LD, [facility]     Any diabetes medication dose changes were made via the CDE Protocol and Collaborative Practice Agreement with the patient's referring provider. A copy of this encounter was shared with the provider.

## 2022-06-01 ENCOUNTER — MYC MEDICAL ADVICE (OUTPATIENT)
Dept: EDUCATION SERVICES | Facility: CLINIC | Age: 32
End: 2022-06-01
Payer: COMMERCIAL

## 2022-06-02 NOTE — TELEPHONE ENCOUNTER
Type 1 Diabetes + pregnant Follow-up    Subjective/Objective:    Preeti BOYLE Oliverderrell sent in blood glucose log for review. Last date of communication was: 5/25/22.    Type 1 diabetes is being managed with diet, activity and medications    Taking diabetes medications:   yes:     Diabetes Medication(s)     Diabetic Other       Glucagon, rDNA, (GLUCAGON EMERGENCY) 1 MG KIT    Inject 1 mg as directed daily as needed (hypoglycemia)    Insulin       insulin aspart (NOVOLOG VIAL) 100 UNITS/ML vial    INJ 90 UNITS every day VIA PUMP     insulin lispro (HUMALOG VIAL) 100 UNIT/ML vial    INJECT 70 UNITS UNDER THE SKIN EVERY DAY VIA INSULIN PUMP          Estimated Date of Delivery: Dec 20, 2022    BG/Food Log:                 Assessment:    Significant variability in blood sugars over the past week. Frequent hypoglycemia during the day in between meals as well as overnight hyperglycemia. Some overnight lows appear to be related to late night corrections. Suggest increase in overnight basal rate, slight decrease in daytime basal, focus on pre-meal boluses and continuing to enter blood sugar & carbs into pump for accurate dosing.     Plan/Response:  Pump setting changes:   Basal rates  12:00a 1.2 --> 1.3  7:00a 1.8 --> 1.7  Focus on premeal boluses & entering blood sugars & carbs into pump   No change in I:C or ISF today  Follow-up in 1 week.    Lindsey Benjamin RD, LD, [facility]     Any diabetes medication dose changes were made via the CDE Protocol and Collaborative Practice Agreement with the patient's endocrinology provider. A copy of this encounter was shared with the provider.

## 2022-06-06 ENCOUNTER — PRE VISIT (OUTPATIENT)
Dept: MATERNAL FETAL MEDICINE | Facility: CLINIC | Age: 32
End: 2022-06-06
Payer: COMMERCIAL

## 2022-06-08 ENCOUNTER — MYC MEDICAL ADVICE (OUTPATIENT)
Dept: EDUCATION SERVICES | Facility: CLINIC | Age: 32
End: 2022-06-08
Payer: COMMERCIAL

## 2022-06-08 NOTE — TELEPHONE ENCOUNTER
See 6/8 encounter note for this week's blood sugar reviews.     Lindsey Benjamin, RD, LD, [facility]

## 2022-06-09 ENCOUNTER — PRENATAL OFFICE VISIT (OUTPATIENT)
Dept: OBGYN | Facility: CLINIC | Age: 32
End: 2022-06-09
Payer: COMMERCIAL

## 2022-06-09 VITALS
HEART RATE: 92 BPM | SYSTOLIC BLOOD PRESSURE: 116 MMHG | BODY MASS INDEX: 22.68 KG/M2 | TEMPERATURE: 98.4 F | HEIGHT: 71 IN | WEIGHT: 162 LBS | DIASTOLIC BLOOD PRESSURE: 75 MMHG

## 2022-06-09 DIAGNOSIS — O24.911 DIABETES MELLITUS AFFECTING PREGNANCY IN FIRST TRIMESTER: Primary | ICD-10-CM

## 2022-06-09 DIAGNOSIS — K59.01 SLOW TRANSIT CONSTIPATION: ICD-10-CM

## 2022-06-09 DIAGNOSIS — O21.9 NAUSEA/VOMITING IN PREGNANCY: ICD-10-CM

## 2022-06-09 PROCEDURE — 99207 PR FIRST OB VISIT: CPT | Performed by: OBSTETRICS & GYNECOLOGY

## 2022-06-09 RX ORDER — METOCLOPRAMIDE 5 MG/1
5 TABLET ORAL 3 TIMES DAILY PRN
Qty: 60 TABLET | Refills: 1 | Status: SHIPPED | OUTPATIENT
Start: 2022-06-09 | End: 2022-09-21

## 2022-06-09 NOTE — PROGRESS NOTES
[facility]  Genetic Counseling Consult    Patient: Pretei Leiva YOB: 1990   Date of Service: 6/10/22      Preeti Leiva was seen at [facility] for genetic and maternal fetal medicine consultation to discuss the options for screening and testing for fetal chromosome abnormalities in light of her history of type I diabetes. Preeti was accompanied to today's consult by her , Gabe.        Impression/Plan:   1.  Preeti had an ultrasound and blood draw for NIPT (NIPS test through [facility]).  Results are expected within 7-10 days, and will be available in EPIC.  We will contact her to discuss the results, and a copy will be forwarded to the office of the referring OB provider. In the event we are unable to contact the patient once results become available, she has requested we leave a detailed voicemail fully disclosing the results, including the predicted fetal sex, at her mobile telephone number.     2.  Preeti had an NT ultrasound today, please see the ultrasound report for more details.      3.  Maternal serum AFP (single marker screen) is recommended after 15 weeks to screen for open neural tube defects. A quad screen should not be performed.    4.  Preeti had an MFM consultation today due to her history of type I diabetes, please see Dr. Muñzo's documentation for more details regarding pregnancy management.     5.  Preeti and Gabe elected to proceed with the 289 [facility] expanded carrier screening panel during today's consult. Consent to communicate forms were completed today. The couple have elected to have M genetic counseling call both Gabe and Preeti to review the couple's combined results, once available. Ok to leave detailed voicemail summary of results at their mobile telephone numbers.     Pregnancy & Medical History:   /Parity:     Age at Delivery: 32 year old  XIOMY: 2022,  by Last Menstrual Period  Gestational Age: 12w2d    No significant complications or exposures were reported in the current pregnancy.    This is Preeti's first pregnancy.     The patient is currently taking prenatal vitamins, zoloft, and uses an insulin pump in pregnancy due to her personal history of type I diabetes. Please see Dr. Muñoz's documentation for more details about Preeti's personal history and medical management recommendations for her pregnancy.     Family History:   A three-generation reproductive pedigree was obtained, and is scanned under the  Media  tab. Previously, Preeti's cancer family history was collected by genetic counselor Juana Gary MS, [facility] on 11/3/21. Please see that scanned in under the Media tab for reference in the patient's chart. Today, Gabe and Preeti report that their family histories are negative for multiple miscarriages, stillbirths, birth defects, intellectual disabilities known genetic conditions, and consanguinity.       Carrier Screening:   The patient reports that she and the father of the pregnancy have  ancestry:     Cystic fibrosis is an autosomal recessive genetic condition that occurs with increased frequency in individuals of  ancestry and carrier screening for this condition is available.  In addition, we did review that  screening in the [facility] includes cystic fibrosis.    The patient reports that she and the father of the pregnancy have Ashken[place] Restoration[place] ancestry, though Gabe reports having less than 1% while Preeti has less than 5%:      There is a group of several autosomal recessive genetic conditions that occur with increased frequency in individuals of Ashken[place] Restorationism ancestry, such as Abdelrahman Sachs disease and Canavan disease. Carrier screening is available for a variety of these conditions.    Comprehensive carrier screening for mutations in a large panel of genes associated with autosomal recessive conditions including  cystic fibrosis, Thalassemias, hearing loss, spinal muscular atrophy, and others, is now available. We also reviewed that comprehensive carrier screening can assess for common X-linked recessive disorders such as Fragile X syndrome.     We discussed that comprehensive carrier screening is designed to identify carrier status for conditions that are primarily childhood or adolescent onset. Comprehensive carrier screening does not evaluate for adult-onset conditions such as hereditary cancer syndromes, dementia/ Alzheimer's disease, or cardiovascular disease risk factors. Additionally, comprehensive carrier screening is not comprehensive for all known genetic diseases or inherited conditions. It does not screen for autosomal dominant hereditary conditions. This is a screening test, and residual carrier status risk figures will be provided to the patient after results become available. Carrier screening is not meant to diagnose the patient with a condition, and generally carriers are asymptomatic. However, certain genes may confer increased risks for various health concerns in carriers (for example, but not limited to: CHANTAL, FMR1, DMD, etc). Patients are encouraged to share results with their primary care providers to ensure appropriate screening. If we are notified by the performing laboratory of a variant reclassification, the patient will be contacted.    We reviewed availability of comprehensive carrier screening through [facility] for up to 289 conditions. [facility] can also screen for fewer conditions via a variety of panels. [facility] will report on pseudodeficiency alleles, which are benign variants that are not known to be associated with disease and are not thought to impact the individuals risk to be a carrier for these conditions.  However, the presence of pseudodeficiency alleles can exhibit false positive results on biochemical tests such as  screen. This will be addressed with the patient should  "a pseudodeficiency allele be identified in the couple's sample. [facility] will report the common 5T CFTR allele in isolation.     The patient and her partner elected to pursue comprehensive carrier screening (40365, 289 conditions) via [facility] today. The couple have provided permission for [facility] genetic counseling to call both of them individually with the couple's combined carrier screening results once available in 2-3 weeks from today. Coverage for carrier screening can be variable by insurance companies. We reviewed the self-pay option that will be available to the patient and her partner ($250 for one test, $100 for partner testing) should insurance not cover the cost of testing, or, if with coverage, cost of testing is greater than $350 combined.        Risk Assessment for Chromosome Conditions:   We explained that the risk for fetal chromosome abnormalities increases with maternal age. Per patient request, we did not discuss Preeti's specific age-related risks for aneuploidy during today's consult. The patient is aware that she is considered to be at \"low risk\" for fetal aneuploidy. We discussed specific features of common chromosome abnormalities, including Down syndrome, trisomy 13, trisomy 18, and sex chromosome trisomies.       Testing Options:   We discussed the following options:     First trimester screening    First trimester ultrasound with nuchal translucency and nasal bone assessments, maternal plasma hCG, LES-A, and AFP measurement    Screens for fetal trisomy 21, trisomy 13, and trisomy 18    Cannot screen for open neural tube defects; maternal serum AFP after 15 weeks is recommended       Non-invasive Prenatal Testing (NIPT)    Maternal plasma cell-free DNA testing; first trimester ultrasound with nuchal translucency and nasal bone assessment is recommended, when appropriate    Screens for fetal trisomy 21, trisomy 13, trisomy 18, and sex chromosome aneuploidy    Cannot screen for open " neural tube defects; maternal serum AFP after 15 weeks is recommended       Chorionic villus sampling (CVS)    Invasive procedure typically performed in the first trimester by which placental villi are obtained for the purpose of chromosome analysis and/or other prenatal genetic analysis    Diagnostic results; >99% sensitivity for fetal chromosome abnormalities    Cannot test for open neural tube defects; maternal serum AFP after 15 weeks is recommended       Genetic Amniocentesis    Invasive procedure typically performed in the second trimester by which amniotic fluid is obtained for the purpose of chromosome analysis and/or other prenatal genetic analysis    Diagnostic results; >99% sensitivity for fetal chromosome abnormalities    AFAFP measurement tests for open neural tube defects       Comprehensive (Level II) ultrasound: Detailed ultrasound performed between 18-22 weeks gestation to screen for major birth defects and markers for aneuploidy.      NT Ultrasound     Assessment for the thickness of the nuchal translucency and the fetus' nasal bone performed between [id number]-00w8c gestation    ~70% aneuploidy detection      We reviewed the benefits and limitations of this testing.  Screening tests provide a risk assessment specific to the pregnancy for certain fetal chromosome abnormalities, but cannot definitively diagnose or exclude a fetal chromosome abnormality.  Follow-up genetic counseling and consideration of diagnostic testing is recommended with any abnormal screening result.     Diagnostic tests carry inherent risks- including risk of miscarriage- that require careful consideration.  These tests can detect fetal chromosome abnormalities with greater than 99% certainty.  Results can be compromised by maternal cell contamination or mosaicism, and are limited by the resolution of cytogenetic G-banding technology.  There is no screening nor diagnostic test that can detect all forms of birth defects or mental  disability.     It was a pleasure to be involved with Preeti s care. Face-to-face time of the meeting was 45 minutes.    Adri Nunez MS, [facility]  Genetic Counselor  [facility]  Maternal Fetal Medicine  Ph: [phone]   Pager: [phone]

## 2022-06-10 ENCOUNTER — MEDICAL CORRESPONDENCE (OUTPATIENT)
Dept: HEALTH INFORMATION MANAGEMENT | Facility: CLINIC | Age: 32
End: 2022-06-10

## 2022-06-10 ENCOUNTER — HOSPITAL ENCOUNTER (OUTPATIENT)
Dept: ULTRASOUND IMAGING | Facility: CLINIC | Age: 32
Discharge: HOME | End: 2022-06-10
Attending: OBSTETRICS & GYNECOLOGY
Payer: COMMERCIAL

## 2022-06-10 ENCOUNTER — LAB (OUTPATIENT)
Dept: LAB | Facility: CLINIC | Age: 32
End: 2022-06-10
Attending: OBSTETRICS & GYNECOLOGY
Payer: COMMERCIAL

## 2022-06-10 ENCOUNTER — OFFICE VISIT (OUTPATIENT)
Dept: MATERNAL FETAL MEDICINE | Facility: CLINIC | Age: 32
End: 2022-06-10
Attending: OBSTETRICS & GYNECOLOGY
Payer: COMMERCIAL

## 2022-06-10 VITALS — SYSTOLIC BLOOD PRESSURE: 124 MMHG | DIASTOLIC BLOOD PRESSURE: 79 MMHG

## 2022-06-10 DIAGNOSIS — O26.90 PREGNANCY RELATED CONDITION: ICD-10-CM

## 2022-06-10 DIAGNOSIS — O26.91 PREGNANCY RELATED CONDITION IN FIRST TRIMESTER: ICD-10-CM

## 2022-06-10 DIAGNOSIS — O24.011 PRE-EXISTING TYPE 1 DIABETES MELLITUS DURING PREGNANCY IN FIRST TRIMESTER: Primary | ICD-10-CM

## 2022-06-10 DIAGNOSIS — Z34.01 NORMAL FIRST PREGNANCY IN FIRST TRIMESTER: ICD-10-CM

## 2022-06-10 DIAGNOSIS — Z36.9 PRENATAL SCREENING ENCOUNTER: ICD-10-CM

## 2022-06-10 DIAGNOSIS — Z13.71 SCREENING FOR GENETIC DISEASE CARRIER STATUS: ICD-10-CM

## 2022-06-10 DIAGNOSIS — Z36.9 PRENATAL SCREENING ENCOUNTER: Primary | ICD-10-CM

## 2022-06-10 DIAGNOSIS — E10.65 TYPE 1 DIABETES MELLITUS WITH HYPERGLYCEMIA (H): ICD-10-CM

## 2022-06-10 LAB
ALBUMIN SERPL-MCNC: 4.1 G/DL (ref 3.4–5)
ALP SERPL-CCNC: 51 U/L (ref 40–150)
ALT SERPL W P-5'-P-CCNC: 17 U/L (ref 0–50)
ANION GAP SERPL CALCULATED.3IONS-SCNC: 5 MMOL/L (ref 3–14)
AST SERPL W P-5'-P-CCNC: 10 U/L (ref 0–45)
BILIRUB SERPL-MCNC: 0.4 MG/DL (ref 0.2–1.3)
BUN SERPL-MCNC: 11 MG/DL (ref 7–30)
CALCIUM SERPL-MCNC: 9.5 MG/DL (ref 8.5–10.1)
CHLORIDE BLD-SCNC: 105 MMOL/L (ref 94–109)
CO2 SERPL-SCNC: 25 MMOL/L (ref 20–32)
CREAT SERPL-MCNC: 0.49 MG/DL (ref 0.52–1.04)
CREAT UR-MCNC: 176 MG/DL
GFR SERPL CREATININE-BSD FRML MDRD: >90 ML/MIN/1.73M2
GLUCOSE BLD-MCNC: 137 MG/DL (ref 70–99)
HBA1C MFR BLD: 6.5 % (ref 0–5.6)
POTASSIUM BLD-SCNC: 4.4 MMOL/L (ref 3.4–5.3)
PROT SERPL-MCNC: 7.9 G/DL (ref 6.8–8.8)
PROT UR-MCNC: 0.18 G/L
PROT/CREAT 24H UR: 0.1 G/G CR (ref 0–0.2)
SODIUM SERPL-SCNC: 135 MMOL/L (ref 133–144)

## 2022-06-10 PROCEDURE — 84156 ASSAY OF PROTEIN URINE: CPT | Performed by: OBSTETRICS & GYNECOLOGY

## 2022-06-10 PROCEDURE — 76813 OB US NUCHAL MEAS 1 GEST: CPT | Mod: 26 | Performed by: OBSTETRICS & GYNECOLOGY

## 2022-06-10 PROCEDURE — 76813 OB US NUCHAL MEAS 1 GEST: CPT

## 2022-06-10 PROCEDURE — 99203 OFFICE O/P NEW LOW 30 MIN: CPT | Mod: 25 | Performed by: OBSTETRICS & GYNECOLOGY

## 2022-06-10 PROCEDURE — 80053 COMPREHEN METABOLIC PANEL: CPT | Performed by: OBSTETRICS & GYNECOLOGY

## 2022-06-10 PROCEDURE — 83036 HEMOGLOBIN GLYCOSYLATED A1C: CPT | Performed by: OBSTETRICS & GYNECOLOGY

## 2022-06-10 PROCEDURE — 36415 COLL VENOUS BLD VENIPUNCTURE: CPT | Performed by: OBSTETRICS & GYNECOLOGY

## 2022-06-10 NOTE — Clinical Note
It was our pleasure to see your patient Ms. Leiva to our office for MFM consultation. Please see attached for information from our visit and don't hesitate to reach out with questions/concerns.   Tita

## 2022-06-10 NOTE — NURSING NOTE
Preeti seen in clinic today for GC, NT, and MFM consult at 12w3d gestation due to pregnancy c/b DM1 (see report/notes). VSS. Pt denies bldg/lof/change in discharge/cramping. Pt follows closely with endocrinology. Has not had a recent eye exam. Dr. Muñoz met with pt and discussed POC. Plan to return to [facility] for L2. Will need a fetal echo, EKG, and labs. Pt discharged stable and ambulatory.     Edel Ram RN

## 2022-06-10 NOTE — PROGRESS NOTES
Dear Dr. Glynn,    Thank you for referring your patient Ms. Leiva for a Maternal-Fetal Medicine consultation today.  As you know, she is a 31 year old  at 12w3d by LMP c/w 7 week ultrasound with a pregnancy complicated by T1DM. She also recently recovered from COVID in May. Her pregnancy is otherwise complicated by anxiety/depression.     With regards to her T1DM she was diagnosed in  when she was 6 years old. She was hospitalized for 1 episode of DKA in high school but has otherwise been fairly well controlled. She is on an insulin pump and recently switched Endocrinologists. She still feels symptomatic with her lows which are infrequent. She does endorse being slightly less well controlled just prior to pregnancy but has really had improved glycemic control over the last few months. She has not seen Ophthalmology recently (last appointment 1-2 years ago) but denies any history of retinopathy. No peripheral neuropathy or renal impairment. She has never had an EKG.  She is not currently taking a baby aspirin.    With regards to her anxiety/depression she follows closely with Psychology here and her mood is stable on Sertraline 200mg daily.     Today she feels well.  She denies cramping, leakage of fluid or vaginal bleeding.     Obstetrical History:    OB History    Para Term  AB Living   1 0 0 0 0 0   SAB IAB Ectopic Multiple Live Births   0 0 0 0 0      # Outcome Date GA Lbr Ar/2nd Weight Sex Delivery Anes PTL Lv   1 Current              Medical History:   Past Medical History:   Diagnosis Date     Anxiety      Depressive disorder      Type 1 diabetes mellitus with hyperglycemia (H)      Varicella      Surgical History:   Past Surgical History:   Procedure Laterality Date     INGUINAL HERNIA REPAIR       Family History:   Family History   Problem Relation Age of Onset     Thyroid Disease Mother      Anxiety Disorder Mother      Depression Mother         Mother     Skin Cancer Mother       Melanoma Mother      Lung Cancer Father      Melanoma Maternal Grandmother      Skin Cancer Maternal Grandmother      Melanoma Paternal Grandmother      Skin Cancer Paternal Grandmother      Melanoma Maternal Uncle      Skin Cancer Maternal Uncle      Glaucoma No family hx of      Macular Degeneration No family hx of      Pertinent Data Reviewed:     Latest Reference Range & Units 05/10/22 08:39   WBC 4.0 - 11.0 10e3/uL 7.3   Hemoglobin 11.7 - 15.7 g/dL 13.9   Hematocrit 35.0 - 47.0 % 40.8   Platelet Count 150 - 450 10e3/uL 201      Latest Reference Range & Units 02/10/22 17:50   TSH 0.40 - 4.00 mU/L 1.20      Latest Reference Range & Units 02/10/22 16:51   Hemoglobin A1C 0.0 - 5.6 % 8.6 (H) [1]   (H): Data is abnormally high    Pump reports from Endocrinology encounter :              Assessment:  Just started Omnipod 5 with automated insulin delivery on , in the evening. Blood sugar tracings since start of new Omnipod look excellent. Fasting blood sugars & post-prandial numbers  near goal x 24 hrs with 0% low blood sugars. Time in range 92% yesterday, vast improvement compared to previous days.     Prenatal labs reviewed, see results in Epic    Ultrasound from today  1. Stack intrauterine pregnancy at 12w3d gestational age here for aneuploidy risk assessment.  2. The nuchal translucency measurement is within the normal range.  3. The nasal bone was visualized.  4. Measurements consistent with established dates.    Assessment & Recommendations:  31 year old  at 12w3d by LMP c/w 7 week ultrasound with a pregnancy complicated by T1DM. She also recently recovered from COVID in May. Her pregnancy is otherwise complicated by anxiety/depression.     1. T1DM: We discussed the implications of T1DM in pregnancy. While pregnancy does not alter the course of diabetes in the setting of normal renal function, it does have significant implications on pregnancy outcomes. Pregnancy is broadly characterized by  increased insulin resistance due to placental-produced hormones. This physiologic change can increase insulin requirements and worsen glycemic control in women with pregestational diabetes. On average, insulin needs increase from a range of 0.7-0.8 units/kg actual body weight/day in the first trimester, to 0.8-1 units/kg/day in the second trimester, to 0.9-1.2 units/kg/day in the third trimester. The goal of therapy is to achieve euglycemia in pregnancy without significant hypoglycemia. Glycemia goals generally include fasting and premeal glucose values of 95 mg/dL or less and either 1-hour postprandial levels of 140 mg/dL or less or 2-hour postprandial values of 120 mg/dL or less. During the night, glucose levels should not decrease to less than 60 mg/dL. Even with meticulous monitoring, hypoglycemia is more frequent in pregnancy than at other times, particularly in women with type 1 pregestational diabetes mellitus. Patients should be questioned to determine if they can recognize when their glucose levels decrease to less than 60 mg/dL. Patients and their families should be taught how to respond quickly and appropriately to hypoglycemia. The best approach is to have glucose tablets available at all times. A drink of fruit juice or milk can be used if immediately available. In general, patients should be instructed to consume 15 g of carbohydrate and then wait 15 minutes for their glucose level to correct before taking in additional glucose. Patients should have glucagon on hand for severe hypoglycemia and loss of consciousness. We reinforced that a diabetic diet consists of 40-50% of complex high fiber carbohydrates, 20% protein, and 30-40% unsaturated fats. Additionally, careful carbohydrate counting and allocation of appropriate ratios of carbohydrates to meals and snacks. For example, typical carbohydrate allocation ranges might be 30-45 g at breakfast, 45-60 g at lunch and dinner, and 15-g snacks approximately  2-3 hours after each meal.     Mean capillary glucose levels should be maintained at an average of 100 mg/d  with a goal HbA1C less than 6% to minimize fetal risk and complications of pregnancy. Importantly, because of the association of elevated glucose values and congenital anomalies, aggressive approaches to glycemic control early in the first trimester before or during embryogenesis may reduce the risk of fetal anomalies. The approximate risks of malformation: 1) baseline risk 2-3%, 2) A1c 7-9: 10%, 3) A1c 9-11: 23%, 4) A1c 11 and above: >25-30%. Complex cardiac defects, central nervous system anomalies, such as anencephaly and spina bifida, and skeletal malformations, including sacral agenesis, are most common. Other fetal risks of pregnancy in the setting of preexisting diabetes include fetal growth restriction, fetal macrosomia, and stillbirth. Women who have macrosomic fetuses have an increased risk for shoulder dystocia and permanent brachial plexus injury.  complications include hypoglycemia, a higher rate of respiratory distress syndrome, polycythemia, organomegaly, electrolyte disturbances, and hyperbilirubinemia. Long-term outcomes for offspring of women with type 1 diabetes mellitus include obesity and carbohydrate intolerance.     Pregnancy has been associated with exacerbation of diabetes-related complications, particularly retinopathy and nephropathy. Poorly controlled pregestational diabetes mellitus leads to serious end-organ damage that may eventually become life threatening. Pregnancy in the setting of diabetes, especially in the presence of nephropathy,  carries a high risk of developing hypertension and preeclampsia. There is also an increased risk of acute myocardial infarction. There is a 3-5 fold increase in the risk of infection. Additionally, macrosomic infants increase the risk of maternal obstetrical trauma at time of delivery. Finally, we discussed that strict glycemic control  can help reduce many of these maternal and fetal risks.     2. Anxiety/Depression: We reviewed the patient s history of anxiety/depression in detail. She is currently taking Sertraline 200mg daily. We discussed the possibility of her depression worsening in the antepartum/postpartum period and would recommend close monitoring with EPDS screening every trimester and at 2 and 6 weeks postpartum.     Finally, we discussed that pre-pregnancy / antepartum depression is a risk factor for postpartum depression. Warning signs as well as strategies to combat its development (eg, organizing a strong support network postpartum and accessing mental health professionals were discussed. We discussed that the potential fetal risks of any medications need to be weighed against the risks of uncontrolled maternal disease in pregnancy and that in many cases, the risks of uncontrolled maternal depression outweigh potential risks of SSRIs. We also discussed  abstinence syndrome and potential for respiratory depression and longer hospital stay for the . SHANNAN symptoms can be managed and there have not been long term effects reported.        Recommendations:  Genetic screening  -Met with genetic counseling today, cell free fetal DNA being drawn following today's visit  -MSAFP screening at 15-20 weeks gestation    Medications  -Continue Sertaline 200mg daily for anxiety/depression with dose increases as needed   -Initiate low dose aspirin 81mg daily until delivery   -Continue insulin pump with setting adjusted per endocrinology to maintain fasting and premeal glucose values of 95 mg/dL or less and either 1-hour postprandial levels of 140 mg/dL or less or 2-hour postprandial values of 120 mg/dL or less.    Laboratory evaluation  -Baseline preeclampsia labs, urine protein to creatinine ratio (CBC already done, CMP and UPC ordered today)  -Recommend A1c every trimester and at admission for delivery. Repeat A1c obtained  today  -Baseline TSH given thyroid dysfunction can be as high as 40% in women with T1DM - already performed and reviewed, normal TSH in February    Maternal antepartum management  -Continue to follow with Endocrinology, significant improvement in glycemic control with current pump setting.   -EPDS per trimester  -Baseline Ophthalmology exam. If retinopathy exists, we recommend followup exams every trimester and postpartum, emphasized the importance of her scheduling this)  -Baseline EKG (ordered)    Ultrasound surveillance  -Comprehensive anatomic survey at 18-20 weeks and fetal echocardiogram 22-24 weeks  -Growth scans every 4 weeks   -Twice weekly  monitoring starting at 32 weeks    Timing and mode of delivery  -Mode of delivery per routine obstetrical indications  -Delivery at 39 weeks or sooner if clinically indicated  -Will need to be transitioned to an insulin drip for labor and/or delivery, recommend Endocrinology consult on admission to assist in pump/drip transitions    Postpartum management   -Close monitoring of mood and blood pressure postpartum     At the end of our discussion, Ms. Leiva indicated that her questions were answered and she seemed satisfied with our discussion.  Thank you for the opportunity to participate in your patient s care.  If I can be of any further assistance, please do not hesitate to contact me.    Sincerely,  Joan Muñoz MD  , OB/GYN  Maternal-Fetal Medicine       I spent a total of 30 minutes face-to-face with Ms. Leiva and her partner during today's office visit including reviewing the patient's medical record and documenting in her chart. Over 50% of this time was spent counseling the patient and/or coordinating care. Please see her note for specific details.  The patient was also seen for an ultrasound in the [facility] today.  For a detailed report of the ultrasound examination, please see the ultrasound report which  can be found under the imaging tab.

## 2022-06-11 ENCOUNTER — NURSE TRIAGE (OUTPATIENT)
Dept: NURSING | Facility: CLINIC | Age: 32
End: 2022-06-11
Payer: COMMERCIAL

## 2022-06-11 DIAGNOSIS — E10.65 TYPE 1 DIABETES MELLITUS WITH HYPERGLYCEMIA (H): ICD-10-CM

## 2022-06-11 RX ORDER — PROCHLORPERAZINE 25 MG/1
SUPPOSITORY RECTAL
Qty: 3 EACH | Refills: 5 | Status: SHIPPED | OUTPATIENT
Start: 2022-06-11 | End: 2022-06-21

## 2022-06-11 NOTE — TELEPHONE ENCOUNTER
"Triage call:     Patient calling stating that her pharmacy sent her refill request to the wrong MD and now she is out of sensors. Med is managed by Endocrine    Disp Refills Start End AUGUSTUS   Continuous Blood Gluc Sensor (DEXCOM G6 SENSOR) MISC 3 each 11 5/20/2021  --   Sig: Change every 10 days.   Sent to pharmacy as: Dexcom G6 Sensor   Class: E-Prescribe   Order: [id number]   E-Prescribing Status: Receipt confirmed by pharmacy (5/20/2021  7:32 AM CDT)   Page to on-call provider for endocrinology- Dr Leonides Hicks : 8:59 am  ON call returned call and gave OK for refill- sent in separate encounter and routed to provider- Internet issues and will sign off later today.     Call placed to patient and relayed information- patient is very appreciative of assistance today - declines additional questions.     Elba Mccartney RN BSN 6/11/2022 8:56 AM    Reason for Disposition    [1] Request for URGENT new prescription or refill of \"essential\" medication (i.e., likelihood of harm to patient if not taken) AND [2] triager unable to fill per unit policy    Additional Information    Negative: Drug overdose and triager unable to answer question    Negative: Caller requesting information unrelated to medicine    Negative: Caller requesting a prescription for Strep throat and has a positive culture result    Negative: Rash while taking a medication or within 3 days of stopping it    Negative: Immunization reaction suspected    Negative: [1] Asthma and [2] having symptoms of asthma (cough, wheezing, etc.)    Negative: [1] Influenza symptoms AND [2] anti-viral med prescription request, such as Tamiflu    Negative: [1] Symptom of illness (e.g., headache, abdominal pain, earache, vomiting) AND [2] more than mild    Negative: MORE THAN A DOUBLE DOSE of a prescription or over-the-counter (OTC) drug    Negative: [1] DOUBLE DOSE (an extra dose or lesser amount) of over-the-counter (OTC) drug AND [2] any symptoms (e.g., dizziness, nausea, " pain, sleepiness)    Negative: [1] DOUBLE DOSE (an extra dose or lesser amount) of prescription drug AND [2] any symptoms (e.g., dizziness, nausea, pain, sleepiness)    Negative: Took another person's prescription drug    Negative: [1] DOUBLE DOSE (an extra dose or lesser amount) of prescription drug AND [2] NO symptoms (Exception: a double dose of antibiotics)    Negative: Diabetes drug error or overdose (e.g., took wrong type of insulin or took extra dose)    Protocols used: MEDICATION QUESTION CALL-A-AH

## 2022-06-11 NOTE — TELEPHONE ENCOUNTER
Patient calling for a refill- per oncall Dr Lucero Hicks, OK to send refills for patient until she is able to see Dr Thomas again.     Routing to provider for signature.     Elba Mccartney RN BSN 6/11/2022 9:05 AM w

## 2022-06-11 NOTE — PROGRESS NOTES
SUBJECTIVE: Preeti Leiva is a 31 year old   here for initial OB visit.  Doing ok.   Still having nausea. Vomiting usually once daily - usually as an endpoint to a lot of dry heaving. Is otherwise tolerating PO.   Has been enjoying much improved glucose control with current endocrine team and management. Blood sugars do not seem significantly altered by vomiting.    Scooby here with her, no medical problems. Works in finance.     Preeti does complain of constipation and feels that worsens nausea.     Past Medical History:   Diagnosis Date     Anxiety      Depressive disorder      Type 1 diabetes mellitus with hyperglycemia (H)      Varicella        History reviewed. No pertinent surgical history.    Family History   Problem Relation Age of Onset     Thyroid Disease Mother      Anxiety Disorder Mother      Depression Mother         Mother     Skin Cancer Mother      Melanoma Mother      Lung Cancer Father      Melanoma Maternal Grandmother      Skin Cancer Maternal Grandmother      Melanoma Paternal Grandmother      Skin Cancer Paternal Grandmother      Melanoma Maternal Uncle      Skin Cancer Maternal Uncle      Glaucoma No family hx of      Macular Degeneration No family hx of        Social History     Socioeconomic History     Marital status: Single     Spouse name: Not on file     Number of children: Not on file     Years of education: Not on file     Highest education level: Not on file   Occupational History     Not on file   Tobacco Use     Smoking status: Never Smoker     Smokeless tobacco: Never Used   Vaping Use     Vaping Use: Never used   Substance and Sexual Activity     Alcohol use: Not Currently     Comment:  5 drinks per week      Drug use: Never     Sexual activity: Yes     Partners: Male   Other Topics Concern     Parent/sibling w/ CABG, MI or angioplasty before 65F 55M? No   Social History Narrative     Not on file     Social Determinants of Health     Financial Resource Strain:  "Not on file   Food Insecurity: Not on file   Transportation Needs: Not on file   Physical Activity: Not on file   Stress: Not on file   Social Connections: Not on file   Intimate Partner Violence: Not on file   Housing Stability: Not on file         Current Outpatient Medications:      Continuous Blood Gluc Sensor (DEXCOM G6 SENSOR) MISC, Change every 10 days., Disp: 3 each, Rfl: 11     Continuous Blood Gluc Transmit (DEXCOM G6 TRANSMITTER) MISC, Change every 3 months., Disp: 1 each, Rfl: 3     Glucagon, rDNA, (GLUCAGON EMERGENCY) 1 MG KIT, Inject 1 mg as directed daily as needed (hypoglycemia), Disp: 2 kit, Rfl: 11     hydrOXYzine (ATARAX) 10 MG tablet, TAKE 1 TABLET(10 MG) BY MOUTH EVERY 8 HOURS AS NEEDED FOR ANXIETY (Patient not taking: Reported on 6/10/2022), Disp: 60 tablet, Rfl: 0     Insulin Disposable Pump (OMNIPOD 5 G6 POD, GEN 5,) MISC, 1 each every 3 days, Disp: 30 each, Rfl: 11     insulin lispro (HUMALOG VIAL) 100 UNIT/ML vial, INJECT 70 UNITS UNDER THE SKIN EVERY DAY VIA INSULIN PUMP, Disp: 80 mL, Rfl: 3     metoclopramide (REGLAN) 5 MG tablet, Take 1 tablet (5 mg) by mouth 3 times daily as needed, Disp: 60 tablet, Rfl: 1     Prenatal Vit-DSS-Fe Cbn-FA (PRENATAL AD PO), , Disp: , Rfl:      sertraline (ZOLOFT) 100 MG tablet, TAKE 2 TABLETS(200 MG) BY MOUTH DAILY, Disp: 180 tablet, Rfl: 1    Current Facility-Administered Medications:      levonorgestrel (FIDEL) 13.5 MG IUD 13.5 mg, 1 each, Intrauterine, Once, Lubna Glynn MD    No Known Allergies      Past Medical History of Father of Baby: No significant medical history    Review of Systems:   Constitutional, HEENT, cardiovascular, pulmonary, gi and gu systems are negative, except as otherwise noted.     History Since Last Menstrual Period: Nausea and Vomiting    EXAM:   Vitals:    06/09/22 1012   BP: 116/75   Pulse: 92   Temp: 98.4  F (36.9  C)   Weight: 73.5 kg (162 lb)   Height: 1.803 m (5' 11\")     Body mass index is 22.59 " kg/m .  GENERAL APPEARANCE: healthy, alert and no distress  EYES: EOMI,  PERRL  HENT: Nose and mouth without ulcers or lesions  NECK: no adenopathy, no asymmetry, masses, or scars and thyroid normal to palpation  RESP: lungs clear to auscultation - no rales, rhonchi or wheezes  BREAST: normal without masses, tenderness or nipple discharge and no palpable axillary masses or adenopathy  CV: regular rates and rhythm, normal S1 S2, no S3 or S4 and no murmur, click or rub -  ABDOMEN:  soft, nontender, no HSM or masses and bowel sounds normal  : normal cervix, adnexae, and uterus without masses or discharge  MS: extremities normal- no gross deformities noted, no evidence of inflammation in joints, FROM in all extremities.  SKIN: no suspicious lesions or rashes  NEURO: Normal strength and tone, sensory exam grossly normal, mentation intact and speech normal  PSYCH: mentation appears normal and affect normal/bright  LYMPHATICS: No axillary, cervical, inguinal, or supraclavicular nodes    Pelvix exam:  Perineum: Intact;   Vulva: Normal;  Vagina: Normal mucosa, no discharge,   Cervix: Nulliparous, closed, mobile,  no discharge;  Uterus: 12 weeks, Normal shape, position and consistency;   Adnexa: Normal;  Anus: Normal without lesion or mass;   Bony Pelvis: Adequate.     Ultrasound: Informal for heart tones. Viable reyes IUP c/w prior dates.     ASSESSMENT/ PLAN:  Preeti Leiva is a 31 year old   at 12 weeks 3 days by LMP c/w ultrasound.   Follow up in 4 weeks.  Normal exercise.  Normal sexual activity.  Prenatal vitamins.  Anticipated weight gain:  BMI <25: 25-35 pounds  Flu shot in fall  TDaP 27-36 weeks  Oriented to practice, PNC  Discussed aneuploidy screening, has scheduled tomorrow.   Prenatal labs tomorrow with NIPT  Discussed A1c then, baseline pre-eclampsia labs.   Discussed monitoring schedule for diabetes in pregnancy, potential complications of pregnancy, such as pre-eclampsia, LGA, polyhydramnios,  early delivery.

## 2022-06-15 ENCOUNTER — VIRTUAL VISIT (OUTPATIENT)
Dept: PSYCHOLOGY | Facility: CLINIC | Age: 32
End: 2022-06-15
Payer: COMMERCIAL

## 2022-06-15 ENCOUNTER — MYC MEDICAL ADVICE (OUTPATIENT)
Dept: EDUCATION SERVICES | Facility: CLINIC | Age: 32
End: 2022-06-15
Payer: COMMERCIAL

## 2022-06-15 ENCOUNTER — TELEPHONE (OUTPATIENT)
Dept: MATERNAL FETAL MEDICINE | Facility: CLINIC | Age: 32
End: 2022-06-15
Payer: COMMERCIAL

## 2022-06-15 DIAGNOSIS — F41.9 ANXIETY: Primary | ICD-10-CM

## 2022-06-15 LAB — SCANNED LAB RESULT: NORMAL

## 2022-06-15 PROCEDURE — 90837 PSYTX W PT 60 MINUTES: CPT | Mod: GT | Performed by: SOCIAL WORKER

## 2022-06-15 ASSESSMENT — PATIENT HEALTH QUESTIONNAIRE - PHQ9
SUM OF ALL RESPONSES TO PHQ QUESTIONS 1-9: 11
SUM OF ALL RESPONSES TO PHQ QUESTIONS 1-9: 11
10. IF YOU CHECKED OFF ANY PROBLEMS, HOW DIFFICULT HAVE THESE PROBLEMS MADE IT FOR YOU TO DO YOUR WORK, TAKE CARE OF THINGS AT HOME, OR GET ALONG WITH OTHER PEOPLE: SOMEWHAT DIFFICULT

## 2022-06-15 NOTE — TELEPHONE ENCOUNTER
See new MyChart encounter on 6/15 for further pump report review.     Lindsey Benjamin RD, LD, [facility]

## 2022-06-15 NOTE — TELEPHONE ENCOUNTER
Type 1 Diabetes + pregnancy Follow-up    Subjective/Objective:    Preeti TAMAR Leiva sent in blood glucose log for review. Last date of communication was: 6/8/22.    Gestational diabetes is being managed with diet, activity and medications    Taking diabetes medications:   yes:     Diabetes Medication(s)     Diabetic Other       Glucagon, rDNA, (GLUCAGON EMERGENCY) 1 MG KIT    Inject 1 mg as directed daily as needed (hypoglycemia)    Insulin       insulin lispro (HUMALOG VIAL) 100 UNIT/ML vial    INJECT 70 UNITS UNDER THE SKIN EVERY DAY VIA INSULIN PUMP          Estimated Date of Delivery: Dec 20, 2022    Pump Reports:                   Assessment:    Continued improvement in patients blood sugar trends on Omnipod 5. Seeing more hyperglycemia after meals but improved overnight trends.     Plan/Response:  Increase I:C ratio x 24 hrs   MN 10 --> 9  11a 9 --> 8.5  9p 10 --> 9  Increase ISF 1:50 --> 1:40  Make sure patient is entering blood sugars for correction when bolusing  Follow up in 1 week for review   See [facility] message for patient communication    Lindsey Benjamin, RD, LD, [facility]     Any diabetes medication dose changes were made via the CDE Protocol and Collaborative Practice Agreement with the patient's endocrinology provider. A copy of this encounter was shared with the provider.
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
## 2022-06-15 NOTE — TELEPHONE ENCOUNTER
Kelley 15, 2022    I spoke with Preeti regarding her NIPT results.     Results indicate NO ANEUPLOIDY DETECTED for chromosomes 21, 18, 13, or the sex chromosomes (XX).     This puts her current pregnancy at low risk for Down syndrome, trisomy 18, trisomy 13 and sex chromosome abnormalities. This test is reported to have the following sensitivities: Down syndrome- >99.9%, trisomy 18- >99.9%, and trisomy 13- >99.9%. Although these results are reassuring, this does not replace a standard chromosome analysis from a chorionic villus sampling or amniocentesis.     MSAFP is the appropriate second trimester screening test for open neural tube defects; the maternal quad screen is not recommended.    Her results are available in her Epic chart for her primary OB to review.     Preeti and Gabe's carrier screening is still pending. They will be updated as soon as those results become available.    Kaia Mcghee MS, [facility]  Licensed Genetic Counselor  [facility]  Maternal Fetal Medicine  [email or web address]  [phone]

## 2022-06-18 ENCOUNTER — NURSE TRIAGE (OUTPATIENT)
Dept: NURSING | Facility: CLINIC | Age: 32
End: 2022-06-18
Payer: COMMERCIAL

## 2022-06-18 NOTE — TELEPHONE ENCOUNTER
February 21, 2018      [facility] [address] [place]  [facility] [address]  Phone: [phone]  Fax: [phone]       Patient: Kimberly Hoffman   YOB: 1990  Date of Visit: 02/21/2018    To Whom It May Concern:    Zbigniew Hoffman  was at [facility] on 02/21/2018. She may return to work/school on 3/17/2018 with no restrictions. If you have any questions or concerns, or if I can be of further assistance, please do not hesitate to contact me.    Sincerely,    Dr.Brian Chicas      OB Triage Call      Is patient's OB/Midwife with the [facility] or [facility]? [facility]- Proceed with triage     Reason for call: Vaginal bleeding     Assessment: Pt had a small amount of bleeding (estimates between a tsp and Tbsp) mixed in with urine this morning. No abdominal/cramping, no fever, no back pain.    Plan: Protocol recommends home care at this time. Pt to monitor bleeding. If she continues to spot, has excessive bleeding or begins to experience any abdominal pain, instructed to call back. Pt verbalized understanding. Pelvic rest precautions reviewed as well.     Patient plans to deliver at [place]    Patient's primary OB Provider is Lubna Glynn MD.      Per protocol recommendations Patient to follow home care recommendations. An FYI page or consult is not needed unless patient is requesting to speak to midwife or MD, they are in labor, or it is recommended by triage protocol    Is patient's delivering hospital on divert? Does not apply due to Patient given home care instructions      13w4d    Estimated Date of Delivery: Dec 20, 2022        OB History    Para Term  AB Living   1 0 0 0 0 0   SAB IAB Ectopic Multiple Live Births   0 0 0 0 0      # Outcome Date GA Lbr Ar/2nd Weight Sex Delivery Anes PTL Lv   1 Current                No results found for: GBS       Edel Presley RN 22 8:26 AM  [facility] Nurse Advisor    Reason for Disposition    SPOTTING (single or brief episode)    Additional Information    Negative: Shock suspected (e.g., cold/pale/clammy skin, too weak to stand, low BP, rapid pulse)    Negative: Difficult to awaken or acting confused (e.g., disoriented, slurred speech)    Negative: Passed out (i.e., lost consciousness, collapsed and was not responding)    Negative: Sounds like a life-threatening emergency to the triager    Negative: [1] Vaginal bleeding AND [2] pregnant > 20 weeks    Negative: Not pregnant or pregnancy status unknown    Negative:  "SEVERE abdominal pain    Negative: [1] SEVERE vaginal bleeding (e.g., soaking 2 pads / hour, large blood clots) AND [2] present 2 or more hours    Negative: SEVERE dizziness (e.g., unable to stand, requires support to walk, feels like passing out)    Negative: [1] MODERATE vaginal bleeding (i.e., soaking 1 pad / hour; clots) AND [2] present > 6 hours    Negative: [1] MODERATE vaginal bleeding (i.e., soaking 1 pad / hour; clots) AND [2] pregnant > 12 weeks    Negative: Passed tissue (e.g., gray-white)    Negative: Shoulder pain    Negative: Pale skin (pallor) of new onset or worsening    Negative: Patient sounds very sick or weak to the triager    Negative: [1] Constant abdominal pain AND [2] present > 2 hours    Negative: Fever > 100.4 F (38.0 C)    Negative: [1] Intermittent lower abdominal pain (e.g., cramping) AND [2] present > 24 hours    Negative: Prior history of \"ectopic pregnancy\" or previous tubal surgery (e.g., tubal ligation)    Negative: Pain or burning with passing urine (urination)    Negative: MODERATE vaginal bleeding (i.e., soaking 1 pad / hour; clots)    Negative: Has IUD    Negative: MILD vaginal bleeding (i.e., less than 1 pad / hour; less than patient's usual menstrual bleeding; not just spotting)    Negative: SPOTTING lasts > 48 hours or spotting happens more than once in a week    Negative: Unusual vaginal discharge (e.g., bad smelling, yellow, green, or foamy-white)    Negative: Not feeling pregnant any longer (e.g., breast tenderness or nausea has disappeared)    Negative: SPOTTING after sexual intercourse (single or brief episode)    Negative: SPOTTING after a pelvic examination (single or brief episode)    Protocols used: PREGNANCY - VAGINAL BLEEDING LESS THAN 20 WEEKS EGA-A-AH      "

## 2022-06-21 ENCOUNTER — DOCUMENTATION ONLY (OUTPATIENT)
Dept: MATERNAL FETAL MEDICINE | Facility: CLINIC | Age: 32
End: 2022-06-21
Payer: COMMERCIAL

## 2022-06-21 ENCOUNTER — VIRTUAL VISIT (OUTPATIENT)
Dept: ENDOCRINOLOGY | Facility: CLINIC | Age: 32
End: 2022-06-21
Payer: COMMERCIAL

## 2022-06-21 DIAGNOSIS — E10.65 TYPE 1 DIABETES MELLITUS WITH HYPERGLYCEMIA (H): ICD-10-CM

## 2022-06-21 PROCEDURE — 95251 CONT GLUC MNTR ANALYSIS I&R: CPT | Performed by: INTERNAL MEDICINE

## 2022-06-21 PROCEDURE — 99214 OFFICE O/P EST MOD 30 MIN: CPT | Mod: GT | Performed by: INTERNAL MEDICINE

## 2022-06-21 RX ORDER — PROCHLORPERAZINE 25 MG/1
SUPPOSITORY RECTAL
Qty: 9 EACH | Refills: 3 | Status: SHIPPED | OUTPATIENT
Start: 2022-06-21 | End: 2023-07-06

## 2022-06-21 RX ORDER — PROCHLORPERAZINE 25 MG/1
SUPPOSITORY RECTAL
Qty: 1 EACH | Refills: 3 | Status: SHIPPED | OUTPATIENT
Start: 2022-06-21 | End: 2023-07-16

## 2022-06-21 ASSESSMENT — PAIN SCALES - GENERAL: PAINLEVEL: NO PAIN (0)

## 2022-06-21 NOTE — NURSING NOTE
Chief Complaint   Patient presents with     Follow Up     Type 1 diabetes mellitus with hyperglycemia (H) +1 more       There were no vitals filed for this visit.    There is no height or weight on file to calculate BMI.    BRIAN EstevezF

## 2022-06-21 NOTE — LETTER
"    6/21/2022         RE: Preeti Leiva  [address]  [facility] [address]        Dear Colleague,    Thank you for referring your patient, Preeti Leiva, to the [facility] [place]. Please see a copy of my visit note below.      Video-Visit Details    Type of service:  Video Visit  Video Start Time: 3:04  Video End Time:3:24  Originating Location (pt. Location): Home, [place]  Distant Location (provider location):  Home  Platform used for Video Visit: Norm Thomas MD    Preeti Leiva is a 31 year old year old female here for evaluation of  diabetes in pregnancy via a billable video visit.    Currently: 14w0d  Estimated Date of Delivery: Dec 20, 2022  Baby: GIRL!!!      1) Diabetes Mellitus in pregnancy    Diabetes History:  Diagnosis: Age 6  Hospitalizations: DKA, 2008 most recently, difficult time in college managing   Previous Regimens: almost always on pump, most recently Medtronic/Guardian, but didn't use automode-- kicked out frequently and didn't work through this  Current Regimen: Omnipod 5/Dexcom-- calculating insulin dosing basing on \"number of units\" vs actual carb counting, does not enter in BG for correction dosing.     INTERVAL HISTORY:  - Still feeling pretty nauseated, but getting better as progressing  - Keeping food down mostly, usually once have food in stomach it settles down  - Bolusing before meals, but usually only 5-10min before eating   - Prior to Omnipod 5 was having nocturnal hypoglycemia now     Diet:  24hr Recall:  Breakfast- 8:30/9 (after getting to work)- some days more nauseated so doesn't eat, but trying to have something smaller-- yogurt, cracker, granola bar  Lunch-  Dinner-  Snacks-  Beverages-    Exercise:      BG check- DEXCOM/OMNIPOD 5  Trends-                 BP Readings from Last 3 Encounters:   06/10/22 124/79   06/09/22 116/75   05/18/22 117/82       Lab Results   Component Value Date    A1C 6.5 06/10/2022    A1C 8.6 02/10/2022 "    A1C 7.2 09/28/2021    A1C 9.4 08/20/2020    A1C 8.4 01/02/2020    A1C 7.8 01/29/2019       Wt Readings from Last 3 Encounters:   06/09/22 73.5 kg (162 lb)   05/18/22 72.3 kg (159 lb 4.8 oz)   05/10/22 72.3 kg (159 lb 6.4 oz)       Current Outpatient Medications   Medication Sig Dispense Refill     Continuous Blood Gluc Sensor (DEXCOM G6 SENSOR) MISC Change every 10 days. 3 each 5     Continuous Blood Gluc Transmit (DEXCOM G6 TRANSMITTER) MISC Change every 3 months. 1 each 3     Glucagon, rDNA, (GLUCAGON EMERGENCY) 1 MG KIT Inject 1 mg as directed daily as needed (hypoglycemia) 2 kit 11     Insulin Disposable Pump (OMNIPOD 5 G6 POD, GEN 5,) MISC 1 each every 3 days 30 each 11     insulin lispro (HUMALOG VIAL) 100 UNIT/ML vial INJECT 70 UNITS UNDER THE SKIN EVERY DAY VIA INSULIN PUMP 80 mL 3     metoclopramide (REGLAN) 5 MG tablet Take 1 tablet (5 mg) by mouth 3 times daily as needed 60 tablet 1     Prenatal Vit-DSS-Fe Cbn-FA (PRENATAL AD PO)        sertraline (ZOLOFT) 100 MG tablet TAKE 2 TABLETS(200 MG) BY MOUTH DAILY 180 tablet 1     hydrOXYzine (ATARAX) 10 MG tablet TAKE 1 TABLET(10 MG) BY MOUTH EVERY 8 HOURS AS NEEDED FOR ANXIETY (Patient not taking: No sig reported) 60 tablet 0          REVIEW OF SYSTEMS:   ROS: 10 point ROS neg other than the symptoms noted above in the HPI.      EXAM:  Physical Exam (visual exam)  VS:  no vital signs taken for video visit  CONSTITUTIONAL: healthy, alert and NAD, responding appropriately  ENT: normocephalic, no visual evidence of trauma, normal nose and oral mucosa  EYES: conjunctivae and sclerae normal, no exophthalmos or proptosis  THYROID:  no visualized nodules or goiter  LUNGS: no audible wheeze, cough or visible cyanosis, no visible retractions or increased work of breathing  EXTREMITIES: no hand tremors  NEUROLOGY: cranial nerves grossly intact with no obvious deficit.  SKIN:  no visualized skin lesions or rash, no edema visualized  PSYCH: mentation appears normal,  normal judgement      ASSESSMENT/PLAN:    1) Diabetes Mellitus in pregnancy  - Glucose Control- NOT at goal, continue current regimen.   Increase I:C ratio around breakfast/lunch  MN 9   7a 8.5--> 7.2 (20% increase)  4p 8.5  9p  9  Continue ISF 1:40  Basal Rate:  12a- 1.3 --> 1.5 (20% increase)  7a- 1.7  - Reviewed blood sugar goals in pregnancy. Glucose goals during pregnancy according to the American Diabetes Association:  Fasting glucose 70-95 mg/dL AND  One-hour postprandial glucose 110-140 mg/dL or  Two-hour postprandial glucose 100-120 mg/dL  - Encouraged at least 30min of activity daily.  - We reviewed risks of uncontrolled hyperglycemia during pregnancy, including but not limited to: gestational hypertension/preeclampsia, increased cesarian section rate, macrosomia, shoulder dystocia, still birth, and long term risk of development of diabetes in grown child    RTC- every 4 weeks until delivery. Follow weekly with diabetes educator.      A total of 23 minutes were spent today 06/21/22 on this visit including chart review, history and counseling, documentation and other activities as detailed above.               Again, thank you for allowing me to participate in the care of your patient.        Sincerely,        Shanon Thomas MD

## 2022-06-21 NOTE — PROGRESS NOTES
"  Video-Visit Details    Type of service:  Video Visit  Video Start Time: 3:04  Video End Time:3:24  Originating Location (pt. Location): [place], [place]  Distant Location (provider location):  Home  Platform used for Video Visit: Norm Thomas MD    Preeti Leiva is a 31 year old year old female here for evaluation of  diabetes in pregnancy via a billable video visit.    Currently: 14w0d  Estimated Date of Delivery: Dec 20, 2022  Baby: GIRL!!!      1) Diabetes Mellitus in pregnancy    Diabetes History:  Diagnosis: Age 6  Hospitalizations: DKA, 2008 most recently, difficult time in college managing   Previous Regimens: almost always on pump, most recently Medtronic/Guardian, but didn't use automode-- kicked out frequently and didn't work through this  Current Regimen: Omnipod 5/Dexcom-- calculating insulin dosing basing on \"number of units\" vs actual carb counting, does not enter in BG for correction dosing.     INTERVAL HISTORY:  - Still feeling pretty nauseated, but getting better as progressing  - Keeping food down mostly, usually once have food in stomach it settles down  - Bolusing before meals, but usually only 5-10min before eating   - Prior to Omnipod 5 was having nocturnal hypoglycemia now     Diet:  24hr Recall:  Breakfast- 8:30/9 (after getting to work)- some days more nauseated so doesn't eat, but trying to have something smaller-- yogurt, cracker, granola bar  Lunch-  Dinner-  Snacks-  Beverages-    Exercise:      BG check- DEXCOM/OMNIPOD 5  Trends-                 BP Readings from Last 3 Encounters:   06/10/22 124/79   06/09/22 116/75   05/18/22 117/82       Lab Results   Component Value Date    A1C 6.5 06/10/2022    A1C 8.6 02/10/2022    A1C 7.2 09/28/2021    A1C 9.4 08/20/2020    A1C 8.4 01/02/2020    A1C 7.8 01/29/2019       Wt Readings from Last 3 Encounters:   06/09/22 73.5 kg (162 lb)   05/18/22 72.3 kg (159 lb 4.8 oz)   05/10/22 72.3 kg (159 lb 6.4 oz)       Current Outpatient " Medications   Medication Sig Dispense Refill     Continuous Blood Gluc Sensor (DEXCOM G6 SENSOR) MISC Change every 10 days. 3 each 5     Continuous Blood Gluc Transmit (DEXCOM G6 TRANSMITTER) MISC Change every 3 months. 1 each 3     Glucagon, rDNA, (GLUCAGON EMERGENCY) 1 MG KIT Inject 1 mg as directed daily as needed (hypoglycemia) 2 kit 11     Insulin Disposable Pump (OMNIPOD 5 G6 POD, GEN 5,) MISC 1 each every 3 days 30 each 11     insulin lispro (HUMALOG VIAL) 100 UNIT/ML vial INJECT 70 UNITS UNDER THE SKIN EVERY DAY VIA INSULIN PUMP 80 mL 3     metoclopramide (REGLAN) 5 MG tablet Take 1 tablet (5 mg) by mouth 3 times daily as needed 60 tablet 1     Prenatal Vit-DSS-Fe Cbn-FA (PRENATAL AD PO)        sertraline (ZOLOFT) 100 MG tablet TAKE 2 TABLETS(200 MG) BY MOUTH DAILY 180 tablet 1     hydrOXYzine (ATARAX) 10 MG tablet TAKE 1 TABLET(10 MG) BY MOUTH EVERY 8 HOURS AS NEEDED FOR ANXIETY (Patient not taking: No sig reported) 60 tablet 0          REVIEW OF SYSTEMS:   ROS: 10 point ROS neg other than the symptoms noted above in the HPI.      EXAM:  Physical Exam (visual exam)  VS:  no vital signs taken for video visit  CONSTITUTIONAL: healthy, alert and NAD, responding appropriately  ENT: normocephalic, no visual evidence of trauma, normal nose and oral mucosa  EYES: conjunctivae and sclerae normal, no exophthalmos or proptosis  THYROID:  no visualized nodules or goiter  LUNGS: no audible wheeze, cough or visible cyanosis, no visible retractions or increased work of breathing  EXTREMITIES: no hand tremors  NEUROLOGY: cranial nerves grossly intact with no obvious deficit.  SKIN:  no visualized skin lesions or rash, no edema visualized  PSYCH: mentation appears normal, normal judgement      ASSESSMENT/PLAN:    1) Diabetes Mellitus in pregnancy  - Glucose Control- NOT at goal, continue current regimen.   Increase I:C ratio around breakfast/lunch  MN 9   7a 8.5--> 7.2 (20% increase)  4p 8.5  9p  9  Continue ISF  1:40  Basal Rate:  12a- 1.3 --> 1.5 (20% increase)  7a- 1.7  - Reviewed blood sugar goals in pregnancy. Glucose goals during pregnancy according to the American Diabetes Association:  Fasting glucose 70-95 mg/dL AND  One-hour postprandial glucose 110-140 mg/dL or  Two-hour postprandial glucose 100-120 mg/dL  - Encouraged at least 30min of activity daily.  - We reviewed risks of uncontrolled hyperglycemia during pregnancy, including but not limited to: gestational hypertension/preeclampsia, increased cesarian section rate, macrosomia, shoulder dystocia, still birth, and long term risk of development of diabetes in grown child    RTC- every 4 weeks until delivery. Follow weekly with diabetes educator.      A total of 23 minutes were spent today 06/21/22 on this visit including chart review, history and counseling, documentation and other activities as detailed above.

## 2022-06-21 NOTE — PROGRESS NOTES
"2022    I called the patient's partner, Scooby Leiva, to discuss his [facility] expanded carrier screening results. Scooby's sample was screened for 289 conditions on the Comprehensive Carrier Screening Panel. He was not identified as a carrier for any of the conditions screened for on the [employer] Comprehensive Carrier Screening Panel (0 of 289 conditions). This means that no disease-causing mutations were identified in Scooby's sample. This is reassuring news as it decreases reproductive risks for him and Preeti to have a child with any one of the conditions screened for on this panel. Additionally, testing of Preeti's sample is not technically necessary based on his negative results, however, her results are currently pending and are expected to be resulted by 2022. The couple have completed consent to communicate forms that are on file. Of note, risks have not been completley eliminated with this negative test result, however they are significantly reduced for the couple to have an affected child with any one of the conditions on this panel. A copy of Scooby's residual carrier risks can be found in his chart under \"laboratory\" in Chart Review.     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 The presence of a pseudodeficiency allele does NOT impact this individual's risk for being a carrier. Individuals with a pseudodeficiency allele(s) may exhibit false positive results on related biochemical tests, such as  screening. However, pseudodeficiency alleles are not known to cause disease. Carrier testing for Preeti for this is not indicated.  Scooby verbalized understanding and had no questions today. He plans to share this information and his carrier screening results with Preeti for review which were securely released via the [facility] patient portal today.     We discussed that Preeti's results are pending and are expected to " be available by 7/2/22. I will call Preeti directly to review her results once available.         Adri Nunez MS, [facility]  Genetic Counselor  [facility]  Maternal Fetal Medicine  Ph: [phone]

## 2022-06-30 ENCOUNTER — TELEPHONE (OUTPATIENT)
Dept: MATERNAL FETAL MEDICINE | Facility: CLINIC | Age: 32
End: 2022-06-30

## 2022-06-30 LAB — SCANNED LAB RESULT: ABNORMAL

## 2022-06-30 NOTE — TELEPHONE ENCOUNTER
2022    I called the patient to discuss her [facility] genetics carrier screening results. Preeti's  Gabe also underwent carrier screening through [facility] [employer], and his results returned completley negative, meaning, he was not identified to be a carrier for any of the 289 conditions his sample was screened for. 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 Therefore, the couple's reproductive risks for having a child with any one of the conditions screened for on this panel have been significantly reduced.  Presently, the couple's reproductive risk for having a child with this condition is: 1 in 3,004 (<1%). Preeti verbalized understanding.    We reviewed the autosomal recessive inheritance pattern of this condition. Every individual has two copies of the gene that is responsible for this condition, and if someone has a change or mutation that impacts how one copy of the gene functions, they are called a carrier for the condition.  If someone has two copies of the gene that have a harmful change, they are affected with the condition.  If two people who are carriers for the same condition have children, there is a chance for their children to be affected.  Each parent has a 50% chance for passing on their copy of the gene with a mutation, so there is a 25% chance for each pregnancy to get two copies of the mutation and be affected, a 50% chance for each pregnancy to be an unaffected carrier, and a 25% chance to be unaffected with two normal copies of the gene. Preeti understands that there is a 1 in 2 chance for any child born to her to be an asymptomatic carrier like herself, and a 1 in 2 chance for any child born to her to not be a carrier.     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 The presence of a pseudodeficiency allele  does NOT impact this individual's risk for being a carrier. Individuals with a pseudodeficiency allele(s) may exhibit false positive results on related biochemical tests, such as  screening. However, pseudodeficiency alleles are not known to cause disease, including tyrosinemia type 1. Carrier testing for the reproductive partner is not indicated.  Preeti verbalized understanding and had no questions today.     Preeti requested these results be shared with her , Gabe, over the phone today. I called Gabe and left a detailed VM per patient and partner request during their initial genetic counseling visit. A copy of the patient's results were released via the [employer] patient portal. No other testing is pending at this time for the couple.      Adri Nunez MS, [facility]  Genetic Counselor  [facility]  Maternal Fetal Medicine  Ph: [phone]

## 2022-07-01 ENCOUNTER — TELEPHONE (OUTPATIENT)
Dept: ENDOCRINOLOGY | Facility: CLINIC | Age: 32
End: 2022-07-01

## 2022-07-01 NOTE — TELEPHONE ENCOUNTER
PA Initiation    Medication: Omnipod Dash Pods - PA pending thru DME  Insurance Company:    Pharmacy Filling the Rx:    Filling Pharmacy Phone:    Filling Pharmacy Fax:    Start Date:          I HAVE CALLED PATIENTS MEDICAL PLAN [facility] TO CHECK COVERAGE FOR OMNIPOD DASH PODS.   I WAS TOLD IT WOULD BE COVERED AT 90% UNPON PATIENT MEETING HER DEDUCTIBLE. HOWEVER IT DOES REQUIRE A PRIOR AUTH.  I HAVE STARTED THE PRIOR AUTH THRU DME AND IT IS IN REVIEW PROCESS.    SINCE THIS HAD TO BE BILLED THRU DME HER CURRENT PHARMACY IS NOT CONTRACTED TO DO SO.   IF PA IS APPROVED THRU DME PT NEEDS TO FILL AT A CONTRACTED PHARMACY AND SP IS ONE OF THOSE PHARMACY'S     WHEN / IF APPROVED NEW PRESCRIPTION WILL NEED TO BE SENT TO [facility]    CALL REFERENCE NUMBER [id number]  CASE NUMBER: [id number]

## 2022-07-06 ENCOUNTER — DOCUMENTATION ONLY (OUTPATIENT)
Dept: ENDOCRINOLOGY | Facility: CLINIC | Age: 32
End: 2022-07-06

## 2022-07-07 ENCOUNTER — PRENATAL OFFICE VISIT (OUTPATIENT)
Dept: OBGYN | Facility: CLINIC | Age: 32
End: 2022-07-07
Payer: COMMERCIAL

## 2022-07-07 ENCOUNTER — MYC MEDICAL ADVICE (OUTPATIENT)
Dept: EDUCATION SERVICES | Facility: CLINIC | Age: 32
End: 2022-07-07

## 2022-07-07 VITALS
TEMPERATURE: 97.8 F | DIASTOLIC BLOOD PRESSURE: 74 MMHG | BODY MASS INDEX: 23.43 KG/M2 | WEIGHT: 168 LBS | HEART RATE: 87 BPM | SYSTOLIC BLOOD PRESSURE: 125 MMHG

## 2022-07-07 DIAGNOSIS — O24.911 DIABETES MELLITUS AFFECTING PREGNANCY IN FIRST TRIMESTER: Primary | ICD-10-CM

## 2022-07-07 PROCEDURE — 99207 PR PRENATAL VISIT: CPT | Performed by: OBSTETRICS & GYNECOLOGY

## 2022-07-07 PROCEDURE — 99000 SPECIMEN HANDLING OFFICE-LAB: CPT | Performed by: OBSTETRICS & GYNECOLOGY

## 2022-07-07 PROCEDURE — 82105 ALPHA-FETOPROTEIN SERUM: CPT | Mod: 90 | Performed by: OBSTETRICS & GYNECOLOGY

## 2022-07-07 PROCEDURE — 36415 COLL VENOUS BLD VENIPUNCTURE: CPT | Performed by: OBSTETRICS & GYNECOLOGY

## 2022-07-07 NOTE — Clinical Note
Hi,  A pregnant patient of mine is MUCH improved on omnio pods, but having a hard time getting them approved. Has endocrinologist at [facility].  Any tips?  Thanks Lubna

## 2022-07-07 NOTE — TELEPHONE ENCOUNTER
Type 1 Diabetes + pregnant Follow-up    Subjective/Objective:    Preeti BOYLE Oliverderrell sent in blood glucose log for review. Last date of communication was: 7/1/22.    Diabetes is being managed with diet, activity and medications    Taking diabetes medications:   yes:     Diabetes Medication(s)     Diabetic Other       Glucagon, rDNA, (GLUCAGON EMERGENCY) 1 MG KIT    Inject 1 mg as directed daily as needed (hypoglycemia)    Insulin       insulin lispro (HUMALOG VIAL) 100 UNIT/ML vial    INJECT 70 UNITS UNDER THE SKIN EVERY DAY VIA INSULIN PUMP          Estimated Date of Delivery: Dec 20, 2022    BG/Food Log:             Assessment:    Meeting >70% TTR, still seeing some post-pranidal hyperglycemia. Excellent use of Omnipod 5 ADAM - staying in automated mode 100% of the time. No episodes of hypoglycemia. Requesting adjustment of active insulin time. This seems reasonable to decrease from 4 to 3 hrs to see if this helps with correctoin. Will increase lunch & dinner ICR as well.     Plan/Response:  Recommend increase to insulin -   ICR  12a 9 g/unit  7a 6.5 g/unit  Add 11a 6.0 g/unit  4p 7.5 g/unit --> 7.0 g/unit  9p 8 g/unit     Decrease active insulin time 4 hrs --> 3 hrs     Follow-up in 1 week.    Lindsey Benjamin, RD, LD, [facility]     Any diabetes medication dose changes were made via the CDE Protocol and Collaborative Practice Agreement with the patient's endocrinology provider. A copy of this encounter was shared with the provider.

## 2022-07-12 ENCOUNTER — MYC MEDICAL ADVICE (OUTPATIENT)
Dept: OBGYN | Facility: CLINIC | Age: 32
End: 2022-07-12

## 2022-07-12 LAB
# FETUSES US: NORMAL
AFP MOM SERPL: 1.4
AFP SERPL-MCNC: 37 NG/ML
AGE - REPORTED: 31.3 YR
CURRENT SMOKER: NO
FAMILY MEMBER DISEASES HX: NO
GA METHOD: NORMAL
GA: NORMAL WK
IDDM PATIENT QL: YES
INTEGRATED SCN PATIENT-IMP: NORMAL
SPECIMEN DRAWN SERPL: NORMAL

## 2022-07-14 ENCOUNTER — MYC MEDICAL ADVICE (OUTPATIENT)
Dept: EDUCATION SERVICES | Facility: CLINIC | Age: 32
End: 2022-07-14

## 2022-07-14 NOTE — PROGRESS NOTES
Doing well.   Nausea much improved.   Blood sugar has been substantially improved on omni pods, but running into insurance challenges. Working through endocrine on prior auth, etc.   Mood doing well.   AFP today.   RTC 4 weeks, has fetal survey scheduled.

## 2022-07-14 NOTE — TELEPHONE ENCOUNTER
Type 1 Diabetes & Pregnancy Follow-up    Subjective/Objective:    Preeti BOYLE Oliverderrell sent in blood glucose log for review. Last date of communication was: 7/7/22.    Gestational diabetes is being managed with diet, activity and medications    Taking diabetes medications:   yes:     Diabetes Medication(s)     Diabetic Other       Glucagon, rDNA, (GLUCAGON EMERGENCY) 1 MG KIT    Inject 1 mg as directed daily as needed (hypoglycemia)    Insulin       insulin lispro (HUMALOG VIAL) 100 UNIT/ML vial    INJECT 70 UNITS UNDER THE SKIN EVERY DAY VIA INSULIN PUMP          Estimated Date of Delivery: Dec 20, 2022    BG/Food Log:                 Assessment:    See WishLink message - patient returned to Omnipod DASH due to insurance issues. Seeing significantly more hypoglycemia without the automated mode. Will decrease basal rates today with hopes that she can return to Omnipod 5 asap.     Spoke with PA team liasion and they continue to work diligently to get Omnipod 5 covered and sent to patient, still awaiting insurance to determine pharmacy vs medical coverage. No further updates at this time.     Plan/Response:  Recommend decrease to insulin -   Basal rates:   MN 1.5 --> 1.3  0700 1.7 --> 1.6    No change in ICR or ISF today due to frequent hypoglycemia, will await return to Omnipod 5 to make more aggressive changes    Follow up in 1 week or sooner, as needed.  See WishLink message for patient communications     Lindsey Benjamin RD, LD, [facility]     Any diabetes medication dose changes were made via the CDE Protocol and Collaborative Practice Agreement with the patient's endocrinology provider. A copy of this encounter was shared with the provider.

## 2022-07-14 NOTE — TELEPHONE ENCOUNTER
Spoke to Insurance again.  They said they will still keep the claim for the OMnipod 5 kit but now requested clinic notes. Faxed clinic notes over to Ins.

## 2022-07-14 NOTE — TELEPHONE ENCOUNTER
Is there any way to expedite this?  I'm covering our MTM pool today and received a question from her OB provider (patient is pregnant) as she's concerned about patient not being able to maintain tight blood sugar control without Omnipod Dash Pods.    Thanks!  Vikki Tinajero, PharmD, [facility]  Medication Therapy Management Provider  Pager: [phone]

## 2022-07-14 NOTE — TELEPHONE ENCOUNTER
Spoke to Insurance the the pump need sto go through the pharmacy benefits for the Omnipod 5 need to call [phone] opt 2 to submit a new PA. Case: [id number]

## 2022-07-15 DIAGNOSIS — E10.65 TYPE 1 DIABETES MELLITUS WITH HYPERGLYCEMIA (H): ICD-10-CM

## 2022-07-15 RX ORDER — INSULIN PMP CART,AUT,G6/7,CNTR
1 EACH SUBCUTANEOUS
Qty: 30 EACH | Refills: 11 | Status: SHIPPED | OUTPATIENT
Start: 2022-07-15 | End: 2022-09-21

## 2022-07-19 ENCOUNTER — TRANSFERRED RECORDS (OUTPATIENT)
Dept: HEALTH INFORMATION MANAGEMENT | Facility: CLINIC | Age: 32
End: 2022-07-19

## 2022-07-19 ENCOUNTER — VIRTUAL VISIT (OUTPATIENT)
Dept: ENDOCRINOLOGY | Facility: CLINIC | Age: 32
End: 2022-07-19
Payer: COMMERCIAL

## 2022-07-19 DIAGNOSIS — O24.012 PRE-EXISTING TYPE 1 DIABETES MELLITUS DURING PREGNANCY IN SECOND TRIMESTER: Primary | ICD-10-CM

## 2022-07-19 LAB — RETINOPATHY: POSITIVE

## 2022-07-19 PROCEDURE — 99214 OFFICE O/P EST MOD 30 MIN: CPT | Mod: GT | Performed by: INTERNAL MEDICINE

## 2022-07-19 PROCEDURE — 95251 CONT GLUC MNTR ANALYSIS I&R: CPT | Performed by: INTERNAL MEDICINE

## 2022-07-19 ASSESSMENT — PAIN SCALES - GENERAL: PAINLEVEL: NO PAIN (0)

## 2022-07-19 NOTE — LETTER
7/19/2022         RE: Preeti Leiva  [address]  [facility] [address]        Dear Colleague,    Thank you for referring your patient, Preeti Leiva, to the [facility] [place]. Please see a copy of my visit note below.      Video-Visit Details    Type of service:  Video Visit  Video Start Time: 7:29  Video End Time:07:47  Originating Location (pt. Location): [place], [place]  Distant Location (provider location):  Home  Platform used for Video Visit: Norm Thomas MD    Preeti Leiva is a 31 year old year old female here for evaluation of  diabetes in pregnancy via a billable video visit.    Currently: 18w0d  Estimated Date of Delivery: Dec 20, 2022  Baby: Girl      1) Diabetes Mellitus in pregnancy    Diabetes History:  Diagnosis: Age 6  Hospitalizations: DKA, 2008 most recently, difficult time in college managing   Previous Regimens: almost always on pump, most recently Medtronic/Guardian, but didn't use automode-- kicked out frequently and didn't work through this  Current Regimen: OMNIPOD DASH/Dexcom    INTERVAL HISTORY:  - Still awaiting Omnipod 5 appeal, just received 3 month supply of Omnipod Dash  - Hypos overnight improved but not eliminated. Still low every night waking to have snack approx 2a  - Will underbolus with lunch as riding low into lunchtime, had a few days she ran low and depleted all her emergency snacks  - Nausea improved, now able to eat all meals, not vomiting any longer    Exercise:      BG check- DEXCOM  Trends-                   BP Readings from Last 3 Encounters:   07/07/22 125/74   06/10/22 124/79   06/09/22 116/75       Lab Results   Component Value Date    A1C 6.5 06/10/2022    A1C 8.6 02/10/2022    A1C 7.2 09/28/2021    A1C 9.4 08/20/2020    A1C 8.4 01/02/2020    A1C 7.8 01/29/2019       Wt Readings from Last 3 Encounters:   07/07/22 76.2 kg (168 lb)   06/09/22 73.5 kg (162 lb)   05/18/22 72.3 kg (159 lb 4.8 oz)       Current  Outpatient Medications   Medication Sig Dispense Refill     Continuous Blood Gluc Sensor (DEXCOM G6 SENSOR) MISC Change every 10 days. 9 each 3     Continuous Blood Gluc Transmit (DEXCOM G6 TRANSMITTER) MISC Change every 3 months. 1 each 3     Glucagon, rDNA, (GLUCAGON EMERGENCY) 1 MG KIT Inject 1 mg as directed daily as needed (hypoglycemia) 2 kit 11     Insulin Disposable Pump (OMNIPOD 5 G6 POD, GEN 5,) MISC 1 each every 3 days 30 each 11     insulin lispro (HUMALOG VIAL) 100 UNIT/ML vial INJECT 70 UNITS UNDER THE SKIN EVERY DAY VIA INSULIN PUMP 80 mL 3     metoclopramide (REGLAN) 5 MG tablet Take 1 tablet (5 mg) by mouth 3 times daily as needed 60 tablet 1     Prenatal Vit-DSS-Fe Cbn-FA (PRENATAL AD PO)        sertraline (ZOLOFT) 100 MG tablet TAKE 2 TABLETS(200 MG) BY MOUTH DAILY 180 tablet 1     hydrOXYzine (ATARAX) 10 MG tablet TAKE 1 TABLET(10 MG) BY MOUTH EVERY 8 HOURS AS NEEDED FOR ANXIETY (Patient not taking: No sig reported) 60 tablet 0          REVIEW OF SYSTEMS:   ROS: 10 point ROS neg other than the symptoms noted above in the HPI.      EXAM:  Physical Exam (visual exam)  VS:  no vital signs taken for video visit  CONSTITUTIONAL: healthy, alert and NAD, responding appropriately  ENT: normocephalic, no visual evidence of trauma, normal nose and oral mucosa  EYES: conjunctivae and sclerae normal, no exophthalmos or proptosis  THYROID:  no visualized nodules or goiter  LUNGS: no audible wheeze, cough or visible cyanosis, no visible retractions or increased work of breathing  EXTREMITIES: no hand tremors  NEUROLOGY: cranial nerves grossly intact with no obvious deficit.  SKIN:  no visualized skin lesions or rash, no edema visualized  PSYCH: mentation appears normal, normal judgement      ASSESSMENT/PLAN:  1) Diabetes Mellitus in pregnancy  - Glucose Control- NOT at goal   - Drop overnight basal rate by additional 20%, extend time until 9a--     NEW BASAL:    - MN- 1.3--> 1.05    - 9a- 1.6 (changed start  time, same setting)   - Discussed strengthening ICR at lunch, but she is underbolusing given usually running low, will try above change and then adjust further at next checkin.   - Being more intentional with accurate carb dosing-- looking at nutrition labels, etc  - Reviewed blood sugar goals in pregnancy. Glucose goals during pregnancy according to the American Diabetes Association:  Fasting glucose 70-95 mg/dL AND  One-hour postprandial glucose 110-140 mg/dL or  Two-hour postprandial glucose 100-120 mg/dL  - Encouraged at least 30min of activity daily.    Check in with appeal process for Omnipod 5-- she was doing so well on it.     RTC- every 4 weeks until delivery. Follow weekly with diabetes educator.      A total of 30 minutes were spent today 07/19/22 on this visit including chart review, history and counseling, documentation and other activities as detailed above.           Answers for HPI/ROS submitted by the patient on 7/18/2022  General Symptoms: No  Skin Symptoms: No  HENT Symptoms: No  EYE SYMPTOMS: No  HEART SYMPTOMS: No  LUNG SYMPTOMS: No  INTESTINAL SYMPTOMS: No  URINARY SYMPTOMS: No  GYNECOLOGIC SYMPTOMS: No  BREAST SYMPTOMS: No  SKELETAL SYMPTOMS: No  BLOOD SYMPTOMS: No  NERVOUS SYSTEM SYMPTOMS: No  MENTAL HEALTH SYMPTOMS: No          Again, thank you for allowing me to participate in the care of your patient.        Sincerely,        Shanon Thomas MD

## 2022-07-19 NOTE — NURSING NOTE
Chief Complaint   Patient presents with     Follow Up     Type 1 diabetes mellitus with hyperglycemia (H)       There were no vitals filed for this visit.    There is no height or weight on file to calculate BMI.    Darlene Harris, [place]

## 2022-07-19 NOTE — PROGRESS NOTES
Video-Visit Details    Type of service:  Video Visit  Video Start Time: 7:29  Video End Time:07:47  Originating Location (pt. Location): [place], [place]  Distant Location (provider location):  Home  Platform used for Video Visit: Norm Thomas MD    Preeti Leiva is a 31 year old year old female here for evaluation of  diabetes in pregnancy via a billable video visit.    Currently: 18w0d  Estimated Date of Delivery: Dec 20, 2022  Baby: Girl      1) Diabetes Mellitus in pregnancy    Diabetes History:  Diagnosis: Age 6  Hospitalizations: DKA, 2008 most recently, difficult time in college managing   Previous Regimens: almost always on pump, most recently Medtronic/Guardian, but didn't use automode-- kicked out frequently and didn't work through this  Current Regimen: OMNIPOD DASH/Dexcom    INTERVAL HISTORY:  - Still awaiting Omnipod 5 appeal, just received 3 month supply of Omnipod Dash  - Hypos overnight improved but not eliminated. Still low every night waking to have snack approx 2a  - Will underbolus with lunch as riding low into lunchtime, had a few days she ran low and depleted all her emergency snacks  - Nausea improved, now able to eat all meals, not vomiting any longer    Exercise:      BG check- DEXCOM  Trends-                   BP Readings from Last 3 Encounters:   07/07/22 125/74   06/10/22 124/79   06/09/22 116/75       Lab Results   Component Value Date    A1C 6.5 06/10/2022    A1C 8.6 02/10/2022    A1C 7.2 09/28/2021    A1C 9.4 08/20/2020    A1C 8.4 01/02/2020    A1C 7.8 01/29/2019       Wt Readings from Last 3 Encounters:   07/07/22 76.2 kg (168 lb)   06/09/22 73.5 kg (162 lb)   05/18/22 72.3 kg (159 lb 4.8 oz)       Current Outpatient Medications   Medication Sig Dispense Refill     Continuous Blood Gluc Sensor (DEXCOM G6 SENSOR) MISC Change every 10 days. 9 each 3     Continuous Blood Gluc Transmit (DEXCOM G6 TRANSMITTER) MISC Change every 3 months. 1 each 3     Glucagon, rDNA, (GLUCAGON  EMERGENCY) 1 MG KIT Inject 1 mg as directed daily as needed (hypoglycemia) 2 kit 11     Insulin Disposable Pump (OMNIPOD 5 G6 POD, GEN 5,) MISC 1 each every 3 days 30 each 11     insulin lispro (HUMALOG VIAL) 100 UNIT/ML vial INJECT 70 UNITS UNDER THE SKIN EVERY DAY VIA INSULIN PUMP 80 mL 3     metoclopramide (REGLAN) 5 MG tablet Take 1 tablet (5 mg) by mouth 3 times daily as needed 60 tablet 1     Prenatal Vit-DSS-Fe Cbn-FA (PRENATAL AD PO)        sertraline (ZOLOFT) 100 MG tablet TAKE 2 TABLETS(200 MG) BY MOUTH DAILY 180 tablet 1     hydrOXYzine (ATARAX) 10 MG tablet TAKE 1 TABLET(10 MG) BY MOUTH EVERY 8 HOURS AS NEEDED FOR ANXIETY (Patient not taking: No sig reported) 60 tablet 0          REVIEW OF SYSTEMS:   ROS: 10 point ROS neg other than the symptoms noted above in the HPI.      EXAM:  Physical Exam (visual exam)  VS:  no vital signs taken for video visit  CONSTITUTIONAL: healthy, alert and NAD, responding appropriately  ENT: normocephalic, no visual evidence of trauma, normal nose and oral mucosa  EYES: conjunctivae and sclerae normal, no exophthalmos or proptosis  THYROID:  no visualized nodules or goiter  LUNGS: no audible wheeze, cough or visible cyanosis, no visible retractions or increased work of breathing  EXTREMITIES: no hand tremors  NEUROLOGY: cranial nerves grossly intact with no obvious deficit.  SKIN:  no visualized skin lesions or rash, no edema visualized  PSYCH: mentation appears normal, normal judgement      ASSESSMENT/PLAN:  1) Diabetes Mellitus in pregnancy  - Glucose Control- NOT at goal   - Drop overnight basal rate by additional 20%, extend time until 9a--     NEW BASAL:    - MN- 1.3--> 1.05    - 9a- 1.6 (changed start time, same setting)   - Discussed strengthening ICR at lunch, but she is underbolusing given usually running low, will try above change and then adjust further at next checkin.   - Being more intentional with accurate carb dosing-- looking at nutrition labels, etc  -  Reviewed blood sugar goals in pregnancy. Glucose goals during pregnancy according to the American Diabetes Association:  Fasting glucose 70-95 mg/dL AND  One-hour postprandial glucose 110-140 mg/dL or  Two-hour postprandial glucose 100-120 mg/dL  - Encouraged at least 30min of activity daily.    Check in with appeal process for Omnipod 5-- she was doing so well on it.     RTC- every 4 weeks until delivery. Follow weekly with diabetes educator.      A total of 30 minutes were spent today 07/19/22 on this visit including chart review, history and counseling, documentation and other activities as detailed above.           Answers for HPI/ROS submitted by the patient on 7/18/2022  General Symptoms: No  Skin Symptoms: No  HENT Symptoms: No  EYE SYMPTOMS: No  HEART SYMPTOMS: No  LUNG SYMPTOMS: No  INTESTINAL SYMPTOMS: No  URINARY SYMPTOMS: No  GYNECOLOGIC SYMPTOMS: No  BREAST SYMPTOMS: No  SKELETAL SYMPTOMS: No  BLOOD SYMPTOMS: No  NERVOUS SYSTEM SYMPTOMS: No  MENTAL HEALTH SYMPTOMS: No

## 2022-07-20 NOTE — TELEPHONE ENCOUNTER
I have reached out to our team working on patients DME billing. They are still looking into her coverage but I was told they will call patient now and give update and follow up with her as they go

## 2022-07-22 ENCOUNTER — HOSPITAL ENCOUNTER (OUTPATIENT)
Dept: ULTRASOUND IMAGING | Facility: CLINIC | Age: 32
Discharge: HOME | End: 2022-07-22
Attending: OBSTETRICS & GYNECOLOGY
Payer: COMMERCIAL

## 2022-07-22 ENCOUNTER — OFFICE VISIT (OUTPATIENT)
Dept: MATERNAL FETAL MEDICINE | Facility: CLINIC | Age: 32
End: 2022-07-22
Attending: OBSTETRICS & GYNECOLOGY
Payer: COMMERCIAL

## 2022-07-22 DIAGNOSIS — O24.011 PRE-EXISTING TYPE 1 DIABETES MELLITUS DURING PREGNANCY IN FIRST TRIMESTER: ICD-10-CM

## 2022-07-22 DIAGNOSIS — O24.011 PRE-EXISTING TYPE 1 DIABETES MELLITUS DURING PREGNANCY IN FIRST TRIMESTER: Primary | ICD-10-CM

## 2022-07-22 PROCEDURE — 76811 OB US DETAILED SNGL FETUS: CPT | Mod: 26 | Performed by: OBSTETRICS & GYNECOLOGY

## 2022-07-22 PROCEDURE — 76811 OB US DETAILED SNGL FETUS: CPT

## 2022-07-22 NOTE — PROGRESS NOTES
Please refer to ultrasound report under 'Imaging' Studies of 'Chart Review' tabs.    Josse Hart M.D.

## 2022-08-11 ENCOUNTER — PRENATAL OFFICE VISIT (OUTPATIENT)
Dept: OBGYN | Facility: CLINIC | Age: 32
End: 2022-08-11
Payer: COMMERCIAL

## 2022-08-11 VITALS
SYSTOLIC BLOOD PRESSURE: 128 MMHG | WEIGHT: 176 LBS | DIASTOLIC BLOOD PRESSURE: 78 MMHG | HEART RATE: 83 BPM | BODY MASS INDEX: 24.55 KG/M2

## 2022-08-11 DIAGNOSIS — O24.912 DIABETES MELLITUS AFFECTING PREGNANCY IN SECOND TRIMESTER: Primary | ICD-10-CM

## 2022-08-11 DIAGNOSIS — A09 DIARRHEA, TRAVELERS': ICD-10-CM

## 2022-08-11 PROCEDURE — 99207 PR PRENATAL VISIT: CPT | Performed by: OBSTETRICS & GYNECOLOGY

## 2022-08-11 RX ORDER — AZITHROMYCIN 500 MG/1
500 TABLET, FILM COATED ORAL DAILY
Qty: 6 TABLET | Refills: 0 | Status: SHIPPED | OUTPATIENT
Start: 2022-08-11 | End: 2022-09-21

## 2022-08-11 NOTE — PROGRESS NOTES
Doing well.   Feeling fetal movement.   Back on omni pods and blood sugars doing well.   Was in [place] for vacation. Has travelers diarrhea now for 10 days. Discussed trial of antibiotics, will try azithromycin.   Red blanching rash in groin.  has tinea. Try clotrimazole BID x 2 weeks then send to derm if not resolved.   Has ultrasound next week.   RTC 4 weeks.

## 2022-08-16 ENCOUNTER — VIRTUAL VISIT (OUTPATIENT)
Dept: ENDOCRINOLOGY | Facility: CLINIC | Age: 32
End: 2022-08-16
Payer: COMMERCIAL

## 2022-08-16 DIAGNOSIS — O24.012 PRE-EXISTING TYPE 1 DIABETES MELLITUS DURING PREGNANCY IN SECOND TRIMESTER: ICD-10-CM

## 2022-08-16 DIAGNOSIS — E10.65 TYPE 1 DIABETES MELLITUS WITH HYPERGLYCEMIA (H): Primary | ICD-10-CM

## 2022-08-16 PROCEDURE — 99213 OFFICE O/P EST LOW 20 MIN: CPT | Mod: GT | Performed by: INTERNAL MEDICINE

## 2022-08-16 PROCEDURE — 95251 CONT GLUC MNTR ANALYSIS I&R: CPT | Performed by: INTERNAL MEDICINE

## 2022-08-16 NOTE — LETTER
"    8/16/2022         RE: Preeti Leiva  [address]  [place] [facility] [address]        Dear Colleague,    Thank you for referring your patient, Preeti Leiva, to the [facility] [place]. Please see a copy of my visit note below.      Video-Visit Details    Type of service:  Video Visit  Video Start Time: 7:35  Video End Time:07:52  Originating Location (pt. Location): [place], [place]  Distant Location (provider location):  Home  Platform used for Video Visit: Norm Thomsa MD    Preeti Leiva is a 31 year old year old female here for evaluation of  diabetes in pregnancy via a billable video visit.    Currently: 22w0d  Estimated Date of Delivery: Dec 20, 2022  Baby: Girl      1) Diabetes Mellitus Type 1 in pregnancy    Diabetes History:  Diagnosis: Age 6  Hospitalizations: DKA, 2008 most recently, difficult time in college managing   Previous Regimens: almost always on pump, most recently Medtronic/Guardian, but didn't use automode-- kicked out frequently and didn't work through this  Current Regimen: OMNIPOD 5/DEXCOM      INTERVAL HISTORY:  - Recently on trip to [facility] and got pretty sick  - Numbers overall higher during vacation, was eating more/more frequently than normally  - Restarted Omnipod 5 on 8/9, numbers have been better, accidentally turned off auto insulin mode overnight that night and dipped a little low  - Slow to come down after meals so will \"rage bolus\" multiple sequential boluses to bring it down, and has early PM low often (~5-6 pm)    BG check- DEXCOM  Trends-                       BP Readings from Last 3 Encounters:   08/11/22 128/78   07/07/22 125/74   06/10/22 124/79       Lab Results   Component Value Date    A1C 6.5 06/10/2022    A1C 8.6 02/10/2022    A1C 7.2 09/28/2021    A1C 9.4 08/20/2020    A1C 8.4 01/02/2020    A1C 7.8 01/29/2019       Wt Readings from Last 3 Encounters:   08/11/22 79.8 kg (176 lb)   07/07/22 76.2 kg (168 lb)   06/09/22 73.5 " kg (162 lb)       Current Outpatient Medications   Medication Sig Dispense Refill     azithromycin (ZITHROMAX) 500 MG tablet Take 1 tablet (500 mg) by mouth daily 6 tablet 0     Continuous Blood Gluc Sensor (DEXCOM G6 SENSOR) MISC Change every 10 days. 9 each 3     Continuous Blood Gluc Transmit (DEXCOM G6 TRANSMITTER) MISC Change every 3 months. 1 each 3     Glucagon, rDNA, (GLUCAGON EMERGENCY) 1 MG KIT Inject 1 mg as directed daily as needed (hypoglycemia) 2 kit 11     Insulin Disposable Pump (OMNIPOD 5 G6 POD, GEN 5,) MISC 1 each every 3 days 30 each 11     insulin lispro (HUMALOG VIAL) 100 UNIT/ML vial INJECT 70 UNITS UNDER THE SKIN EVERY DAY VIA INSULIN PUMP 80 mL 3     metoclopramide (REGLAN) 5 MG tablet Take 1 tablet (5 mg) by mouth 3 times daily as needed 60 tablet 1     Prenatal Vit-DSS-Fe Cbn-FA (PRENATAL AD PO)        sertraline (ZOLOFT) 100 MG tablet TAKE 2 TABLETS(200 MG) BY MOUTH DAILY 180 tablet 1     hydrOXYzine (ATARAX) 10 MG tablet TAKE 1 TABLET(10 MG) BY MOUTH EVERY 8 HOURS AS NEEDED FOR ANXIETY (Patient not taking: No sig reported) 60 tablet 0          REVIEW OF SYSTEMS:   ROS: 10 point ROS neg other than the symptoms noted above in the HPI.      EXAM:  Physical Exam (visual exam)  VS:  no vital signs taken for video visit  CONSTITUTIONAL: healthy, alert and NAD, responding appropriately  ENT: normocephalic, no visual evidence of trauma, normal nose and oral mucosa  EYES: conjunctivae and sclerae normal, no exophthalmos or proptosis  THYROID:  no visualized nodules or goiter  LUNGS: no audible wheeze, cough or visible cyanosis, no visible retractions or increased work of breathing  EXTREMITIES: no hand tremors  NEUROLOGY: cranial nerves grossly intact with no obvious deficit.  SKIN:  no visualized skin lesions or rash, no edema visualized  PSYCH: mentation appears normal, normal judgement      ASSESSMENT/PLAN:  1) Diabetes Mellitus in pregnancy  - Glucose Control- NOT at goal, now back on  Omnipod5 which is helping.   - Continue current basal settings, most of highs appear to be post prandial.    - MN- 1.3--> 1.05    - 9a- 1.6 (changed start time, same setting)   - ISF    -MN - 40 (same)    -7a- 35 (new time, lower)    - 3p - 40 (new time, same)    - 6p - 35 (new time, lower)   - ICR    - MN -9 (same)    - 7a - 5.5 (20% lower)    - 11a 5g (20% lower)    - 4p 7g (same)    - 6p 5.5 (new time, 20% lower)    - 9p 7 (10% lower)  - Reviewed blood sugar goals in pregnancy. Glucose goals during pregnancy according to the American Diabetes Association:  Fasting glucose 70-95 mg/dL AND  One-hour postprandial glucose 110-140 mg/dL or  Two-hour postprandial glucose 100-120 mg/dL  - Encouraged at least 30min of activity daily.        RTC- every 4 weeks until delivery. Follow weekly with diabetes educator.    A total of 27 minutes were spent today 08/16/22 on this visit including chart review, history and counseling, documentation and other activities as detailed above.         Again, thank you for allowing me to participate in the care of your patient.        Sincerely,        Shanon Thomas MD

## 2022-08-16 NOTE — PROGRESS NOTES
"  Video-Visit Details    Type of service:  Video Visit  Video Start Time: 7:35  Video End Time:07:52  Originating Location (pt. Location): Home, [place]  Distant Location (provider location):  Home  Platform used for Video Visit: Norm Thomas MD    Preeti Leiva is a 31 year old year old female here for evaluation of  diabetes in pregnancy via a billable video visit.    Currently: 22w0d  Estimated Date of Delivery: Dec 20, 2022  Baby: Girl      1) Diabetes Mellitus Type 1 in pregnancy    Diabetes History:  Diagnosis: Age 6  Hospitalizations: DKA, 2008 most recently, difficult time in college managing   Previous Regimens: almost always on pump, most recently Medtronic/Guardian, but didn't use automode-- kicked out frequently and didn't work through this  Current Regimen: OMNIPOD 5/DEXCOM      INTERVAL HISTORY:  - Recently on trip to [facility] and got pretty sick  - Numbers overall higher during vacation, was eating more/more frequently than normally  - Restarted Omnipod 5 on 8/9, numbers have been better, accidentally turned off auto insulin mode overnight that night and dipped a little low  - Slow to come down after meals so will \"rage bolus\" multiple sequential boluses to bring it down, and has early PM low often (~5-6 pm)    BG check- DEXCOM  Trends-                       BP Readings from Last 3 Encounters:   08/11/22 128/78   07/07/22 125/74   06/10/22 124/79       Lab Results   Component Value Date    A1C 6.5 06/10/2022    A1C 8.6 02/10/2022    A1C 7.2 09/28/2021    A1C 9.4 08/20/2020    A1C 8.4 01/02/2020    A1C 7.8 01/29/2019       Wt Readings from Last 3 Encounters:   08/11/22 79.8 kg (176 lb)   07/07/22 76.2 kg (168 lb)   06/09/22 73.5 kg (162 lb)       Current Outpatient Medications   Medication Sig Dispense Refill     azithromycin (ZITHROMAX) 500 MG tablet Take 1 tablet (500 mg) by mouth daily 6 tablet 0     Continuous Blood Gluc Sensor (DEXCOM G6 SENSOR) MISC Change every 10 days. 9 each 3 "     Continuous Blood Gluc Transmit (DEXCOM G6 TRANSMITTER) MISC Change every 3 months. 1 each 3     Glucagon, rDNA, (GLUCAGON EMERGENCY) 1 MG KIT Inject 1 mg as directed daily as needed (hypoglycemia) 2 kit 11     Insulin Disposable Pump (OMNIPOD 5 G6 POD, GEN 5,) MISC 1 each every 3 days 30 each 11     insulin lispro (HUMALOG VIAL) 100 UNIT/ML vial INJECT 70 UNITS UNDER THE SKIN EVERY DAY VIA INSULIN PUMP 80 mL 3     metoclopramide (REGLAN) 5 MG tablet Take 1 tablet (5 mg) by mouth 3 times daily as needed 60 tablet 1     Prenatal Vit-DSS-Fe Cbn-FA (PRENATAL AD PO)        sertraline (ZOLOFT) 100 MG tablet TAKE 2 TABLETS(200 MG) BY MOUTH DAILY 180 tablet 1     hydrOXYzine (ATARAX) 10 MG tablet TAKE 1 TABLET(10 MG) BY MOUTH EVERY 8 HOURS AS NEEDED FOR ANXIETY (Patient not taking: No sig reported) 60 tablet 0          REVIEW OF SYSTEMS:   ROS: 10 point ROS neg other than the symptoms noted above in the HPI.      EXAM:  Physical Exam (visual exam)  VS:  no vital signs taken for video visit  CONSTITUTIONAL: healthy, alert and NAD, responding appropriately  ENT: normocephalic, no visual evidence of trauma, normal nose and oral mucosa  EYES: conjunctivae and sclerae normal, no exophthalmos or proptosis  THYROID:  no visualized nodules or goiter  LUNGS: no audible wheeze, cough or visible cyanosis, no visible retractions or increased work of breathing  EXTREMITIES: no hand tremors  NEUROLOGY: cranial nerves grossly intact with no obvious deficit.  SKIN:  no visualized skin lesions or rash, no edema visualized  PSYCH: mentation appears normal, normal judgement      ASSESSMENT/PLAN:  1) Diabetes Mellitus in pregnancy  - Glucose Control- NOT at goal, now back on Omnipod5 which is helping.   - Continue current basal settings, most of highs appear to be post prandial.    - MN- 1.3--> 1.05    - 9a- 1.6 (changed start time, same setting)   - ISF    -MN - 40 (same)    -7a- 35 (new time, lower)    - 3p - 40 (new time, same)    - 6p  - 35 (new time, lower)   - ICR    - MN -9 (same)    - 7a - 5.5 (20% lower)    - 11a 5g (20% lower)    - 4p 7g (same)    - 6p 5.5 (new time, 20% lower)    - 9p 7 (10% lower)  - Reviewed blood sugar goals in pregnancy. Glucose goals during pregnancy according to the American Diabetes Association:  Fasting glucose 70-95 mg/dL AND  One-hour postprandial glucose 110-140 mg/dL or  Two-hour postprandial glucose 100-120 mg/dL  - Encouraged at least 30min of activity daily.        RTC- every 4 weeks until delivery. Follow weekly with diabetes educator.    A total of 27 minutes were spent today 08/16/22 on this visit including chart review, history and counseling, documentation and other activities as detailed above.

## 2022-08-19 ENCOUNTER — OFFICE VISIT (OUTPATIENT)
Dept: MATERNAL FETAL MEDICINE | Facility: CLINIC | Age: 32
End: 2022-08-19
Attending: OBSTETRICS & GYNECOLOGY
Payer: COMMERCIAL

## 2022-08-19 ENCOUNTER — HOSPITAL ENCOUNTER (OUTPATIENT)
Dept: ULTRASOUND IMAGING | Facility: CLINIC | Age: 32
Discharge: HOME | End: 2022-08-19
Attending: OBSTETRICS & GYNECOLOGY
Payer: COMMERCIAL

## 2022-08-19 DIAGNOSIS — O24.011 PRE-EXISTING TYPE 1 DIABETES MELLITUS DURING PREGNANCY IN FIRST TRIMESTER: Primary | ICD-10-CM

## 2022-08-19 DIAGNOSIS — O24.011 PRE-EXISTING TYPE 1 DIABETES MELLITUS DURING PREGNANCY IN FIRST TRIMESTER: ICD-10-CM

## 2022-08-19 PROCEDURE — 76816 OB US FOLLOW-UP PER FETUS: CPT | Mod: 26 | Performed by: OBSTETRICS & GYNECOLOGY

## 2022-08-19 PROCEDURE — 76816 OB US FOLLOW-UP PER FETUS: CPT

## 2022-08-19 NOTE — PROGRESS NOTES
"Please see \"Imaging\" tab under \"Chart Review\" for details of today's visit.    Edel Medina MD PhD  Maternal Fetal Medicine     "

## 2022-08-24 ENCOUNTER — HOSPITAL ENCOUNTER (OUTPATIENT)
Dept: CARDIOLOGY | Facility: CLINIC | Age: 32
Discharge: HOME | End: 2022-08-24
Attending: OBSTETRICS & GYNECOLOGY | Admitting: OBSTETRICS & GYNECOLOGY
Payer: COMMERCIAL

## 2022-08-24 ENCOUNTER — OFFICE VISIT (OUTPATIENT)
Dept: CARDIOLOGY | Facility: CLINIC | Age: 32
End: 2022-08-24
Payer: COMMERCIAL

## 2022-08-24 DIAGNOSIS — O35.BXX0 FETAL CARDIAC DISEASE AFFECTING PREGNANCY, SINGLE OR UNSPECIFIED FETUS: Primary | ICD-10-CM

## 2022-08-24 DIAGNOSIS — O24.011 PRE-EXISTING TYPE 1 DIABETES MELLITUS DURING PREGNANCY IN FIRST TRIMESTER: ICD-10-CM

## 2022-08-24 PROCEDURE — 93325 DOPPLER ECHO COLOR FLOW MAPG: CPT | Mod: 26 | Performed by: PEDIATRICS

## 2022-08-24 PROCEDURE — 76827 ECHO EXAM OF FETAL HEART: CPT | Mod: 26 | Performed by: PEDIATRICS

## 2022-08-24 PROCEDURE — 76825 ECHO EXAM OF FETAL HEART: CPT | Mod: 26 | Performed by: PEDIATRICS

## 2022-08-24 PROCEDURE — 99203 OFFICE O/P NEW LOW 30 MIN: CPT | Mod: 25 | Performed by: PEDIATRICS

## 2022-08-24 PROCEDURE — 93325 DOPPLER ECHO COLOR FLOW MAPG: CPT

## 2022-08-24 NOTE — PROGRESS NOTES
[facility]   Heart Center Fetal Consult Note    Patient:  Preeti Leiva MRN:  [id number]   YOB: 1990 Age:  31 year old   Date of Visit:  2022 PCP:  Roshni Cortez MD     Dear Doctor,     I had the pleasure of seeing Preeti Leiva at the [facility] on 2022 in fetal cardiology consultation for fetal echocardiogram results. She presented today accompanied by her . As you know, she is a 31 year old  at 23w1d who presented for fetal echocardiogram today because of pre-gestational type I diabetes mellitus.    I performed and interpreted the fetal echocardiogram today, which demonstrated normal fetal cardiac anatomy. Normal fetal intracardiac connections. Normal right and left ventricular size and function. No hydrops.    I reviewed the echo findings today with Preeti Leiva and her partner. She is aware that the study was within normal limits with no major cardiac abnormalities. She is aware of the general limitations of fetal echocardiography. No additional fetal echocardiograms are recommended. No  cardiac follow-up is required.     Thank you for allowing me to participate in Preeti's care. Please do not hesitate to contact me with questions or concerns.    This visit was separate from the performance and interpretation of the ultrasound. The majority of the time (>50%) was spent in counseling and coordination of care. I spent approximately 20 minutes in face-to-face time reviewing the above considerations.    Evangelina Lin M.D.  Pediatric Cardiology  [address], 5th floor, [place] [address]  Phone [phone]  Fax [phone]

## 2022-08-25 ENCOUNTER — MYC MEDICAL ADVICE (OUTPATIENT)
Dept: EDUCATION SERVICES | Facility: CLINIC | Age: 32
End: 2022-08-25

## 2022-08-25 NOTE — TELEPHONE ENCOUNTER
T1 diabetes in pregnancy Follow-up    Subjective/Objective:    Preeti TAMAR Leiva sent in blood glucose log for review. Last date of communication was: 8/16/22-last pump adjustment.    Gestational diabetes is being managed with diet, activity and medications    Taking diabetes medications:   yes:     Diabetes Medication(s)     Diabetic Other       Glucagon, rDNA, (GLUCAGON EMERGENCY) 1 MG KIT    Inject 1 mg as directed daily as needed (hypoglycemia)    Insulin       insulin lispro (HUMALOG VIAL) 100 UNIT/ML vial    INJECT 70 UNITS UNDER THE SKIN EVERY DAY VIA INSULIN PUMP          Estimated Date of Delivery: Dec 20, 2022, 23w2d    BG/Food Log:       .                   Assessment:  Blood sugars are improved, but continues to have post meal elevations, especially after 12pm.  Recommend ICR decrease at 11am and 6pm.    Plan/Response:  Recommend insulin pump settings change to ICR:  [place]-no change  7am-no change  11am- 5g --> 4.5g  4p-no change  6p- 5.5 --> 5.0g  9p- no change    Follow up 1 week.    Marielena Appiah MS, RD, LD, CDE      Any diabetes medication dose changes were made via the CDE Protocol and Collaborative Practice Agreement with the patient's Endocrinologist. A copy of this encounter was shared with the provider.

## 2022-09-08 ENCOUNTER — PRENATAL OFFICE VISIT (OUTPATIENT)
Dept: OBGYN | Facility: CLINIC | Age: 32
End: 2022-09-08
Payer: COMMERCIAL

## 2022-09-08 ENCOUNTER — MYC MEDICAL ADVICE (OUTPATIENT)
Dept: EDUCATION SERVICES | Facility: CLINIC | Age: 32
End: 2022-09-08

## 2022-09-08 VITALS
HEART RATE: 88 BPM | WEIGHT: 183 LBS | DIASTOLIC BLOOD PRESSURE: 77 MMHG | TEMPERATURE: 97.2 F | BODY MASS INDEX: 25.52 KG/M2 | SYSTOLIC BLOOD PRESSURE: 123 MMHG

## 2022-09-08 DIAGNOSIS — O24.912 DIABETES MELLITUS AFFECTING PREGNANCY IN SECOND TRIMESTER: Primary | ICD-10-CM

## 2022-09-08 PROCEDURE — 99207 PR PRENATAL VISIT: CPT | Performed by: OBSTETRICS & GYNECOLOGY

## 2022-09-08 NOTE — TELEPHONE ENCOUNTER
Gestational Diabetes Follow-up    Subjective/Objective:    Preeti Leiva sent in blood glucose log for review. Last date of communication was: 8/25.    Gestational diabetes is being managed with diet, activity and medications    Taking diabetes medications:   yes:     Diabetes Medication(s)     Diabetic Other       Glucagon, rDNA, (GLUCAGON EMERGENCY) 1 MG KIT    Inject 1 mg as directed daily as needed (hypoglycemia)    Insulin       insulin lispro (HUMALOG VIAL) 100 UNIT/ML vial    INJECT 70 UNITS UNDER THE SKIN EVERY DAY VIA INSULIN PUMP          Estimated Date of Delivery: Dec 20, 2022    BG/Food Log:                           Assessment:    Still having rise in BG after lunch/dinner. Recommend I:C ratio    Plan/Response:  Adjust I:C ratio:  7a: 5.5 --> 5.0  4p: 7 --> 6.5  6p: 5 --> 4.5    Will recommend to revert to previous settings if BG drops low after meals. Work on bolusing before meals.  Routing to Dr. Martha Gibbons RD LD CDCES    Any diabetes medication dose changes were made via the CDE Protocol and Collaborative Practice Agreement with the patient's referring provider. A copy of this encounter was shared with the provider.

## 2022-09-13 ENCOUNTER — VIRTUAL VISIT (OUTPATIENT)
Dept: ENDOCRINOLOGY | Facility: CLINIC | Age: 32
End: 2022-09-13
Payer: COMMERCIAL

## 2022-09-13 VITALS — HEIGHT: 70 IN | WEIGHT: 183 LBS | BODY MASS INDEX: 26.2 KG/M2

## 2022-09-13 DIAGNOSIS — O24.012 PRE-EXISTING TYPE 1 DIABETES MELLITUS DURING PREGNANCY IN SECOND TRIMESTER: Primary | ICD-10-CM

## 2022-09-13 PROCEDURE — 99213 OFFICE O/P EST LOW 20 MIN: CPT | Mod: GT | Performed by: INTERNAL MEDICINE

## 2022-09-13 ASSESSMENT — PAIN SCALES - GENERAL: PAINLEVEL: NO PAIN (0)

## 2022-09-13 NOTE — LETTER
9/13/2022         RE: Preeti Leiva  [address]  [facility] [address]        Dear Colleague,    Thank you for referring your patient, Preeti Leiva, to the [facility] [place]. Please see a copy of my visit note below.      Video-Visit Details    Type of service:  Video Visit  Video Start Time: 7:31  Video End Time:7:49  Originating Location (pt. Location): [place], [place]  Distant Location (provider location):  Home  Platform used for Video Visit: Norm Thomas MD    Preeti Leiva is a 32 year old year old female here for evaluation of  diabetes in pregnancy via a billable video visit.    Currently: 26w0d  Estimated Date of Delivery: Dec 20, 2022  Baby: girl      1) Diabetes Mellitus in pregnancy, Type I    Diabetes History:  Diagnosis: Age 6  Hospitalizations: DKA, 2008 most recently, difficult time in college managing   Previous Regimens: almost always on pump, most recently Medtronic/Guardian, but didn't use automode-- kicked out frequently and didn't work through this  Current Regimen: OMNIPOD 5/DEXCOM        INTERVAL HISTORY:  - Overall feeling well, tired,   - Last fetal ultrasound 8/24 measuring gestational age  - Noting postprandial hyperglycemia,trying to bolus 20-30 min in advance, but difficult as doesn't plan meals that well/far in advance.       BG check- DEXCOM  Trends-         BP Readings from Last 3 Encounters:   09/08/22 123/77   08/11/22 128/78   07/07/22 125/74       Lab Results   Component Value Date    A1C 6.5 06/10/2022    A1C 8.6 02/10/2022    A1C 7.2 09/28/2021    A1C 9.4 08/20/2020    A1C 8.4 01/02/2020    A1C 7.8 01/29/2019       Wt Readings from Last 3 Encounters:   09/13/22 83 kg (183 lb)   09/08/22 83 kg (183 lb)   08/11/22 79.8 kg (176 lb)       Current Outpatient Medications   Medication Sig Dispense Refill     Continuous Blood Gluc Sensor (DEXCOM G6 SENSOR) MISC Change every 10 days. 9 each 3     Continuous Blood Gluc Transmit (DEXCOM  G6 TRANSMITTER) MISC Change every 3 months. 1 each 3     Glucagon, rDNA, (GLUCAGON EMERGENCY) 1 MG KIT Inject 1 mg as directed daily as needed (hypoglycemia) 2 kit 11     Insulin Disposable Pump (OMNIPOD 5 G6 POD, GEN 5,) MISC 1 each every 3 days 30 each 11     insulin lispro (HUMALOG VIAL) 100 UNIT/ML vial INJECT 70 UNITS UNDER THE SKIN EVERY DAY VIA INSULIN PUMP 80 mL 3     Prenatal Vit-DSS-Fe Cbn-FA (PRENATAL AD PO)        sertraline (ZOLOFT) 100 MG tablet TAKE 2 TABLETS(200 MG) BY MOUTH DAILY 180 tablet 1     azithromycin (ZITHROMAX) 500 MG tablet Take 1 tablet (500 mg) by mouth daily 6 tablet 0     hydrOXYzine (ATARAX) 10 MG tablet TAKE 1 TABLET(10 MG) BY MOUTH EVERY 8 HOURS AS NEEDED FOR ANXIETY (Patient not taking: No sig reported) 60 tablet 0     metoclopramide (REGLAN) 5 MG tablet Take 1 tablet (5 mg) by mouth 3 times daily as needed 60 tablet 1          REVIEW OF SYSTEMS:   ROS: 10 point ROS neg other than the symptoms noted above in the HPI.      EXAM:  Physical Exam (visual exam)  VS:  no vital signs taken for video visit  CONSTITUTIONAL: healthy, alert and NAD, responding appropriately  ENT: normocephalic, no visual evidence of trauma, normal nose and oral mucosa  EYES: conjunctivae and sclerae normal, no exophthalmos or proptosis  THYROID:  no visualized nodules or goiter  LUNGS: no audible wheeze, cough or visible cyanosis, no visible retractions or increased work of breathing  EXTREMITIES: no hand tremors  NEUROLOGY: cranial nerves grossly intact with no obvious deficit.  SKIN:  no visualized skin lesions or rash, no edema visualized  PSYCH: mentation appears normal, normal judgement      ASSESSMENT/PLAN:    1) Diabetes Mellitus in pregnancy  - Glucose Control- NOT at goal   - AM fasting- ~100, will increase overnight basal, only helpful if not in automated mode   - Post prandial hyperglycemia- some but minimal lows, will increase CHO ratios as follows:   7a- 5-->4.5   11a 4.5--> 3.5   4p-  6.5-->5.7   6p- 5--> 4.5   9p- 7 No change  - While some of her blood sugars may not be completely within target range/have some limitations of automated mode, her control is far superior to where she was while in manual mode with Dash. For now, will continue current regimen  - Reviewed upcoming pattern with 3rd trimester and accelerated growth of fetus/placenta and often increasing insulin resistance. Reviewed need to be diligent to maintain to blood glucose close to euglycemia to minimize risk of macrosomia and  complications at this point  - Reviewed blood sugar goals in pregnancy. Glucose goals during pregnancy according to the American Diabetes Association:  Fasting glucose 70-95 mg/dL AND  One-hour postprandial glucose 110-140 mg/dL or  Two-hour postprandial glucose 100-120 mg/dL  - Encouraged at least 30min of activity daily.  - She IS  following with MFM  - She is uptodate with ophthalmology evaluation. Instructed to return postpartum  - We reviewed risks of uncontrolled hyperglycemia during pregnancy, including but not limited to: gestational hypertension/preeclampsia, increased cesarian section rate, macrosomia, shoulder dystocia, still birth, and long term risk of development of diabetes in grown child    RTC- every 4 weeks until delivery. Follow weekly with diabetes educator.      A total of 29 minutes were spent today 22 on this visit including chart review, history and counseling, documentation and other activities as detailed above.           Answers for HPI/ROS submitted by the patient on 2022  General Symptoms: No  Skin Symptoms: No  HENT Symptoms: No  EYE SYMPTOMS: No  HEART SYMPTOMS: No  LUNG SYMPTOMS: No  INTESTINAL SYMPTOMS: No  URINARY SYMPTOMS: No  GYNECOLOGIC SYMPTOMS: No  BREAST SYMPTOMS: No  SKELETAL SYMPTOMS: No  BLOOD SYMPTOMS: No  NERVOUS SYSTEM SYMPTOMS: No  MENTAL HEALTH SYMPTOMS: No          Again, thank you for allowing me to participate in the care of your  patient.        Sincerely,        Shanon Thomas MD

## 2022-09-13 NOTE — PROGRESS NOTES
Video-Visit Details    Type of service:  Video Visit  Video Start Time: 7:31  Video End Time:7:49  Originating Location (pt. Location): [place], [place]  Distant Location (provider location):  Home  Platform used for Video Visit: Norm Thomas MD    Preeti Leiva is a 32 year old year old female here for evaluation of  diabetes in pregnancy via a billable video visit.    Currently: 26w0d  Estimated Date of Delivery: Dec 20, 2022  Baby: girl      1) Diabetes Mellitus in pregnancy, Type I    Diabetes History:  Diagnosis: Age 6  Hospitalizations: DKA, 2008 most recently, difficult time in college managing   Previous Regimens: almost always on pump, most recently Medtronic/Guardian, but didn't use automode-- kicked out frequently and didn't work through this  Current Regimen: OMNIPOD 5/DEXCOM        INTERVAL HISTORY:  - Overall feeling well, tired,   - Last fetal ultrasound 8/24 measuring gestational age  - Noting postprandial hyperglycemia,trying to bolus 20-30 min in advance, but difficult as doesn't plan meals that well/far in advance.       BG check- DEXCOM  Trends-         BP Readings from Last 3 Encounters:   09/08/22 123/77   08/11/22 128/78   07/07/22 125/74       Lab Results   Component Value Date    A1C 6.5 06/10/2022    A1C 8.6 02/10/2022    A1C 7.2 09/28/2021    A1C 9.4 08/20/2020    A1C 8.4 01/02/2020    A1C 7.8 01/29/2019       Wt Readings from Last 3 Encounters:   09/13/22 83 kg (183 lb)   09/08/22 83 kg (183 lb)   08/11/22 79.8 kg (176 lb)       Current Outpatient Medications   Medication Sig Dispense Refill     Continuous Blood Gluc Sensor (DEXCOM G6 SENSOR) MISC Change every 10 days. 9 each 3     Continuous Blood Gluc Transmit (DEXCOM G6 TRANSMITTER) MISC Change every 3 months. 1 each 3     Glucagon, rDNA, (GLUCAGON EMERGENCY) 1 MG KIT Inject 1 mg as directed daily as needed (hypoglycemia) 2 kit 11     Insulin Disposable Pump (OMNIPOD 5 G6 POD, GEN 5,) MISC 1 each every 3 days 30 each 11      insulin lispro (HUMALOG VIAL) 100 UNIT/ML vial INJECT 70 UNITS UNDER THE SKIN EVERY DAY VIA INSULIN PUMP 80 mL 3     Prenatal Vit-DSS-Fe Cbn-FA (PRENATAL AD PO)        sertraline (ZOLOFT) 100 MG tablet TAKE 2 TABLETS(200 MG) BY MOUTH DAILY 180 tablet 1     azithromycin (ZITHROMAX) 500 MG tablet Take 1 tablet (500 mg) by mouth daily 6 tablet 0     hydrOXYzine (ATARAX) 10 MG tablet TAKE 1 TABLET(10 MG) BY MOUTH EVERY 8 HOURS AS NEEDED FOR ANXIETY (Patient not taking: No sig reported) 60 tablet 0     metoclopramide (REGLAN) 5 MG tablet Take 1 tablet (5 mg) by mouth 3 times daily as needed 60 tablet 1          REVIEW OF SYSTEMS:   ROS: 10 point ROS neg other than the symptoms noted above in the HPI.      EXAM:  Physical Exam (visual exam)  VS:  no vital signs taken for video visit  CONSTITUTIONAL: healthy, alert and NAD, responding appropriately  ENT: normocephalic, no visual evidence of trauma, normal nose and oral mucosa  EYES: conjunctivae and sclerae normal, no exophthalmos or proptosis  THYROID:  no visualized nodules or goiter  LUNGS: no audible wheeze, cough or visible cyanosis, no visible retractions or increased work of breathing  EXTREMITIES: no hand tremors  NEUROLOGY: cranial nerves grossly intact with no obvious deficit.  SKIN:  no visualized skin lesions or rash, no edema visualized  PSYCH: mentation appears normal, normal judgement      ASSESSMENT/PLAN:    1) Diabetes Mellitus in pregnancy  - Glucose Control- NOT at goal   - AM fasting- ~100, will increase overnight basal, only helpful if not in automated mode   - Post prandial hyperglycemia- some but minimal lows, will increase CHO ratios as follows:   7a- 5-->4.5   11a 4.5--> 3.5   4p- 6.5-->5.7   6p- 5--> 4.5   9p- 7 No change  - While some of her blood sugars may not be completely within target range/have some limitations of automated mode, her control is far superior to where she was while in manual mode with Dash. For now, will continue current  regimen  - Reviewed upcoming pattern with 3rd trimester and accelerated growth of fetus/placenta and often increasing insulin resistance. Reviewed need to be diligent to maintain to blood glucose close to euglycemia to minimize risk of macrosomia and  complications at this point  - Reviewed blood sugar goals in pregnancy. Glucose goals during pregnancy according to the American Diabetes Association:  Fasting glucose 70-95 mg/dL AND  One-hour postprandial glucose 110-140 mg/dL or  Two-hour postprandial glucose 100-120 mg/dL  - Encouraged at least 30min of activity daily.  - She IS  following with MFM  - She is uptodate with ophthalmology evaluation. Instructed to return postpartum  - We reviewed risks of uncontrolled hyperglycemia during pregnancy, including but not limited to: gestational hypertension/preeclampsia, increased cesarian section rate, macrosomia, shoulder dystocia, still birth, and long term risk of development of diabetes in grown child    RTC- every 4 weeks until delivery. Follow weekly with diabetes educator.      A total of 29 minutes were spent today 22 on this visit including chart review, history and counseling, documentation and other activities as detailed above.           Answers for HPI/ROS submitted by the patient on 2022  General Symptoms: No  Skin Symptoms: No  HENT Symptoms: No  EYE SYMPTOMS: No  HEART SYMPTOMS: No  LUNG SYMPTOMS: No  INTESTINAL SYMPTOMS: No  URINARY SYMPTOMS: No  GYNECOLOGIC SYMPTOMS: No  BREAST SYMPTOMS: No  SKELETAL SYMPTOMS: No  BLOOD SYMPTOMS: No  NERVOUS SYSTEM SYMPTOMS: No  MENTAL HEALTH SYMPTOMS: No

## 2022-09-13 NOTE — NURSING NOTE
"Chief Complaint   Patient presents with     RECHECK     Type 1 diabetes mellitus with hyperglycemia (H) +1 more       Vitals:    09/13/22 0725   Weight: 83 kg (183 lb)   Height: 1.778 m (5' 10\")       Body mass index is 26.26 kg/m .    Darlene Harris, BRIANF    "

## 2022-09-14 ENCOUNTER — OFFICE VISIT (OUTPATIENT)
Dept: MATERNAL FETAL MEDICINE | Facility: CLINIC | Age: 32
End: 2022-09-14
Attending: OBSTETRICS & GYNECOLOGY
Payer: COMMERCIAL

## 2022-09-14 ENCOUNTER — HOSPITAL ENCOUNTER (OUTPATIENT)
Dept: ULTRASOUND IMAGING | Facility: CLINIC | Age: 32
Discharge: HOME | End: 2022-09-14
Attending: OBSTETRICS & GYNECOLOGY
Payer: COMMERCIAL

## 2022-09-14 DIAGNOSIS — O24.011 PRE-EXISTING TYPE 1 DIABETES MELLITUS DURING PREGNANCY IN FIRST TRIMESTER: ICD-10-CM

## 2022-09-14 DIAGNOSIS — O24.013 TYPE 1 DIABETES MELLITUS DURING PREGNANCY IN THIRD TRIMESTER: Primary | ICD-10-CM

## 2022-09-14 PROCEDURE — 76816 OB US FOLLOW-UP PER FETUS: CPT | Mod: 26 | Performed by: OBSTETRICS & GYNECOLOGY

## 2022-09-14 PROCEDURE — 76816 OB US FOLLOW-UP PER FETUS: CPT

## 2022-09-14 NOTE — PROGRESS NOTES
"Please see \"Imaging\" tab under \"Chart Review\" for details of today's US.    Bernarda Jewell, DO    "

## 2022-09-18 ENCOUNTER — HEALTH MAINTENANCE LETTER (OUTPATIENT)
Age: 32
End: 2022-09-18

## 2022-09-21 DIAGNOSIS — E10.65 TYPE 1 DIABETES MELLITUS WITH HYPERGLYCEMIA (H): ICD-10-CM

## 2022-09-21 RX ORDER — INSULIN PMP CART,AUT,G6/7,CNTR
1 EACH SUBCUTANEOUS EVERY OTHER DAY
Qty: 45 EACH | Refills: 11 | Status: SHIPPED | OUTPATIENT
Start: 2022-09-21 | End: 2022-11-09

## 2022-10-04 ENCOUNTER — PRENATAL OFFICE VISIT (OUTPATIENT)
Dept: OBGYN | Facility: CLINIC | Age: 32
End: 2022-10-04
Payer: COMMERCIAL

## 2022-10-04 VITALS
SYSTOLIC BLOOD PRESSURE: 132 MMHG | DIASTOLIC BLOOD PRESSURE: 82 MMHG | TEMPERATURE: 97.2 F | WEIGHT: 192 LBS | HEART RATE: 85 BPM | BODY MASS INDEX: 27.55 KG/M2

## 2022-10-04 DIAGNOSIS — Z23 NEED FOR TDAP VACCINATION: ICD-10-CM

## 2022-10-04 DIAGNOSIS — O24.912 DIABETES MELLITUS AFFECTING PREGNANCY IN SECOND TRIMESTER: Primary | ICD-10-CM

## 2022-10-04 LAB
ERYTHROCYTE [DISTWIDTH] IN BLOOD BY AUTOMATED COUNT: 13.6 % (ref 10–15)
HBA1C MFR BLD: 6.1 % (ref 0–5.6)
HCT VFR BLD AUTO: 39.3 % (ref 35–47)
HGB BLD-MCNC: 13.5 G/DL (ref 11.7–15.7)
MCH RBC QN AUTO: 31.3 PG (ref 26.5–33)
MCHC RBC AUTO-ENTMCNC: 34.4 G/DL (ref 31.5–36.5)
MCV RBC AUTO: 91 FL (ref 78–100)
PLATELET # BLD AUTO: 207 10E3/UL (ref 150–450)
RBC # BLD AUTO: 4.31 10E6/UL (ref 3.8–5.2)
WBC # BLD AUTO: 12.6 10E3/UL (ref 4–11)

## 2022-10-04 PROCEDURE — 36415 COLL VENOUS BLD VENIPUNCTURE: CPT | Performed by: OBSTETRICS & GYNECOLOGY

## 2022-10-04 PROCEDURE — 90715 TDAP VACCINE 7 YRS/> IM: CPT | Performed by: OBSTETRICS & GYNECOLOGY

## 2022-10-04 PROCEDURE — 90471 IMMUNIZATION ADMIN: CPT | Performed by: OBSTETRICS & GYNECOLOGY

## 2022-10-04 PROCEDURE — 83036 HEMOGLOBIN GLYCOSYLATED A1C: CPT | Performed by: OBSTETRICS & GYNECOLOGY

## 2022-10-04 PROCEDURE — 85027 COMPLETE CBC AUTOMATED: CPT | Performed by: OBSTETRICS & GYNECOLOGY

## 2022-10-04 PROCEDURE — 99207 PR PRENATAL VISIT: CPT | Performed by: OBSTETRICS & GYNECOLOGY

## 2022-10-05 NOTE — PROGRESS NOTES
Doing well.   Reports insulin needs increasing, but blood sugar control is better than it's ever been in her life. Recommend A1c, CBC today.   Rh positive. TDaP today.   Good fetal movement.   Reviewed last growth ultrasound - EFW 99%ile, AC 98%ile. AC=HC. Discussed when CS may be recommended or considered.   Has growth US next week. Will start scheduling BPPs then will schedule next appointment with me in coordination with BPP at [facility].   RTC 3 weeks

## 2022-10-06 ENCOUNTER — MYC MEDICAL ADVICE (OUTPATIENT)
Dept: EDUCATION SERVICES | Facility: CLINIC | Age: 32
End: 2022-10-06

## 2022-10-07 NOTE — TELEPHONE ENCOUNTER
Type 1 Diabetes in pregnancy Follow-up    Subjective/Objective:    Preeti TAMAR Leiva sent in blood glucose log for review. Last date of communication was: 9/20/22.    Gestational diabetes is being managed with diet, activity and medications    Taking diabetes medications:   yes:     Diabetes Medication(s)     Diabetic Other       Glucagon, rDNA, (GLUCAGON EMERGENCY) 1 MG KIT    Inject 1 mg as directed daily as needed (hypoglycemia)    Insulin       insulin lispro (HUMALOG VIAL) 100 UNIT/ML vial    INJECT 70 UNITS UNDER THE SKIN EVERY DAY VIA INSULIN PUMP          Estimated Date of Delivery: Dec 20, 2022    BG/Food Log:             Assessment:  Post lunch and post dinner tend to run above target, with dinner high carrying into overnight.  Recommend decrease to 11am, 4pm, 6pm, and 9pm ICR.     Plan/Response:  Recommend the following pump changes to the Insulin to Carbohydrate Ratio:  11am: 3.5g -->3g  4pm: 5.7g -->5g  6pm: 4g-->3.5g  9pm: 7g-->6g    Endo visit scheduled 10/11/22    Marielena Appiah MS, RD, LD, CDE      Any diabetes medication dose changes were made via the CDE Protocol and Collaborative Practice Agreement with the patient's OB/GYN provider. A copy of this encounter was shared with the provider.

## 2022-10-11 ENCOUNTER — VIRTUAL VISIT (OUTPATIENT)
Dept: ENDOCRINOLOGY | Facility: CLINIC | Age: 32
End: 2022-10-11
Payer: COMMERCIAL

## 2022-10-11 DIAGNOSIS — O24.013 PRE-EXISTING TYPE 1 DIABETES MELLITUS DURING PREGNANCY IN THIRD TRIMESTER: Primary | ICD-10-CM

## 2022-10-11 PROCEDURE — 99215 OFFICE O/P EST HI 40 MIN: CPT | Mod: GT | Performed by: INTERNAL MEDICINE

## 2022-10-11 PROCEDURE — 95251 CONT GLUC MNTR ANALYSIS I&R: CPT | Performed by: INTERNAL MEDICINE

## 2022-10-11 NOTE — PROGRESS NOTES
Video-Visit Details    Type of service:  Video Visit  Video Start Time: 07:31  Video End Time:8:01  Originating Location (pt. Location): [place], [place]  Distant Location (provider location):  Home  Platform used for Video Visit: Norm Thomas MD    Preeti Leiva is a 32 year old year old female here for evaluation of  diabetes in pregnancy via a billable video visit. She was last seen by me 9/13/2022    Currently: 30w0d  Estimated Date of Delivery: Dec 20, 2022  Baby: girl       1) Diabetes Mellitus in pregnancy    Diabetes History:  Diagnosis: Age 6  Hospitalizations: DKA, 2008 most recently, difficult time in college managing   Previous Regimens: almost always on pump, most recently Medtronic/Guardian, but didn't use automode-- kicked out frequently and didn't work through this  Current Regimen: OMNIPOD 5/DEXCOM            INTERVAL HISTORY:  - Next ultrasound tomorrow, baby measuring 99th percentile  - likely delivering early via csection if baby keeps measuring at this   - hoping to deliver vaginally, wants to keep pod on if possible during delivery      BG check- Dexcom  Trends-             BP Readings from Last 3 Encounters:   10/04/22 132/82   09/08/22 123/77   08/11/22 128/78       Lab Results   Component Value Date    A1C 6.1 10/04/2022    A1C 6.5 06/10/2022    A1C 8.6 02/10/2022    A1C 9.4 08/20/2020    A1C 8.4 01/02/2020    A1C 7.8 01/29/2019       Wt Readings from Last 3 Encounters:   10/04/22 87.1 kg (192 lb)   09/13/22 83 kg (183 lb)   09/08/22 83 kg (183 lb)       Current Outpatient Medications   Medication Sig Dispense Refill     Continuous Blood Gluc Sensor (DEXCOM G6 SENSOR) MISC Change every 10 days. 9 each 3     Continuous Blood Gluc Transmit (DEXCOM G6 TRANSMITTER) MISC Change every 3 months. 1 each 3     Glucagon, rDNA, (GLUCAGON EMERGENCY) 1 MG KIT Inject 1 mg as directed daily as needed (hypoglycemia) 2 kit 11     Insulin Disposable Pump (OMNIPOD 5 G6 POD, GEN 5,) MISC 1 each every  other day 45 each 11     insulin lispro (HUMALOG VIAL) 100 UNIT/ML vial INJECT 70 UNITS UNDER THE SKIN EVERY DAY VIA INSULIN PUMP 80 mL 3     Prenatal Vit-DSS-Fe Cbn-FA (PRENATAL AD PO)        sertraline (ZOLOFT) 100 MG tablet TAKE 2 TABLETS(200 MG) BY MOUTH DAILY 180 tablet 1          REVIEW OF SYSTEMS:   ROS: 10 point ROS neg other than the symptoms noted above in the HPI.      EXAM:  Physical Exam (visual exam)  VS:  no vital signs taken for video visit  CONSTITUTIONAL: healthy, alert and NAD, responding appropriately  ENT: normocephalic, no visual evidence of trauma, normal nose and oral mucosa  EYES: conjunctivae and sclerae normal, no exophthalmos or proptosis  THYROID:  no visualized nodules or goiter  LUNGS: no audible wheeze, cough or visible cyanosis, no visible retractions or increased work of breathing  EXTREMITIES: no hand tremors  NEUROLOGY: cranial nerves grossly intact with no obvious deficit.  SKIN:  no visualized skin lesions or rash, no edema visualized  PSYCH: mentation appears normal, normal judgement      ASSESSMENT/PLAN:    1) Diabetes Mellitus in pregnancy  - Glucose Control- NOT at goal   - Increase basal rate- 10% overnight, 20% daytime   - Increase ISF by 20% across the board   - Increase CHO by 10% at 7a, and by 20% all other times  - Created basal 2 for post delivery settings  - Discussed delivery plan --   - GOAL- wear Omnipod during delivery to maintain control of her own blood glucoses   - We discussed this is reasonable as long as she maintains good control of her blood sugars-- She would need to maintain BG , if she is over 180 on 2 checks would be agreeable to insulin drip   - If she needs , will use insulin drip   - If she is unable to control her own blood sugars (loss of consciousness, etc) she would use insulin drip.   - Will restart Omnipod immediately after delivery (if go on insulin drip will suspend but not remove omnipod, will bring additional supplies to  restart), have created separate basal setting for post-delivery.  - Will discuss with [facility] resetting the algorithm to relearn her habits post delivery   - Reviewed blood sugar goals in pregnancy. Glucose goals during pregnancy according to the American Diabetes Association:  Fasting glucose 70-95 mg/dL AND  One-hour postprandial glucose 110-140 mg/dL or  Two-hour postprandial glucose 100-120 mg/dL  - Encouraged at least 30min of activity daily.    RTC- every 4 weeks until delivery. Follow weekly with diabetes educator.      A total of 42 minutes were spent today 10/11/22 on this visit including chart review, history and counseling, documentation and other activities as detailed above.             Answers for HPI/ROS submitted by the patient on 10/11/2022  General Symptoms: No  Skin Symptoms: No  HENT Symptoms: No  EYE SYMPTOMS: No  HEART SYMPTOMS: No  LUNG SYMPTOMS: No  INTESTINAL SYMPTOMS: No  URINARY SYMPTOMS: No  GYNECOLOGIC SYMPTOMS: No  BREAST SYMPTOMS: No  SKELETAL SYMPTOMS: No  BLOOD SYMPTOMS: No  NERVOUS SYSTEM SYMPTOMS: No  MENTAL HEALTH SYMPTOMS: No

## 2022-10-11 NOTE — LETTER
10/11/2022         RE: Preeti Leiva  [address]  [facility] [address]        Dear Colleague,    Thank you for referring your patient, Preeti Leiva, to the [facility] [place]. Please see a copy of my visit note below.      Video-Visit Details    Type of service:  Video Visit  Video Start Time: 07:31  Video End Time:8:01  Originating Location (pt. Location): Home, [place]  Distant Location (provider location):  Home  Platform used for Video Visit: Norm Thomas MD    Preeti Leiva is a 32 year old year old female here for evaluation of  diabetes in pregnancy via a billable video visit. She was last seen by me 9/13/2022    Currently: 30w0d  Estimated Date of Delivery: Dec 20, 2022  Baby: girl       1) Diabetes Mellitus in pregnancy    Diabetes History:  Diagnosis: Age 6  Hospitalizations: DKA, 2008 most recently, difficult time in college managing   Previous Regimens: almost always on pump, most recently Medtronic/Guardian, but didn't use automode-- kicked out frequently and didn't work through this  Current Regimen: OMNIPOD 5/DEXCOM            INTERVAL HISTORY:  - Next ultrasound tomorrow, baby measuring 99th percentile  - likely delivering early via csection if baby keeps measuring at this   - hoping to deliver vaginally, wants to keep pod on if possible during delivery      BG check- Dexcom  Trends-             BP Readings from Last 3 Encounters:   10/04/22 132/82   09/08/22 123/77   08/11/22 128/78       Lab Results   Component Value Date    A1C 6.1 10/04/2022    A1C 6.5 06/10/2022    A1C 8.6 02/10/2022    A1C 9.4 08/20/2020    A1C 8.4 01/02/2020    A1C 7.8 01/29/2019       Wt Readings from Last 3 Encounters:   10/04/22 87.1 kg (192 lb)   09/13/22 83 kg (183 lb)   09/08/22 83 kg (183 lb)       Current Outpatient Medications   Medication Sig Dispense Refill     Continuous Blood Gluc Sensor (DEXCOM G6 SENSOR) MISC Change every 10 days. 9 each 3     Continuous  Blood Gluc Transmit (DEXCOM G6 TRANSMITTER) MISC Change every 3 months. 1 each 3     Glucagon, rDNA, (GLUCAGON EMERGENCY) 1 MG KIT Inject 1 mg as directed daily as needed (hypoglycemia) 2 kit 11     Insulin Disposable Pump (OMNIPOD 5 G6 POD, GEN 5,) MISC 1 each every other day 45 each 11     insulin lispro (HUMALOG VIAL) 100 UNIT/ML vial INJECT 70 UNITS UNDER THE SKIN EVERY DAY VIA INSULIN PUMP 80 mL 3     Prenatal Vit-DSS-Fe Cbn-FA (PRENATAL AD PO)        sertraline (ZOLOFT) 100 MG tablet TAKE 2 TABLETS(200 MG) BY MOUTH DAILY 180 tablet 1          REVIEW OF SYSTEMS:   ROS: 10 point ROS neg other than the symptoms noted above in the HPI.      EXAM:  Physical Exam (visual exam)  VS:  no vital signs taken for video visit  CONSTITUTIONAL: healthy, alert and NAD, responding appropriately  ENT: normocephalic, no visual evidence of trauma, normal nose and oral mucosa  EYES: conjunctivae and sclerae normal, no exophthalmos or proptosis  THYROID:  no visualized nodules or goiter  LUNGS: no audible wheeze, cough or visible cyanosis, no visible retractions or increased work of breathing  EXTREMITIES: no hand tremors  NEUROLOGY: cranial nerves grossly intact with no obvious deficit.  SKIN:  no visualized skin lesions or rash, no edema visualized  PSYCH: mentation appears normal, normal judgement      ASSESSMENT/PLAN:    1) Diabetes Mellitus in pregnancy  - Glucose Control- NOT at goal   - Increase basal rate- 10% overnight, 20% daytime   - Increase ISF by 20% across the board   - Increase CHO by 10% at 7a, and by 20% all other times  - Created basal 2 for post delivery settings  - Discussed delivery plan --   - GOAL- wear Omnipod during delivery to maintain control of her own blood glucoses   - We discussed this is reasonable as long as she maintains good control of her blood sugars-- She would need to maintain BG , if she is over 180 on 2 checks would be agreeable to insulin drip   - If she needs , will use  insulin drip   - If she is unable to control her own blood sugars (loss of consciousness, etc) she would use insulin drip.   - Will restart Omnipod immediately after delivery (if go on insulin drip will suspend but not remove omnipod, will bring additional supplies to restart), have created separate basal setting for post-delivery.  - Will discuss with Insulet resetting the algorithm to relearn her habits post delivery   - Reviewed blood sugar goals in pregnancy. Glucose goals during pregnancy according to the American Diabetes Association:  Fasting glucose 70-95 mg/dL AND  One-hour postprandial glucose 110-140 mg/dL or  Two-hour postprandial glucose 100-120 mg/dL  - Encouraged at least 30min of activity daily.    RTC- every 4 weeks until delivery. Follow weekly with diabetes educator.      A total of 42 minutes were spent today 10/11/22 on this visit including chart review, history and counseling, documentation and other activities as detailed above.             Answers for HPI/ROS submitted by the patient on 10/11/2022  General Symptoms: No  Skin Symptoms: No  HENT Symptoms: No  EYE SYMPTOMS: No  HEART SYMPTOMS: No  LUNG SYMPTOMS: No  INTESTINAL SYMPTOMS: No  URINARY SYMPTOMS: No  GYNECOLOGIC SYMPTOMS: No  BREAST SYMPTOMS: No  SKELETAL SYMPTOMS: No  BLOOD SYMPTOMS: No  NERVOUS SYSTEM SYMPTOMS: No  MENTAL HEALTH SYMPTOMS: No          Again, thank you for allowing me to participate in the care of your patient.        Sincerely,        Shanon Thomas MD

## 2022-10-12 ENCOUNTER — HOSPITAL ENCOUNTER (OUTPATIENT)
Dept: ULTRASOUND IMAGING | Facility: CLINIC | Age: 32
Discharge: HOME | End: 2022-10-12
Attending: OBSTETRICS & GYNECOLOGY
Payer: COMMERCIAL

## 2022-10-12 ENCOUNTER — OFFICE VISIT (OUTPATIENT)
Dept: MATERNAL FETAL MEDICINE | Facility: CLINIC | Age: 32
End: 2022-10-12
Attending: OBSTETRICS & GYNECOLOGY
Payer: COMMERCIAL

## 2022-10-12 VITALS — SYSTOLIC BLOOD PRESSURE: 132 MMHG | DIASTOLIC BLOOD PRESSURE: 80 MMHG

## 2022-10-12 DIAGNOSIS — O40.3XX0 POLYHYDRAMNIOS IN THIRD TRIMESTER COMPLICATION, SINGLE OR UNSPECIFIED FETUS: ICD-10-CM

## 2022-10-12 DIAGNOSIS — O24.013 TYPE 1 DIABETES MELLITUS DURING PREGNANCY IN THIRD TRIMESTER: ICD-10-CM

## 2022-10-12 DIAGNOSIS — O24.013 TYPE 1 DIABETES MELLITUS DURING PREGNANCY IN THIRD TRIMESTER: Primary | ICD-10-CM

## 2022-10-12 PROCEDURE — 76816 OB US FOLLOW-UP PER FETUS: CPT | Mod: 26 | Performed by: OBSTETRICS & GYNECOLOGY

## 2022-10-12 PROCEDURE — 76816 OB US FOLLOW-UP PER FETUS: CPT

## 2022-10-12 NOTE — NURSING NOTE
Pt c/o slight swelling in hands/feet.  Denies other signs/symptoms of preeclampsia.  BP in clinic 132/80- consistent with prenatal office visit readings and reported BP at home yesterday. Encouraged pt to continue to monitor symptoms and contact PCP with any concerns.  Pt and partner aware and agree with plan.

## 2022-10-12 NOTE — PROGRESS NOTES
"Please see \"Imaging\" tab under \"Chart Review\" for details of today's visit.    Derik Shaffer    "

## 2022-10-14 ENCOUNTER — PRENATAL OFFICE VISIT (OUTPATIENT)
Dept: OBGYN | Facility: CLINIC | Age: 32
End: 2022-10-14
Payer: COMMERCIAL

## 2022-10-14 VITALS
SYSTOLIC BLOOD PRESSURE: 134 MMHG | HEART RATE: 98 BPM | OXYGEN SATURATION: 100 % | WEIGHT: 198 LBS | BODY MASS INDEX: 28.41 KG/M2 | DIASTOLIC BLOOD PRESSURE: 80 MMHG

## 2022-10-14 DIAGNOSIS — O14.93 PRE-ECLAMPSIA IN THIRD TRIMESTER: ICD-10-CM

## 2022-10-14 DIAGNOSIS — O09.93 HIGH-RISK PREGNANCY IN THIRD TRIMESTER: Primary | ICD-10-CM

## 2022-10-14 DIAGNOSIS — R60.0 BILATERAL LOWER EXTREMITY EDEMA: ICD-10-CM

## 2022-10-14 DIAGNOSIS — O12.03 EDEMA DURING PREGNANCY IN THIRD TRIMESTER: ICD-10-CM

## 2022-10-14 LAB
ALT SERPL W P-5'-P-CCNC: 18 U/L (ref 0–50)
AST SERPL W P-5'-P-CCNC: 22 U/L (ref 0–45)
CREAT SERPL-MCNC: 0.6 MG/DL (ref 0.52–1.04)
CREAT UR-MCNC: 44 MG/DL
ERYTHROCYTE [DISTWIDTH] IN BLOOD BY AUTOMATED COUNT: 13.5 % (ref 10–15)
GFR SERPL CREATININE-BSD FRML MDRD: >90 ML/MIN/1.73M2
HCT VFR BLD AUTO: 36.5 % (ref 35–47)
HGB BLD-MCNC: 12.5 G/DL (ref 11.7–15.7)
MCH RBC QN AUTO: 30.6 PG (ref 26.5–33)
MCHC RBC AUTO-ENTMCNC: 34.2 G/DL (ref 31.5–36.5)
MCV RBC AUTO: 89 FL (ref 78–100)
PLATELET # BLD AUTO: 197 10E3/UL (ref 150–450)
PROT UR-MCNC: 0.18 G/L
PROT/CREAT 24H UR: 0.41 G/G CR (ref 0–0.2)
RBC # BLD AUTO: 4.09 10E6/UL (ref 3.8–5.2)
WBC # BLD AUTO: 11.3 10E3/UL (ref 4–11)

## 2022-10-14 PROCEDURE — 84156 ASSAY OF PROTEIN URINE: CPT | Performed by: OBSTETRICS & GYNECOLOGY

## 2022-10-14 PROCEDURE — 84460 ALANINE AMINO (ALT) (SGPT): CPT | Performed by: OBSTETRICS & GYNECOLOGY

## 2022-10-14 PROCEDURE — 99207 PR PRENATAL VISIT: CPT | Performed by: OBSTETRICS & GYNECOLOGY

## 2022-10-14 PROCEDURE — 36415 COLL VENOUS BLD VENIPUNCTURE: CPT | Performed by: OBSTETRICS & GYNECOLOGY

## 2022-10-14 PROCEDURE — 85027 COMPLETE CBC AUTOMATED: CPT | Performed by: OBSTETRICS & GYNECOLOGY

## 2022-10-14 PROCEDURE — 82565 ASSAY OF CREATININE: CPT | Performed by: OBSTETRICS & GYNECOLOGY

## 2022-10-14 PROCEDURE — 84450 TRANSFERASE (AST) (SGOT): CPT | Performed by: OBSTETRICS & GYNECOLOGY

## 2022-10-14 NOTE — PROGRESS NOTES
30w3d  Acute visit today due to concern for swelling and possible RUQ pain.  Swelling worse over the last week.  Used ACE wraps one evening to help.  Rings feeling tight.  Denies HA, vision changes, CP/SOB.  Has some tenderness to RUQ, but baby is big and breech, so unsure if it's just rib soreness from baby.  Extensively discussed pre-eclampsia, s/sx as well as possible outcomes depending on severity of illness.  Initial BP today elevated, but repeat normal and patient reports normal BP at home.  Will check labs today.  Ordered stat and will send FYI to on call MD in case results need more urgent follow-up (feel this is unlikely to be the case)  Gave info re pre-e in AVS, as well as things to look out for.  Ordered compression stockings for swelling.  Would like to try knee high stockings first.  RTC 1w.  Edel Manning MD

## 2022-10-14 NOTE — Clinical Note
Saw this patient this aft for pre-e eval.  Ordered stat labs and don't suspect the results will warrant urgent follow-up, but do you ladies mind checking in on her chart tonight/tomorrow, assuming they won't come in while I'm still in the office?  Thanks, Kerri

## 2022-10-14 NOTE — PATIENT INSTRUCTIONS
Symptoms to look out for:  headache despite taking Tylenol and drinking a big glass of water or two; vision changes; chest pain/shortness of breath, even at rest; right upper quadrant pain     Severe range/most concerning blood pressure:  great than 160 on the top, or greater than 110 on the bottom.      Mild range/mildly elevated blood pressure:  greater than 140/90    If any concerns regarding the above, please do call right away.  [phone]

## 2022-10-19 ENCOUNTER — PRENATAL OFFICE VISIT (OUTPATIENT)
Dept: OBGYN | Facility: CLINIC | Age: 32
End: 2022-10-19
Payer: COMMERCIAL

## 2022-10-19 VITALS
DIASTOLIC BLOOD PRESSURE: 90 MMHG | OXYGEN SATURATION: 97 % | HEART RATE: 104 BPM | SYSTOLIC BLOOD PRESSURE: 134 MMHG | WEIGHT: 192 LBS | BODY MASS INDEX: 27.55 KG/M2

## 2022-10-19 DIAGNOSIS — O09.93 HIGH-RISK PREGNANCY IN THIRD TRIMESTER: Primary | ICD-10-CM

## 2022-10-19 DIAGNOSIS — Z23 NEED FOR COVID-19 VACCINE: ICD-10-CM

## 2022-10-19 DIAGNOSIS — O14.93 PRE-ECLAMPSIA IN THIRD TRIMESTER: ICD-10-CM

## 2022-10-19 LAB
ALT SERPL W P-5'-P-CCNC: 18 U/L (ref 0–50)
AST SERPL W P-5'-P-CCNC: 20 U/L (ref 0–45)
CREAT SERPL-MCNC: 0.62 MG/DL (ref 0.52–1.04)
ERYTHROCYTE [DISTWIDTH] IN BLOOD BY AUTOMATED COUNT: 13.1 % (ref 10–15)
GFR SERPL CREATININE-BSD FRML MDRD: >90 ML/MIN/1.73M2
HCT VFR BLD AUTO: 36.8 % (ref 35–47)
HGB BLD-MCNC: 12.4 G/DL (ref 11.7–15.7)
MCH RBC QN AUTO: 29.8 PG (ref 26.5–33)
MCHC RBC AUTO-ENTMCNC: 33.7 G/DL (ref 31.5–36.5)
MCV RBC AUTO: 89 FL (ref 78–100)
PLATELET # BLD AUTO: 181 10E3/UL (ref 150–450)
RBC # BLD AUTO: 4.16 10E6/UL (ref 3.8–5.2)
WBC # BLD AUTO: 11.1 10E3/UL (ref 4–11)

## 2022-10-19 PROCEDURE — 99207 PR PRENATAL VISIT: CPT | Performed by: OBSTETRICS & GYNECOLOGY

## 2022-10-19 PROCEDURE — 82565 ASSAY OF CREATININE: CPT | Performed by: OBSTETRICS & GYNECOLOGY

## 2022-10-19 PROCEDURE — 85027 COMPLETE CBC AUTOMATED: CPT | Performed by: OBSTETRICS & GYNECOLOGY

## 2022-10-19 PROCEDURE — 0124A PR ADMIN COVID VAC PFIZER 12+ BIVAL ADDITIONAL: CPT | Performed by: OBSTETRICS & GYNECOLOGY

## 2022-10-19 PROCEDURE — 36415 COLL VENOUS BLD VENIPUNCTURE: CPT | Performed by: OBSTETRICS & GYNECOLOGY

## 2022-10-19 PROCEDURE — 84450 TRANSFERASE (AST) (SGOT): CPT | Performed by: OBSTETRICS & GYNECOLOGY

## 2022-10-19 PROCEDURE — 91312 PR COVID VAC PFIZER BIVAL 30 MCG/0.3 ML IM: CPT | Performed by: OBSTETRICS & GYNECOLOGY

## 2022-10-19 PROCEDURE — 84460 ALANINE AMINO (ALT) (SGPT): CPT | Performed by: OBSTETRICS & GYNECOLOGY

## 2022-10-19 NOTE — PROGRESS NOTES
31w1d  Last night had pain on right side, but thinks it was due to baby's movement/position.  Asymptomatic today.  Mild range BP today.  Discussed may need to start anti-hypertensive before delivery, but wouldn't recommend it quite yet.  Will repeat labs today.  BPP scheduled to start at MFM next week.  Ordered one for this week in our clinic.  Doesn't need visit after.  Aware of need for IOL at 37w, sooner if severe features.  Bivalent COVID booster.  RTC weekly until delivery.  Edel Manning MD

## 2022-10-24 ENCOUNTER — OFFICE VISIT (OUTPATIENT)
Dept: MATERNAL FETAL MEDICINE | Facility: CLINIC | Age: 32
End: 2022-10-24
Attending: OBSTETRICS & GYNECOLOGY
Payer: COMMERCIAL

## 2022-10-24 ENCOUNTER — HOSPITAL ENCOUNTER (OUTPATIENT)
Dept: ULTRASOUND IMAGING | Facility: CLINIC | Age: 32
Discharge: HOME | End: 2022-10-24
Attending: OBSTETRICS & GYNECOLOGY
Payer: COMMERCIAL

## 2022-10-24 ENCOUNTER — TELEPHONE (OUTPATIENT)
Dept: OBGYN | Facility: CLINIC | Age: 32
End: 2022-10-24

## 2022-10-24 DIAGNOSIS — O24.013 TYPE 1 DIABETES MELLITUS DURING PREGNANCY IN THIRD TRIMESTER: ICD-10-CM

## 2022-10-24 DIAGNOSIS — O24.013 TYPE 1 DIABETES MELLITUS DURING PREGNANCY IN THIRD TRIMESTER: Primary | ICD-10-CM

## 2022-10-24 DIAGNOSIS — O09.93 HIGH-RISK PREGNANCY IN THIRD TRIMESTER: Primary | ICD-10-CM

## 2022-10-24 DIAGNOSIS — O14.93 PRE-ECLAMPSIA IN THIRD TRIMESTER: ICD-10-CM

## 2022-10-24 PROCEDURE — 76819 FETAL BIOPHYS PROFIL W/O NST: CPT | Mod: 26 | Performed by: OBSTETRICS & GYNECOLOGY

## 2022-10-24 PROCEDURE — 76819 FETAL BIOPHYS PROFIL W/O NST: CPT

## 2022-10-24 NOTE — TELEPHONE ENCOUNTER
Preeti is 31w6d, needing twice weekly BPP w/NST and weekly prenatal visits with RD OB for duration of pregnancy.  Has testing scheduled with MFM.  Message from M that patient is having trouble getting weekly prenatal visits to coordinate with days she has appointments with MFM    Route to  -please assist with scheduling

## 2022-10-25 NOTE — TELEPHONE ENCOUNTER
Called 10/25 pt wants to know if she could do e-visits for her next few prenatal appts. She has extremely limited availability and our schedule does not work around her MFM appts because they're so late in the afternoon and she's unable to come in the morning.

## 2022-10-26 ENCOUNTER — MYC MEDICAL ADVICE (OUTPATIENT)
Dept: OBGYN | Facility: CLINIC | Age: 32
End: 2022-10-26

## 2022-10-26 NOTE — TELEPHONE ENCOUNTER
"Called patient to help schedule next few PNVs.     Lab scheduled after MFM on TH 10/27/22, phone visit w/ AO on F 10/28/22 (aware AO is coming out of surgery, could be delayed in calling).     Patient wanted to try to schedule PNVs either before or after MFM appts with either AO or SK only, does not want to see \"a bunch of doctors who don't know me since I'm high risk\".     Unable to coordinate before or after MFM appts so scheduled on different days with AO.     PNV schedule week of Thanksgiving with RR, AO off all week and SK already scheduled with 26 patients 11/23/22. Scheduled with RR 11/21/22 and added to wait list in case something cancels with SK on 11/23/22.     Routing to AO for update.       Kirstin Brareto/her/hers  [facility] Scheduler     "

## 2022-10-26 NOTE — TELEPHONE ENCOUNTER
Needs weekly labs and blood pressure.   She could do phone visit with me Friday (can put after hysteroscopy) if she can do lab only Thursday before or after MFM for CBC, AST, ALT, creatinine

## 2022-10-27 ENCOUNTER — MYC MEDICAL ADVICE (OUTPATIENT)
Dept: EDUCATION SERVICES | Facility: CLINIC | Age: 32
End: 2022-10-27

## 2022-10-27 ENCOUNTER — HOSPITAL ENCOUNTER (OUTPATIENT)
Dept: ULTRASOUND IMAGING | Facility: CLINIC | Age: 32
Discharge: HOME | End: 2022-10-27
Attending: OBSTETRICS & GYNECOLOGY
Payer: COMMERCIAL

## 2022-10-27 ENCOUNTER — LAB (OUTPATIENT)
Dept: LAB | Facility: CLINIC | Age: 32
End: 2022-10-27
Payer: COMMERCIAL

## 2022-10-27 ENCOUNTER — OFFICE VISIT (OUTPATIENT)
Dept: MATERNAL FETAL MEDICINE | Facility: CLINIC | Age: 32
End: 2022-10-27
Attending: OBSTETRICS & GYNECOLOGY
Payer: COMMERCIAL

## 2022-10-27 DIAGNOSIS — O24.013 TYPE 1 DIABETES MELLITUS DURING PREGNANCY IN THIRD TRIMESTER: Primary | ICD-10-CM

## 2022-10-27 DIAGNOSIS — O14.93 PRE-ECLAMPSIA IN THIRD TRIMESTER: ICD-10-CM

## 2022-10-27 DIAGNOSIS — O24.013 TYPE 1 DIABETES MELLITUS DURING PREGNANCY IN THIRD TRIMESTER: ICD-10-CM

## 2022-10-27 LAB
ERYTHROCYTE [DISTWIDTH] IN BLOOD BY AUTOMATED COUNT: 13.1 % (ref 10–15)
HCT VFR BLD AUTO: 34.7 % (ref 35–47)
HGB BLD-MCNC: 11.6 G/DL (ref 11.7–15.7)
MCH RBC QN AUTO: 29.2 PG (ref 26.5–33)
MCHC RBC AUTO-ENTMCNC: 33.4 G/DL (ref 31.5–36.5)
MCV RBC AUTO: 87 FL (ref 78–100)
PLATELET # BLD AUTO: 202 10E3/UL (ref 150–450)
RBC # BLD AUTO: 3.97 10E6/UL (ref 3.8–5.2)
WBC # BLD AUTO: 12.3 10E3/UL (ref 4–11)

## 2022-10-27 PROCEDURE — 76819 FETAL BIOPHYS PROFIL W/O NST: CPT

## 2022-10-27 PROCEDURE — 84450 TRANSFERASE (AST) (SGOT): CPT

## 2022-10-27 PROCEDURE — 85027 COMPLETE CBC AUTOMATED: CPT

## 2022-10-27 PROCEDURE — 84460 ALANINE AMINO (ALT) (SGPT): CPT

## 2022-10-27 PROCEDURE — 76819 FETAL BIOPHYS PROFIL W/O NST: CPT | Mod: 26 | Performed by: OBSTETRICS & GYNECOLOGY

## 2022-10-27 PROCEDURE — 36415 COLL VENOUS BLD VENIPUNCTURE: CPT

## 2022-10-27 PROCEDURE — 82565 ASSAY OF CREATININE: CPT

## 2022-10-27 NOTE — TELEPHONE ENCOUNTER
Type 1 Diabetes during pregnancy Follow-up    Subjective/Objective:    Preeti BOYLE Oliverderrell sent in blood glucose log for review. Last date of communication was: 10/6/22.    Gestational diabetes is being managed with diet, activity and medications    Taking diabetes medications:   yes:     Diabetes Medication(s)     Diabetic Other       Glucagon, rDNA, (GLUCAGON EMERGENCY) 1 MG KIT    Inject 1 mg as directed daily as needed (hypoglycemia)    Insulin       insulin lispro (HUMALOG VIAL) 100 UNIT/ML vial    INJECT 70 UNITS UNDER THE SKIN EVERY DAY VIA INSULIN PUMP          Estimated Date of Delivery: Dec 20, 2022    BG/Food Log:               Assessment:    TTR % has fallen to 60% and average glucose at 136. Need more mealtime coverage and unceratin if automated dosing is working well for patient - overnights appear a bit high. Could consider basal insulin shot daily to supplement automated mode. Will hold off on this suggestion for now but could consider it. Will strengthen ICR and ISF today.     Plan/Response:  ICR:   12A no change  7A --> 5:30A 3.6 g/u --> 3.0 g/u  11A 2.4 --> 2.0  4P no change   6P no change  9P no change    ISF:   12A 30 --> 27  7A 25 --> 22  3P 25 --> 22  6P 24 --> 21    Follow up in 1 week for review     Lindsey Benjamin RD, LD, [facility]     Any diabetes medication dose changes were made via the CDE Protocol and Collaborative Practice Agreement with the patient's endocrinology provider. A copy of this encounter was shared with the provider.

## 2022-10-28 ENCOUNTER — PRENATAL OFFICE VISIT (OUTPATIENT)
Dept: OBGYN | Facility: CLINIC | Age: 32
End: 2022-10-28
Payer: COMMERCIAL

## 2022-10-28 ENCOUNTER — TELEPHONE (OUTPATIENT)
Dept: OBGYN | Facility: CLINIC | Age: 32
End: 2022-10-28

## 2022-10-28 DIAGNOSIS — O14.93 PRE-ECLAMPSIA IN THIRD TRIMESTER: Primary | ICD-10-CM

## 2022-10-28 DIAGNOSIS — O24.912 DIABETES MELLITUS AFFECTING PREGNANCY IN SECOND TRIMESTER: ICD-10-CM

## 2022-10-28 LAB
ALT SERPL W P-5'-P-CCNC: 20 U/L (ref 0–50)
AST SERPL W P-5'-P-CCNC: 23 U/L (ref 0–45)
CREAT SERPL-MCNC: 0.5 MG/DL (ref 0.52–1.04)
GFR SERPL CREATININE-BSD FRML MDRD: >90 ML/MIN/1.73M2

## 2022-10-28 PROCEDURE — 99207 PR PRENATAL VISIT: CPT | Performed by: OBSTETRICS & GYNECOLOGY

## 2022-10-28 RX ORDER — ASPIRIN 81 MG/1
81 TABLET, CHEWABLE ORAL DAILY
Status: ON HOLD | COMMUNITY
End: 2022-12-01

## 2022-10-28 NOTE — PROGRESS NOTES
Telephone visit for prenatal care.     Doing well.   Good fetal movement.   Getting uncomfortable from size of baby, but hanging in there.   Now cephalic presentation on BPPs.     Blood sugars are ok, needing to go up on insulin.   No headaches, vision changes, RUQ pain.   Forgot to have MFM check her BP. Will check tonight and send to me.   Labs from yesterday still pending.   Discussed IOL at 37 weeks, sooner as indicated by pre-eclampsia. Plan 11/29.     RTC weekly    Phone-Visit Details    Type of service:  Phone Visit    Originating Location (pt. Location): Home    Distant Location (provider location): On site    Mode of Communication: Telephone visit    Telephone time:16min

## 2022-10-29 VITALS — SYSTOLIC BLOOD PRESSURE: 135 MMHG | DIASTOLIC BLOOD PRESSURE: 81 MMHG | BODY MASS INDEX: 28.01 KG/M2 | WEIGHT: 195.2 LBS

## 2022-10-31 ENCOUNTER — OFFICE VISIT (OUTPATIENT)
Dept: MATERNAL FETAL MEDICINE | Facility: CLINIC | Age: 32
End: 2022-10-31
Attending: OBSTETRICS & GYNECOLOGY
Payer: COMMERCIAL

## 2022-10-31 ENCOUNTER — PRENATAL OFFICE VISIT (OUTPATIENT)
Dept: OBGYN | Facility: CLINIC | Age: 32
End: 2022-10-31
Payer: COMMERCIAL

## 2022-10-31 ENCOUNTER — HOSPITAL ENCOUNTER (OUTPATIENT)
Dept: ULTRASOUND IMAGING | Facility: CLINIC | Age: 32
Discharge: HOME | End: 2022-10-31
Attending: OBSTETRICS & GYNECOLOGY
Payer: COMMERCIAL

## 2022-10-31 VITALS
SYSTOLIC BLOOD PRESSURE: 145 MMHG | BODY MASS INDEX: 27.58 KG/M2 | DIASTOLIC BLOOD PRESSURE: 86 MMHG | WEIGHT: 197 LBS | HEART RATE: 100 BPM | OXYGEN SATURATION: 97 % | HEIGHT: 71 IN

## 2022-10-31 VITALS — SYSTOLIC BLOOD PRESSURE: 145 MMHG | DIASTOLIC BLOOD PRESSURE: 91 MMHG

## 2022-10-31 DIAGNOSIS — O14.93 PRE-ECLAMPSIA IN THIRD TRIMESTER: Primary | ICD-10-CM

## 2022-10-31 DIAGNOSIS — O24.013 PREGNANCY COMPLICATED BY PRE-EXISTING TYPE 1 DIABETES, THIRD TRIMESTER: Primary | ICD-10-CM

## 2022-10-31 DIAGNOSIS — O14.93 PREECLAMPSIA, THIRD TRIMESTER: ICD-10-CM

## 2022-10-31 DIAGNOSIS — O24.013 TYPE 1 DIABETES MELLITUS DURING PREGNANCY IN THIRD TRIMESTER: ICD-10-CM

## 2022-10-31 LAB
ALT SERPL W P-5'-P-CCNC: 21 U/L (ref 0–50)
AST SERPL W P-5'-P-CCNC: 19 U/L (ref 0–45)
CREAT SERPL-MCNC: 0.59 MG/DL (ref 0.52–1.04)
ERYTHROCYTE [DISTWIDTH] IN BLOOD BY AUTOMATED COUNT: 13.1 % (ref 10–15)
GFR SERPL CREATININE-BSD FRML MDRD: >90 ML/MIN/1.73M2
HCT VFR BLD AUTO: 36.3 % (ref 35–47)
HGB BLD-MCNC: 12.2 G/DL (ref 11.7–15.7)
MCH RBC QN AUTO: 29.1 PG (ref 26.5–33)
MCHC RBC AUTO-ENTMCNC: 33.6 G/DL (ref 31.5–36.5)
MCV RBC AUTO: 87 FL (ref 78–100)
PLATELET # BLD AUTO: 211 10E3/UL (ref 150–450)
RBC # BLD AUTO: 4.19 10E6/UL (ref 3.8–5.2)
WBC # BLD AUTO: 10.9 10E3/UL (ref 4–11)

## 2022-10-31 PROCEDURE — 84460 ALANINE AMINO (ALT) (SGPT): CPT | Performed by: OBSTETRICS & GYNECOLOGY

## 2022-10-31 PROCEDURE — 76819 FETAL BIOPHYS PROFIL W/O NST: CPT | Mod: 26 | Performed by: OBSTETRICS & GYNECOLOGY

## 2022-10-31 PROCEDURE — 36415 COLL VENOUS BLD VENIPUNCTURE: CPT | Performed by: OBSTETRICS & GYNECOLOGY

## 2022-10-31 PROCEDURE — 85027 COMPLETE CBC AUTOMATED: CPT | Performed by: OBSTETRICS & GYNECOLOGY

## 2022-10-31 PROCEDURE — 99213 OFFICE O/P EST LOW 20 MIN: CPT | Performed by: OBSTETRICS & GYNECOLOGY

## 2022-10-31 PROCEDURE — 76819 FETAL BIOPHYS PROFIL W/O NST: CPT

## 2022-10-31 PROCEDURE — 84450 TRANSFERASE (AST) (SGOT): CPT | Performed by: OBSTETRICS & GYNECOLOGY

## 2022-10-31 PROCEDURE — 82565 ASSAY OF CREATININE: CPT | Performed by: OBSTETRICS & GYNECOLOGY

## 2022-10-31 NOTE — NURSING NOTE
Patient presented to [facility] for her BPP. She asked writer to check her BP as she has had increase swelling, headache and new nausea. /91. Aleena Hansen RN at [facility] asking for patient to come up to clinic to be seen. Patient states understanding and left clinic ambulatory to 7th floor.   Shelbi Singh RN

## 2022-10-31 NOTE — PROGRESS NOTES
Please see the imaging tab for details of the ultrasound performed today.    Nano Mallory MD  Specialist in Maternal-Fetal Medicine

## 2022-11-01 NOTE — PROGRESS NOTES
"OB Progress Note     CC: headache and elevated blood pressure     HPI: Preeti Leiva is a 33 YO  at 32w6d with a pregnancy complicated by type I DM and pre-eclampsia without severe features which has been managed as an outpatient. She was at [facility] today getting her BPP and reported a headache and was noted to have a mild range BP and it was recommended that she come to clinic today after her ultrasound for further evaluation. In clinic she reports that her headache has almost fully resolved. She has had some mild RUQ abdominal discomfort for the past few weeks which is table and no other pre-e symptoms. Baby is moving well.     O:   Vitals:    10/31/22 1554   BP: (!) 145/86   Pulse: 100   SpO2: 97%   Weight: 89.4 kg (197 lb)   Height: 1.803 m (5' 11\")     General: Patient alert and oriented, no acute distress  CV: no peripheral edema or cyanosis  Resp: normal respiratory effort and equal lung expansion  Ext: non-tender, trace edema    Component      Latest Ref Rng & Units 10/31/2022   WBC      4.0 - 11.0 10e3/uL 10.9   RBC Count      3.80 - 5.20 10e6/uL 4.19   Hemoglobin      11.7 - 15.7 g/dL 12.2   Hematocrit      35.0 - 47.0 % 36.3   MCV      78 - 100 fL 87   MCH      26.5 - 33.0 pg 29.1   MCHC      31.5 - 36.5 g/dL 33.6   RDW      10.0 - 15.0 % 13.1   Platelet Count      150 - 450 10e3/uL 211   Creatinine      0.52 - 1.04 mg/dL 0.59   GFR Estimate      >60 mL/min/1.73m2 >90   AST      0 - 45 U/L 19   ALT      0 - 50 U/L 21       Assessment and Plan:   Preeti Leiva is a 33 YO  at 32w6d, pregnancy complicated by type I DM and pre-e without severe features who presents due to elevated BP and headache   -We reviewed indications for early delivery prior to 37 weeks and currently her blood pressures are stable, labs are normal with the exception of an elevated UPC and fetal status is reassuring. She has had intermittent headaches over the past few weeks but the one she had earlier today is now " almost fully resolved. She has also had some mild RUQ abdominal pain that she thinks is due to baby's position but not progressive or severe and LFTs are normal.   -We discussed the option to monitor her on L&D with serial blood pressures and to make sure that her headache fully resolves but she prefers home care at this time which I think is reasonable. She agrees to come to L&D tonight if her headache worsens or does not fully resolve at home or she has any other new symptoms. She will also keep her appointment with Dr. Glynn later this week and continue home BP monitoring and twice weekly  testing.   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 I discussed that while we don't recommend strict bedrest, I would recommend scaling back her activities/work hours or taking a leave of absence. She would prefer to continue working as long as possible but is open to working from home so a letter was provided with that recommendation.     Dispo: RTC on 11/3 for scheduled prenatal visit or sooner JOSE Cronin MD

## 2022-11-03 ENCOUNTER — PRENATAL OFFICE VISIT (OUTPATIENT)
Dept: OBGYN | Facility: CLINIC | Age: 32
End: 2022-11-03
Payer: COMMERCIAL

## 2022-11-03 VITALS
SYSTOLIC BLOOD PRESSURE: 129 MMHG | BODY MASS INDEX: 26.78 KG/M2 | HEART RATE: 98 BPM | TEMPERATURE: 97.5 F | DIASTOLIC BLOOD PRESSURE: 82 MMHG | WEIGHT: 192 LBS

## 2022-11-03 DIAGNOSIS — O24.013 PRE-EXISTING TYPE 1 DIABETES MELLITUS DURING PREGNANCY IN THIRD TRIMESTER: ICD-10-CM

## 2022-11-03 DIAGNOSIS — O09.93 HIGH-RISK PREGNANCY IN THIRD TRIMESTER: Primary | ICD-10-CM

## 2022-11-03 DIAGNOSIS — O14.93 PRE-ECLAMPSIA IN THIRD TRIMESTER: ICD-10-CM

## 2022-11-03 PROCEDURE — 99207 PR PRENATAL VISIT: CPT | Performed by: OBSTETRICS & GYNECOLOGY

## 2022-11-03 PROCEDURE — 87653 STREP B DNA AMP PROBE: CPT | Performed by: OBSTETRICS & GYNECOLOGY

## 2022-11-03 NOTE — PROGRESS NOTES
Doing well.   Good fetal movement.   Intermittent headaches, but resolve with rest or tylenol.   Twice weekly BPPs for diabetes. Weekly appointment for prenatal care with pre-eclampsia labs. Last done 10/31.   Discussed and scheduled IOL at 37 weeks. Plan by endo in place.   Discussed indications for delivery sooner if severe features develop.   ECG today for diabetes in pregnancy.   RTC weekly    (O09.93) High-risk pregnancy in third trimester  (primary encounter diagnosis)  Comment:   Plan: Group B strep PCR            (O24.013) Pre-existing type 1 diabetes mellitus during pregnancy in third trimester  Comment:   Plan: EKG 12-lead complete w/read - Clinics            (O14.93) Pre-eclampsia in third trimester  Comment:   Plan: Weekly visits with labs.   IOL scheduled 11/29.

## 2022-11-04 ENCOUNTER — TELEPHONE (OUTPATIENT)
Dept: OBGYN | Facility: CLINIC | Age: 32
End: 2022-11-04

## 2022-11-04 ENCOUNTER — OFFICE VISIT (OUTPATIENT)
Dept: MATERNAL FETAL MEDICINE | Facility: CLINIC | Age: 32
End: 2022-11-04
Attending: OBSTETRICS & GYNECOLOGY
Payer: COMMERCIAL

## 2022-11-04 ENCOUNTER — HOSPITAL ENCOUNTER (OUTPATIENT)
Dept: ULTRASOUND IMAGING | Facility: CLINIC | Age: 32
Discharge: HOME | End: 2022-11-04
Attending: OBSTETRICS & GYNECOLOGY
Payer: COMMERCIAL

## 2022-11-04 DIAGNOSIS — O14.93 PREECLAMPSIA, THIRD TRIMESTER: ICD-10-CM

## 2022-11-04 DIAGNOSIS — O24.013 TYPE 1 DIABETES MELLITUS DURING PREGNANCY IN THIRD TRIMESTER: ICD-10-CM

## 2022-11-04 DIAGNOSIS — O24.013 PREGNANCY COMPLICATED BY PRE-EXISTING TYPE 1 DIABETES, THIRD TRIMESTER: Primary | ICD-10-CM

## 2022-11-04 DIAGNOSIS — O40.3XX0 POLYHYDRAMNIOS IN THIRD TRIMESTER COMPLICATION, SINGLE OR UNSPECIFIED FETUS: ICD-10-CM

## 2022-11-04 PROCEDURE — 76819 FETAL BIOPHYS PROFIL W/O NST: CPT | Mod: 26 | Performed by: OBSTETRICS & GYNECOLOGY

## 2022-11-04 PROCEDURE — 76819 FETAL BIOPHYS PROFIL W/O NST: CPT

## 2022-11-04 NOTE — TELEPHONE ENCOUNTER
Patient seen in MFM today and told nurses that she needed twice weekly BP checks done there. MFM did not see that in provider notes, and they do not do that. Please advise on plan of care so nursing can clarify with patient. Should she be coming up to our clinic for the checks on MFM appointment days? Routed to provider.   Maddie Durán RN

## 2022-11-04 NOTE — PROGRESS NOTES
The patient was seen for an ultrasound in the [facility] at the [facility] today.  For a detailed report of the ultrasound examination, please see the ultrasound report which can be found under the imaging tab.    Joan Muñoz MD  , OB/GYN  Maternal-Fetal Medicine  [phone] (Pager)

## 2022-11-04 NOTE — TELEPHONE ENCOUNTER
Per last visit note with Dr. Glynn:    Twice weekly BPP, weekly office visit with us for BP check and labs.  If her office visit can be coordinated with one of her BPPs, that would be great.  If she is already scheduled and fine with the scheduling, no need to do anything.  Thanks    KELVIN GUTIÉRREZ MD

## 2022-11-05 LAB — GP B STREP DNA SPEC QL NAA+PROBE: NEGATIVE

## 2022-11-07 ENCOUNTER — HOSPITAL ENCOUNTER (OUTPATIENT)
Dept: ULTRASOUND IMAGING | Facility: CLINIC | Age: 32
Discharge: HOME | End: 2022-11-07
Attending: OBSTETRICS & GYNECOLOGY
Payer: COMMERCIAL

## 2022-11-07 ENCOUNTER — OFFICE VISIT (OUTPATIENT)
Dept: MATERNAL FETAL MEDICINE | Facility: CLINIC | Age: 32
End: 2022-11-07
Attending: OBSTETRICS & GYNECOLOGY
Payer: COMMERCIAL

## 2022-11-07 DIAGNOSIS — O14.93 PREECLAMPSIA, THIRD TRIMESTER: Primary | ICD-10-CM

## 2022-11-07 DIAGNOSIS — O24.013 TYPE 1 DIABETES MELLITUS COMPLICATING PREGNANCY, ANTEPARTUM, THIRD TRIMESTER: ICD-10-CM

## 2022-11-07 DIAGNOSIS — O24.013 TYPE 1 DIABETES MELLITUS DURING PREGNANCY IN THIRD TRIMESTER: ICD-10-CM

## 2022-11-07 PROCEDURE — 76819 FETAL BIOPHYS PROFIL W/O NST: CPT

## 2022-11-07 PROCEDURE — 76819 FETAL BIOPHYS PROFIL W/O NST: CPT | Mod: 26 | Performed by: OBSTETRICS & GYNECOLOGY

## 2022-11-07 PROCEDURE — 76816 OB US FOLLOW-UP PER FETUS: CPT | Mod: 26 | Performed by: OBSTETRICS & GYNECOLOGY

## 2022-11-08 ENCOUNTER — TELEPHONE (OUTPATIENT)
Dept: OBGYN | Facility: CLINIC | Age: 32
End: 2022-11-08

## 2022-11-08 ENCOUNTER — PRENATAL OFFICE VISIT (OUTPATIENT)
Dept: OBGYN | Facility: CLINIC | Age: 32
End: 2022-11-08
Payer: COMMERCIAL

## 2022-11-08 ENCOUNTER — VIRTUAL VISIT (OUTPATIENT)
Dept: ENDOCRINOLOGY | Facility: CLINIC | Age: 32
End: 2022-11-08
Payer: COMMERCIAL

## 2022-11-08 VITALS
WEIGHT: 198 LBS | HEART RATE: 88 BPM | SYSTOLIC BLOOD PRESSURE: 130 MMHG | HEIGHT: 71 IN | OXYGEN SATURATION: 100 % | DIASTOLIC BLOOD PRESSURE: 86 MMHG | BODY MASS INDEX: 27.72 KG/M2

## 2022-11-08 DIAGNOSIS — O14.93 PRE-ECLAMPSIA IN THIRD TRIMESTER: ICD-10-CM

## 2022-11-08 DIAGNOSIS — O24.013 PRE-EXISTING TYPE 1 DIABETES MELLITUS DURING PREGNANCY IN THIRD TRIMESTER: Primary | ICD-10-CM

## 2022-11-08 DIAGNOSIS — O09.93 HIGH-RISK PREGNANCY IN THIRD TRIMESTER: Primary | ICD-10-CM

## 2022-11-08 LAB
ERYTHROCYTE [DISTWIDTH] IN BLOOD BY AUTOMATED COUNT: 13.1 % (ref 10–15)
HCT VFR BLD AUTO: 35.3 % (ref 35–47)
HGB BLD-MCNC: 11.9 G/DL (ref 11.7–15.7)
MCH RBC QN AUTO: 28.7 PG (ref 26.5–33)
MCHC RBC AUTO-ENTMCNC: 33.7 G/DL (ref 31.5–36.5)
MCV RBC AUTO: 85 FL (ref 78–100)
PLATELET # BLD AUTO: 183 10E3/UL (ref 150–450)
RBC # BLD AUTO: 4.14 10E6/UL (ref 3.8–5.2)
WBC # BLD AUTO: 9.8 10E3/UL (ref 4–11)

## 2022-11-08 PROCEDURE — 36415 COLL VENOUS BLD VENIPUNCTURE: CPT | Performed by: OBSTETRICS & GYNECOLOGY

## 2022-11-08 PROCEDURE — 99214 OFFICE O/P EST MOD 30 MIN: CPT | Mod: GT | Performed by: INTERNAL MEDICINE

## 2022-11-08 PROCEDURE — 85027 COMPLETE CBC AUTOMATED: CPT | Performed by: OBSTETRICS & GYNECOLOGY

## 2022-11-08 PROCEDURE — 84450 TRANSFERASE (AST) (SGOT): CPT | Performed by: OBSTETRICS & GYNECOLOGY

## 2022-11-08 PROCEDURE — 99207 PR PRENATAL VISIT: CPT | Performed by: OBSTETRICS & GYNECOLOGY

## 2022-11-08 PROCEDURE — 84460 ALANINE AMINO (ALT) (SGPT): CPT | Performed by: OBSTETRICS & GYNECOLOGY

## 2022-11-08 NOTE — LETTER
11/8/2022         RE: Preeti Leiva  [address]  [facility] [address]        Dear Colleague,    Thank you for referring your patient, Preeti Leiva, to the [facility] [place]. Please see a copy of my visit note below.      Video-Visit Details    Type of service:  Video Visit  Video Start Time: 07:33  Video End Time: 7:55  Originating Location (pt. Location): Home, [place]  Distant Location (provider location):  Home  Platform used for Video Visit: Norm Thomas MD    Preeti Leiva is a 32 year old year old female here for evaluation of  diabetes in pregnancy via a billable video visit. She was last seen by me 10/16/2022    Currently: 34w0d  Estimated Date of Delivery: Dec 20, 2022  Baby: girl       1) Diabetes Mellitus in pregnancy    Diabetes History:  Diagnosis: Age 6  Hospitalizations: DKA, 2008 most recently, difficult time in college managing   Previous Regimens: almost always on pump, most recently Medtronic/Guardian, but didn't use automode-- kicked out frequently and didn't work through this  Current Regimen: OMNIPOD 5/DEXCOM            INTERVAL HISTORY:  - Ultrasound yesterday, baby still measuring 99th percentile  - Tentative induction scheduled 11/27, but now has preeclampsia based on proteinuria so may deliver early, monitoring BP at home  - Goal for omnipod on during delivery and afterwards      BG check- Dexcom  Trends-     BP Readings from Last 3 Encounters:   11/03/22 129/82   10/31/22 (!) 145/86   10/31/22 (!) 145/91       Lab Results   Component Value Date    A1C 6.1 10/04/2022    A1C 6.5 06/10/2022    A1C 8.6 02/10/2022    A1C 9.4 08/20/2020    A1C 8.4 01/02/2020    A1C 7.8 01/29/2019       Wt Readings from Last 3 Encounters:   11/03/22 87.1 kg (192 lb)   10/31/22 89.4 kg (197 lb)   10/28/22 88.5 kg (195 lb 3.2 oz)       Current Outpatient Medications   Medication Sig Dispense Refill     aspirin (ASA) 81 MG chewable tablet Take 81 mg by mouth  daily       Continuous Blood Gluc Sensor (DEXCOM G6 SENSOR) MISC Change every 10 days. 9 each 3     Continuous Blood Gluc Transmit (DEXCOM G6 TRANSMITTER) MISC Change every 3 months. 1 each 3     Glucagon, rDNA, (GLUCAGON EMERGENCY) 1 MG KIT Inject 1 mg as directed daily as needed (hypoglycemia) 2 kit 11     Insulin Disposable Pump (OMNIPOD 5 G6 POD, GEN 5,) MISC 1 each every other day 45 each 11     insulin lispro (HUMALOG VIAL) 100 UNIT/ML vial INJECT 70 UNITS UNDER THE SKIN EVERY DAY VIA INSULIN PUMP 80 mL 3     Prenatal Vit-DSS-Fe Cbn-FA (PRENATAL AD PO)        sertraline (ZOLOFT) 100 MG tablet TAKE 2 TABLETS(200 MG) BY MOUTH DAILY 180 tablet 1          REVIEW OF SYSTEMS:   ROS: 10 point ROS neg other than the symptoms noted above in the HPI.      EXAM:  Physical Exam (visual exam)  VS:  no vital signs taken for video visit  CONSTITUTIONAL: healthy, alert and NAD, responding appropriately  ENT: normocephalic, no visual evidence of trauma, normal nose and oral mucosa  EYES: conjunctivae and sclerae normal, no exophthalmos or proptosis  THYROID:  no visualized nodules or goiter  LUNGS: no audible wheeze, cough or visible cyanosis, no visible retractions or increased work of breathing  EXTREMITIES: no hand tremors  NEUROLOGY: cranial nerves grossly intact with no obvious deficit.  SKIN:  no visualized skin lesions or rash, no edema visualized  PSYCH: mentation appears normal, normal judgement        ASSESSMENT/PLAN:    1) Diabetes Mellitus in pregnancy  - Glucose Control- NOT at goal, but close   - Continue basal rate   - Continue ISF-- some postprandial hypoglycemia likely from overcorrection. Will drink juice when hits 65 with down arrow   - Increase CHO from 2--> 1.5 at 11a-4p, will cover lunch rise.  - Created basal 2 for post delivery settings  - Discussed delivery plan --   - GOAL- wear Omnipod during delivery to maintain control of her own blood glucoses   - We discussed this is reasonable as long as she  maintains good control of her blood sugars-- She would need to maintain BG , if she is over 180 on 2 checks would be agreeable to insulin drip   - If she needs , will use insulin drip   - If she is unable to control her own blood sugars (loss of consciousness, etc) she would use insulin drip.   - Will restart Omnipod immediately after delivery (if go on insulin drip will suspend but not remove omnipod, will bring additional supplies to restart), have created separate basal setting for post-delivery (Basal 2).  - Will discuss with Insulet resetting the algorithm to relearn her habits post delivery -- discussed with Omnipod rep-- will instead leave pump and let it relearn settings without reset. Preeti will let me know if she is having significant hypos and we will fix.    - Bolus settings-- match pre-pregnancy settings-- I:CHO 1:13, ISF 1:50   - Plans to breast feed, reviewed likely lower insulin needs, cautioned re: hypoglycemia and keep snacks handy  - Reviewed blood sugar goals in pregnancy. Glucose goals during pregnancy according to the American Diabetes Association:  Fasting glucose 70-95 mg/dL AND  One-hour postprandial glucose 110-140 mg/dL or  Two-hour postprandial glucose 100-120 mg/dL  - Encouraged at least 30min of activity daily.    RTC- every 4 weeks until delivery. Follow weekly with diabetes educator.    A total of 32 minutes were spent today 22 on this visit including chart review, history and counseling, documentation and other activities as detailed above.         Again, thank you for allowing me to participate in the care of your patient.        Sincerely,        Shanon Thomas MD

## 2022-11-08 NOTE — PROGRESS NOTES
Video-Visit Details    Type of service:  Video Visit  Video Start Time: 07:33  Video End Time: 7:55  Originating Location (pt. Location): [place], [place]  Distant Location (provider location):  Home  Platform used for Video Visit: Norm Thomas MD    Preeti Leiva is a 32 year old year old female here for evaluation of  diabetes in pregnancy via a billable video visit. She was last seen by me 10/16/2022    Currently: 34w0d  Estimated Date of Delivery: Dec 20, 2022  Baby: girl       1) Diabetes Mellitus in pregnancy    Diabetes History:  Diagnosis: Age 6  Hospitalizations: DKA, 2008 most recently, difficult time in college managing   Previous Regimens: almost always on pump, most recently [employer]/Guardian, but didn't use automode-- kicked out frequently and didn't work through this  Current Regimen: OMNIPOD 5/DEXCOM            INTERVAL HISTORY:  - Ultrasound yesterday, baby still measuring 99th percentile  - Tentative induction scheduled 11/27, but now has preeclampsia based on proteinuria so may deliver early, monitoring BP at home  - Goal for omnipod on during delivery and afterwards      BG check- Dexcom  Trends-     BP Readings from Last 3 Encounters:   11/03/22 129/82   10/31/22 (!) 145/86   10/31/22 (!) 145/91       Lab Results   Component Value Date    A1C 6.1 10/04/2022    A1C 6.5 06/10/2022    A1C 8.6 02/10/2022    A1C 9.4 08/20/2020    A1C 8.4 01/02/2020    A1C 7.8 01/29/2019       Wt Readings from Last 3 Encounters:   11/03/22 87.1 kg (192 lb)   10/31/22 89.4 kg (197 lb)   10/28/22 88.5 kg (195 lb 3.2 oz)       Current Outpatient Medications   Medication Sig Dispense Refill     aspirin (ASA) 81 MG chewable tablet Take 81 mg by mouth daily       Continuous Blood Gluc Sensor (DEXCOM G6 SENSOR) MISC Change every 10 days. 9 each 3     Continuous Blood Gluc Transmit (DEXCOM G6 TRANSMITTER) MISC Change every 3 months. 1 each 3     Glucagon, rDNA, (GLUCAGON EMERGENCY) 1 MG KIT Inject 1 mg as directed  daily as needed (hypoglycemia) 2 kit 11     Insulin Disposable Pump (OMNIPOD 5 G6 POD, GEN 5,) MISC 1 each every other day 45 each 11     insulin lispro (HUMALOG VIAL) 100 UNIT/ML vial INJECT 70 UNITS UNDER THE SKIN EVERY DAY VIA INSULIN PUMP 80 mL 3     Prenatal Vit-DSS-Fe Cbn-FA (PRENATAL AD PO)        sertraline (ZOLOFT) 100 MG tablet TAKE 2 TABLETS(200 MG) BY MOUTH DAILY 180 tablet 1          REVIEW OF SYSTEMS:   ROS: 10 point ROS neg other than the symptoms noted above in the HPI.      EXAM:  Physical Exam (visual exam)  VS:  no vital signs taken for video visit  CONSTITUTIONAL: healthy, alert and NAD, responding appropriately  ENT: normocephalic, no visual evidence of trauma, normal nose and oral mucosa  EYES: conjunctivae and sclerae normal, no exophthalmos or proptosis  THYROID:  no visualized nodules or goiter  LUNGS: no audible wheeze, cough or visible cyanosis, no visible retractions or increased work of breathing  EXTREMITIES: no hand tremors  NEUROLOGY: cranial nerves grossly intact with no obvious deficit.  SKIN:  no visualized skin lesions or rash, no edema visualized  PSYCH: mentation appears normal, normal judgement        ASSESSMENT/PLAN:    1) Diabetes Mellitus in pregnancy  - Glucose Control- NOT at goal, but close   - Continue basal rate   - Continue ISF-- some postprandial hypoglycemia likely from overcorrection. Will drink juice when hits 65 with down arrow   - Increase CHO from 2--> 1.5 at 11a-4p, will cover lunch rise.  - Created basal 2 for post delivery settings  - Discussed delivery plan --   - GOAL- wear Omnipod during delivery to maintain control of her own blood glucoses   - We discussed this is reasonable as long as she maintains good control of her blood sugars-- She would need to maintain BG , if she is over 180 on 2 checks would be agreeable to insulin drip   - If she needs , will use insulin drip   - If she is unable to control her own blood sugars (loss of  consciousness, etc) she would use insulin drip.   - Will restart Omnipod immediately after delivery (if go on insulin drip will suspend but not remove omnipod, will bring additional supplies to restart), have created separate basal setting for post-delivery (Basal 2).  - Will discuss with Insulet resetting the algorithm to relearn her habits post delivery -- discussed with Omnipod rep-- will instead leave pump and let it relearn settings without reset. Preeti will let me know if she is having significant hypos and we will fix.    - Bolus settings-- match pre-pregnancy settings-- I:CHO 1:13, ISF 1:50   - Plans to breast feed, reviewed likely lower insulin needs, cautioned re: hypoglycemia and keep snacks handy  - Reviewed blood sugar goals in pregnancy. Glucose goals during pregnancy according to the American Diabetes Association:  Fasting glucose 70-95 mg/dL AND  One-hour postprandial glucose 110-140 mg/dL or  Two-hour postprandial glucose 100-120 mg/dL  - Encouraged at least 30min of activity daily.    RTC- every 4 weeks until delivery. Follow weekly with diabetes educator.    A total of 32 minutes were spent today 11/08/22 on this visit including chart review, history and counseling, documentation and other activities as detailed above.

## 2022-11-09 DIAGNOSIS — E10.65 TYPE 1 DIABETES MELLITUS WITH HYPERGLYCEMIA (H): ICD-10-CM

## 2022-11-09 LAB
ALT SERPL W P-5'-P-CCNC: 18 U/L (ref 0–50)
AST SERPL W P-5'-P-CCNC: 20 U/L (ref 0–45)

## 2022-11-09 RX ORDER — INSULIN PMP CART,AUT,G6/7,CNTR
1 EACH SUBCUTANEOUS EVERY OTHER DAY
Qty: 45 EACH | Refills: 11 | Status: SHIPPED | OUTPATIENT
Start: 2022-11-09 | End: 2023-08-29

## 2022-11-10 ENCOUNTER — HOSPITAL ENCOUNTER (OUTPATIENT)
Dept: ULTRASOUND IMAGING | Facility: CLINIC | Age: 32
Discharge: HOME | End: 2022-11-10
Attending: OBSTETRICS & GYNECOLOGY
Payer: COMMERCIAL

## 2022-11-10 ENCOUNTER — OFFICE VISIT (OUTPATIENT)
Dept: MATERNAL FETAL MEDICINE | Facility: CLINIC | Age: 32
End: 2022-11-10
Attending: OBSTETRICS & GYNECOLOGY
Payer: COMMERCIAL

## 2022-11-10 DIAGNOSIS — O14.93 PREECLAMPSIA, THIRD TRIMESTER: ICD-10-CM

## 2022-11-10 DIAGNOSIS — O24.013 TYPE 1 DIABETES MELLITUS COMPLICATING PREGNANCY, ANTEPARTUM, THIRD TRIMESTER: Primary | ICD-10-CM

## 2022-11-10 DIAGNOSIS — F41.9 ANXIETY: ICD-10-CM

## 2022-11-10 DIAGNOSIS — O24.013 TYPE 1 DIABETES MELLITUS DURING PREGNANCY IN THIRD TRIMESTER: ICD-10-CM

## 2022-11-10 DIAGNOSIS — F33.42 RECURRENT MAJOR DEPRESSIVE DISORDER, IN FULL REMISSION (H): ICD-10-CM

## 2022-11-10 PROCEDURE — 76819 FETAL BIOPHYS PROFIL W/O NST: CPT | Mod: 26 | Performed by: OBSTETRICS & GYNECOLOGY

## 2022-11-10 PROCEDURE — 76819 FETAL BIOPHYS PROFIL W/O NST: CPT

## 2022-11-10 RX ORDER — SERTRALINE HYDROCHLORIDE 100 MG/1
TABLET, FILM COATED ORAL
Qty: 180 TABLET | Refills: 0 | Status: SHIPPED | OUTPATIENT
Start: 2022-11-10 | End: 2023-02-15

## 2022-11-10 NOTE — PROGRESS NOTES
"Please see \"Imaging\" tab under \"Chart Review\" for details of today's US at the [facility].    Angel Rodríguez MD  Maternal-Fetal Medicine      "

## 2022-11-14 ENCOUNTER — OFFICE VISIT (OUTPATIENT)
Dept: MATERNAL FETAL MEDICINE | Facility: CLINIC | Age: 32
End: 2022-11-14
Attending: OBSTETRICS & GYNECOLOGY
Payer: COMMERCIAL

## 2022-11-14 ENCOUNTER — HOSPITAL ENCOUNTER (OUTPATIENT)
Dept: ULTRASOUND IMAGING | Facility: CLINIC | Age: 32
Discharge: HOME | End: 2022-11-14
Attending: OBSTETRICS & GYNECOLOGY
Payer: COMMERCIAL

## 2022-11-14 DIAGNOSIS — O14.93 PREECLAMPSIA, THIRD TRIMESTER: ICD-10-CM

## 2022-11-14 DIAGNOSIS — O24.013 TYPE 1 DIABETES MELLITUS COMPLICATING PREGNANCY, ANTEPARTUM, THIRD TRIMESTER: Primary | ICD-10-CM

## 2022-11-14 DIAGNOSIS — O24.013 TYPE 1 DIABETES MELLITUS DURING PREGNANCY IN THIRD TRIMESTER: ICD-10-CM

## 2022-11-14 DIAGNOSIS — E10.65 TYPE 1 DIABETES MELLITUS WITH HYPERGLYCEMIA (H): Primary | ICD-10-CM

## 2022-11-14 PROCEDURE — 76819 FETAL BIOPHYS PROFIL W/O NST: CPT

## 2022-11-14 PROCEDURE — 76819 FETAL BIOPHYS PROFIL W/O NST: CPT | Mod: 26 | Performed by: OBSTETRICS & GYNECOLOGY

## 2022-11-14 NOTE — PROGRESS NOTES
Doing ok. Ready to be done.   Good fetal movement x 2.   Blood sugar control has improved.     Growth US:  GENERAL EVALUATION  ---------------------------------------------------------------------------------------------------------  Cardiac activity present.  bpm.  Fetal movements present.  Presentation cephalic.  Placenta  Posterior, anterior accessory lobe in right lateral uterus.  Umbilical cord 3 vessel cord.  Amniotic fluid Amount of AF: normal. MVP 6.5 cm. KRISTINA 20.0 cm. Q1 6.5 cm, Q2 3.5 cm, Q3 3.8 cm, Q4 6.2 cm.        FETAL BIOMETRY  ---------------------------------------------------------------------------------------------------------  Main Fetal Biometry:  BPD                                        91.8                    mm                         37w 2d                Hadlock  OFD                                        110.3                  mm                          33w 1d                Nicolaides  HC                                          322.0                  mm                          36w 3d                Hadlock  Cerebellum tr                            45.4                   mm                          -/-                Nicolaides  AC                                          372.0                  mm                          41w 1d        >99%        Hadlock  Femur                                      65.5                   mm                          33w 5d                Hadlock  Fetal Weight Calculation:  EFW                                       3,552                  g                                     >99%         Hadlock  EFW (lb,oz)                             7 lb 13                 oz      Discussed AC>>HC. Discussed with MFM fellow, recommend another growth US prior to IOL.  RTC 2 weeks

## 2022-11-14 NOTE — PROGRESS NOTES
The patient was seen for an ultrasound in the [facility] at the [facility] today.  For a detailed report of the ultrasound examination, please see the ultrasound report which can be found under the imaging tab.    Joan Muñoz MD  , OB/GYN  Maternal-Fetal Medicine  [phone] (Pager)

## 2022-11-15 ENCOUNTER — MYC MEDICAL ADVICE (OUTPATIENT)
Dept: EDUCATION SERVICES | Facility: CLINIC | Age: 32
End: 2022-11-15

## 2022-11-15 NOTE — TELEPHONE ENCOUNTER
Diabetes and Pregnancy Follow-up  Type of Service: MyChart Check-in    How would patient like to obtain AVS? Not needed    Subjective/Objective:    Preeti Leiva was called for a scheduled BG review. Last date of communication was:  (Dr Thomas).    Gestational diabetes is being managed with medications    Taking diabetes medications:   yes:     Diabetes Medication(s)     Diabetic Other       Glucagon, rDNA, (GLUCAGON EMERGENCY) 1 MG KIT    Inject 1 mg as directed daily as needed (hypoglycemia)    Insulin       insulin lispro (HUMALOG VIAL) 100 UNIT/ML vial    INJECT 70 UNITS UNDER THE SKIN EVERY DAY VIA INSULIN PUMP          Estimated Date of Delivery: Dec 20, 2022    Insulin Pump Reports:                          Assessment:    Having some lows after lunch since last adjustment with Dr. Thomas.     Plan/Response:  Adjust I:C ratio:  Decrease CHO from 1.5 --> 2 at 11a-4p    EDENILSON Urias [facility]  Triage: [phone]  [phone]    Any diabetes medication dose changes were made via the CDE Protocol and Collaborative Practice Agreement with the patient's referring provider. A copy of this encounter was shared with the provider.

## 2022-11-17 ENCOUNTER — OFFICE VISIT (OUTPATIENT)
Dept: MATERNAL FETAL MEDICINE | Facility: CLINIC | Age: 32
End: 2022-11-17
Attending: OBSTETRICS & GYNECOLOGY
Payer: COMMERCIAL

## 2022-11-17 ENCOUNTER — HOSPITAL ENCOUNTER (OUTPATIENT)
Dept: ULTRASOUND IMAGING | Facility: CLINIC | Age: 32
Discharge: HOME | End: 2022-11-17
Attending: OBSTETRICS & GYNECOLOGY
Payer: COMMERCIAL

## 2022-11-17 DIAGNOSIS — Z3A.35 35 WEEKS GESTATION OF PREGNANCY: ICD-10-CM

## 2022-11-17 DIAGNOSIS — O14.93 PREECLAMPSIA, THIRD TRIMESTER: ICD-10-CM

## 2022-11-17 DIAGNOSIS — O24.013 TYPE 1 DIABETES MELLITUS DURING PREGNANCY IN THIRD TRIMESTER: ICD-10-CM

## 2022-11-17 DIAGNOSIS — O24.013 TYPE 1 DIABETES MELLITUS COMPLICATING PREGNANCY, ANTEPARTUM, THIRD TRIMESTER: Primary | ICD-10-CM

## 2022-11-17 PROCEDURE — 76819 FETAL BIOPHYS PROFIL W/O NST: CPT

## 2022-11-17 PROCEDURE — 76819 FETAL BIOPHYS PROFIL W/O NST: CPT | Mod: 26 | Performed by: OBSTETRICS & GYNECOLOGY

## 2022-11-18 ENCOUNTER — PRENATAL OFFICE VISIT (OUTPATIENT)
Dept: OBGYN | Facility: CLINIC | Age: 32
End: 2022-11-18
Payer: COMMERCIAL

## 2022-11-18 VITALS
BODY MASS INDEX: 28.59 KG/M2 | TEMPERATURE: 98 F | WEIGHT: 205 LBS | DIASTOLIC BLOOD PRESSURE: 82 MMHG | HEART RATE: 98 BPM | SYSTOLIC BLOOD PRESSURE: 138 MMHG

## 2022-11-18 DIAGNOSIS — O09.93 HIGH-RISK PREGNANCY IN THIRD TRIMESTER: Primary | ICD-10-CM

## 2022-11-18 DIAGNOSIS — O14.93 PRE-ECLAMPSIA IN THIRD TRIMESTER: ICD-10-CM

## 2022-11-18 LAB
ALT SERPL W P-5'-P-CCNC: 20 U/L (ref 10–35)
AST SERPL W P-5'-P-CCNC: 26 U/L (ref 10–35)
CREAT SERPL-MCNC: 0.63 MG/DL (ref 0.51–0.95)
ERYTHROCYTE [DISTWIDTH] IN BLOOD BY AUTOMATED COUNT: 13.1 % (ref 10–15)
GFR SERPL CREATININE-BSD FRML MDRD: >90 ML/MIN/1.73M2
HCT VFR BLD AUTO: 35.5 % (ref 35–47)
HGB BLD-MCNC: 11.9 G/DL (ref 11.7–15.7)
MCH RBC QN AUTO: 28.7 PG (ref 26.5–33)
MCHC RBC AUTO-ENTMCNC: 33.5 G/DL (ref 31.5–36.5)
MCV RBC AUTO: 86 FL (ref 78–100)
PLATELET # BLD AUTO: 193 10E3/UL (ref 150–450)
RBC # BLD AUTO: 4.14 10E6/UL (ref 3.8–5.2)
WBC # BLD AUTO: 9.2 10E3/UL (ref 4–11)

## 2022-11-18 PROCEDURE — 82565 ASSAY OF CREATININE: CPT | Performed by: OBSTETRICS & GYNECOLOGY

## 2022-11-18 PROCEDURE — 36415 COLL VENOUS BLD VENIPUNCTURE: CPT | Performed by: OBSTETRICS & GYNECOLOGY

## 2022-11-18 PROCEDURE — 84450 TRANSFERASE (AST) (SGOT): CPT | Performed by: OBSTETRICS & GYNECOLOGY

## 2022-11-18 PROCEDURE — 99213 OFFICE O/P EST LOW 20 MIN: CPT | Mod: 25 | Performed by: OBSTETRICS & GYNECOLOGY

## 2022-11-18 PROCEDURE — 84460 ALANINE AMINO (ALT) (SGPT): CPT | Performed by: OBSTETRICS & GYNECOLOGY

## 2022-11-18 PROCEDURE — 85027 COMPLETE CBC AUTOMATED: CPT | Performed by: OBSTETRICS & GYNECOLOGY

## 2022-11-18 PROCEDURE — 59025 FETAL NON-STRESS TEST: CPT | Performed by: OBSTETRICS & GYNECOLOGY

## 2022-11-19 NOTE — PROGRESS NOTES
Doing well.   BPs have been stable, checking at home.   No symptoms of worsening PIH. Labs today.   Has growth US next week. Discussed thresholds for considering  delivery. Discussed that AC>>HC does not have a specific threshold but will be taken into consideration with EFW for decision making.   Discussed optimizing blood sugars prior to delivery and need for no PO intake for two hours, clears for an addition 4 hours before that. Will discuss with endocrine if scheduled CS is planned.   Blood sugars have been doing pretty well. Reports more lows than highs. Is making adjustments and communicating with endocrine.     Initial FHT was in the 170s. NST done for fetal tachycardia. Baseline 135, moderate variability, accelerations (prolonged to 170), no decelerations. No contractions.   Reactive. NST    RTC one week.     (O09.93) High-risk pregnancy in third trimester  (primary encounter diagnosis)  Comment:   Plan: CBC with platelets, AST, ALT, Creatinine            (O14.93) Pre-eclampsia in third trimester  Comment:   Plan: CBC with platelets, AST, ALT, Creatinine            (P03.810) Fetal tachycardia before the onset of labor  Comment:   Plan: NST, reactive and baseline 135.

## 2022-11-21 ENCOUNTER — HOSPITAL ENCOUNTER (OUTPATIENT)
Dept: ULTRASOUND IMAGING | Facility: CLINIC | Age: 32
Discharge: HOME | End: 2022-11-21
Attending: OBSTETRICS & GYNECOLOGY
Payer: COMMERCIAL

## 2022-11-21 ENCOUNTER — OFFICE VISIT (OUTPATIENT)
Dept: MATERNAL FETAL MEDICINE | Facility: CLINIC | Age: 32
End: 2022-11-21
Attending: OBSTETRICS & GYNECOLOGY
Payer: COMMERCIAL

## 2022-11-21 DIAGNOSIS — O24.013 TYPE 1 DIABETES MELLITUS COMPLICATING PREGNANCY, ANTEPARTUM, THIRD TRIMESTER: Primary | ICD-10-CM

## 2022-11-21 DIAGNOSIS — O24.013 TYPE 1 DIABETES MELLITUS COMPLICATING PREGNANCY, ANTEPARTUM, THIRD TRIMESTER: ICD-10-CM

## 2022-11-21 DIAGNOSIS — Z3A.35 35 WEEKS GESTATION OF PREGNANCY: ICD-10-CM

## 2022-11-21 DIAGNOSIS — O14.93 PREECLAMPSIA, THIRD TRIMESTER: ICD-10-CM

## 2022-11-21 PROCEDURE — 76819 FETAL BIOPHYS PROFIL W/O NST: CPT | Mod: 26 | Performed by: OBSTETRICS & GYNECOLOGY

## 2022-11-21 PROCEDURE — 76819 FETAL BIOPHYS PROFIL W/O NST: CPT

## 2022-11-23 PROBLEM — Z23 NEED FOR TDAP VACCINATION: Status: RESOLVED | Noted: 2022-04-27 | Resolved: 2022-11-23

## 2022-11-24 LAB
ABO/RH(D): NORMAL
ANTIBODY SCREEN: NEGATIVE
SPECIMEN EXPIRATION DATE: NORMAL

## 2022-11-25 ENCOUNTER — HOSPITAL ENCOUNTER (OUTPATIENT)
Dept: ULTRASOUND IMAGING | Facility: CLINIC | Age: 32
Discharge: HOME | End: 2022-11-25
Attending: OBSTETRICS & GYNECOLOGY
Payer: COMMERCIAL

## 2022-11-25 ENCOUNTER — PRENATAL OFFICE VISIT (OUTPATIENT)
Dept: OBGYN | Facility: CLINIC | Age: 32
End: 2022-11-25
Payer: COMMERCIAL

## 2022-11-25 ENCOUNTER — OFFICE VISIT (OUTPATIENT)
Dept: MATERNAL FETAL MEDICINE | Facility: CLINIC | Age: 32
End: 2022-11-25
Attending: OBSTETRICS & GYNECOLOGY
Payer: COMMERCIAL

## 2022-11-25 VITALS
BODY MASS INDEX: 29.08 KG/M2 | DIASTOLIC BLOOD PRESSURE: 82 MMHG | WEIGHT: 208.5 LBS | OXYGEN SATURATION: 98 % | SYSTOLIC BLOOD PRESSURE: 150 MMHG | HEART RATE: 107 BPM

## 2022-11-25 DIAGNOSIS — O24.013 PRE-EXISTING TYPE 1 DIABETES MELLITUS DURING PREGNANCY IN THIRD TRIMESTER: ICD-10-CM

## 2022-11-25 DIAGNOSIS — O24.013 TYPE 1 DIABETES MELLITUS COMPLICATING PREGNANCY, ANTEPARTUM, THIRD TRIMESTER: Primary | ICD-10-CM

## 2022-11-25 DIAGNOSIS — E10.65 TYPE 1 DIABETES MELLITUS WITH HYPERGLYCEMIA (H): ICD-10-CM

## 2022-11-25 DIAGNOSIS — Z3A.36 36 WEEKS GESTATION OF PREGNANCY: ICD-10-CM

## 2022-11-25 DIAGNOSIS — O09.93 PREGNANCY, SUPERVISION, HIGH-RISK, THIRD TRIMESTER: Primary | ICD-10-CM

## 2022-11-25 DIAGNOSIS — O36.60X0 FETAL MACROSOMIA AFFECTING MANAGEMENT OF MOTHER, ANTEPARTUM: ICD-10-CM

## 2022-11-25 DIAGNOSIS — O14.93 PRE-ECLAMPSIA IN THIRD TRIMESTER: ICD-10-CM

## 2022-11-25 DIAGNOSIS — O14.93 PREECLAMPSIA, THIRD TRIMESTER: ICD-10-CM

## 2022-11-25 LAB
ALBUMIN MFR UR ELPH: 486 MG/DL
ALT SERPL W P-5'-P-CCNC: 12 U/L (ref 10–35)
AST SERPL W P-5'-P-CCNC: 28 U/L (ref 10–35)
CREAT SERPL-MCNC: 0.68 MG/DL (ref 0.51–0.95)
CREAT UR-MCNC: 407 MG/DL
ERYTHROCYTE [DISTWIDTH] IN BLOOD BY AUTOMATED COUNT: 13.9 % (ref 10–15)
GFR SERPL CREATININE-BSD FRML MDRD: >90 ML/MIN/1.73M2
HCT VFR BLD AUTO: 36.2 % (ref 35–47)
HGB BLD-MCNC: 12 G/DL (ref 11.7–15.7)
MCH RBC QN AUTO: 28.2 PG (ref 26.5–33)
MCHC RBC AUTO-ENTMCNC: 33.1 G/DL (ref 31.5–36.5)
MCV RBC AUTO: 85 FL (ref 78–100)
PLATELET # BLD AUTO: 178 10E3/UL (ref 150–450)
PROT/CREAT 24H UR: 1.19 MG/MG CR (ref 0–0.2)
RBC # BLD AUTO: 4.26 10E6/UL (ref 3.8–5.2)
WBC # BLD AUTO: 9.9 10E3/UL (ref 4–11)

## 2022-11-25 PROCEDURE — U0005 INFEC AGEN DETEC AMPLI PROBE: HCPCS | Performed by: OBSTETRICS & GYNECOLOGY

## 2022-11-25 PROCEDURE — 86850 RBC ANTIBODY SCREEN: CPT | Performed by: OBSTETRICS & GYNECOLOGY

## 2022-11-25 PROCEDURE — 86901 BLOOD TYPING SEROLOGIC RH(D): CPT | Performed by: OBSTETRICS & GYNECOLOGY

## 2022-11-25 PROCEDURE — 76816 OB US FOLLOW-UP PER FETUS: CPT

## 2022-11-25 PROCEDURE — 99207 PR PRENATAL VISIT: CPT | Performed by: OBSTETRICS & GYNECOLOGY

## 2022-11-25 PROCEDURE — U0003 INFECTIOUS AGENT DETECTION BY NUCLEIC ACID (DNA OR RNA); SEVERE ACUTE RESPIRATORY SYNDROME CORONAVIRUS 2 (SARS-COV-2) (CORONAVIRUS DISEASE [COVID-19]), AMPLIFIED PROBE TECHNIQUE, MAKING USE OF HIGH THROUGHPUT TECHNOLOGIES AS DESCRIBED BY CMS-2020-01-R: HCPCS | Performed by: OBSTETRICS & GYNECOLOGY

## 2022-11-25 PROCEDURE — 86900 BLOOD TYPING SEROLOGIC ABO: CPT | Performed by: OBSTETRICS & GYNECOLOGY

## 2022-11-25 PROCEDURE — 82565 ASSAY OF CREATININE: CPT | Performed by: OBSTETRICS & GYNECOLOGY

## 2022-11-25 PROCEDURE — 76819 FETAL BIOPHYS PROFIL W/O NST: CPT | Mod: 26 | Performed by: OBSTETRICS & GYNECOLOGY

## 2022-11-25 PROCEDURE — 76819 FETAL BIOPHYS PROFIL W/O NST: CPT

## 2022-11-25 PROCEDURE — 76816 OB US FOLLOW-UP PER FETUS: CPT | Mod: 26 | Performed by: OBSTETRICS & GYNECOLOGY

## 2022-11-25 PROCEDURE — 85027 COMPLETE CBC AUTOMATED: CPT | Performed by: OBSTETRICS & GYNECOLOGY

## 2022-11-25 PROCEDURE — 36415 COLL VENOUS BLD VENIPUNCTURE: CPT | Performed by: OBSTETRICS & GYNECOLOGY

## 2022-11-25 PROCEDURE — 84156 ASSAY OF PROTEIN URINE: CPT | Performed by: OBSTETRICS & GYNECOLOGY

## 2022-11-25 PROCEDURE — 84460 ALANINE AMINO (ALT) (SGPT): CPT | Performed by: OBSTETRICS & GYNECOLOGY

## 2022-11-25 PROCEDURE — 84450 TRANSFERASE (AST) (SGOT): CPT | Performed by: OBSTETRICS & GYNECOLOGY

## 2022-11-25 ASSESSMENT — PATIENT HEALTH QUESTIONNAIRE - PHQ9: SUM OF ALL RESPONSES TO PHQ QUESTIONS 1-9: 9

## 2022-11-25 NOTE — PROGRESS NOTES
36w3d  Active fetal movement. No contractions, no leaking or bleeding. No   US today showed EFW 4400g, AC (399mm)>HC (340mm), cephalic, normal fluid,  BPP  Discussed patient with Dr. Hart and Dr. Glynn .Delivery for type I DM with concurrent hypertensive disease would be reasonable between 36 and 37 weeks. Recommend patient strongly consider delivery by primary  section because type 1 DM and next week fetus projected to be >4500g with AC>HC. Patient agrees and is interested in  section. Coordinated with Dr. Glynn, scheduled for . Will get pre-op labs and HELLP labs today, instructed patient to  surgical soap and discussed NPO guidelines.   Edel Olivera MD

## 2022-11-26 LAB — SARS-COV-2 RNA RESP QL NAA+PROBE: NEGATIVE

## 2022-11-27 ENCOUNTER — ANESTHESIA EVENT (OUTPATIENT)
Dept: OBGYN | Facility: CLINIC | Age: 32
End: 2022-11-27
Payer: COMMERCIAL

## 2022-11-27 RX ORDER — NALBUPHINE HYDROCHLORIDE 10 MG/ML
2.5-5 INJECTION, SOLUTION INTRAMUSCULAR; INTRAVENOUS; SUBCUTANEOUS EVERY 6 HOURS PRN
Status: CANCELLED | OUTPATIENT
Start: 2022-11-27

## 2022-11-27 RX ORDER — FENTANYL CITRATE-0.9 % NACL/PF 10 MCG/ML
100 PLASTIC BAG, INJECTION (ML) INTRAVENOUS EVERY 5 MIN PRN
Status: CANCELLED | OUTPATIENT
Start: 2022-11-27

## 2022-11-27 RX ORDER — FENTANYL CITRATE 50 UG/ML
15 INJECTION, SOLUTION INTRAMUSCULAR; INTRAVENOUS ONCE
Status: CANCELLED | OUTPATIENT
Start: 2022-11-27 | End: 2022-11-27

## 2022-11-27 RX ORDER — MORPHINE SULFATE 1 MG/ML
150 INJECTION, SOLUTION EPIDURAL; INTRATHECAL; INTRAVENOUS ONCE
Status: CANCELLED | OUTPATIENT
Start: 2022-11-27 | End: 2022-11-27

## 2022-11-27 RX ORDER — BUPIVACAINE HYDROCHLORIDE 7.5 MG/ML
12 INJECTION, SOLUTION EPIDURAL; RETROBULBAR ONCE
Status: CANCELLED | OUTPATIENT
Start: 2022-11-27 | End: 2022-11-27

## 2022-11-27 NOTE — ANESTHESIA PREPROCEDURE EVALUATION
Anesthesia Pre-Procedure Evaluation    Patient: Preeti Leiva   MRN: [id number] : 1990        Procedure : Procedure(s):   SECTION          Past Medical History:   Diagnosis Date     Anxiety      Depressive disorder      Type 1 diabetes mellitus with hyperglycemia (H)      Varicella       Past Surgical History:   Procedure Laterality Date     INGUINAL HERNIA REPAIR        No Known Allergies   Social History     Tobacco Use     Smoking status: Never     Smokeless tobacco: Never   Substance Use Topics     Alcohol use: Not Currently     Comment:  5 drinks per week       Wt Readings from Last 1 Encounters:   22 94.6 kg (208 lb 8 oz)        Anesthesia Evaluation   Pt has had prior anesthetic.         ROS/MED HX  ENT/Pulmonary:  - neg pulmonary ROS     Neurologic:  - neg neurologic ROS     Cardiovascular: Comment: Pre-eclampsia of 3rd trimester    (+) --PIH ---    METS/Exercise Tolerance:     Hematologic:  - neg hematologic  ROS     Musculoskeletal:       GI/Hepatic:  - neg GI/hepatic ROS     Renal/Genitourinary:       Endo:     (+) type I DM, Last HgA1c: 6.1, date: 10/4/2022,     Psychiatric/Substance Use:     (+) psychiatric history anxiety and depression     Infectious Disease:       Malignancy:       Other:   Polyhydramnios in 3rd trimester  Fetal tachycardia  (-) previous        Physical Exam    Airway        Mallampati: II   TM distance: > 3 FB   Neck ROM: full   Mouth opening: > 3 cm    Respiratory Devices and Support         Dental  no notable dental history         Cardiovascular   cardiovascular exam normal          Pulmonary   pulmonary exam normal                OUTSIDE LABS:  CBC:   Lab Results   Component Value Date    WBC 9.9 2022    WBC 9.2 2022    HGB 12.0 2022    HGB 11.9 2022    HCT 36.2 2022    HCT 35.5 2022     2022     2022     BMP:   Lab Results   Component Value Date     06/10/2022      09/28/2021    POTASSIUM 4.4 06/10/2022    POTASSIUM 3.6 09/28/2021    CHLORIDE 105 06/10/2022    CHLORIDE 106 09/28/2021    CO2 25 06/10/2022    CO2 28 09/28/2021    BUN 11 06/10/2022    BUN 16 09/28/2021    CR 0.68 11/25/2022    CR 0.63 11/18/2022     (H) 06/10/2022    GLC 99 09/28/2021     COAGS: No results found for: PTT, INR, FIBR  POC:   Lab Results   Component Value Date    HCG Negative 09/03/2019     HEPATIC:   Lab Results   Component Value Date    ALBUMIN 4.1 06/10/2022    PROTTOTAL 7.9 06/10/2022    ALT 12 11/25/2022    AST 28 11/25/2022    ALKPHOS 51 06/10/2022    BILITOTAL 0.4 06/10/2022     OTHER:   Lab Results   Component Value Date    A1C 6.1 (H) 10/04/2022    LUIS 9.5 06/10/2022    TSH 1.20 02/10/2022       Anesthesia Plan    ASA Status:  3   NPO Status:  ELEVATED Aspiration Risk/Unknown    Anesthesia Type: Spinal.         Techniques and Equipment:     - Lines/Monitors: 2nd IV     Consents    Anesthesia Plan(s) and associated risks, benefits, and realistic alternatives discussed. Questions answered and patient/representative(s) expressed understanding.     - Discussed: Risks, Benefits and Alternatives for BOTH SEDATION and the PROCEDURE were discussed     - Discussed with:  Patient      - Extended Intubation/Ventilatory Support Discussed: No.      - Patient is DNR/DNI Status: No    Use of blood products discussed: Yes.     - Discussed with: Patient.     - Consented: consented to blood products            Reason for refusal: other.     Postoperative Care    Pain management: Multi-modal analgesia, IV analgesics, Oral pain medications, intrathecal morphine, Neuraxial analgesia, Peripheral nerve block (Continuous), Peripheral nerve block (Single Shot).   PONV prophylaxis: Ondansetron (or other 5HT-3)     Comments:           H&P reviewed: Unable to attach VIRTUAL H&P to encounter due to EHR limitations. Appropriate H&P reviewed. The physical exam performed by anesthesia during this surgical encounter  serves as the physical portion of that virtual H&P.  Any significant changes noted within this preop evaluation.          Destin Richards MD

## 2022-11-28 ENCOUNTER — ANESTHESIA (OUTPATIENT)
Dept: OBGYN | Facility: CLINIC | Age: 32
End: 2022-11-28
Payer: COMMERCIAL

## 2022-11-28 ENCOUNTER — HOSPITAL ENCOUNTER (INPATIENT)
Facility: CLINIC | Age: 32
LOS: 3 days | Discharge: HOME | End: 2022-12-01
Attending: OBSTETRICS & GYNECOLOGY | Admitting: OBSTETRICS & GYNECOLOGY
Payer: COMMERCIAL

## 2022-11-28 DIAGNOSIS — Z98.891 STATUS POST CESAREAN DELIVERY: Primary | ICD-10-CM

## 2022-11-28 LAB
ERYTHROCYTE [DISTWIDTH] IN BLOOD BY AUTOMATED COUNT: 13.6 % (ref 10–15)
GLUCOSE BLDC GLUCOMTR-MCNC: 105 MG/DL (ref 70–99)
GLUCOSE BLDC GLUCOMTR-MCNC: 115 MG/DL (ref 70–99)
GLUCOSE BLDC GLUCOMTR-MCNC: 115 MG/DL (ref 70–99)
GLUCOSE BLDC GLUCOMTR-MCNC: 122 MG/DL (ref 70–99)
GLUCOSE BLDC GLUCOMTR-MCNC: 130 MG/DL (ref 70–99)
GLUCOSE BLDC GLUCOMTR-MCNC: 133 MG/DL (ref 70–99)
GLUCOSE BLDC GLUCOMTR-MCNC: 139 MG/DL (ref 70–99)
GLUCOSE BLDC GLUCOMTR-MCNC: 89 MG/DL (ref 70–99)
GLUCOSE BLDC GLUCOMTR-MCNC: 89 MG/DL (ref 70–99)
GLUCOSE BLDC GLUCOMTR-MCNC: 90 MG/DL (ref 70–99)
GLUCOSE BLDC GLUCOMTR-MCNC: 92 MG/DL (ref 70–99)
HBA1C MFR BLD: 5.5 % (ref 0–5.6)
HCT VFR BLD AUTO: 35.8 % (ref 35–47)
HGB BLD-MCNC: 12.1 G/DL (ref 11.7–15.7)
HOLD SPECIMEN: NORMAL
MCH RBC QN AUTO: 28.3 PG (ref 26.5–33)
MCHC RBC AUTO-ENTMCNC: 33.8 G/DL (ref 31.5–36.5)
MCV RBC AUTO: 84 FL (ref 78–100)
PLATELET # BLD AUTO: 155 10E3/UL (ref 150–450)
RBC # BLD AUTO: 4.27 10E6/UL (ref 3.8–5.2)
WBC # BLD AUTO: 9.1 10E3/UL (ref 4–11)

## 2022-11-28 PROCEDURE — 710N000010 HC RECOVERY PHASE 1, LEVEL 2, PER MIN: Performed by: OBSTETRICS & GYNECOLOGY

## 2022-11-28 PROCEDURE — C9290 INJ, BUPIVACAINE LIPOSOME: HCPCS | Performed by: STUDENT IN AN ORGANIZED HEALTH CARE EDUCATION/TRAINING PROGRAM

## 2022-11-28 PROCEDURE — 258N000003 HC RX IP 258 OP 636: Performed by: STUDENT IN AN ORGANIZED HEALTH CARE EDUCATION/TRAINING PROGRAM

## 2022-11-28 PROCEDURE — 250N000011 HC RX IP 250 OP 636: Performed by: STUDENT IN AN ORGANIZED HEALTH CARE EDUCATION/TRAINING PROGRAM

## 2022-11-28 PROCEDURE — 250N000012 HC RX MED GY IP 250 OP 636 PS 637: Performed by: PHYSICIAN ASSISTANT

## 2022-11-28 PROCEDURE — 250N000009 HC RX 250: Performed by: STUDENT IN AN ORGANIZED HEALTH CARE EDUCATION/TRAINING PROGRAM

## 2022-11-28 PROCEDURE — 271N000001 HC OR GENERAL SUPPLY NON-STERILE: Performed by: OBSTETRICS & GYNECOLOGY

## 2022-11-28 PROCEDURE — 370N000017 HC ANESTHESIA TECHNICAL FEE, PER MIN: Performed by: OBSTETRICS & GYNECOLOGY

## 2022-11-28 PROCEDURE — 250N000009 HC RX 250: Performed by: PHYSICIAN ASSISTANT

## 2022-11-28 PROCEDURE — 120N000002 HC R&B MED SURG/OB UMMC

## 2022-11-28 PROCEDURE — 3E0R3NZ INTRODUCTION OF ANALGESICS, HYPNOTICS, SEDATIVES INTO SPINAL CANAL, PERCUTANEOUS APPROACH: ICD-10-PCS | Performed by: STUDENT IN AN ORGANIZED HEALTH CARE EDUCATION/TRAINING PROGRAM

## 2022-11-28 PROCEDURE — 3E0T3BZ INTRODUCTION OF ANESTHETIC AGENT INTO PERIPHERAL NERVES AND PLEXI, PERCUTANEOUS APPROACH: ICD-10-PCS | Performed by: STUDENT IN AN ORGANIZED HEALTH CARE EDUCATION/TRAINING PROGRAM

## 2022-11-28 PROCEDURE — 83036 HEMOGLOBIN GLYCOSYLATED A1C: CPT | Performed by: OBSTETRICS & GYNECOLOGY

## 2022-11-28 PROCEDURE — 250N000013 HC RX MED GY IP 250 OP 250 PS 637

## 2022-11-28 PROCEDURE — 250N000013 HC RX MED GY IP 250 OP 250 PS 637: Performed by: STUDENT IN AN ORGANIZED HEALTH CARE EDUCATION/TRAINING PROGRAM

## 2022-11-28 PROCEDURE — 59510 CESAREAN DELIVERY: CPT | Performed by: OBSTETRICS & GYNECOLOGY

## 2022-11-28 PROCEDURE — 360N000076 HC SURGERY LEVEL 3, PER MIN: Performed by: OBSTETRICS & GYNECOLOGY

## 2022-11-28 PROCEDURE — 36415 COLL VENOUS BLD VENIPUNCTURE: CPT | Performed by: OBSTETRICS & GYNECOLOGY

## 2022-11-28 PROCEDURE — 59514 CESAREAN DELIVERY ONLY: CPT | Mod: 80 | Performed by: OBSTETRICS & GYNECOLOGY

## 2022-11-28 PROCEDURE — 250N000011 HC RX IP 250 OP 636

## 2022-11-28 PROCEDURE — 272N000001 HC OR GENERAL SUPPLY STERILE: Performed by: OBSTETRICS & GYNECOLOGY

## 2022-11-28 PROCEDURE — 85027 COMPLETE CBC AUTOMATED: CPT | Performed by: OBSTETRICS & GYNECOLOGY

## 2022-11-28 PROCEDURE — 999N000141 HC STATISTIC PRE-PROCEDURE NURSING ASSESSMENT: Performed by: OBSTETRICS & GYNECOLOGY

## 2022-11-28 PROCEDURE — 99221 1ST HOSP IP/OBS SF/LOW 40: CPT | Performed by: PHYSICIAN ASSISTANT

## 2022-11-28 RX ORDER — NICOTINE POLACRILEX 4 MG
15-30 LOZENGE BUCCAL
Status: DISCONTINUED | OUTPATIENT
Start: 2022-11-28 | End: 2022-12-01 | Stop reason: HOSPADM

## 2022-11-28 RX ORDER — PROCHLORPERAZINE 25 MG
25 SUPPOSITORY, RECTAL RECTAL EVERY 12 HOURS PRN
Status: DISCONTINUED | OUTPATIENT
Start: 2022-11-28 | End: 2022-12-01 | Stop reason: HOSPADM

## 2022-11-28 RX ORDER — ONDANSETRON 2 MG/ML
4 INJECTION INTRAMUSCULAR; INTRAVENOUS EVERY 6 HOURS PRN
Status: DISCONTINUED | OUTPATIENT
Start: 2022-11-28 | End: 2022-12-01 | Stop reason: HOSPADM

## 2022-11-28 RX ORDER — OXYTOCIN/0.9 % SODIUM CHLORIDE 30/500 ML
100-340 PLASTIC BAG, INJECTION (ML) INTRAVENOUS CONTINUOUS PRN
Status: DISCONTINUED | OUTPATIENT
Start: 2022-11-28 | End: 2022-12-01 | Stop reason: HOSPADM

## 2022-11-28 RX ORDER — SERTRALINE HYDROCHLORIDE 100 MG/1
200 TABLET, FILM COATED ORAL DAILY
Status: DISCONTINUED | OUTPATIENT
Start: 2022-11-29 | End: 2022-12-01 | Stop reason: HOSPADM

## 2022-11-28 RX ORDER — NALOXONE HYDROCHLORIDE 0.4 MG/ML
0.2 INJECTION, SOLUTION INTRAMUSCULAR; INTRAVENOUS; SUBCUTANEOUS
Status: DISCONTINUED | OUTPATIENT
Start: 2022-11-28 | End: 2022-12-01 | Stop reason: HOSPADM

## 2022-11-28 RX ORDER — CARBOPROST TROMETHAMINE 250 UG/ML
250 INJECTION, SOLUTION INTRAMUSCULAR
Status: DISCONTINUED | OUTPATIENT
Start: 2022-11-28 | End: 2022-11-28 | Stop reason: HOSPADM

## 2022-11-28 RX ORDER — SODIUM CHLORIDE 9 MG/ML
INJECTION, SOLUTION INTRAVENOUS CONTINUOUS
Status: DISCONTINUED | OUTPATIENT
Start: 2022-11-28 | End: 2022-11-28

## 2022-11-28 RX ORDER — DIPHENHYDRAMINE HCL 25 MG
25 CAPSULE ORAL EVERY 6 HOURS PRN
Status: DISCONTINUED | OUTPATIENT
Start: 2022-11-28 | End: 2022-12-01 | Stop reason: HOSPADM

## 2022-11-28 RX ORDER — NICOTINE POLACRILEX 4 MG
15-30 LOZENGE BUCCAL
Status: DISCONTINUED | OUTPATIENT
Start: 2022-11-28 | End: 2022-11-28

## 2022-11-28 RX ORDER — SIMETHICONE 80 MG
80 TABLET,CHEWABLE ORAL 4 TIMES DAILY PRN
Status: DISCONTINUED | OUTPATIENT
Start: 2022-11-28 | End: 2022-11-29

## 2022-11-28 RX ORDER — NIFEDIPINE 30 MG/1
30 TABLET, EXTENDED RELEASE ORAL DAILY
Status: DISCONTINUED | OUTPATIENT
Start: 2022-11-28 | End: 2022-11-29

## 2022-11-28 RX ORDER — KETOROLAC TROMETHAMINE 30 MG/ML
INJECTION, SOLUTION INTRAMUSCULAR; INTRAVENOUS PRN
Status: DISCONTINUED | OUTPATIENT
Start: 2022-11-28 | End: 2022-11-28

## 2022-11-28 RX ORDER — DEXTROSE MONOHYDRATE 25 G/50ML
25-50 INJECTION, SOLUTION INTRAVENOUS
Status: DISCONTINUED | OUTPATIENT
Start: 2022-11-28 | End: 2022-11-28

## 2022-11-28 RX ORDER — OXYCODONE HYDROCHLORIDE 5 MG/1
5 TABLET ORAL EVERY 4 HOURS PRN
Status: DISCONTINUED | OUTPATIENT
Start: 2022-11-28 | End: 2022-12-01 | Stop reason: HOSPADM

## 2022-11-28 RX ORDER — CITRIC ACID/SODIUM CITRATE 334-500MG
30 SOLUTION, ORAL ORAL
Status: COMPLETED | OUTPATIENT
Start: 2022-11-28 | End: 2022-11-28

## 2022-11-28 RX ORDER — SODIUM CHLORIDE 9 MG/ML
INJECTION, SOLUTION INTRAVENOUS CONTINUOUS
Status: DISCONTINUED | OUTPATIENT
Start: 2022-11-28 | End: 2022-12-01 | Stop reason: HOSPADM

## 2022-11-28 RX ORDER — DEXTROSE MONOHYDRATE 25 G/50ML
25-50 INJECTION, SOLUTION INTRAVENOUS
Status: DISCONTINUED | OUTPATIENT
Start: 2022-11-28 | End: 2022-11-29

## 2022-11-28 RX ORDER — MORPHINE SULFATE 1 MG/ML
INJECTION, SOLUTION EPIDURAL; INTRATHECAL; INTRAVENOUS
Status: DISCONTINUED | OUTPATIENT
Start: 2022-11-28 | End: 2022-11-28

## 2022-11-28 RX ORDER — LIDOCAINE 40 MG/G
CREAM TOPICAL
Status: DISCONTINUED | OUTPATIENT
Start: 2022-11-28 | End: 2022-12-01 | Stop reason: HOSPADM

## 2022-11-28 RX ORDER — OXYTOCIN 10 [USP'U]/ML
10 INJECTION, SOLUTION INTRAMUSCULAR; INTRAVENOUS
Status: DISCONTINUED | OUTPATIENT
Start: 2022-11-28 | End: 2022-12-01 | Stop reason: HOSPADM

## 2022-11-28 RX ORDER — LIDOCAINE 40 MG/G
CREAM TOPICAL
Status: DISCONTINUED | OUTPATIENT
Start: 2022-11-28 | End: 2022-11-28 | Stop reason: HOSPADM

## 2022-11-28 RX ORDER — ACETAMINOPHEN 325 MG/1
975 TABLET ORAL ONCE
Status: COMPLETED | OUTPATIENT
Start: 2022-11-28 | End: 2022-11-28

## 2022-11-28 RX ORDER — CEFAZOLIN SODIUM/WATER 2 G/20 ML
2 SYRINGE (ML) INTRAVENOUS
Status: COMPLETED | OUTPATIENT
Start: 2022-11-28 | End: 2022-11-28

## 2022-11-28 RX ORDER — ACETAMINOPHEN 325 MG/1
975 TABLET ORAL EVERY 6 HOURS
Status: DISCONTINUED | OUTPATIENT
Start: 2022-11-28 | End: 2022-12-01 | Stop reason: HOSPADM

## 2022-11-28 RX ORDER — NICOTINE POLACRILEX 4 MG
15-30 LOZENGE BUCCAL
Status: DISCONTINUED | OUTPATIENT
Start: 2022-11-28 | End: 2022-11-29

## 2022-11-28 RX ORDER — NALOXONE HYDROCHLORIDE 0.4 MG/ML
0.4 INJECTION, SOLUTION INTRAMUSCULAR; INTRAVENOUS; SUBCUTANEOUS
Status: DISCONTINUED | OUTPATIENT
Start: 2022-11-28 | End: 2022-12-01 | Stop reason: HOSPADM

## 2022-11-28 RX ORDER — DEXTROSE, SODIUM CHLORIDE, SODIUM LACTATE, POTASSIUM CHLORIDE, AND CALCIUM CHLORIDE 5; .6; .31; .03; .02 G/100ML; G/100ML; G/100ML; G/100ML; G/100ML
INJECTION, SOLUTION INTRAVENOUS CONTINUOUS
Status: DISCONTINUED | OUTPATIENT
Start: 2022-11-28 | End: 2022-12-01 | Stop reason: HOSPADM

## 2022-11-28 RX ORDER — FENTANYL CITRATE 50 UG/ML
INJECTION, SOLUTION INTRAMUSCULAR; INTRAVENOUS
Status: DISCONTINUED | OUTPATIENT
Start: 2022-11-28 | End: 2022-11-28

## 2022-11-28 RX ORDER — TRANEXAMIC ACID 10 MG/ML
1 INJECTION, SOLUTION INTRAVENOUS EVERY 30 MIN PRN
Status: DISCONTINUED | OUTPATIENT
Start: 2022-11-28 | End: 2022-12-01 | Stop reason: HOSPADM

## 2022-11-28 RX ORDER — METHYLERGONOVINE MALEATE 0.2 MG/ML
200 INJECTION INTRAVENOUS
Status: DISCONTINUED | OUTPATIENT
Start: 2022-11-28 | End: 2022-12-01 | Stop reason: HOSPADM

## 2022-11-28 RX ORDER — MISOPROSTOL 200 UG/1
400 TABLET ORAL
Status: DISCONTINUED | OUTPATIENT
Start: 2022-11-28 | End: 2022-12-01 | Stop reason: HOSPADM

## 2022-11-28 RX ORDER — BUPIVACAINE HYDROCHLORIDE 7.5 MG/ML
INJECTION, SOLUTION INTRASPINAL
Status: DISCONTINUED | OUTPATIENT
Start: 2022-11-28 | End: 2022-11-28

## 2022-11-28 RX ORDER — MISOPROSTOL 200 UG/1
800 TABLET ORAL
Status: DISCONTINUED | OUTPATIENT
Start: 2022-11-28 | End: 2022-11-28 | Stop reason: HOSPADM

## 2022-11-28 RX ORDER — TRANEXAMIC ACID 10 MG/ML
1 INJECTION, SOLUTION INTRAVENOUS EVERY 30 MIN PRN
Status: DISCONTINUED | OUTPATIENT
Start: 2022-11-28 | End: 2022-11-28 | Stop reason: HOSPADM

## 2022-11-28 RX ORDER — METOCLOPRAMIDE 10 MG/1
10 TABLET ORAL EVERY 6 HOURS PRN
Status: DISCONTINUED | OUTPATIENT
Start: 2022-11-28 | End: 2022-12-01 | Stop reason: HOSPADM

## 2022-11-28 RX ORDER — BISACODYL 10 MG
10 SUPPOSITORY, RECTAL RECTAL DAILY PRN
Status: DISCONTINUED | OUTPATIENT
Start: 2022-11-30 | End: 2022-12-01 | Stop reason: HOSPADM

## 2022-11-28 RX ORDER — PROCHLORPERAZINE MALEATE 10 MG
10 TABLET ORAL EVERY 6 HOURS PRN
Status: DISCONTINUED | OUTPATIENT
Start: 2022-11-28 | End: 2022-12-01 | Stop reason: HOSPADM

## 2022-11-28 RX ORDER — OXYTOCIN 10 [USP'U]/ML
10 INJECTION, SOLUTION INTRAMUSCULAR; INTRAVENOUS
Status: DISCONTINUED | OUTPATIENT
Start: 2022-11-28 | End: 2022-11-28 | Stop reason: HOSPADM

## 2022-11-28 RX ORDER — SODIUM CHLORIDE 9 MG/ML
INJECTION, SOLUTION INTRAVENOUS
Status: DISCONTINUED
Start: 2022-11-28 | End: 2022-11-28 | Stop reason: HOSPADM

## 2022-11-28 RX ORDER — CEFAZOLIN SODIUM/WATER 2 G/20 ML
2 SYRINGE (ML) INTRAVENOUS SEE ADMIN INSTRUCTIONS
Status: DISCONTINUED | OUTPATIENT
Start: 2022-11-28 | End: 2022-11-28 | Stop reason: HOSPADM

## 2022-11-28 RX ORDER — MISOPROSTOL 200 UG/1
400 TABLET ORAL
Status: DISCONTINUED | OUTPATIENT
Start: 2022-11-28 | End: 2022-11-28 | Stop reason: HOSPADM

## 2022-11-28 RX ORDER — ONDANSETRON 4 MG/1
4 TABLET, ORALLY DISINTEGRATING ORAL EVERY 6 HOURS PRN
Status: DISCONTINUED | OUTPATIENT
Start: 2022-11-28 | End: 2022-12-01 | Stop reason: HOSPADM

## 2022-11-28 RX ORDER — DEXTROSE, SODIUM CHLORIDE, SODIUM LACTATE, POTASSIUM CHLORIDE, AND CALCIUM CHLORIDE 5; .6; .31; .03; .02 G/100ML; G/100ML; G/100ML; G/100ML; G/100ML
INJECTION, SOLUTION INTRAVENOUS CONTINUOUS
Status: DISCONTINUED | OUTPATIENT
Start: 2022-11-28 | End: 2022-11-28

## 2022-11-28 RX ORDER — MODIFIED LANOLIN
OINTMENT (GRAM) TOPICAL
Status: DISCONTINUED | OUTPATIENT
Start: 2022-11-28 | End: 2022-12-01 | Stop reason: HOSPADM

## 2022-11-28 RX ORDER — DEXTROSE MONOHYDRATE 100 MG/ML
INJECTION, SOLUTION INTRAVENOUS CONTINUOUS PRN
Status: DISCONTINUED | OUTPATIENT
Start: 2022-11-28 | End: 2022-11-28

## 2022-11-28 RX ORDER — CARBOPROST TROMETHAMINE 250 UG/ML
250 INJECTION, SOLUTION INTRAMUSCULAR
Status: DISCONTINUED | OUTPATIENT
Start: 2022-11-28 | End: 2022-12-01 | Stop reason: HOSPADM

## 2022-11-28 RX ORDER — SODIUM CHLORIDE, SODIUM LACTATE, POTASSIUM CHLORIDE, CALCIUM CHLORIDE 600; 310; 30; 20 MG/100ML; MG/100ML; MG/100ML; MG/100ML
INJECTION, SOLUTION INTRAVENOUS
Status: DISCONTINUED
Start: 2022-11-28 | End: 2022-11-28 | Stop reason: HOSPADM

## 2022-11-28 RX ORDER — AMOXICILLIN 250 MG
1 CAPSULE ORAL 2 TIMES DAILY
Status: DISCONTINUED | OUTPATIENT
Start: 2022-11-28 | End: 2022-12-01 | Stop reason: HOSPADM

## 2022-11-28 RX ORDER — METOCLOPRAMIDE HYDROCHLORIDE 5 MG/ML
10 INJECTION INTRAMUSCULAR; INTRAVENOUS EVERY 6 HOURS PRN
Status: DISCONTINUED | OUTPATIENT
Start: 2022-11-28 | End: 2022-12-01 | Stop reason: HOSPADM

## 2022-11-28 RX ORDER — MISOPROSTOL 200 UG/1
800 TABLET ORAL
Status: DISCONTINUED | OUTPATIENT
Start: 2022-11-28 | End: 2022-12-01 | Stop reason: HOSPADM

## 2022-11-28 RX ORDER — METHYLERGONOVINE MALEATE 0.2 MG/ML
200 INJECTION INTRAVENOUS
Status: DISCONTINUED | OUTPATIENT
Start: 2022-11-28 | End: 2022-11-28 | Stop reason: HOSPADM

## 2022-11-28 RX ORDER — SODIUM CHLORIDE, SODIUM LACTATE, POTASSIUM CHLORIDE, CALCIUM CHLORIDE 600; 310; 30; 20 MG/100ML; MG/100ML; MG/100ML; MG/100ML
INJECTION, SOLUTION INTRAVENOUS CONTINUOUS
Status: DISCONTINUED | OUTPATIENT
Start: 2022-11-28 | End: 2022-11-28 | Stop reason: HOSPADM

## 2022-11-28 RX ORDER — DEXTROSE MONOHYDRATE 25 G/50ML
25-50 INJECTION, SOLUTION INTRAVENOUS
Status: DISCONTINUED | OUTPATIENT
Start: 2022-11-28 | End: 2022-12-01 | Stop reason: HOSPADM

## 2022-11-28 RX ORDER — HYDROCORTISONE 25 MG/G
CREAM TOPICAL 3 TIMES DAILY PRN
Status: DISCONTINUED | OUTPATIENT
Start: 2022-11-28 | End: 2022-12-01 | Stop reason: HOSPADM

## 2022-11-28 RX ORDER — OXYTOCIN/0.9 % SODIUM CHLORIDE 30/500 ML
340 PLASTIC BAG, INJECTION (ML) INTRAVENOUS CONTINUOUS PRN
Status: DISCONTINUED | OUTPATIENT
Start: 2022-11-28 | End: 2022-12-01 | Stop reason: HOSPADM

## 2022-11-28 RX ORDER — DIPHENHYDRAMINE HYDROCHLORIDE 50 MG/ML
25 INJECTION INTRAMUSCULAR; INTRAVENOUS EVERY 6 HOURS PRN
Status: DISCONTINUED | OUTPATIENT
Start: 2022-11-28 | End: 2022-12-01 | Stop reason: HOSPADM

## 2022-11-28 RX ORDER — OXYTOCIN/0.9 % SODIUM CHLORIDE 30/500 ML
340 PLASTIC BAG, INJECTION (ML) INTRAVENOUS CONTINUOUS PRN
Status: DISCONTINUED | OUTPATIENT
Start: 2022-11-28 | End: 2022-11-28 | Stop reason: HOSPADM

## 2022-11-28 RX ORDER — IBUPROFEN 800 MG/1
800 TABLET, FILM COATED ORAL EVERY 6 HOURS
Status: DISCONTINUED | OUTPATIENT
Start: 2022-11-29 | End: 2022-12-01 | Stop reason: HOSPADM

## 2022-11-28 RX ORDER — KETOROLAC TROMETHAMINE 30 MG/ML
30 INJECTION, SOLUTION INTRAMUSCULAR; INTRAVENOUS EVERY 6 HOURS
Status: DISPENSED | OUTPATIENT
Start: 2022-11-28 | End: 2022-11-29

## 2022-11-28 RX ORDER — BUPIVACAINE HYDROCHLORIDE 2.5 MG/ML
INJECTION, SOLUTION EPIDURAL; INFILTRATION; INTRACAUDAL
Status: COMPLETED | OUTPATIENT
Start: 2022-11-28 | End: 2022-11-28

## 2022-11-28 RX ORDER — AMOXICILLIN 250 MG
2 CAPSULE ORAL 2 TIMES DAILY
Status: DISCONTINUED | OUTPATIENT
Start: 2022-11-28 | End: 2022-12-01 | Stop reason: HOSPADM

## 2022-11-28 RX ADMIN — BUPIVACAINE HYDROCHLORIDE IN DEXTROSE 1.8 ML: 7.5 INJECTION, SOLUTION SUBARACHNOID at 13:50

## 2022-11-28 RX ADMIN — BUPIVACAINE HYDROCHLORIDE 20 ML: 2.5 INJECTION, SOLUTION EPIDURAL; INFILTRATION; INTRACAUDAL at 14:30

## 2022-11-28 RX ADMIN — FENTANYL CITRATE 15 MCG: 50 INJECTION, SOLUTION INTRAMUSCULAR; INTRAVENOUS at 13:50

## 2022-11-28 RX ADMIN — INSULIN ASPART 9 UNITS: 100 INJECTION, SOLUTION INTRAVENOUS; SUBCUTANEOUS at 18:15

## 2022-11-28 RX ADMIN — INSULIN LISPRO 1 VIAL: 100 INJECTION, SOLUTION INTRAVENOUS; SUBCUTANEOUS at 19:00

## 2022-11-28 RX ADMIN — SODIUM CHLORIDE, POTASSIUM CHLORIDE, SODIUM LACTATE AND CALCIUM CHLORIDE: 600; 310; 30; 20 INJECTION, SOLUTION INTRAVENOUS at 14:28

## 2022-11-28 RX ADMIN — KETOROLAC TROMETHAMINE 30 MG: 30 INJECTION, SOLUTION INTRAMUSCULAR at 14:30

## 2022-11-28 RX ADMIN — Medication 600 ML/HR: at 14:03

## 2022-11-28 RX ADMIN — SODIUM CHLORIDE, POTASSIUM CHLORIDE, SODIUM LACTATE AND CALCIUM CHLORIDE: 600; 310; 30; 20 INJECTION, SOLUTION INTRAVENOUS at 09:45

## 2022-11-28 RX ADMIN — Medication 100 ML/HR: at 15:56

## 2022-11-28 RX ADMIN — NIFEDIPINE 30 MG: 30 TABLET, FILM COATED, EXTENDED RELEASE ORAL at 20:58

## 2022-11-28 RX ADMIN — Medication 2 G: at 13:54

## 2022-11-28 RX ADMIN — MORPHINE SULFATE 0.15 MG: 1 INJECTION EPIDURAL; INTRATHECAL; INTRAVENOUS at 13:50

## 2022-11-28 RX ADMIN — ACETAMINOPHEN 975 MG: 325 TABLET, FILM COATED ORAL at 12:54

## 2022-11-28 RX ADMIN — ACETAMINOPHEN 975 MG: 325 TABLET, FILM COATED ORAL at 18:18

## 2022-11-28 RX ADMIN — Medication: at 19:06

## 2022-11-28 RX ADMIN — BUPIVACAINE 20 ML: 13.3 INJECTION, SUSPENSION, LIPOSOMAL INFILTRATION at 14:30

## 2022-11-28 RX ADMIN — SODIUM CITRATE AND CITRIC ACID MONOHYDRATE 30 ML: 500; 334 SOLUTION ORAL at 12:54

## 2022-11-28 RX ADMIN — SODIUM CHLORIDE, POTASSIUM CHLORIDE, SODIUM LACTATE AND CALCIUM CHLORIDE: 600; 310; 30; 20 INJECTION, SOLUTION INTRAVENOUS at 13:13

## 2022-11-28 RX ADMIN — HUMAN INSULIN 1 UNITS/HR: 100 INJECTION, SOLUTION SUBCUTANEOUS at 10:17

## 2022-11-28 RX ADMIN — PHENYLEPHRINE HYDROCHLORIDE 25 MCG/MIN: 10 INJECTION INTRAVENOUS at 13:50

## 2022-11-28 RX ADMIN — SENNOSIDES AND DOCUSATE SODIUM 1 TABLET: 50; 8.6 TABLET ORAL at 20:50

## 2022-11-28 RX ADMIN — SODIUM CHLORIDE: 9 INJECTION, SOLUTION INTRAVENOUS at 10:16

## 2022-11-28 RX ADMIN — KETOROLAC TROMETHAMINE 30 MG: 30 INJECTION, SOLUTION INTRAMUSCULAR at 20:51

## 2022-11-28 ASSESSMENT — ACTIVITIES OF DAILY LIVING (ADL)
ADLS_ACUITY_SCORE: 18
ADLS_ACUITY_SCORE: 35
ADLS_ACUITY_SCORE: 18

## 2022-11-28 NOTE — ANESTHESIA POSTPROCEDURE EVALUATION
Patient: Preeti Leiva    Procedure: Procedure(s):   SECTION       Anesthesia Type:  No value filed.    Note:  Disposition: Admission   Postop Pain Control: Uneventful            Sign Out: Well controlled pain   PONV: No   Neuro/Psych: Uneventful            Sign Out: Acceptable/Baseline neuro status   Airway/Respiratory: Uneventful            Sign Out: Acceptable/Baseline resp. status   CV/Hemodynamics: Uneventful            Sign Out: Acceptable CV status; No obvious hypovolemia; No obvious fluid overload   Other NRE: NONE   DID A NON-ROUTINE EVENT OCCUR? No           Last vitals:  Vitals Value Taken Time   /107 22 1732   Temp 36.4  C (97.6  F) 22 1730   Pulse 89 22 1730   Resp 20 22 1730   SpO2 99 % 22       Electronically Signed By: Esmer Zhou MD  2022  5:50 PM

## 2022-11-28 NOTE — PLAN OF CARE
Goal Outcome Evaluation:  Data: [facility] Reason for maternal/fetal assessment per patient is scheduled . Patient is a . Prenatal record reviewed.      OB History    Para Term  AB Living   1 0 0 0 0 0   SAB IAB Ectopic Multiple Live Births   0 0 0 0 0      # Outcome Date GA Lbr Ar/2nd Weight Sex Delivery Anes PTL Lv   1 Current            . Medical history:   Past Medical History:   Diagnosis Date    Anxiety     Depressive disorder     Type 1 diabetes mellitus with hyperglycemia (H)     Varicella    . Gestational Age 36w6d. VSS. Fetal movement present. Patient denies cramping, backache, vaginal discharge, pelvic pressure, UTI symptoms, GI problems, bloody show, vaginal bleeding, edema, headache, visual disturbances, epigastric or URQ pain, abdominal pain, rupture of membranes. Support persons  and mother present.  Action: EFM applied for monitoring. Uterine assessment occas with irrit. Fetal assessment: Presumed adequate fetal oxygenation documented (see flow record).   Response:  informed of pt arrival.

## 2022-11-28 NOTE — CONSULTS
NEW INPATIENT DIABETES MANAGEMENT CONSULT  Preeti Leiva  Age: 32 year old  MRN # [id number]   YOB: 1990    Chief Complaint: LGA baby, encounter for    Reason for Consult: glycemic management from labor to postpartum  Consulting Provider: Reuben Ruano MD    History of Present Illness:   Preeti is a 32 year old female  at 36w6d with a PMHx Type I diabetes who was admitted on 2022 for primary  in the setting of LGA baby, Type I diabetes, and pre-eclampsia without SF.     Pt is not known to the Inpatient Diabetes Service from past admission(s), but follows with Dr. Thomas in the outpatient setting. She had endorsed a desire to remain on her Omnipod insulin pump through delivery, if uncomplicated, however, she consented to the use of IV insulin during . Per Dr. Thomas's notes:         History obtained via the patient, chart review, and discussion with consulting team.    Most recent PTA diabetes regimen includes:  Omnipod 5 insulin pump with Dexcom CGM, and Humalog insulin. The settings are documented below.     BG upon this admission 130, at time of insulin drip initiation. Stable now, with IV insulin 0.2-0.5 units per hour, not having eaten x6 hours in preparation for .      Labs are pending BhB, CBC, A1c.     She reports that her BG have been largely in target the past few days; one low, for which she was symptomatic. Improved quickly with juice.     She is wanting to resume her ambulatory insulin pump as soon as she is deemed stable following delivery. She worked with Dr. Thomas to develop a basal pattern that is input into her pump already, for use postpartum.     She is planning to breastfeed.     Other Active/Contributory Medical Problems: pre-E  Diabetes Mellitus Type: I  Duration:  Dx at age 6  Prior to Admission Diabetes Regimen:      BG monitor: Dexcom  Frequency of checks: <4 times daily  Omnipod insulin pump  Basal:  Midnight -0900: 1.25  units per hour  0900-midnight: 1.95 units per hour  Bolus:  Midnight to 0530: 1:9g CHO  3746-9878: 1:3g CHO  0005-8364: 1:1.5g CHO  8734-1542:1:4g   7471-1837: 1:2.8g CHO  2100-midnight: 1:4.8g CHO  ISF:   Midnight-0700: 27  8261-2520: 22  4434-3236: 22  1800-midnight: 21  Target: 110    * prior to pregnancy, she was relying on basal rate 80% of the time, not carb counting and entering manual boluses  Usual BG control PTA: A1c 5.5% this admission, improved from 6.1%, 1 month ago.  History of DKA: yes, in 2008  Able to Detect Hypoglycemia: yes, aided by Dexcom  Usual Diabetes Care Provider: [facility] endocrine (Dr. Thomas)- prior to pregnancy, saw Dada Richards   Primary Care Provider: Roshni Nvearez  Current Diet: Orders Placed This Encounter      NPO per Anesthesia Guidelines for Procedure/Surgery Except for: Meds     10 point ROS completed with pertinent positives and negatives noted in the HPI  Past medical, family and social histories are reviewed and updated.    Social History  Social History     Socioeconomic History     Marital status: Single   Tobacco Use     Smoking status: Never     Smokeless tobacco: Never   Vaping Use     Vaping Use: Never used   Substance and Sexual Activity     Alcohol use: Not Currently     Comment:  5 drinks per week      Drug use: Never     Sexual activity: Yes     Partners: Male   Other Topics Concern     Parent/sibling w/ CABG, MI or angioplasty before 65F 55M? No     Family History  Great grandmother had type 1 DM.    Physical Exam   LMP 03/15/2022   General: pleasant, in no distress. present/supportive  HEENT: normocephalic, atraumatic. Oral mucous membranes moist.  Lungs: unlabored respiration, no cough  ABD: gravid  Skin: warm and dry, no obvious lesions  MSK:  moves all extremities  Lymp:  no LE edema   Mental status:  alert, oriented to self, place, time  Psych:   calm and appropriate interaction     Most Recent Laboratory Tests:  Recent Labs   Lab  22  1628   HGB 12.0     No results for input(s): A1C in the last 168 hours.  Recent Labs   Lab 22  1628   CR 0.68     No results for input(s): GLC, BGM in the last 168 hours.    Assessment:   1) Type I diabetes in pregnancy complicated by LGA baby and now with pre eclampsia without SF    Plan:    -OB high intensity IV insulin infusion initiated.    -following , transfer to postpartum unit, and tolerating PO intake, will place orders for resumption of her Omnipod 5 insulin pump, with Humalog insulin, and Dexcom G6 CGM:    Basal 2: initial postpartum settings.     Basal-   Midnight-0700: 1 unit per hour  0700-midnight: 1.5 units per hour.   *if she is experiencing hypoglycemia, we'd recommend a 20% reduction in these basal rates overnight.*    Bolus-  1 unit per 13g CHO  Insulin sensitivity factor: 50    AIT: 4 hours.     -BG monitoring AC/HS/0200/0500, once off IV insulin   -consistent carb diet   -hypoglycemia protocol     Discussed plan of care with patient, nursing, and primary team   Thank you for this consult; Inpatient Diabetes will continue to follow.         Contacting the Inpatient Diabetes Team   From 8AM-4PM: page inpatient diabetes provider that is following the patient, or utilize the job code paging system.   From 4PM-8AM: page the job code for endocrine fellow on call.       Please use the following job code to reach the Inpatient Diabetes team. Note that you must use an in house phone and that job codes cannot receive text pages.     Dial [phone] (or star-star-star [phone] on the new [employer] telephones), then [phone] to reach the endocrine-diabetes provider on call.    60 minutes spent on the date of the encounter doing chart review, history and exam, documentation and further activities per the note      Over 50% of my time on the unit was spent counseling the patient and/or coordinating care regarding acute hyperglycemia management.  See note for details.    Astrid Wren PA-C  Inpatient  Diabetes Management Service  Pager [phone]

## 2022-11-28 NOTE — ANESTHESIA PROCEDURE NOTES
"Intrathecal injection Procedure Note    Pre-Procedure   Staff -        Anesthesiologist:  Esmer Zhou MD       Performed By: anesthesiologist and other anesthesia staff       Pre-Anesthestic Checklist: patient identified, IV checked, risks and benefits discussed, informed consent, monitors and equipment checked, pre-op evaluation, at physician/surgeon's request and post-op pain management  Timeout:       Correct Patient: Yes        Correct Procedure: Yes        Correct Site: Yes        Correct Position: Yes   Procedure Documentation  Procedure: intrathecal injection       Patient Position: sitting       Patient Prep/Sterile Barriers: sterile gloves, mask, patient draped       Skin prep: Chloraprep       Insertion Site: L3-4. (midline approach).       Needle Gauge: 20.        Needle Length (Inches): 3.5        Spinal Needle Type: Pencan       Introducer used       Introducer: 20 G       # of attempts: 3 and  # of redirects:  3    Assessment/Narrative         Paresthesias: No.       Sensory Level: T4       CSF fluid: clear.       Opening pressure was cmH2O while  Sitting.      Medication(s) Administered   0.75% Hyperbaric Bupivacaine (Intrathecal) - Intrathecal   1.8 mL - 11/28/2022 1:50:00 PM  Morphine PF 1 mg/mL (Intrathecal) - Intrathecal   0.15 mg - 11/28/2022 1:50:00 PM  Fentanyl PF (Intrathecal) - Intrathecal   15 mcg - 11/28/2022 1:50:00 PM    FOR [facility] ONLY:   Pain Team Contact information: please page the Pain Team Via [employer]. Search \"Pain\". During daytime hours, please page the attending first. At night please page the resident first.    "

## 2022-11-28 NOTE — CONSULTS
"Brief diabetes team note- full consult to follow     Saw Preeti this AM prior to her .   She follows with Dr. Thomas, who has developed a plan for peripartum glycemic management.     Preeti is agreeable to the plan to start IV insulin now; she will remove her pod once the IV insulin is started.     Once stable post , diet resumed, and on postpartum floor, Preeti would like to immediately resume the use of her Omnipod. She has all supplies needed to do this. We will order Humalog vial for her to fill her pump.     She has a \"basal 2\" pattern on her pump that is close to pre-pregnancy settings, already input and she will start on this pattern upon pump resumption.     Astrid Wren PA-C  Diabetes Management Service  Pager [phone]   "

## 2022-11-28 NOTE — H&P
L&D History and Physical   2022   Preeti Leiva  [id number]      HPI:   Preeti Leiva is a 32 year old  at 36w6d by LMP c/w 7w5d US who presents today for primary  section in the setting of type I diabetes with EFW of >4500 g and preE w/o SF.    She states that she is feeling well today.  Denies LOF or VB. Good FM. No contractions. She denies F/C, HA, vision changes, CP, SOB, RUQ pain, N/V, dysuria, constipation, diarrhea, increased edema, changes in urination or bowel movements.    Her pregnancy is notable for:  - Type I diabetes  - PreE w/o SF  - LGA  - Mild polyhydramnios  - Accessory placental lobe  - COVID in pregnancy  - MDD/ROSANNA    OBHX:   OB History    Para Term  AB Living   1 0 0 0 0 0   SAB IAB Ectopic Multiple Live Births   0 0 0 0 0      # Outcome Date GA Lbr Ar/2nd Weight Sex Delivery Anes PTL Lv   1 Current                Past Medical History:   Diagnosis Date    Anxiety     Depressive disorder     Type 1 diabetes mellitus with hyperglycemia (H)     Varicella        Past Surgical History:   Procedure Laterality Date    INGUINAL HERNIA REPAIR         Medications:   No current facility-administered medications on file prior to encounter.  aspirin (ASA) 81 MG chewable tablet, Take 81 mg by mouth daily  Continuous Blood Gluc Sensor (DEXCOM G6 SENSOR) MISC, Change every 10 days.  Continuous Blood Gluc Transmit (DEXCOM G6 TRANSMITTER) MISC, Change every 3 months.  insulin lispro (HUMALOG VIAL) 100 UNIT/ML vial, INJECT 70 UNITS UNDER THE SKIN EVERY DAY VIA INSULIN PUMP  Prenatal Vit-DSS-Fe Cbn-FA (PRENATAL AD PO),   sertraline (ZOLOFT) 100 MG tablet, TAKE 2 TABLETS(200 MG) BY MOUTH DAILY  Glucagon, rDNA, (GLUCAGON EMERGENCY) 1 MG KIT, Inject 1 mg as directed daily as needed (hypoglycemia)  Insulin Disposable Pump (OMNIPOD 5 G6 POD, GEN 5,) MISC, 1 each every other day        No Known Allergies    Family History   Problem Relation Age of Onset    Thyroid  Disease Mother     Anxiety Disorder Mother     Depression Mother         Mother    Skin Cancer Mother     Melanoma Mother     Lung Cancer Father     Melanoma Maternal Grandmother     Skin Cancer Maternal Grandmother     Melanoma Paternal Grandmother     Skin Cancer Paternal Grandmother     Melanoma Maternal Uncle     Skin Cancer Maternal Uncle     Glaucoma No family hx of     Macular Degeneration No family hx of        SocialHX:   Social History     Tobacco Use    Smoking status: Never    Smokeless tobacco: Never   Vaping Use    Vaping Use: Never used   Substance Use Topics    Alcohol use: Not Currently     Comment:  5 drinks per week     Drug use: Never       ROS: 10-point ROS negative except as indicated in HPI.    Physical Exam:  Vitals:    22 0932   BP: (!) 140/95   Resp: 24   Temp: 98.8  F (37.1  C)   TempSrc: Oral     General: alert, oriented female, resting in bed in NAD  CV: regular rate and rhythm, well perfused  Lungs: clear bilaterally, no crackles or wheezes.   Abdomen: soft, gravid, non-tender, EFW 9#.  Extremities: bilateral lower extremities non-tender with trace edema    SVE: Deferred  Membranes: Intact    Presentation: Cephalic by BUSUS    FHT: Baseline 150, mod variability, accels oresent, decels absent  [place]: Irritability by external monitoring    Prenatal ultrasounds:   EFW 4400g, AC (399mm)>HC (340mm), cephalic, normal fluid, 8/8 BPP.    Prenatal Labs:   Lab Results   Component Value Date    AS Negative 2022    HEPBANG Nonreactive 05/10/2022    CHPCRT Negative 2021    GCPCRT Negative 2021    HGB 12.0 2022      prior to starting insulin drip    GBS Status:   No results found for: GBS    No results found for: PAP    Labs:   Patient Vitals for the past 24 hrs:   BP Temp Temp src Resp   22 0932 (!) 140/95 98.8  F (37.1  C) Oral 24       A/P:   32 year old  at 36w6d by LMP c/w 7w5d US, here for scheduled primary  section in the setting of  "EFW >4500 g with PMH of type I diabetes and preE w/o SF. Pregnancy notable for the below:    # Scheduled Primary  Section  - Admitted to L&D for scheduled primary  section.  - Consent obtained: The risks, benefits, and alternatives of  section were discussed, including the risks of bleeding, infection, injury to surrounding organs, fetal injury, and remote risks of hysterectomy. She consented to a blood transfusion in the event of a life threatening amount of bleeding. She had time to ask questions and agreed to proceed. Surgical consent was signed. Blood transfusion consent signed.   - Labs: CBC, T&S  - Fen/GI: NPO, IVFs, Baca.  - Pain: Spinal per anesthesia.   - ID: Ancef for issa-op antibiotics.  - PPX: SCDs prior to surgery     # Fetal Well Being  - Category I FHT    # PNC     - BMI 29  - Rh positive, Rubella immune, Hep B Antigen neg  - GBS neg  - Other infectious labs neg  - Placenta: Accessory placental lobe, posterior, plans to harvest placenta.  - Feed: Breast and pumping  - BC: Condoms vs pills, does not want anything for discharge.    # Type I diabetes  # LGA  # Mild polyhydramnios  Endocrinology planning to follow throughout delivery and postpartum period. Will start high intensity insulin drip through delivery until to PP floor and tolerating regular diet. Plan to adjust back to insulin pump at that time.   - Last visit with endocrinology  re: delivery planning and postpartum management:    \"- If she needs , will use insulin drip              - If she is unable to control her own blood sugars (loss of consciousness, etc) she would use insulin drip.              - Will restart Omnipod immediately after delivery (if go on insulin drip will suspend but not remove omnipod, will bring additional supplies to restart), have created separate basal setting for post-delivery (Basal 2).  - Will discuss with Insulet resetting the algorithm to relearn her habits post delivery -- " "discussed with Omnipod rep-- will instead leave pump and let it relearn settings without reset. Preeti will let me know if she is having significant hypos and we will fix.               - Bolus settings-- match pre-pregnancy settings-- I:CHO 1:13, ISF 1:50              - Plans to breast feed, reviewed likely lower insulin needs, cautioned re: hypoglycemia and keep snacks handy  - Reviewed blood sugar goals in pregnancy. Glucose goals during pregnancy according to the American Diabetes Association:  Fasting glucose 70-95 mg/dL AND  One-hour postprandial glucose 110-140 mg/dL or  Two-hour postprandial glucose 100-120 mg/dL  - Encouraged at least 30min of activity daily.\"  - MOD as above.     # PreE w/o SF  - HELLP labs last  nl, UPC 1.19  - Asx  - Serial BP monitoring; BPs: low MR  - Antihypertensives: No PTA meds; IV antihypertensives PRN for sustained severe range blood pressures (SBP>160, DBP>110 sustained over 15 minutes)  - Daily weights, strict I&Os    # MDD/ROSANNA  - PTA sertraline continued in house  - SW consult PP    Patient seen and care plan discussed under supervision of Dr. Lubna Glynn.    Kwasi Glynn MD, MPH  Ob/Gyn Resident, PGY-2  22 9:33 AM     Physician Attestation   I personally examined and evaluated this patient.  I discussed the patient with the resident/fellow and care team, and agree with the assessment and plan of care as documented in the note of 22.      I personally reviewed vital signs, medications, labs, and fetal heart rate tracing .    Key findings: Category 1 tracing, on insulin infusion.   Proceed with .   Lubna Glynn MD  Date of Service (when I saw the patient): 22    "

## 2022-11-28 NOTE — ANESTHESIA CARE TRANSFER NOTE
Patient: Preeti Leiva    Procedure: Procedure(s):   SECTION       Diagnosis: 36 weeks gestation of pregnancy [Z3A.36]  Diagnosis Additional Information: No value filed.    Anesthesia Type:   No value filed.     Note:    Oropharynx: oropharynx clear of all foreign objects  Level of Consciousness: awake      Independent Airway: airway patency satisfactory and stable  Dentition: dentition unchanged  Vital Signs Stable: post-procedure vital signs reviewed and stable  Report to RN Given: handoff report given  Patient transferred to: PACU    Handoff Report: Identifed the Patient, Identified the Reponsible Provider, Reviewed the pertinent medical history, Discussed the surgical course, Reviewed Intra-OP anesthesia mangement and issues during anesthesia, Set expectations for post-procedure period and Allowed opportunity for questions and acknowledgement of understanding      Vitals:  Vitals Value Taken Time   /88 22 1530   Temp     Pulse 80 22 1534   Resp 16 22 1534   SpO2 98 % 22 1534   Vitals shown include unvalidated device data.    Electronically Signed By: Destin Richards MD  2022  3:35 PM

## 2022-11-28 NOTE — ANESTHESIA PROCEDURE NOTES
"TAP Procedure Note    Pre-Procedure   Staff -        Anesthesiologist:  Esmer Zhou MD       Resident/Fellow: Destin Richards MD       Performed By: resident       Location: OR       Procedure Start/Stop Times: 11/28/2022 2:30 PM and 11/28/2022 2:45 PM       Pre-Anesthestic Checklist: patient identified, IV checked, site marked, risks and benefits discussed, informed consent, monitors and equipment checked, pre-op evaluation, at physician/surgeon's request and post-op pain management  Timeout:       Correct Patient: Yes        Correct Procedure: Yes        Correct Site: Yes        Correct Position: Yes        Correct Laterality: Yes   Procedure Documentation  Procedure: TAP       Laterality: bilateral       Patient Position: supine       Skin prep: Chloraprep       Insertion Site: T9-10.       Needle Type: short bevel       Needle Gauge: 21.        Needle Length (millimeters): 110        Ultrasound guided       1. Ultrasound was used to identify targeted nerve, plexus, vascular marker, or fascial plane and place a needle adjacent to it in real-time.       2. Ultrasound was used to visualize the spread of anesthetic in close proximity to the above referenced structure.    Assessment/Narrative         Bolus given via needle..        Secured via.        Insertion/Infusion Method: Single Shot    Medication(s) Administered   Bupivacaine 0.25% PF (Infiltration) - Infiltration   20 mL - 11/28/2022 2:30:00 PM  Bupivacaine liposome (Exparel) 1.3% LA inj susp (Infiltration) - Infiltration   20 mL - 11/28/2022 2:30:00 PM  Medication Administration Time: 11/28/2022 2:30 PM      FOR [facility] ([facility]) ONLY:   Pain Team Contact information: please page the Pain Team Via [employer]. Search \"Pain\". During daytime hours, please page the attending first. At night please page the resident first.    "

## 2022-11-29 LAB
ALT SERPL W P-5'-P-CCNC: 13 U/L (ref 0–50)
AST SERPL W P-5'-P-CCNC: 26 U/L (ref 0–45)
CREAT SERPL-MCNC: 0.75 MG/DL (ref 0.52–1.04)
GFR SERPL CREATININE-BSD FRML MDRD: >90 ML/MIN/1.73M2
GLUCOSE BLDC GLUCOMTR-MCNC: 134 MG/DL (ref 70–99)
GLUCOSE BLDC GLUCOMTR-MCNC: 81 MG/DL (ref 70–99)
GLUCOSE BLDC GLUCOMTR-MCNC: 97 MG/DL (ref 70–99)
GLUCOSE BLDC GLUCOMTR-MCNC: 98 MG/DL (ref 70–99)
HGB BLD-MCNC: 9.1 G/DL (ref 11.7–15.7)
HGB BLD-MCNC: 9.2 G/DL (ref 11.7–15.7)
PLATELET # BLD AUTO: 152 10E3/UL (ref 150–450)

## 2022-11-29 PROCEDURE — 250N000013 HC RX MED GY IP 250 OP 250 PS 637

## 2022-11-29 PROCEDURE — 36415 COLL VENOUS BLD VENIPUNCTURE: CPT

## 2022-11-29 PROCEDURE — 84460 ALANINE AMINO (ALT) (SGPT): CPT | Performed by: STUDENT IN AN ORGANIZED HEALTH CARE EDUCATION/TRAINING PROGRAM

## 2022-11-29 PROCEDURE — 120N000002 HC R&B MED SURG/OB UMMC

## 2022-11-29 PROCEDURE — 84450 TRANSFERASE (AST) (SGOT): CPT

## 2022-11-29 PROCEDURE — 85018 HEMOGLOBIN: CPT

## 2022-11-29 PROCEDURE — 250N000011 HC RX IP 250 OP 636

## 2022-11-29 PROCEDURE — 99233 SBSQ HOSP IP/OBS HIGH 50: CPT | Performed by: PHYSICIAN ASSISTANT

## 2022-11-29 PROCEDURE — 82565 ASSAY OF CREATININE: CPT | Performed by: STUDENT IN AN ORGANIZED HEALTH CARE EDUCATION/TRAINING PROGRAM

## 2022-11-29 PROCEDURE — 84450 TRANSFERASE (AST) (SGOT): CPT | Performed by: STUDENT IN AN ORGANIZED HEALTH CARE EDUCATION/TRAINING PROGRAM

## 2022-11-29 PROCEDURE — 85049 AUTOMATED PLATELET COUNT: CPT | Performed by: STUDENT IN AN ORGANIZED HEALTH CARE EDUCATION/TRAINING PROGRAM

## 2022-11-29 PROCEDURE — 82565 ASSAY OF CREATININE: CPT

## 2022-11-29 PROCEDURE — 84460 ALANINE AMINO (ALT) (SGPT): CPT

## 2022-11-29 RX ORDER — NIFEDIPINE 30 MG/1
60 TABLET, EXTENDED RELEASE ORAL DAILY
Status: DISCONTINUED | OUTPATIENT
Start: 2022-11-30 | End: 2022-11-30

## 2022-11-29 RX ORDER — SIMETHICONE 80 MG
80 TABLET,CHEWABLE ORAL 4 TIMES DAILY
Status: DISCONTINUED | OUTPATIENT
Start: 2022-11-30 | End: 2022-12-01 | Stop reason: HOSPADM

## 2022-11-29 RX ORDER — NIFEDIPINE 30 MG/1
30 TABLET, EXTENDED RELEASE ORAL ONCE
Status: COMPLETED | OUTPATIENT
Start: 2022-11-29 | End: 2022-11-29

## 2022-11-29 RX ORDER — CYCLOBENZAPRINE HCL 10 MG
10 TABLET ORAL ONCE
Status: COMPLETED | OUTPATIENT
Start: 2022-11-29 | End: 2022-11-29

## 2022-11-29 RX ADMIN — IBUPROFEN 800 MG: 800 TABLET, FILM COATED ORAL at 18:32

## 2022-11-29 RX ADMIN — SIMETHICONE 80 MG: 80 TABLET, CHEWABLE ORAL at 18:32

## 2022-11-29 RX ADMIN — NIFEDIPINE 30 MG: 30 TABLET, FILM COATED, EXTENDED RELEASE ORAL at 08:15

## 2022-11-29 RX ADMIN — NIFEDIPINE 30 MG: 30 TABLET, FILM COATED, EXTENDED RELEASE ORAL at 20:22

## 2022-11-29 RX ADMIN — SIMETHICONE 80 MG: 80 TABLET, CHEWABLE ORAL at 08:15

## 2022-11-29 RX ADMIN — CYCLOBENZAPRINE 10 MG: 10 TABLET, FILM COATED ORAL at 22:08

## 2022-11-29 RX ADMIN — ACETAMINOPHEN 975 MG: 325 TABLET, FILM COATED ORAL at 18:32

## 2022-11-29 RX ADMIN — IBUPROFEN 800 MG: 800 TABLET, FILM COATED ORAL at 08:15

## 2022-11-29 RX ADMIN — ACETAMINOPHEN 975 MG: 325 TABLET, FILM COATED ORAL at 05:30

## 2022-11-29 RX ADMIN — SENNOSIDES AND DOCUSATE SODIUM 2 TABLET: 50; 8.6 TABLET ORAL at 08:15

## 2022-11-29 RX ADMIN — ACETAMINOPHEN 975 MG: 325 TABLET, FILM COATED ORAL at 00:02

## 2022-11-29 RX ADMIN — KETOROLAC TROMETHAMINE 30 MG: 30 INJECTION, SOLUTION INTRAMUSCULAR at 02:27

## 2022-11-29 RX ADMIN — SENNOSIDES AND DOCUSATE SODIUM 2 TABLET: 50; 8.6 TABLET ORAL at 20:21

## 2022-11-29 RX ADMIN — ACETAMINOPHEN 975 MG: 325 TABLET, FILM COATED ORAL at 12:17

## 2022-11-29 RX ADMIN — SERTRALINE HYDROCHLORIDE 200 MG: 100 TABLET ORAL at 08:14

## 2022-11-29 ASSESSMENT — ACTIVITIES OF DAILY LIVING (ADL)
ADLS_ACUITY_SCORE: 18

## 2022-11-29 NOTE — PLAN OF CARE
Pt has elevated BPs 139/90,144/93, postpartum assessment WDL. Pt bladder scanned for 410 ml and straight cath 350 mL.Voiding spontaneously afterwards.Pain managed with Toradol and tylenol. Dressing clean, dry and intact.Pt bonding with baby and breastfeeding. Support person at bedside.

## 2022-11-29 NOTE — PLAN OF CARE
Goal Outcome Evaluation:  Data: Preeti Leiva transferred to [id number] via wheelchair at 1700. Baby transferred via parent's arms. Baca discharge at 1720 with clear urine. Running insulin drip and pitocin IV.   Action: Receiving unit notified of transfer: Yes. Patient and family notified of room change. Report given to Candy RN. Belongings sent to receiving unit. Accompanied by Registered Nurse. Oriented patient to surroundings. Call light within reach. ID bands double-checked with receiving RN.  Response: Patient tolerated transfer and is stable.  at bedside.

## 2022-11-29 NOTE — PROGRESS NOTES
[facility]   Post-partum Progress Note    Name:  Preeti Leiva  MRN: [id number]    S:   Patient reports feeling well this AM.  Pain well controlled.  Tolerating water without nausea or vomiting, feeling hungry. Ambulating without dizziness. Voiding spontaneously. Has passed flatus; has not had bowel movement. Lochia similar to menstrual flow. Denies fever/chills, headache, vision changes, chest pain, shortness of breath, RUQ pain, increased edema. Breastfeeding.  Declines contraception at this time.    O:   Patient Vitals for the past 24 hrs:   BP Temp Temp src Pulse Resp SpO2   11/29/22 0355 (!) 139/90 98  F (36.7  C) Axillary 82 16 100 %   11/29/22 0002 (!) 144/93 97.9  F (36.6  C) Axillary 108 18 --   11/28/22 2045 130/88 98.3  F (36.8  C) Oral 107 16 98 %   11/28/22 1913 (!) 152/91 98.8  F (37.1  C) Oral 110 20 98 %   11/28/22 1814 (!) 148/96 98.6  F (37  C) Oral 92 20 --   11/28/22 1732 (!) 147/107 -- -- -- -- 99 %   11/28/22 1730 (!) 167/96 97.6  F (36.4  C) Oral 89 20 --   11/28/22 1651 -- -- -- 80 14 100 %   11/28/22 1645 (!) 152/88 -- -- 70 11 100 %   11/28/22 1630 104/85 -- -- 86 26 100 %   11/28/22 1615 (!) 150/94 -- -- 76 19 100 %   11/28/22 1600 (!) 157/148 -- -- 83 12 99 %   11/28/22 1545 (!) 138/100 -- -- 82 (!) 34 93 %   11/28/22 1530 (!) 146/88 -- -- 81 14 97 %   11/28/22 1515 (!) 150/86 -- -- 83 19 97 %   11/28/22 1500 (!) 142/88 -- -- 85 13 98 %   11/28/22 1456 (!) 147/91 -- -- 80 -- --   11/28/22 0932 (!) 140/95 98.8  F (37.1  C) Oral -- 24 --     Gen:  Resting comfortably, NAD  CV:  RRR  Pulm:  Non-labored breathing. No cough or audible wheezing.   Abd:  Soft, appropriately tender to palpation, non-distended.  Fundus below umbilicus, firm, and non-tender.  Inc:  Bandage in place, c/d/i with minimal overlying shadowing, no surrounding erythema or edema   Ext:  Non-tender, 1+ LE edema b/l    Hgb:   Recent Labs   Lab 11/28/22  1048 11/25/22  1628   HGB 12.1 12.0     A/P:  32  "year old  POD#1 s/p PLTCS. Pregnancy notable for LGA fetus, T1DM, pre-eclampsia without severe features. Meeting postop goals. Continue with routine postpartum management.     #Pre-E w/o SF  - HELLP labs wnl (), UPC 1.19 ()  - NSSR x 1 (3690)  - D#2 Nifed 30 mg    # Type I diabetes  # LGA  # Mild polyhydramnios  Endocrinology planning to follow throughout delivery and postpartum period. Will start high intensity insulin drip through delivery until to PP floor and tolerating regular diet. Plan to adjust back to insulin pump at that time.   - Last visit with endocrinology  re: delivery planning and postpartum management:                \"- If she needs , will use insulin drip              - If she is unable to control her own blood sugars (loss of consciousness, etc) she would use insulin drip.              - Will restart Omnipod immediately after delivery (if go on insulin drip will suspend but not remove omnipod, will bring additional supplies to restart), have created separate basal setting for post-delivery (Basal 2).  - Will discuss with Insulet resetting the algorithm to relearn her habits post delivery -- discussed with Omnipod rep-- will instead leave pump and let it relearn settings without reset. Preeti will let me know if she is having significant hypos and we will fix.               - Bolus settings-- match pre-pregnancy settings-- I:CHO 1:13, ISF 1:50              - Plans to breast feed, reviewed likely lower insulin needs, cautioned re: hypoglycemia and keep snacks handy  - Reviewed blood sugar goals in pregnancy. Glucose goals during pregnancy according to the American Diabetes Association:  Fasting glucose 70-95 mg/dL AND  One-hour postprandial glucose 110-140 mg/dL or  Two-hour postprandial glucose 100-120 mg/dL  - Encouraged at least 30min of activity daily.\"  - MOD as above.    #Routine Postpartum  Pain: Well-controlled with scheduled tylenol, toradol > ibuprofen, PRN " oxy  Hgb: 12.1>  mL > AM pending. VSS as noted above, asymptomatic. Will discharge with PO Fe if hgb < 10  GI:  Regular diet. BID Senna/Colace. PRN Miralax, Simethicone. Encourage hydration and ambulation  : Voiding spontaneously  PPx:  Encouraged ambulation  Rh: Positive  Rub:  Immune  Hep B: Surface antigen negative  Baby: In room, doing well  Feed: Breastfeeding  BC: Declines  Dispo: Plan for home on POD#2-3.    Patient discussed with Dr. Glynn.    Etelvina Rivera MD  Ob/Gyn Resident, PGY-2  11/29/2022 6:04 AM     Physician Attestation   I personally examined and evaluated this patient.  I discussed the patient with the resident/fellow and care team, and agree with the assessment and plan of care as documented in the note of 11/29/22.      I personally reviewed vital signs, medications, labs and exam.    Key findings: Doing well. Discussed anticipated postpartum course with pre-eclampsia.   Recovering as anticipated.   Lubna Glynn MD  Date of Service (when I saw the patient): 11/29/22

## 2022-11-29 NOTE — PROVIDER NOTIFICATION
Pt already ate again before calling RN to check BG. I've told her 3 times this morning to call for BG checks before eating. Do you want me to do a BG post lunch, now? Text paged Astrid Wren, diabetes nurse clinician and updated.

## 2022-11-29 NOTE — PROGRESS NOTES
"IP Diabetes Management  Daily Note           Assessment and Plan:   HPI: Preeti is a 32 year old female  at 36w6d with a PMHx Type I diabetes who was admitted on 2022 for primary  in the setting of LGA baby, Type I diabetes, and pre-eclampsia without SF.     S/P primary  22.    Assessment:   1) Type I diabetes in pregnancy complicated by LGA baby and pre eclampsia without SF    Plan:                  -Omnipod 5 insulin pump, with Humalog insulin, and Dexcom G6 CGM:     \"Basal 2 pattern\"- adjusted all settings   *she is running in auto mode, so actual rates in real time will adjust to meet targets.     Basal-   Midnight-0700: 1 unit per hour> reduced to 0.85 units per hour and changed from 0700 to 0900  0700-midnight: 1.5 units per hour. > reduced to 1.2 units per hour and changed from 0700 to 0900     Bolus-  * all new bolus settings , as she did not adjust following delivery.  ICR of 10, all day  ISF of 40, all day  Target: adjusted from 110mg/dL to 120mg/dL  AIT 3 hours.                       -BG monitoring AC/HS/0200/0500                 -consistent carb diet                 -hypoglycemia protocol     Outpatient follow up: with [facility] endocrinology (Dr. Thomas or Dr. Richards) within 2-3 weeks.    Plan discussed with patient, bedside RN, and primary team paged.       Interval History and Assessment: interval glucose trend reviewed:   BG stable/tight back on her Omnipod since yesterday evening. She went to basal program 2, but she forgot to change the bolus programming (cannot make multiple bolus programs in advance with the Omnipod).     She bolused for breakfast, but did remove some of the count from her bolus, as her BG have been high 60s-80's all morning (bolused 16.9 units instead of the 24 called for, which was approximately a 1:4g ratio).   Her sensor was reading 72 upon entry to room after breastfeeding session; she drank some juice, and upon leaving room notified " her that sensor was reading 68mg/dL. Her auto mode on pump stopped basal delivery for this trend. Changes to all pump settings made at 1120, and auto mode was resumed.     Eating well.   Breastfeeding.  She is feeling confident in managing on her pump.     Current nutritional intake and type: Orders Placed This Encounter      Regular Diet Adult    PTA Diabetes Regimen:   BG monitor: Dexcom  Frequency of checks: <4 times daily  Omnipod insulin pump  Basal:  Midnight -0900: 1.25 units per hour  0900-midnight: 1.95 units per hour  Bolus:  Midnight to 0530: 1:9g CHO  6997-8516: 1:3g CHO  4989-3790: 1:1.5g CHO  0980-6518:1:4g   5528-8568: 1:2.8g CHO  2100-midnight: 1:4.8g CHO  ISF:   Midnight-0700: 27  7411-9293: 22  4391-2731: 22  1800-midnight: 21  Target: 110     * prior to pregnancy, she was relying on basal rate 80% of the time, not carb counting and entering manual boluses    Discharge Planning: discharge 1-2 days.            Diabetes History:   Type of Diabetes: Type 1 Diabetes Mellitus  Lab Results   Component Value Date    A1C 5.5 11/28/2022    A1C 6.1 10/04/2022    A1C 6.5 06/10/2022    A1C 8.6 02/10/2022    A1C 7.2 09/28/2021    A1C 9.4 08/20/2020    A1C 8.4 01/02/2020    A1C 7.8 01/29/2019              Review of Systems:     The Review of Systems is negative other than noted in the Interval History.           Medications:     Current Facility-Administered Medications   Medication     acetaminophen (TYLENOL) tablet 975 mg     [START ON 11/30/2022] bisacodyl (DULCOLAX) suppository 10 mg     carboprost (HEMABATE) injection 250 mcg     dextrose 5% in lactated ringers infusion     glucose gel 15-30 g    Or     dextrose 50 % injection 25-50 mL    Or     glucagon injection 1 mg     diphenhydrAMINE (BENADRYL) capsule 25 mg    Or     diphenhydrAMINE (BENADRYL) injection 25 mg     hydrocortisone (Perianal) (ANUSOL-HC) 2.5 % cream     ibuprofen (ADVIL/MOTRIN) tablet 800 mg     insulin basal rate from AMBULATORY PUMP      insulin bolus from AMBULATORY PUMP     insulin bolus from AMBULATORY PUMP     insulin lispro (HumaLOG) 100 UNIT/ML vial for filling pump reservoir     lanolin cream     lidocaine (LMX4) cream     lidocaine 1 % 0.1-1 mL     methylergonovine (METHERGINE) injection 200 mcg     metoclopramide (REGLAN) injection 10 mg    Or     metoclopramide (REGLAN) tablet 10 mg     misoprostol (CYTOTEC) tablet 400 mcg    Or     misoprostol (CYTOTEC) tablet 800 mcg     naloxone (NARCAN) injection 0.2 mg    Or     naloxone (NARCAN) injection 0.4 mg    Or     naloxone (NARCAN) injection 0.2 mg    Or     naloxone (NARCAN) injection 0.4 mg     NIFEdipine ER OSMOTIC (PROCARDIA XL) 24 hr tablet 30 mg     No MMR Needed -  Assessment: Patient does not need MMR vaccine     No Tdap Needed - Assessment: Patient does not need Tdap vaccine     ondansetron (ZOFRAN ODT) ODT tab 4 mg    Or     ondansetron (ZOFRAN) injection 4 mg     oxyCODONE (ROXICODONE) tablet 5 mg     oxytocin (PITOCIN) 30 units in 500 mL 0.9% NaCl infusion     oxytocin (PITOCIN) 30 units in 500 mL 0.9% NaCl infusion     oxytocin (PITOCIN) injection 10 Units     oxytocin (PITOCIN) injection 10 Units     prochlorperazine (COMPAZINE) injection 10 mg    Or     prochlorperazine (COMPAZINE) tablet 10 mg    Or     prochlorperazine (COMPAZINE) suppository 25 mg     senna-docusate (SENOKOT-S/PERICOLACE) 8.6-50 MG per tablet 1 tablet    Or     senna-docusate (SENOKOT-S/PERICOLACE) 8.6-50 MG per tablet 2 tablet     sertraline (ZOLOFT) tablet 200 mg     simethicone (MYLICON) chewable tablet 80 mg     sodium chloride (PF) 0.9% PF flush 3 mL     sodium chloride (PF) 0.9% PF flush 3 mL     sodium chloride 0.9% infusion     [START ON 11/30/2022] sodium phosphate (FLEET ENEMA) 1 enema     tranexamic acid 1 g in 100 mL NS IV bag (premix)            Physical Exam:    BP (!) 139/90 (BP Location: Left arm, Patient Position: Semi-Steen's, Cuff Size: Adult Regular)   Pulse 82   Temp 98  F (36.7  C)  (Axillary)   Resp 16   LMP 03/15/2022   SpO2 100%   Breastfeeding Unknown   General: pleasant, in no distress, holding baby in bed. Spouse present and supportive.    HEENT: normocephalic, atraumatic. Oral mucous membranes moist.   Lungs: unlabored respiration, no cough  Skin: warm and dry, no obvious lesions  MSK:  moves all extremities  Lymp:  no LE edema   Mental status:  alert, oriented to self, place, time  Psych:  calm and appropriate interaction             Data:     Recent Labs   Lab 11/29/22  0533 11/29/22  0238 11/28/22  2205 11/28/22  1859 11/28/22  1807 11/28/22  1656   GLC 81 134* 133* 90 92 139*     Lab Results   Component Value Date    WBC 9.1 11/28/2022    WBC 9.9 11/25/2022    WBC 9.2 11/18/2022    HGB 9.1 (L) 11/29/2022    HGB 12.1 11/28/2022    HGB 12.0 11/25/2022    HCT 35.8 11/28/2022    HCT 36.2 11/25/2022    HCT 35.5 11/18/2022    MCV 84 11/28/2022    MCV 85 11/25/2022    MCV 86 11/18/2022     11/29/2022     11/28/2022     11/25/2022     Lab Results   Component Value Date     06/10/2022     09/28/2021     01/02/2020    POTASSIUM 4.4 06/10/2022    POTASSIUM 3.6 09/28/2021    POTASSIUM 4.2 01/02/2020    CHLORIDE 105 06/10/2022    CHLORIDE 106 09/28/2021    CHLORIDE 106 01/02/2020    CO2 25 06/10/2022    CO2 28 09/28/2021    CO2 23 01/02/2020    GLC 81 11/29/2022     (H) 11/29/2022     (H) 11/28/2022     Lab Results   Component Value Date    BUN 11 06/10/2022    BUN 16 09/28/2021    BUN 16 01/02/2020     Lab Results   Component Value Date    TSH 1.20 02/10/2022    TSH 1.11 01/02/2020     Lab Results   Component Value Date    AST 26 11/29/2022    AST 28 11/25/2022    AST 26 11/18/2022    ALT 13 11/29/2022    ALT 12 11/25/2022    ALT 20 11/18/2022    ALKPHOS 51 06/10/2022       35 minutes spent on the date of the encounter doing chart review, history and exam, documentation and further activities per the note      Over 50% of my time on the  unit was spent counseling the patient and/or coordinating care regarding acute hyperglycemia management.  See note for details.      Contacting the Inpatient Diabetes Team   From 8AM-4PM: page inpatient diabetes provider that is following the patient, or utilize the job code paging system.   From 4PM-8AM: page the job code for endocrine fellow on call.       Please use the following job code to reach the Inpatient Diabetes team. Note that you must use an in house phone and that job codes cannot receive text pages.     Dial [phone] (or star-star-star [phone] on the new [employer] telephones), then [phone] to reach the endocrine-diabetes provider on call.    Astrid Wren PA-C  Inpatient Diabetes Management Service  Pager [phone]

## 2022-11-29 NOTE — PLAN OF CARE
Goal Outcome Evaluation:  OR to PACU Transfer Note  Data: Pt to OB PACU at 1450 via cart. PIV infusing NS with pitocin. On insulin drip. Pt without complications, ruff with clear urine to gravity, temp 98.8, pt does not complain of pain and/or nausea. TAP block done in OR.   Interventions: IV to pump, monitors and alarms on, warm blankets, SCD on.  Response: stable.  and mother at bedside. Cares explained. Questions answered.   Plan: Patient instructed to notify RN for pain or nausea, routine post op cares.

## 2022-11-29 NOTE — PLAN OF CARE
Pt VSS, ex elevated BP - MD notified.  Postpartum assessment WDL.  Pt bonding well with baby and breastfeeding Q2-3 hours.  Pt ruff out at 5:20pm, pt bladder scanned for under 300 ml after voiding a small amount at approx 21:45. Pt tolerating regular diet and denies nausea.  Pain managed with toradol and tylenol.  Pt ambulating independently. C/s dressing CDI  Continue with plan of care.

## 2022-11-29 NOTE — PROGRESS NOTES
"Received order for  to see for \"adjustment to illness counseling\".  Chart reviewed.  Consulted with RN, Kimberly.  RN reports no concerns about patient's coping or need for adjustment to illness counseling.  Noted patient does have mental health history with MDD/ROSANNA.  Patient has not yet completed Moss.  RN will ask patient if she would like a social work visit.    FERNANDO Chakraborty [facility]  Clinical   Maternal Child Health  Phone:  [phone]  Pager:  [phone]     "

## 2022-11-29 NOTE — PROVIDER NOTIFICATION
11/29/22 0355   Vital Signs   Temp 98  F (36.7  C)   Temp src Axillary   Resp 16   Pulse 82   Pulse Rate Source Monitor   BP (!) 139/90   BP Location Left arm   Patient Position Semi-Steen's   Cuff Size Adult Regular     BP just now 139/90. one at midnight 144/93. Denies any pre-e symptoms just wanted to update you thanks.

## 2022-11-29 NOTE — DISCHARGE SUMMARY
[facility] Discharge Summary    Preeti Leiva MRN# [id number]   Age: 32 year old YOB: 1990     Date of Admission:  2022  Date of Discharge:  2022    Admitting Physician:  Lubna Glynn MD  Discharge Physician:  Eedl Manning MD          Admission Diagnoses:   - Intrauterine pregnancy at 36w6d  - Type I diabetes  - PreE w/o SF  - LGA  - Mild polyhydramnios  - Accessory placental lobe  - COVID in pregnancy  - MDD/ROSANNA          Discharge Diagnosis:   - Same as above, now delivered          Procedures:   - Primary low transverse  section with two layer closure via Pfannenstiel skin incision         Medications Prior to Admission:     Medications Prior to Admission   Medication Sig Dispense Refill Last Dose     Continuous Blood Gluc Sensor (DEXCOM G6 SENSOR) MISC Change every 10 days. 9 each 3 2022     Continuous Blood Gluc Transmit (DEXCOM G6 TRANSMITTER) MISC Change every 3 months. 1 each 3 2022     insulin lispro (HUMALOG VIAL) 100 UNIT/ML vial INJECT 70 UNITS UNDER THE SKIN EVERY DAY VIA INSULIN PUMP 80 mL 3 2022     Prenatal Vit-DSS-Fe Cbn-FA (PRENATAL AD PO)    2022     sertraline (ZOLOFT) 100 MG tablet TAKE 2 TABLETS(200 MG) BY MOUTH DAILY 180 tablet 0 2022     Glucagon, rDNA, (GLUCAGON EMERGENCY) 1 MG KIT Inject 1 mg as directed daily as needed (hypoglycemia) 2 kit 11      Insulin Disposable Pump (OMNIPOD 5 G6 POD, GEN 5,) MISC 1 each every other day 45 each 11      [DISCONTINUED] aspirin (ASA) 81 MG chewable tablet Take 81 mg by mouth daily   2022              Discharge Medications:        Review of your medicines      START taking      Dose / Directions   acetaminophen 325 MG tablet  Commonly known as: TYLENOL      Dose: 650 mg  Take 2 tablets (650 mg) by mouth every 6 hours as needed for mild pain Start after Delivery.  Quantity: 100 tablet  Refills: 0     ferrous sulfate 325 (65 Fe) MG EC  tablet  Commonly known as: FE TABS      Dose: 325 mg  Take 1 tablet (325 mg) by mouth every other day  Quantity: 100 tablet  Refills: 0     ibuprofen 600 MG tablet  Commonly known as: ADVIL/MOTRIN      Dose: 600 mg  Take 1 tablet (600 mg) by mouth every 6 hours as needed for moderate pain (4-6) Start after delivery  Quantity: 60 tablet  Refills: 0     labetalol 200 MG tablet  Commonly known as: NORMODYNE  Used for: Preeclampsia in postpartum period      Dose: 200 mg  Take 1 tablet (200 mg) by mouth every 12 hours  Quantity: 120 tablet  Refills: 0     NIFEdipine ER 60 MG 24 hr tablet  Commonly known as: ADALAT CC  Used for: Preeclampsia in postpartum period      Dose: 120 mg  Take 2 tablets (120 mg) by mouth daily  Quantity: 100 tablet  Refills: 0     senna-docusate 8.6-50 MG tablet  Commonly known as: SENOKOT-S/PERICOLACE      Dose: 1 tablet  Take 1 tablet by mouth daily Start after delivery.  Quantity: 100 tablet  Refills: 0        CONTINUE these medicines which have NOT CHANGED      Dose / Directions   Dexcom G6 Sensor Misc  Used for: Type 1 diabetes mellitus with hyperglycemia (H)      Change every 10 days.  Quantity: 9 each  Refills: 3     Dexcom G6 Transmitter Misc  Used for: Type 1 diabetes mellitus with hyperglycemia (H)      Change every 3 months.  Quantity: 1 each  Refills: 3     Glucagon Emergency 1 MG Kit  Used for: Type 1 diabetes mellitus with hyperglycemia (H)      Dose: 1 mg  Inject 1 mg as directed daily as needed (hypoglycemia)  Quantity: 2 kit  Refills: 11     insulin lispro 100 UNIT/ML vial  Commonly known as: HumaLOG VIAL  Used for: Type 1 diabetes mellitus with hyperglycemia (H)      INJECT 70 UNITS UNDER THE SKIN EVERY DAY VIA INSULIN PUMP  Quantity: 80 mL  Refills: 3     Omnipod 5 G6 Pod (Gen 5) Misc  Used for: Type 1 diabetes mellitus with hyperglycemia (H)      Dose: 1 each  1 each every other day  Quantity: 45 each  Refills: 11     PRENATAL AD PO      Refills: 0     sertraline 100 MG  tablet  Commonly known as: ZOLOFT  Used for: Anxiety, Recurrent major depressive disorder, in full remission (H)      TAKE 2 TABLETS(200 MG) BY MOUTH DAILY  Quantity: 180 tablet  Refills: 0        STOP taking    aspirin 81 MG chewable tablet  Commonly known as: ASA              Where to get your medicines      These medications were sent to [facility] [place], [place] - [address]  [address] [facility] [address], [facility] [address]    Phone: [phone]     acetaminophen 325 MG tablet    ferrous sulfate 325 (65 Fe) MG EC tablet    ibuprofen 600 MG tablet    labetalol 200 MG tablet    NIFEdipine ER 60 MG 24 hr tablet    senna-docusate 8.6-50 MG tablet             Consultations:   - Anesthesia  - Social work  - Endocrinology          Brief Admission History:   Preeti Leiva is a 32 year old  at 36w6d by LMP c/w 7w5d US who presents today for primary  section in the setting of type I diabetes with EFW of >4500 g and preE w/o SF.       Intraoperative course   The procedure was uncomplicated.   mL.     Findings:  Viable reyes female infant in cephalic presentation. Weight 9 pounds 10 ounces. Apgars 8 and 9.    See operative report for details.        Postpartum Course   The patient's hospital course was remarkable for blood glucose and pressure management. Endocrinology was following throughout her postpartum course for diabetes management. Her blood sugars were well controlled. Her blood pressures were controlled with 120 mg nifedipine and 200mg labetalol BID. She additionally had acute blood loss anemia related to surgery which was initially managed with maintenance IVF in immediate postoperative period. No further management required. She recovered as anticipated and experienced no post-operative complications. On discharge, her pain was well controlled. Vaginal bleeding is similar to peak menstrual flow.  Voiding without difficulty.  Passing flatus.  Ambulating well and  tolerating a normal diet.  No fever or significant wound drainage.  Pumping well.  Infant is stable in NICU.  She was discharged on post-partum day #3.    Post-partum hemoglobin:   Hemoglobin   Date Value Ref Range Status   11/29/2022 8.9 (L) 11.7 - 15.7 g/dL Final             Discharge Instructions and Follow-Up:     1) Diet: Regular  2) Feeding: Breast, pumping  3) Contraception: Plans for follow up at 6 week PP visit.  4) Activity: No lifting greater than 20 lbs, pushing, pulling, or other strenuous activity for 6 weeks. Pelvic rest for 6 weeks including no sexual intercourse, tampons, or douching. No driving until you can slam on the breaks without pain or while on narcotic pain medications.   5) Precautions: Call for temperature > 100.4, bright red vaginal bleeding >1 pad an hour x 2 hours, foul smelling vaginal discharge, pain not controlled by usual oral pain meds, persistent nausea and vomiting not controlled on medications, drainage or redness from incision site  6) Follow-up:  - Primary OB in 1 week for BP check and 6 weeks for routine postpartum visit         Discharge Disposition:   Discharge to home.    Discharge staff: MD Kwasi Domínguez MD, MPH  Ob/Gyn Resident, PGY-2

## 2022-11-29 NOTE — PLAN OF CARE
Goal Outcome Evaluation:             Problem: Postpartum ( Delivery)  Goal: Effective Urinary Elimination  Outcome: Progressing     Problem: Postpartum ( Delivery)  Goal: Optimal Pain Control and Function  Outcome: Progressing        /93. Afebrile. Up ad trinh and slow moving. Voiding adequately. No N/V with breakfast. Passing a little bit of gas and BS active. Denies SOB, HA, blurry vision and epigastric pain. BLE edema. +2 patellar reflexes and no clonus noted. Breast feeding well. Discussed benefits of using nipple shield but baby has adequate wet and poop diapers and would like to talk with lactation first. FOB here and supportive. Drsg c/d/I and encouraged to shower later as pt feels up to it. Will continue to monitor.

## 2022-11-30 LAB
ALT SERPL W P-5'-P-CCNC: 13 U/L (ref 0–50)
AST SERPL W P-5'-P-CCNC: 23 U/L (ref 0–45)
CREAT SERPL-MCNC: 0.67 MG/DL (ref 0.52–1.04)
ERYTHROCYTE [DISTWIDTH] IN BLOOD BY AUTOMATED COUNT: 13.8 % (ref 10–15)
GFR SERPL CREATININE-BSD FRML MDRD: >90 ML/MIN/1.73M2
GLUCOSE BLDC GLUCOMTR-MCNC: 123 MG/DL (ref 70–99)
GLUCOSE BLDC GLUCOMTR-MCNC: 170 MG/DL (ref 70–99)
GLUCOSE BLDC GLUCOMTR-MCNC: 66 MG/DL (ref 70–99)
GLUCOSE BLDC GLUCOMTR-MCNC: 92 MG/DL (ref 70–99)
HCT VFR BLD AUTO: 25.8 % (ref 35–47)
HGB BLD-MCNC: 8.9 G/DL (ref 11.7–15.7)
MCH RBC QN AUTO: 28.8 PG (ref 26.5–33)
MCHC RBC AUTO-ENTMCNC: 34.5 G/DL (ref 31.5–36.5)
MCV RBC AUTO: 84 FL (ref 78–100)
PLATELET # BLD AUTO: 143 10E3/UL (ref 150–450)
RBC # BLD AUTO: 3.09 10E6/UL (ref 3.8–5.2)
WBC # BLD AUTO: 11.7 10E3/UL (ref 4–11)

## 2022-11-30 PROCEDURE — 250N000013 HC RX MED GY IP 250 OP 250 PS 637

## 2022-11-30 PROCEDURE — 120N000002 HC R&B MED SURG/OB UMMC

## 2022-11-30 PROCEDURE — 250N000013 HC RX MED GY IP 250 OP 250 PS 637: Performed by: OBSTETRICS & GYNECOLOGY

## 2022-11-30 PROCEDURE — 99233 SBSQ HOSP IP/OBS HIGH 50: CPT | Performed by: NURSE PRACTITIONER

## 2022-11-30 RX ORDER — NIFEDIPINE 30 MG/1
90 TABLET, EXTENDED RELEASE ORAL DAILY
Status: DISCONTINUED | OUTPATIENT
Start: 2022-11-30 | End: 2022-11-30

## 2022-11-30 RX ORDER — NIFEDIPINE 30 MG/1
30 TABLET, EXTENDED RELEASE ORAL ONCE
Status: COMPLETED | OUTPATIENT
Start: 2022-11-30 | End: 2022-11-30

## 2022-11-30 RX ORDER — LABETALOL 200 MG/1
200 TABLET, FILM COATED ORAL EVERY 12 HOURS SCHEDULED
Status: DISCONTINUED | OUTPATIENT
Start: 2022-11-30 | End: 2022-12-01 | Stop reason: HOSPADM

## 2022-11-30 RX ORDER — NIFEDIPINE 30 MG/1
120 TABLET, EXTENDED RELEASE ORAL DAILY
Status: DISCONTINUED | OUTPATIENT
Start: 2022-11-30 | End: 2022-12-01 | Stop reason: HOSPADM

## 2022-11-30 RX ADMIN — SIMETHICONE 80 MG: 80 TABLET, CHEWABLE ORAL at 17:24

## 2022-11-30 RX ADMIN — IBUPROFEN 800 MG: 800 TABLET, FILM COATED ORAL at 06:01

## 2022-11-30 RX ADMIN — ACETAMINOPHEN 975 MG: 325 TABLET, FILM COATED ORAL at 19:52

## 2022-11-30 RX ADMIN — SENNOSIDES AND DOCUSATE SODIUM 1 TABLET: 50; 8.6 TABLET ORAL at 08:30

## 2022-11-30 RX ADMIN — LABETALOL HYDROCHLORIDE 200 MG: 200 TABLET, FILM COATED ORAL at 17:24

## 2022-11-30 RX ADMIN — ACETAMINOPHEN 975 MG: 325 TABLET, FILM COATED ORAL at 06:01

## 2022-11-30 RX ADMIN — IBUPROFEN 800 MG: 800 TABLET, FILM COATED ORAL at 19:52

## 2022-11-30 RX ADMIN — NIFEDIPINE 30 MG: 30 TABLET, FILM COATED, EXTENDED RELEASE ORAL at 02:08

## 2022-11-30 RX ADMIN — ACETAMINOPHEN 975 MG: 325 TABLET, FILM COATED ORAL at 00:21

## 2022-11-30 RX ADMIN — NIFEDIPINE 120 MG: 30 TABLET, FILM COATED, EXTENDED RELEASE ORAL at 08:30

## 2022-11-30 RX ADMIN — ACETAMINOPHEN 975 MG: 325 TABLET, FILM COATED ORAL at 13:37

## 2022-11-30 RX ADMIN — IBUPROFEN 800 MG: 800 TABLET, FILM COATED ORAL at 13:37

## 2022-11-30 RX ADMIN — SENNOSIDES AND DOCUSATE SODIUM 2 TABLET: 50; 8.6 TABLET ORAL at 19:51

## 2022-11-30 RX ADMIN — SIMETHICONE 80 MG: 80 TABLET, CHEWABLE ORAL at 08:31

## 2022-11-30 RX ADMIN — IBUPROFEN 800 MG: 800 TABLET, FILM COATED ORAL at 00:21

## 2022-11-30 RX ADMIN — SERTRALINE HYDROCHLORIDE 200 MG: 100 TABLET ORAL at 08:30

## 2022-11-30 ASSESSMENT — ACTIVITIES OF DAILY LIVING (ADL)
ADLS_ACUITY_SCORE: 18

## 2022-11-30 NOTE — PROVIDER NOTIFICATION
11/29/22 2118   Provider Notification   Provider Name/Title Etelvina Norton   Method of Notification Electronic Page   Request Evaluate in Person   Notification Reason Status Update   Pt complained of chest. Pls come and see pt. Thanks

## 2022-11-30 NOTE — PROGRESS NOTES
Pt is here in room and would like to see you now if available. She will be going back down to NICU in about 45mins.  Dr. Cronin text paged and updated.

## 2022-11-30 NOTE — PLAN OF CARE
Goal Outcome Evaluation:               Problem: Postpartum ( Delivery)  Goal: Optimal Pain Control and Function  Outcome: Progressing     Problem: Postpartum ( Delivery)  Goal: Effective Urinary Elimination  Outcome: Progressing     's-150's/90's. -122. See provider note. Very anxious today, questioning BP machine values, questioning procardia-causing her BLE swelling, frustrated about baby's NICU admission last night. BGs continue to be on lower side and Diabetic clinician aware and decrease settings for pt's internal pump. Encouraged to eat on time and to allow herself to have periods of rest in between NICU visits. Compression stockings ordered for BLE swelling. Denies HA, SOB, epigastric pain and blurry vision. FOB here and supportive. Pt showered and dressing off now. Bruising noted on lower right side of abdominal incision. Pt requesting to talk with Dr. Cronin, wanting her own OB doctor-Dr. Cayla Glynn to manage BP and meds. Reassured pt and dr. Cronin notified to come see pt.  Will continue to monitor.

## 2022-11-30 NOTE — PROGRESS NOTES
"                          Diabetes Consult Daily  Progress Note          Assessment/Plan:     HPI:  Preeti is a 32 year old female  at 36w6d with a PMHx Type I diabetes who was admitted on 2022 for primary  in the setting of LGA baby, Type I diabetes, and pre-eclampsia without SF.      S/P primary  22.     Assessment:     1) Type I diabetes in pregnancy complicated by LGA baby and pre eclampsia without SF     Plan:                  -Omnipod 5 insulin pump, with Humalog insulin, and Dexcom G6 CGM:     \"Basal 2 pattern\"- adjusted settings   *she is running in auto mode, so actual rates in real time will adjust to meet targets.     Basal-   Midnight-0900: 0.85 unit per hour> reduced to 0.75 units per hour  0900-midnight: 1.25 units per hour. > reduced to 1.05 units per hour      Bolus-  * all new bolus settings , as she did not adjust following delivery.  Reduced the ICR of 12, all day (1 unit(s) per 12 g cho)  ISF of 40, all day  Target: 120mg/dL  AIT 3 hours.                        -BG monitoring AC/HS/0200/0500                 -consistent carb diet                 -hypoglycemia protocol      Outpatient follow up: with [facility] endocrinology (Dr. Thomas or Dr. Richards) within 2-3 weeks.     Plan discussed with patient, bedside RN/primary team via this note.           Interval History:     The last 24 hours progress and nursing notes reviewed.      Calls to room and stopped in during rounds.  Patient not in room, no answer to calls.  Would like to discuss reduction over noc rate as BG 66 fasting this AM.  Patient has been in NICU.       BG trend:      Able to connect with patient.   She has been running low/tight and is agreeable to rate reductions for basal as well as ICR from 10 to 12.   She has not been eating well, back and forth to NICU and pumping for breast milk.    Will watch closely and continue to make recommendations.       Planned Procedures/surgeries: " none  D5W-containing solutions/medications: none    Recent Labs   Lab 11/30/22 0212 11/29/22  2337 11/29/22  1711 11/29/22  0533 11/29/22  0238 11/28/22  2205   GLC 66* 98 97 81 134* 133*         Nutrition:     Orders Placed This Encounter      Regular Diet Adult        PTA Regimen:     BG monitor: Dexcom  Frequency of checks: <4 times daily  Omnipod insulin pump  Basal:  Midnight -0900: 1.25 units per hour  0900-midnight: 1.95 units per hour  Bolus:  Midnight to 0530: 1:9g CHO  2787-3837: 1:3g CHO  2471-3638: 1:1.5g CHO  1238-0313:1:4g   9154-4315: 1:2.8g CHO  2100-midnight: 1:4.8g CHO  ISF:   Midnight-0700: 27  5520-2499: 22  4731-3891: 22  1800-midnight: 21  Target: 110     * prior to pregnancy, she was relying on basal rate 80% of the time, not carb counting and entering manual boluses             Review of Systems:   CC: denies         Medications:   Steroid planning:  no  Tube Feeding: no       Physical Exam:   BP (!) 136/92   Pulse 99   Temp 98.1  F (36.7  C) (Oral)   Resp 16   Wt 87.3 kg (192 lb 7.4 oz)   LMP 03/15/2022   SpO2 100%   Breastfeeding Unknown   BMI 26.84 kg/m      General:   A&O, NAD, pleasant   HEENT:  Hearing WNL.   Lungs:  unremarkable, no new cough, no SOB  Psych:   Cooperative, appropriate mood, congruent affect          Data:     Lab Results   Component Value Date    A1C 5.5 11/28/2022    A1C 6.1 10/04/2022    A1C 6.5 06/10/2022    A1C 8.6 02/10/2022    A1C 7.2 09/28/2021    A1C 9.4 08/20/2020    A1C 8.4 01/02/2020    A1C 7.8 01/29/2019        CBC RESULTS: Recent Labs   Lab Test 11/29/22 2346   WBC 11.7*   RBC 3.09*   HGB 8.9*   HCT 25.8*   MCV 84   MCH 28.8   MCHC 34.5   RDW 13.8   *       Recent Labs   Lab Test 11/30/22 0212 11/29/22  2346 11/29/22  2337 11/29/22  1711 11/29/22  0705 10/14/22  1506 06/10/22  1204 09/28/21  1445   NA  --   --   --   --   --   --  135 139   POTASSIUM  --   --   --   --   --   --  4.4 3.6   CHLORIDE  --   --   --   --   --   --  105 106    CO2  --   --   --   --   --   --  25 28   ANIONGAP  --   --   --   --   --   --  5 5   GLC 66*  --  98   < >  --    < > 137* 99   BUN  --   --   --   --   --   --  11 16   CR  --  0.67  --   --  0.75   < > 0.49* 0.72   LUIS  --   --   --   --   --   --  9.5 9.2    < > = values in this interval not displayed.     Liver Function Studies -   Recent Labs   Lab Test 11/29/22  2346 10/14/22  1506 06/10/22  1204   PROTTOTAL  --   --  7.9   ALBUMIN  --   --  4.1   BILITOTAL  --   --  0.4   ALKPHOS  --   --  51   AST 23   < > 10   ALT 13   < > 17    < > = values in this interval not displayed.     No results found for: ETHAN Mcpherson CNP   Inpatient Diabetes Management Service  Pager - [phone]  Available on [employer]     To contact Endocrine Diabetes service:   From 8AM-4PM: page inpatient diabetes provider who is following the patient that day (see filed or incomplete progress notes/consult notes).  If uncertain of provider assignment: page job code [id number]. (To page job code in-house dial 3 stars, 777 then enter number).  For questions or updates AFTER HOURS from 4PM-8AM: page the diabetes job code for on call fellow: [phone]    Please notify inpatient diabetes service if changes are planned to steroids, nutrition, or if procedures are planned requiring prolonged NPO status.Diabetes Management Team job code: [phone]    I spent a total of 35 minutes on the date of the encounter doing chart review, history and exam, documentation and further activities per the note.  Over 50% of my time on the unit was spent counseling the patient and/or coordinating care regarding acute hyperglycemic management.  See note for details.

## 2022-11-30 NOTE — PROVIDER NOTIFICATION
11/30/22 0110   Provider Notification   Provider Name/Title Dr. Rivera   Method of Notification Electronic Page   Request Evaluate-Remote   Notification Reason Lab Results   FYI hgb was 8.9

## 2022-11-30 NOTE — LACTATION NOTE
This note was copied from a baby's chart.  D:  I met with Wen and Gabe; Shirin is their 1st baby.  She has type 1 diabetes, pre-eclampsia, hx depression/ anxiety, takes insulin, Zoloft and nifedipine, and has no history of breast/chest surgery or trauma.  She has already started to pump.   I:  I gave her introductory materials and went over pumping guidelines.  I reviewed use of the pump, cleaning, labeling, storing, and logging.   I explained how to access the videos on hand expression and hands-on pumping.  I helped her make a hands-free pumping bra.  We discussed skin to skin holding and how to reach your lactation goals.  We talked about medications during breastfeeding.  She verbalized understanding via teachback.  I advised her to call her insurance company about rental pump coverage (already has a [employer] and [employer]).  We worked on a feeding.  We discussed supportive hold, positioning, latch, breastfeeding patterns and infant driven feeding, breast support and compressions, use/rationale of the nipple shield, skin to skin benefits, and timing of pumpings around breastfeedings.  Shirin was very sleepy, would not maintain latch.  I fitted her with a 20mm shield and instructed her in its use.  She had a few scattered non-nutritive sucks but for the most part slept.  We talked about importance of good time management to allow for self care so we moved onto pumping and paced bottle feeding.  I helped her pump and moved her to Maintain; she got almost 30ml.  I got her a kit to leave at bedside and discussed cleaning of parts.  A:  Mom has information she needs to initiate her supply.  Sleepy baby.  P:  Will continue to provide lactation support.    Sabiha Mackenzie, RNC, IBCLC

## 2022-11-30 NOTE — CONSULTS
[facility]  MATERNAL CHILD HEALTH   INITIAL NICU PSYCHOSOCIAL ASSESSMENT     DATA:     Presenting Information: Mom, Preeti, is a 32 year old  who delivered an infant girl , Shirin on 2022 at 36w6d gestation in the setting of . Baby was admitted to the NICU for Glucose monitoring.  SW was consulted to meet with this family per NICU admission of infant.     Living Situation: Parents, Preeti and Gabe, are  and live together in [place].    Social Support: Social support including Gabe's parents and siblings and Preeti's mother.     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     Insurance: [employer] - Baby will be added to this policy - SW provided info on how to do this.     Source of Financial Support: parental employment    Mental Health History: Preeti endorsed a history of mental health symptoms and shared that she take zoloft. She shared that she is not currently seeing a therapist. SW offered post partum support of MN.     Diagnoses: MDD/ROSANNA    Medications: Zoloft     History of Postpartum Mood Disorders: No history per pt.     Chemical Health History: No history per pt.     Legal/Child Protection Involvement: N/a per pt.     INTERVENTION:       Chart review    Collaboration with team: BLAIRE Jean      Conducted Psychosocial Assessment    Introduction to Maternal Child Health SW role and scope of practice    Orientation to the NICU (parking, lodging, meals, visitation)    Validated emotions and provided supportive listening    SW described process for birth certificate, social security card and insurance process.     Provided resources and referrals    parking pass    Provided psychoeducation on  mood disorders and indicated that SW would continue to monitor mood and support bridging to mental health resources as needed.    Provided SW contact  info    ASSESSMENT:     Coping: Pt and  seem to be coping well, but endorse that today has been very long and draining. Shirin was admitted to the NICU late last night and there has been a lot of information thrown at them today. They shared that they feel with all the support they have that has helped them. SW offered post partum support of MN and discussed therapy if it is necessary for them down the line.     Assessment of parental risk for PMAD: Higher than average risk, considering medically complexity.    Risk Factors: first time parents, hospitalization during a global pandemic, unexpected hospitalization  and maternal mental health history or concerns     Resiliency Factors & Strengths: local family, strong social support, parental employment, stable housing, reliable transportation, connected with mental health support, able and willing to ask for help/accept help, willingness to have vulnerable conversations about emotions and demonstrated commitment to being present and engaged in baby's cares    PLAN:     SW will continue to follow for supportive intervention.    FERNANDO Kramer, [facility]  Maternal and Child Health   Office: [phone]  Pager: [phone]  After Hours Pager: [phone]  [email or web address]

## 2022-11-30 NOTE — PLAN OF CARE
Goal Outcome Evaluation: [id number]        Data: VSS, postpartum assessments WNL with elevated blood pressures. Patient was upset following blood sugar checks on baby, and was comforted/heard by myself and her spouse. She is voiding without difficulty, up ad trinh, passing gas, eating and drinking normally. Incision has been covered and DAIJA, lochia WNL, no s/s infection. Breastfeeding infant with minimal assistance. Taking Tylenol, Ibuprofen for pain. Reports good pain management.   Action: Education provided on plan of care and self-care techniques.  Response: Pt is agreeable with her plan of care. Positive attachment behaviors observed with infant. Support person present. Anticipate discharge per care plan.

## 2022-11-30 NOTE — PROVIDER NOTIFICATION
11/30/22 0038   Provider Notification   Provider Name/Title Dr. Rivera   Method of Notification Electronic Page   Request Evaluate-Remote   Notification Reason Status Update   FYI PT's BP was 151/99, pt is asymptomatic

## 2022-11-30 NOTE — PROGRESS NOTES
[facility]   Post-partum Progress Note    Name:  Preeti Leiva  MRN: [id number]    S:   Patient was in NICU    O:   Patient Vitals for the past 24 hrs:   BP Temp Temp src Pulse Resp Weight   22 0534 (!) 147/97 97.9  F (36.6  C) Oral 93 16 87.3 kg (192 lb 7.4 oz)   22 0213 (!) 146/94 98.1  F (36.7  C) Oral 101 16 --   22 0023 (!) 151/99 98.1  F (36.7  C) Oral 92 16 --   22 2128 (!) 151/91 98.1  F (36.7  C) Oral 96 16 --   22 2115 (!) 142/93 -- -- -- -- --   22 2024 (!) 150/88 98.2  F (36.8  C) Oral 103 16 --   22 1600 (!) 144/93 98.2  F (36.8  C) Oral 102 16 --   22 1220 (!) 153/89 -- -- 88 16 --   22 0942 (!) 145/93 98.1  F (36.7  C) Oral 91 14 --   22 0815 -- -- -- -- -- 89.7 kg (197 lb 12 oz)   22 0812 -- 98.2  F (36.8  C) Axillary -- -- --       Hgb:   Recent Labs   Lab 22  2346 22  1449 22  0705 22  1048 22  1628   HGB 8.9* 9.2* 9.1* 12.1 12.0     A/P:  32 year old  POD#2 s/p PLTCS. Pregnancy notable for LGA fetus, T1DM, pre-eclampsia without severe features. Had an episode of chest and shoulder pain overnight which resolved with flexeril. Meeting postop goals. Continue with routine postpartum management.     #Pre-E w/o SF  - HELLP labs wnl (), UPC 1.19 ()  - NSSR x 1 (7738)  - BP high mild range to low mild range  - 1D Nifed 30 mg > D#1 nifed 90    #Chest/ R shoulder pain  #RUQ pain  - Flexeril 10 mg  - Simethicone scheduled  - Repeat HELLP labs  - Will hold off increasing blood pressure medications at this time as patient has just had a dose of Nifedipine 30 mg 1 hour prior    # Type I diabetes  # LGA  # Mild polyhydramnios  Endocrinology planning to follow throughout delivery and postpartum period. Will start high intensity insulin drip through delivery until to PP floor and tolerating regular diet. Plan to adjust back to insulin pump at that time.   - Last visit with  "endocrinology  re: delivery planning and postpartum management:                \"- If she needs , will use insulin drip              - If she is unable to control her own blood sugars (loss of consciousness, etc) she would use insulin drip.              - Will restart Omnipod immediately after delivery (if go on insulin drip will suspend but not remove omnipod, will bring additional supplies to restart), have created separate basal setting for post-delivery (Basal 2).  - Will discuss with Insulet resetting the algorithm to relearn her habits post delivery -- discussed with Omnipod rep-- will instead leave pump and let it relearn settings without reset. Preeti will let me know if she is having significant hypos and we will fix.               - Bolus settings-- match pre-pregnancy settings-- I:CHO 1:13, ISF 1:50              - Plans to breast feed, reviewed likely lower insulin needs, cautioned re: hypoglycemia and keep snacks handy  - Reviewed blood sugar goals in pregnancy. Glucose goals during pregnancy according to the American Diabetes Association:  Fasting glucose 70-95 mg/dL AND  One-hour postprandial glucose 110-140 mg/dL or  Two-hour postprandial glucose 100-120 mg/dL  - Encouraged at least 30min of activity daily.\"  - MOD as above.    #Routine Postpartum  Pain: Well-controlled with scheduled tylenol, toradol > ibuprofen, PRN oxy  Hgb: 12.1>  mL > AM pending. VSS as noted above, asymptomatic. Will discharge with PO Fe if hgb < 10  GI:  Regular diet. BID Senna/Colace. PRN Miralax, Simethicone. Encourage hydration and ambulation  : Voiding spontaneously  PPx:  Encouraged ambulation  Rh: Positive  Rub:  Immune  Hep B: Surface antigen negative  Baby: In room, doing well  Feed: Breastfeeding  BC: Declines  Dispo: Plan for home on POD#2-3.    Patient discussed with Dr. Garcia.    Etelvina Rivera MD  Ob/Gyn Resident, PGY-2  2022 6:44 AM     "

## 2022-11-30 NOTE — PROGRESS NOTES
Brief Progress Note    S: called to room for patient experiencing chest pain. Upon arrival patient was tearful, reports that she went to the bathroom and then started having significant pain in her shoulder that was making it harder to take deep breaths. She reports that it feels as if she has pulled a muscle. The pain starts in her shoulder and radiates down to her RUQ. Denies left-sided chest pain, shortness of breath, dizziness, lightheadedness.    O:  BP (!) 151/91 (BP Location: Left arm)   Pulse 96   Temp 98.1  F (36.7  C) (Oral)   Resp 16   Wt 89.7 kg (197 lb 12 oz)   LMP 03/15/2022   SpO2 100%   Breastfeeding Unknown   BMI 27.58 kg/m       Exam:  Gen: distressed, tearful  Heart: regular rate and rhythm, systolic flow murmur  Lungs: clear to auscultation bilaterally  MSK: Right shoulder tender with range of motion  Abd: RUQ tender to palpation, fundus firm and appropriately tender  Ext: non-tender, trace edema    A/P: POD#1 s/p PLTCS now with right shoulder pain, most likely referred from abdomen. Low suspicion for PE, MI, pericarditis. Given RUQ pain and elevated BP will repeat a set of HELLP labs.    #Chest/ R shoulder pain  #RUQ pain  - Flexeril 10 mg  - Simethicone scheduled  - Repeat HELLP labs  - Will hold off increasing blood pressure medications at this time as patient has just had a dose of Nifedipine 30 mg 1 hour prior    Etelvina Rivera MD  Ob/Gyn Resident, PGY-2  11/29/2022 10:41 PM

## 2022-11-30 NOTE — PLAN OF CARE
Goal Outcome Evaluation:  VSS and postpartum assessments WDL, except increased BP in the 140s-150s/90s, provider aware and increased Nifedipine .  Up ad trinh with steady gait and independent with cares.  Bonding well with infant.  Infant was transferred to the NICU at 2230 for low BG. Pt plans to pump and go to NICU for feedings.  Pain managed with Tylenol and Ibuprofen.   Gabe present and supportive.  Will continue with postpartum cares and education per plan of care.

## 2022-12-01 VITALS
HEART RATE: 95 BPM | RESPIRATION RATE: 18 BRPM | WEIGHT: 190.5 LBS | OXYGEN SATURATION: 100 % | BODY MASS INDEX: 26.57 KG/M2 | DIASTOLIC BLOOD PRESSURE: 91 MMHG | SYSTOLIC BLOOD PRESSURE: 139 MMHG | TEMPERATURE: 98 F

## 2022-12-01 LAB
GLUCOSE BLDC GLUCOMTR-MCNC: 108 MG/DL (ref 70–99)
GLUCOSE BLDC GLUCOMTR-MCNC: 108 MG/DL (ref 70–99)
GLUCOSE BLDC GLUCOMTR-MCNC: 114 MG/DL (ref 70–99)
GLUCOSE BLDC GLUCOMTR-MCNC: 74 MG/DL (ref 70–99)

## 2022-12-01 PROCEDURE — 250N000013 HC RX MED GY IP 250 OP 250 PS 637

## 2022-12-01 PROCEDURE — 250N000013 HC RX MED GY IP 250 OP 250 PS 637: Performed by: OBSTETRICS & GYNECOLOGY

## 2022-12-01 PROCEDURE — 99233 SBSQ HOSP IP/OBS HIGH 50: CPT | Performed by: NURSE PRACTITIONER

## 2022-12-01 RX ORDER — AMOXICILLIN 250 MG
1 CAPSULE ORAL DAILY
Qty: 100 TABLET | Refills: 0 | Status: SHIPPED | OUTPATIENT
Start: 2022-12-01 | End: 2022-12-20

## 2022-12-01 RX ORDER — FERROUS SULFATE 325(65) MG
325 TABLET, DELAYED RELEASE (ENTERIC COATED) ORAL EVERY OTHER DAY
Qty: 100 TABLET | Refills: 0 | Status: SHIPPED | OUTPATIENT
Start: 2022-12-01 | End: 2022-12-20

## 2022-12-01 RX ORDER — IBUPROFEN 600 MG/1
600 TABLET, FILM COATED ORAL EVERY 6 HOURS PRN
Qty: 60 TABLET | Refills: 0 | Status: SHIPPED | OUTPATIENT
Start: 2022-12-01 | End: 2022-12-20

## 2022-12-01 RX ORDER — ACETAMINOPHEN 325 MG/1
650 TABLET ORAL EVERY 6 HOURS PRN
Qty: 100 TABLET | Refills: 0 | Status: SHIPPED | OUTPATIENT
Start: 2022-12-01 | End: 2023-05-10

## 2022-12-01 RX ORDER — LABETALOL 200 MG/1
200 TABLET, FILM COATED ORAL EVERY 12 HOURS
Qty: 120 TABLET | Refills: 0 | Status: SHIPPED | OUTPATIENT
Start: 2022-12-01 | End: 2022-12-05

## 2022-12-01 RX ADMIN — ACETAMINOPHEN 975 MG: 325 TABLET, FILM COATED ORAL at 14:00

## 2022-12-01 RX ADMIN — SIMETHICONE 80 MG: 80 TABLET, CHEWABLE ORAL at 08:08

## 2022-12-01 RX ADMIN — IBUPROFEN 800 MG: 800 TABLET, FILM COATED ORAL at 01:37

## 2022-12-01 RX ADMIN — SIMETHICONE 80 MG: 80 TABLET, CHEWABLE ORAL at 01:37

## 2022-12-01 RX ADMIN — NIFEDIPINE 120 MG: 30 TABLET, FILM COATED, EXTENDED RELEASE ORAL at 08:08

## 2022-12-01 RX ADMIN — SERTRALINE HYDROCHLORIDE 200 MG: 100 TABLET ORAL at 08:09

## 2022-12-01 RX ADMIN — IBUPROFEN 800 MG: 800 TABLET, FILM COATED ORAL at 08:09

## 2022-12-01 RX ADMIN — ACETAMINOPHEN 975 MG: 325 TABLET, FILM COATED ORAL at 01:37

## 2022-12-01 RX ADMIN — LABETALOL HYDROCHLORIDE 200 MG: 200 TABLET, FILM COATED ORAL at 08:09

## 2022-12-01 RX ADMIN — SIMETHICONE 80 MG: 80 TABLET, CHEWABLE ORAL at 14:00

## 2022-12-01 RX ADMIN — ACETAMINOPHEN 975 MG: 325 TABLET, FILM COATED ORAL at 08:08

## 2022-12-01 RX ADMIN — SENNOSIDES AND DOCUSATE SODIUM 2 TABLET: 50; 8.6 TABLET ORAL at 08:09

## 2022-12-01 RX ADMIN — IBUPROFEN 800 MG: 800 TABLET, FILM COATED ORAL at 14:00

## 2022-12-01 ASSESSMENT — ACTIVITIES OF DAILY LIVING (ADL)
ADLS_ACUITY_SCORE: 18

## 2022-12-01 NOTE — PLAN OF CARE
Goal Outcome Evaluation:                      VSS ex. BP slightly elevated and postpartum assessments WDL.  Up ad trinh with steady gait and independent with cares. Voiding and passing gas. Pt goes to see infant in NICU frequently.  Breastfeeding and pumping independently.  Pain managed with Tylenol and Ibuprofen.   Gabe present and supportive.  Will continue with postpartum cares and education per plan of care.     Problem: Plan of Care - These are the overarching goals to be used throughout the patient stay.    Goal: Absence of Hospital-Acquired Illness or Injury  Outcome: Progressing  Intervention: Prevent Skin Injury  Recent Flowsheet Documentation  Taken 2022 by Janae Jacobsen, RN  Body Position: position changed independently  Intervention: Prevent and Manage VTE (Venous Thromboembolism) Risk  Recent Flowsheet Documentation  Taken 2022 by Janae Jacobsen, RN  VTE Prevention/Management: compression stockings off  Goal: Optimal Comfort and Wellbeing  Outcome: Progressing  Intervention: Monitor Pain and Promote Comfort  Recent Flowsheet Documentation  Taken 2022 by Janae Jacobsen, RN  Pain Management Interventions: medication (see MAR)  Intervention: Provide Person-Centered Care  Recent Flowsheet Documentation  Taken 2022 by Janae Jacobsen, RN  Trust Relationship/Rapport:   care explained   questions answered   questions encouraged   reassurance provided  Goal: Readiness for Transition of Care  Outcome: Progressing     Problem: Postpartum ( Delivery)  Goal: Successful Maternal Role Transition  Outcome: Progressing  Goal: Hemostasis  Outcome: Progressing  Goal: Effective Bowel Elimination  Outcome: Progressing  Goal: Fluid and Electrolyte Balance  Outcome: Progressing  Goal: Absence of Infection Signs and Symptoms  Outcome: Progressing  Goal: Anesthesia/Sedation Recovery  Outcome: Progressing  Goal: Optimal Pain Control and Function  Outcome:  Progressing  Intervention: Prevent or Manage Pain  Recent Flowsheet Documentation  Taken 12/1/2022 0137 by Janae Jacobsen, RN  Pain Management Interventions: medication (see MAR)  Goal: Nausea and Vomiting Relief  Outcome: Progressing  Goal: Effective Urinary Elimination  Outcome: Progressing  Goal: Effective Oxygenation and Ventilation  Outcome: Progressing  Intervention: Optimize Oxygenation and Ventilation  Recent Flowsheet Documentation  Taken 12/1/2022 0132 by Janae Jacobsen, RN  Head of Bed (HOB) Positioning: HOB at 20-30 degrees

## 2022-12-01 NOTE — PLAN OF CARE
VSS, except for elevated BP (not severe range), Dr Manning discussed pt monitoring BP at home and when to call clinic, also will see in clinic on Monday. Up ad trinh with steady gait and independent with cares.  Pumping independently for infant and getting large amounts of breastmilk.  Pain managed with tylenol, ibuprofen and simethicone per MAR.  Incisional steristrips intact.  Reviewed discharge medications.  Reviewed follow-up for appointments.  Reviewed discharge instructions and answered all questions.  Discharged home with all belongings at 1415.

## 2022-12-01 NOTE — DISCHARGE INSTRUCTIONS
Postop  Birth Instructions    Activity     Do not lift more than 10 pounds for 6 weeks after surgery.  Ask family and friends for help when you need it.  No driving until you have stopped taking your pain medications (usually two weeks after surgery).  No heavy exercise or activity for 6 weeks.  Don't do anything that will put a strain on your surgery site.  Don't strain when using the toilet.  Your care team may prescribe a stool softener if you have problems with your bowel movements.     To care for your incision:     Keep the incision clean and dry.  Do not soak your incision in water. No swimming or hot tubs until it has fully healed. You may soak in the bathtub if the water level is below your incision.  Do not use peroxide, gel, cream, lotion, or ointment on your incision.  Adjust your clothes to avoid pressure on your surgery site (check the elastic in your underwear for example).     You may see a small amount of clear or pink drainage and this is normal.  Check with your health care provider:     If the drainage increases or has an odor.  If the incision reddens, you have swelling, or develop a rash.  If you have increased pain and the medicine we prescribed doesn't help.  If you have a fever above 100.4 F (38 C) with or without chills when placing thermometer under your tongue.   The area around your incision (surgery wound), will feel numb.  This is normal. The numbness should go away in less than a year.     Keep your hands clean:  Always wash your hands before touching your incision (surgery wound). This helps reduce your risk of infection. If your hands aren't dirty, you may use an alcohol hand-rub to clean your hands. Keep your nails clean and short.                    Call your healthcare provider if you have any of these symptoms:     You soak a sanitary pad with blood within 1 hour, or you see blood clots larger than a golf ball.  Bleeding that lasts more than 6 weeks.  Vaginal discharge  that smells bad.  Severe pain, cramping or tenderness in your lower belly area.  A need to urinate more frequently (use the toilet more often), more urgently (use the toilet very quickly), or it burns when you urinate.  Nausea and vomiting.  Redness, swelling or pain around a vein in your leg.  Problems breastfeeding or a red or painful area on your breast.  Chest pain and cough or are gasping for air.  Problems with coping with sadness, anxiety or depression. If you have concerns about hurting yourself or the baby, call your provider immediately.    You have questions or concerns after you return home.     Preeclampsia   Call your doctor right away if you have any of the following:  - Edema (swelling) in your face or hands  - Rapid weight gain-about 1 pound or more in a day  - Headache  - Abdominal pain on your right side  - Vision problems (flashes or spots)  - You have questions or concerns once you return home.

## 2022-12-01 NOTE — CONSULTS
"Diabetes CNS consult received for Олег Leiva, age 32,  at 36w6d with a PMHx Type I diabetes who was admitted on 2022 for primary  in the setting of LGA baby, Type I diabetes, and pre-eclampsia without SF.     Preeti uses an ambulatory insulin infusion pump, the Omnipod 5 with Dexcom G 6 CGM.  Utlizes Lispro (Humalog) insulin.  She was seen by the Inpatient Diabetes Service, Bernie Mayes NP, earlier today and adjustments have been made to her rates.  Per Bernie's notes:    Basal 2 pattern\"- adjusted settings 22  *she is running in auto mode, so actual rates in real time will adjust to meet targets.     Basal-   Midnight-0900: 0.85 unit per hour> reduced to 0.75 units per hour  0900-midnight: 1.25 units per hour. > reduced to 1.05 units per hour      Bolus-  * all new bolus settings , as she did not adjust following delivery.  Reduced the ICR of 14, all day  ISF of 40, all day  Target: 120mg/dL  AIT 3 hours.     She will f/u with [facility] Endocrinology, Dr. Thomas or Dr. Richards, in 2-3 weeks.    ETHAN Garrison  Diabetes Clinical Nurse Specialist/[facility]  [phone]  "

## 2022-12-01 NOTE — PROGRESS NOTES
"                          Diabetes Consult Daily  Progress Note          Assessment/Plan:     HPI:  Preeti is a 32 year old female  at 36w6d with a PMHx Type I diabetes who was admitted on 2022 for primary  in the setting of LGA baby, Type I diabetes, and pre-eclampsia without SF.      S/P primary  22.  IDS to sign off following today's visit.      Assessment:     1) Type I diabetes in pregnancy complicated by LGA baby and pre eclampsia without SF     Plan:                  -Omnipod 5 insulin pump, with Humalog insulin, and Dexcom G6 CGM:     \"Basal 2 pattern\"- adjusted settings   *she is running in auto mode, so actual rates in real time will adjust to meet targets.     Basal-   Midnight-0900: 0.85 unit per hour> reduced to 0.75 units per hour  0900-midnight: 1.25 units per hour. > reduced to 1.05 units per hour      Bolus-  * all new bolus settings , as she did not adjust following delivery.  Reduced the ICR of 14, all day  ISF of 40, all day  Target: 120mg/dL  AIT 3 hours.                        -BG monitoring AC/HS/0200/0500                 -consistent carb diet                 -hypoglycemia protocol      Outpatient follow up: with [facility] endocrinology (Dr. Thomas or Dr. Richards) within 2-3 weeks.      Inpatient Diabetes Service Signing off 22   Please call back with any questions or if BG become more labile or if getting closer to discharge and assistance is needed for home recommendations.  Please allow at least 24 hours for home recs.       Plan discussed with patient, bedside RN/primary team via this note.           Interval History:     The last 24 hours progress and nursing notes reviewed.      Pumping/breast-feeding.    BG trend:    Preeti is off the unit and in the NICU, we spoke via phone.   We reviewed her BG and she is still trending < target.   She does not wish to make any changes to her basal but is ok with a reduction of the bolus.  It was " "recommended to go to 1 unit(s) per 15 g cho and Preeti would prefer to go to 1 unit(s) per 14 g cho.     IDS will sign off from daily visits.  Do not hesitate to reach out if there is a need.    Planned Procedures/surgeries: none  D5W-containing solutions/medications: none    Recent Labs   Lab 12/01/22  0506 12/01/22  0144 11/30/22  1742 11/30/22  1353 11/30/22  0805 11/30/22  0212   * 74 170* 92 123* 66*         Nutrition:     Orders Placed This Encounter      Regular Diet Adult        PTA Regimen:     BG monitor: Dexcom  Frequency of checks: <4 times daily  Omnipod insulin pump  Basal:  Midnight -0900: 1.25 units per hour  0900-midnight: 1.95 units per hour  Bolus:  Midnight to 0530: 1:9g CHO  6101-2657: 1:3g CHO  6367-1766: 1:1.5g CHO  2037-0278:1:4g   2856-6727: 1:2.8g CHO  2100-midnight: 1:4.8g CHO  ISF:   Midnight-0700: 27  5660-0333: 22  8125-0083: 22  1800-midnight: 21  Target: 110     * prior to pregnancy, she was relying on basal rate 80% of the time, not carb counting and entering manual boluses             Review of Systems:   CC: denies \"doing really well\"         Medications:   Steroid planning:  no  Tube Feeding: no       Physical Exam:   BP (!) 140/88 (BP Location: Left arm)   Pulse 94   Temp 97.7  F (36.5  C) (Oral)   Resp 18   Wt 86.4 kg (190 lb 8 oz)   LMP 03/15/2022   SpO2 100%   Breastfeeding Unknown   BMI 26.57 kg/m      General:   A&O, NAD, pleasant   HEENT:  Hearing WNL.   Lungs:  unremarkable, no new cough, no SOB  Psych:   Cooperative, appropriate mood, congruent affect          Data:     Lab Results   Component Value Date    A1C 5.5 11/28/2022    A1C 6.1 10/04/2022    A1C 6.5 06/10/2022    A1C 8.6 02/10/2022    A1C 7.2 09/28/2021    A1C 9.4 08/20/2020    A1C 8.4 01/02/2020    A1C 7.8 01/29/2019        CBC RESULTS: Recent Labs   Lab Test 11/29/22  2346   WBC 11.7*   RBC 3.09*   HGB 8.9*   HCT 25.8*   MCV 84   MCH 28.8   MCHC 34.5   RDW 13.8   *       Recent Labs   Lab " Test 11/30/22  0212 11/29/22  2346 11/29/22  2337 11/29/22  1711 11/29/22  0705 10/14/22  1506 06/10/22  1204 09/28/21  1445   NA  --   --   --   --   --   --  135 139   POTASSIUM  --   --   --   --   --   --  4.4 3.6   CHLORIDE  --   --   --   --   --   --  105 106   CO2  --   --   --   --   --   --  25 28   ANIONGAP  --   --   --   --   --   --  5 5   GLC 66*  --  98   < >  --    < > 137* 99   BUN  --   --   --   --   --   --  11 16   CR  --  0.67  --   --  0.75   < > 0.49* 0.72   LUIS  --   --   --   --   --   --  9.5 9.2    < > = values in this interval not displayed.     Liver Function Studies -   Recent Labs   Lab Test 11/29/22 2346 10/14/22  1506 06/10/22  1204   PROTTOTAL  --   --  7.9   ALBUMIN  --   --  4.1   BILITOTAL  --   --  0.4   ALKPHOS  --   --  51   AST 23   < > 10   ALT 13   < > 17    < > = values in this interval not displayed.     No results found for: ETHAN Mcpherson CNP   Inpatient Diabetes Management Service  Pager - [phone]  Available on [facility]     To contact Endocrine Diabetes service:   From 8AM-4PM: page inpatient diabetes provider who is following the patient that day (see filed or incomplete progress notes/consult notes).  If uncertain of provider assignment: page job code [id number]. (To page job code in-house dial 3 stars, 777 then enter number).  For questions or updates AFTER HOURS from 4PM-8AM: page the diabetes job code for on call fellow: [phone]    Please notify inpatient diabetes service if changes are planned to steroids, nutrition, or if procedures are planned requiring prolonged NPO status.Diabetes Management Team job code: [phone]    I spent a total of 35 minutes on the date of the encounter doing chart review, history and exam, documentation and further activities per the note.  Over 50% of my time on the unit was spent counseling the patient and/or coordinating care regarding acute hyperglycemic management.  See note for details.

## 2022-12-01 NOTE — LACTATION NOTE
This note was copied from a baby's chart.  D: I met with parents for discharge teaching.   I: I gave her a feeding log to use at home and went over the need for 8-12 feedings per day and how many wet diapers and stools she should see each day to show adequate intake. We discussed home storage of breast milk, weaning from the nipple shield and pumping, and transitioning to full breastfeeding at home.  I gave the mother handouts on all of these topics as well as extra nipple shields. We discussed growth spurts, birth control and other medications, Babyweigh rental scales, and resources for help at home/ when to seek outpatient help.  She verbalized understanding via teach back.  Her goal is 50% latching, 50% pump and bottle.   A: Mom has information and equipment she needs to feed her baby at home.   P: I encouraged her to seek help with any breastfeeding questions she may have in the future.

## 2022-12-01 NOTE — PLAN OF CARE
Goal Outcome Evaluation:    VSS. PP assessment WDL. Pt is voiding and has had BM. Pain is well controlled with ibuprofen and tylenol. Pt is ambulating independently. Denies epigastric pain, dizziness, or vision disturbances. Pt anticipates to be discharged today. Pt has been going down to NICU to breastfeed baby.  is at bedside and is helpful.     Continue with plan of care.

## 2022-12-01 NOTE — PROGRESS NOTES
"[facility]   Post-partum Progress Note    Name:  Preeti Leiva  MRN: [id number]    S:   Patient in NICU.    O:   Patient Vitals for the past 24 hrs:   BP Temp Temp src Pulse Resp Weight   22 0506 -- -- -- -- -- 86.4 kg (190 lb 8 oz)   22 0503 (!) 140/88 97.7  F (36.5  C) Oral 94 18 --   22 0132 132/86 97.8  F (36.6  C) Oral 99 17 --   22 2039 137/89 -- -- 108 18 --   22 1724 (!) 144/91 98.7  F (37.1  C) Oral (!) 121 18 --   22 1405 -- -- -- 117 -- --   22 1403 (!) 152/90 -- -- 116 16 --   22 1336 (!) 152/97 -- -- (!) 122 16 --   22 1041 (!) 136/92 98.1  F (36.7  C) Oral 99 16 --       Hgb:   Recent Labs   Lab 22  2346 22  1449 22  0705 22  1048 22  1628   HGB 8.9* 9.2* 9.1* 12.1 12.0     A/P:  32 year old  POD#3 s/p PLTCS. Pregnancy notable for LGA fetus, T1DM, pre-eclampsia without severe features. Had an episode of chest and shoulder pain overnight which resolved with flexeril. Meeting postop goals. Continue with routine postpartum management.     #Pre-E w/o SF  - HELLP labs wnl (), UPC 1.19 ()  - NSSR x 1 (1730)  - BP high mild range to low mild range  - 1D Nifed 30 mg > D#2 nifed 120, labetalol 200 BID    #Chest/ R shoulder pain  #RUQ pain  - Flexeril 10 mg  - Simethicone scheduled  - Repeat HELLP labs  - Will hold off increasing blood pressure medications at this time as patient has just had a dose of Nifedipine 30 mg 1 hour prior    # Type I diabetes  # LGA  # Mild polyhydramnios  Endocrinology planning to follow throughout delivery and postpartum period. Will start high intensity insulin drip through delivery until to PP floor and tolerating regular diet. Plan to adjust back to insulin pump at that time.   - Last visit with endocrinology  re: delivery planning and postpartum management:                \"- If she needs , will use insulin drip              - If she is unable to " "control her own blood sugars (loss of consciousness, etc) she would use insulin drip.              - Will restart Omnipod immediately after delivery (if go on insulin drip will suspend but not remove omnipod, will bring additional supplies to restart), have created separate basal setting for post-delivery (Basal 2).  - Will discuss with Insulet resetting the algorithm to relearn her habits post delivery -- discussed with Omnipod rep-- will instead leave pump and let it relearn settings without reset. Preeti will let me know if she is having significant hypos and we will fix.               - Bolus settings-- match pre-pregnancy settings-- I:CHO 1:13, ISF 1:50              - Plans to breast feed, reviewed likely lower insulin needs, cautioned re: hypoglycemia and keep snacks handy  - Reviewed blood sugar goals in pregnancy. Glucose goals during pregnancy according to the American Diabetes Association:  Fasting glucose 70-95 mg/dL AND  One-hour postprandial glucose 110-140 mg/dL or  Two-hour postprandial glucose 100-120 mg/dL  - Encouraged at least 30min of activity daily.\"  - MOD as above.    #Routine Postpartum  Pain: Well-controlled with scheduled tylenol, toradol > ibuprofen, PRN oxy  Hgb: 12.1>  mL > AM pending. VSS as noted above, asymptomatic. Will discharge with PO Fe if hgb < 10  GI:  Regular diet. BID Senna/Colace. PRN Miralax, Simethicone. Encourage hydration and ambulation  : Voiding spontaneously  PPx:  Encouraged ambulation  Rh: Positive  Rub:  Immune  Hep B: Surface antigen negative  Baby: In room, doing well  Feed: Breastfeeding  BC: Declines  Dispo: Plan for home on POD#2-3.    Patient discussed with Dr. Garcia.    Etelvina Rivera MD  Ob/Gyn Resident, PGY-2  12/01/2022 6:58 AM     "

## 2022-12-01 NOTE — PLAN OF CARE
Goal Outcome Evaluation: [id number]       HR and BP elevated during shift, and other VSS and postpartum assessment WDL. She is up ad trinh, voiding, pain managed with Tylenol and Ibuprofen. Incision appears to be healing well with no s/s infection - bruising noted on right side of perineum. Uterus firm and midline overall. Scant lochia rubra. Slight breast/nipple pain - patient given Lanolil and Hydrogels; pumping independently. Patient is passing flatus. Support person spouse Gabe present at bedside; patient goes to visit baby often in NICU. Education provided on plan of care, self-care techniques, and blood pressure checks. Continue with plan of care.

## 2022-12-02 ENCOUNTER — MYC MEDICAL ADVICE (OUTPATIENT)
Dept: EDUCATION SERVICES | Facility: CLINIC | Age: 32
End: 2022-12-02

## 2022-12-02 ENCOUNTER — TELEPHONE (OUTPATIENT)
Dept: OBGYN | Facility: CLINIC | Age: 32
End: 2022-12-02

## 2022-12-02 NOTE — TELEPHONE ENCOUNTER
----- Message from Edel Manning MD sent at 12/1/2022  2:16 PM CST -----  Regarding: post-partum discharge follow-up  Patient has been discharged and needs the following appointments scheduled:    -BP check with MD on Monday, 12/5.  Any MD is fine  -6w post-partum visit with Gretta      Please call patient to schedule. Thank you!

## 2022-12-02 NOTE — CONFIDENTIAL NOTE
"Diabetes Education Follow-up    Subjective/Objective:    Preeti Leiva sent in blood glucose log for review. Last date of communication was: 11-15-22.    Diabetes is being managed with   Lifestyle (diet/activity), Diabetes Medications   Diabetes Medication(s)     Diabetic Other       Glucagon, rDNA, (GLUCAGON EMERGENCY) 1 MG KIT    Inject 1 mg as directed daily as needed (hypoglycemia)    Insulin       insulin lispro (HUMALOG VIAL) 100 UNIT/ML vial    INJECT 70 UNITS UNDER THE SKIN EVERY DAY VIA INSULIN PUMP          Tandem Reports:                 Assessment:    Sifteo message:  \"Hi team,  I gave birth on Monday and have been breastfeeding. I ve been having a hard time with my sugar getting low after pumping or breastfeeding. Then seem to have rebound highs. Is there anything you can recommend or changes I can make to help with this? I have an appointment Tuesday, but want to try and stop the lows before then. Thanks\"       Might need less insulin or more snacks if dropping between meals since making milk takes a lot more calories. Will start by decreasing her sensitivity.        Plan/Response:  Recommend decrease to sensitivity from 40 -> 45 (all 24 hours).  Consider small uncovered snacks.  Follow-up planned for Tuesday Dec. 6th with Dr. Thomas (Endo)  AbielWhatâ[facility]s More Alive Than You response sent.    Twila Correia RN, [facility]      Any diabetes medication dose changes were made via the CDE Protocol and Collaborative Practice Agreement with the patient's endocrinology provider. A copy of this encounter was shared with the provider.      "

## 2022-12-03 ENCOUNTER — PATIENT OUTREACH (OUTPATIENT)
Dept: CARE COORDINATION | Facility: CLINIC | Age: 32
End: 2022-12-03

## 2022-12-03 NOTE — PROGRESS NOTES
Clinic Care Coordination Contact  Community Health Worker Initial Outreach    Patient accepts CC: No, Pt declined needs for extra support.     Clinic Care Coordination Contact  [facility]: Post-Discharge Note  SITUATION                                                      Admission:    Admission Date: 22   Reason for Admission: - Primary low transverse  section with two layer closure via Pfannenstiel skin incision  Discharge:   Discharge Date: 22  Discharge Diagnosis: - Primary low transverse  section with two layer closure via Pfannenstiel skin incision    BACKGROUND                                                      Per hospital discharge summary and inpatient provider notes:    Preeti Leiva is a 32 year old  at 36w6d by LMP c/w 7w5d US who presents today for primary  section in the setting of type I diabetes with EFW of >4500 g and preE w/o SF.    ASSESSMENT           Discharge Assessment  How are you doing now that you are home?: doing well  How are your symptoms? (Red Flag symptoms escalate to triage hotline per guidelines): Improved  Do you feel your condition is stable enough to be safe at home until your provider visit?: Yes  Does the patient have their discharge instructions? : Yes  Does the patient have questions regarding their discharge instructions? : No  Were you started on any new medications or were there changes to any of your previous medications? : Yes  Does the patient have all of their medications?: Yes  Do you have questions regarding any of your medications? : No  Do you have all of your needed medical supplies or equipment (DME)?  (i.e. oxygen tank, CPAP, cane, etc.): Yes  Discharge follow-up appointment scheduled within 14 calendar days? : Yes  Discharge Follow Up Appointment Date: 22  Discharge Follow Up Appointment Scheduled with?: Specialty Care Provider    Post-op ([facility] CTA Only)  If the patient had a surgery or procedure, do  they have any questions for a nurse?: No         PLAN                                                      Outpatient Plan:     Follow-up:  - Primary OB in 1 week for BP check and 6 weeks for routine postpartum visit    Future Appointments   Date Time Provider Department Center   12/5/2022  1:30 PM Lubna Glynn MD [facility]   12[facility]2022  7[facility]   1/9/2023 11:40 AM Edel Manning MD [facility]       For any urgent concerns, please contact our 24 hour nurse triage line: [phone] ([phone])       ALONDRA Diaz  [phone]  [facility]

## 2022-12-05 ENCOUNTER — PRENATAL OFFICE VISIT (OUTPATIENT)
Dept: OBGYN | Facility: CLINIC | Age: 32
End: 2022-12-05
Payer: COMMERCIAL

## 2022-12-05 VITALS
TEMPERATURE: 97.4 F | OXYGEN SATURATION: 98 % | DIASTOLIC BLOOD PRESSURE: 73 MMHG | BODY MASS INDEX: 25.24 KG/M2 | HEART RATE: 115 BPM | WEIGHT: 181 LBS | SYSTOLIC BLOOD PRESSURE: 124 MMHG

## 2022-12-05 DIAGNOSIS — O24.013 PRE-EXISTING TYPE 1 DIABETES MELLITUS DURING PREGNANCY IN THIRD TRIMESTER: ICD-10-CM

## 2022-12-05 DIAGNOSIS — Z98.891 STATUS POST PRIMARY LOW TRANSVERSE CESAREAN SECTION: ICD-10-CM

## 2022-12-05 DIAGNOSIS — O14.93 PRE-ECLAMPSIA IN THIRD TRIMESTER: Primary | ICD-10-CM

## 2022-12-05 PROCEDURE — 99212 OFFICE O/P EST SF 10 MIN: CPT | Performed by: OBSTETRICS & GYNECOLOGY

## 2022-12-05 NOTE — PROGRESS NOTES
"  Assessment & Plan     Pre-eclampsia in third trimester  BPs improving, asymptomatic  Stop labetalol  Reviewed thresholds. If continued drops in BP next step is to go to labetalol 60 mg daily  RTC 2 weeks    Pre-existing type 1 diabetes mellitus during pregnancy in third trimester  Has endocrine follow-up scheduled    Status post primary low transverse  section  Incision healing well      Prescription drug management       MED REC REQUIRED  Post Medication Reconciliation Status:   BMI:   Estimated body mass index is 25.24 kg/m  as calculated from the following:    Height as of 22: 1.803 m (5' 11\").    Weight as of this encounter: 82.1 kg (181 lb).           No follow-ups on file.    Lubna Glynn MD  [facility]    Anna Álvarez is a 32 year old, presenting for the following health issues:  Hypertension      HPI   Presents for postpartum check  Delivered by scheduled CS on 22, discharge on 22  Pregnancy course complicated by pre-eclampsia, she was discharged on nifedipine 120 mg daily (60 am, 60 pm), labetalol 200 mg TID  BPs over last couple of days have dropped to 120s/70s  Blood sugars are more erratic, increased alarm on her pump. Has endo follow-up coming up.           Post Discharge Outreach 12/3/2022   Admission Date 2022   Reason for Admission - Primary low transverse  section with two layer closure via Pfannenstiel skin incision   Discharge Date 2022   Discharge Diagnosis - Primary low transverse  section with two layer closure via Pfannenstiel skin incision   How are you doing now that you are home? doing well   How are your symptoms? (Red Flag symptoms escalate to triage hotline per guidelines) Improved   Do you feel your condition is stable enough to be safe at home until your provider visit? Yes   Does the patient have their discharge instructions?  Yes   Does the patient have questions regarding their discharge " instructions?  No   Were you started on any new medications or were there changes to any of your previous medications?  Yes   Does the patient have all of their medications? Yes   Do you have questions regarding any of your medications?  No   Do you have all of your needed medical supplies or equipment (DME)?  (i.e. oxygen tank, CPAP, cane, etc.) Yes   Discharge follow-up appointment scheduled within 14 calendar days?  Yes   Discharge Follow Up Appointment Date 2022   Discharge Follow Up Appointment Scheduled with? Specialty Care Provider     Hospital Follow-up Visit:    Hospital/Nursing Home/IP Rehab Facility: [facility]  Date of Admission: 22  Date of Discharge: 22  Reason(s) for Admission: scheduled , pre-eclampsia, diabetes    Was your hospitalization related to COVID-19? No   Problems taking medications regularly:  None  Medication changes since discharge: labetalol 200 mg tid, nifedipine 60 mg bid  Problems adhering to non-medication therapy:  None    Summary of hospitalization:  [facility] discharge summary reviewed  Diagnostic Tests/Treatments reviewed.  Follow up needed: none  Other Healthcare Providers Involved in Patient s Care:         Specialist appointment - endocrine  Update since discharge: improved.         Plan of care communicated with patient and family           Past Medical History:   Diagnosis Date     Anxiety      Depressive disorder      Type 1 diabetes mellitus with hyperglycemia (H)      Varicella        Past Surgical History:   Procedure Laterality Date      SECTION N/A 2022    Procedure:  SECTION;  Surgeon: Lubna Glynn MD;  Location:  L+D     INGUINAL HERNIA REPAIR         Family History   Problem Relation Age of Onset     Thyroid Disease Mother      Anxiety Disorder Mother      Depression Mother         Mother     Skin Cancer Mother      Melanoma Mother      Lung  Cancer Father      Melanoma Maternal Grandmother      Skin Cancer Maternal Grandmother      Melanoma Paternal Grandmother      Skin Cancer Paternal Grandmother      Melanoma Maternal Uncle      Skin Cancer Maternal Uncle      Glaucoma No family hx of      Macular Degeneration No family hx of        Social History     Socioeconomic History     Marital status: Single     Spouse name: Not on file     Number of children: Not on file     Years of education: Not on file     Highest education level: Not on file   Occupational History     Not on file   Tobacco Use     Smoking status: Never     Smokeless tobacco: Never   Vaping Use     Vaping Use: Never used   Substance and Sexual Activity     Alcohol use: Not Currently     Comment:  5 drinks per week      Drug use: Never     Sexual activity: Yes     Partners: Male   Other Topics Concern     Parent/sibling w/ CABG, MI or angioplasty before 65F 55M? No   Social History Narrative     Not on file     Social Determinants of Health     Financial Resource Strain: Not on file   Food Insecurity: Not on file   Transportation Needs: Not on file   Physical Activity: Not on file   Stress: Not on file   Social Connections: Not on file   Intimate Partner Violence: Not on file   Housing Stability: Not on file       Current Outpatient Medications   Medication     acetaminophen (TYLENOL) 325 MG tablet     Continuous Blood Gluc Sensor (DEXCOM G6 SENSOR) MISC     Continuous Blood Gluc Transmit (DEXCOM G6 TRANSMITTER) MISC     ferrous sulfate (FE TABS) 325 (65 Fe) MG EC tablet     Glucagon, rDNA, (GLUCAGON EMERGENCY) 1 MG KIT     ibuprofen (ADVIL/MOTRIN) 600 MG tablet     Insulin Disposable Pump (OMNIPOD 5 G6 POD, GEN 5,) MISC     insulin lispro (HUMALOG VIAL) 100 UNIT/ML vial     labetalol (NORMODYNE) 200 MG tablet     NIFEdipine ER OSMOTIC (ADALAT CC) 60 MG 24 hr tablet     senna-docusate (SENOKOT-S/PERICOLACE) 8.6-50 MG tablet     sertraline (ZOLOFT) 100 MG tablet     No current  facility-administered medications for this visit.        No Known Allergies      Review of Systems   Constitutional, HEENT, cardiovascular, pulmonary, gi and gu systems are negative, except as otherwise noted.      Objective    /73   Pulse 115   Temp 97.4  F (36.3  C) (Oral)   Wt 82.1 kg (181 lb)   LMP 03/15/2022   SpO2 98%   Breastfeeding Yes   BMI 25.24 kg/m    Body mass index is 25.24 kg/m .  Physical Exam   GENERAL: healthy, alert and no distress  ABDOMEN: soft, nontender, no hepatosplenomegaly, no masses and bowel sounds normal  Skin: incision CDI. Steristrips removed, ecchymoses surrounding incision on abdomen and mons, non tender.   MS: no gross musculoskeletal defects noted, no edema  PSYCH: mentation appears normal, affect normal/bright

## 2022-12-06 ENCOUNTER — VIRTUAL VISIT (OUTPATIENT)
Dept: ENDOCRINOLOGY | Facility: CLINIC | Age: 32
End: 2022-12-06
Payer: COMMERCIAL

## 2022-12-06 DIAGNOSIS — E10.9 CONTROLLED DIABETES MELLITUS TYPE 1 WITHOUT COMPLICATIONS (H): Primary | ICD-10-CM

## 2022-12-06 PROCEDURE — 99213 OFFICE O/P EST LOW 20 MIN: CPT | Mod: GT | Performed by: INTERNAL MEDICINE

## 2022-12-06 NOTE — LETTER
12/6/2022         RE: Preeti Leiva  [address]  [facility] [address]        Dear Colleague,    Thank you for referring your patient, Preeti Leiva, to the [facility] [place]. Please see a copy of my visit note below.      Video-Visit Details    Type of service:  Video Visit  Video Start Time: 07:33  Video End Time: 7:50  Originating Location (pt. Location): [place], [place]  Distant Location (provider location):  Home  Platform used for Video Visit: Norm Thomas MD    Preeti Leiva is a 32 year old year old female here for evaluation of  diabetes type 1 now post partum via a billable video visit. She was last seen by me 11/2022      Delivered 11/28 at 36w5d-- baby girl Shirin Talley- she spent a few days in NICU for hypoglyemia, 9lb 10oz. Scheduled csection in setting of fetal macrosomia.   Overall healing well, recovering with some incisional pain/bruising but otherwise feeling back to normal  Noticing significant hypoglycemia, especially at time of letdown. Needs to snack on juice, cookie, etc every time breastfeeding.   Has been minimally bolusing as feels she goes low so frequently more focused on keeping BG elevated          1) Diabetes Mellitus in pregnancy    Diabetes History:  Diagnosis: Age 6  Hospitalizations: DKA, 2008 most recently, difficult time in college managing   Previous Regimens: almost always on pump, most recently Medtronic/Guardian, but didn't use automode-- kicked out frequently and didn't work through this  Current Regimen: OMNIPOD 5/DEXCOM          BG check- Dexcom  Trends-       BP Readings from Last 3 Encounters:   12/05/22 124/73   12/01/22 (!) 139/91   11/25/22 (!) 150/82       Lab Results   Component Value Date    A1C 6.1 10/04/2022    A1C 6.5 06/10/2022    A1C 8.6 02/10/2022    A1C 9.4 08/20/2020    A1C 8.4 01/02/2020    A1C 7.8 01/29/2019       Wt Readings from Last 3 Encounters:   12/05/22 82.1 kg (181 lb)   12/01/22 86.4 kg (190  lb 8 oz)   11/25/22 94.6 kg (208 lb 8 oz)       Current Outpatient Medications   Medication Sig Dispense Refill     acetaminophen (TYLENOL) 325 MG tablet Take 2 tablets (650 mg) by mouth every 6 hours as needed for mild pain Start after Delivery. 100 tablet 0     Continuous Blood Gluc Sensor (DEXCOM G6 SENSOR) MISC Change every 10 days. 9 each 3     Continuous Blood Gluc Transmit (DEXCOM G6 TRANSMITTER) MISC Change every 3 months. 1 each 3     ferrous sulfate (FE TABS) 325 (65 Fe) MG EC tablet Take 1 tablet (325 mg) by mouth every other day 100 tablet 0     Glucagon, rDNA, (GLUCAGON EMERGENCY) 1 MG KIT Inject 1 mg as directed daily as needed (hypoglycemia) 2 kit 11     ibuprofen (ADVIL/MOTRIN) 600 MG tablet Take 1 tablet (600 mg) by mouth every 6 hours as needed for moderate pain (4-6) Start after delivery 60 tablet 0     Insulin Disposable Pump (OMNIPOD 5 G6 POD, GEN 5,) MISC 1 each every other day 45 each 11     insulin lispro (HUMALOG VIAL) 100 UNIT/ML vial INJECT 70 UNITS UNDER THE SKIN EVERY DAY VIA INSULIN PUMP 80 mL 3     NIFEdipine ER OSMOTIC (ADALAT CC) 60 MG 24 hr tablet Take 2 tablets (120 mg) by mouth daily 100 tablet 0     senna-docusate (SENOKOT-S/PERICOLACE) 8.6-50 MG tablet Take 1 tablet by mouth daily Start after delivery. 100 tablet 0     sertraline (ZOLOFT) 100 MG tablet TAKE 2 TABLETS(200 MG) BY MOUTH DAILY 180 tablet 0          REVIEW OF SYSTEMS:   ROS: 10 point ROS neg other than the symptoms noted above in the HPI.      EXAM:  Physical Exam (visual exam)  VS:  no vital signs taken for video visit  CONSTITUTIONAL: healthy, alert and NAD, responding appropriately  ENT: normocephalic, no visual evidence of trauma, normal nose and oral mucosa  EYES: conjunctivae and sclerae normal, no exophthalmos or proptosis  THYROID:  no visualized nodules or goiter  LUNGS: no audible wheeze, cough or visible cyanosis, no visible retractions or increased work of breathing  EXTREMITIES: no hand  tremors  NEUROLOGY: cranial nerves grossly intact with no obvious deficit.  SKIN:  no visualized skin lesions or rash, no edema visualized  PSYCH: mentation appears normal, normal judgement        ASSESSMENT/PLAN:    1) Diabetes Mellitus Type 1- now 1 week post partum  - Glucose Control- NOT at goal, significant hypoglycemia   - Have noted that running with low basal-- approx 20u per day (although frequently suspended) Omnipod 5 is needing to relearn her patterns/body outside of pregnancy. She is having significant lows, corrects frequently with uncorrected snacks/juices. Exacerbated in setting of breastfeeding   - Reduce all basal rates by 30-40%    - 12a- 0.75-->0.45 (40% reduction)    - 9a- 1.05--> 0.60 (30% reduction)   - Reduce correction factor 45--> 50   - Continue carb ratio at 1:14   - Currently BG target set at 120, if continuing after 2 days of these settings to notice low bg, she will increase her target to 130.    - We will focus on eliminating lows now, which will hopefully lower her need for correction snacks. She will need increased intake during breast feeding and risk of lows will remain.    - Elected to NOT move forward with hard reset for algorithm, instead will continue pump learning her patterns.     Update screening/health maintenance at next visit in February    RTC-6-8 weeks, message DM education in 1-2 weeks      A total of 20 minutes were spent today 12/06/22 on this visit including chart review, history and counseling, documentation and other activities as detailed above.       Answers for HPI/ROS submitted by the patient on 12/6/2022  General Symptoms: No  Skin Symptoms: No  HENT Symptoms: No  EYE SYMPTOMS: No  HEART SYMPTOMS: No  LUNG SYMPTOMS: No  INTESTINAL SYMPTOMS: No  URINARY SYMPTOMS: No  GYNECOLOGIC SYMPTOMS: No  BREAST SYMPTOMS: No  SKELETAL SYMPTOMS: No  BLOOD SYMPTOMS: No  NERVOUS SYSTEM SYMPTOMS: No  MENTAL HEALTH SYMPTOMS: No          Again, thank you for allowing me to  participate in the care of your patient.        Sincerely,        Shanon Thomas MD

## 2022-12-06 NOTE — PROGRESS NOTES
Video-Visit Details    Type of service:  Video Visit  Video Start Time: 07:33  Video End Time: 7:50  Originating Location (pt. Location): [place], [place]  Distant Location (provider location):  Home  Platform used for Video Visit: Norm Thomas MD    Preeti Leiva is a 32 year old year old female here for evaluation of  diabetes type 1 now post partum via a billable video visit. She was last seen by me 11/2022      Delivered 11/28 at 36w5d-- baby girl Shirin Talley- she spent a few days in NICU for hypoglyemia, 9lb 10oz. Scheduled csection in setting of fetal macrosomia.   Overall healing well, recovering with some incisional pain/bruising but otherwise feeling back to normal  Noticing significant hypoglycemia, especially at time of letdown. Needs to snack on juice, cookie, etc every time breastfeeding.   Has been minimally bolusing as feels she goes low so frequently more focused on keeping BG elevated          1) Diabetes Mellitus in pregnancy    Diabetes History:  Diagnosis: Age 6  Hospitalizations: DKA, 2008 most recently, difficult time in college managing   Previous Regimens: almost always on pump, most recently Medtronic/Guardian, but didn't use automode-- kicked out frequently and didn't work through this  Current Regimen: OMNIPOD 5/DEXCOM          BG check- Dexcom  Trends-       BP Readings from Last 3 Encounters:   12/05/22 124/73   12/01/22 (!) 139/91   11/25/22 (!) 150/82       Lab Results   Component Value Date    A1C 6.1 10/04/2022    A1C 6.5 06/10/2022    A1C 8.6 02/10/2022    A1C 9.4 08/20/2020    A1C 8.4 01/02/2020    A1C 7.8 01/29/2019       Wt Readings from Last 3 Encounters:   12/05/22 82.1 kg (181 lb)   12/01/22 86.4 kg (190 lb 8 oz)   11/25/22 94.6 kg (208 lb 8 oz)       Current Outpatient Medications   Medication Sig Dispense Refill     acetaminophen (TYLENOL) 325 MG tablet Take 2 tablets (650 mg) by mouth every 6 hours as needed for mild pain Start after Delivery. 100 tablet 0      Continuous Blood Gluc Sensor (DEXCOM G6 SENSOR) MISC Change every 10 days. 9 each 3     Continuous Blood Gluc Transmit (DEXCOM G6 TRANSMITTER) MISC Change every 3 months. 1 each 3     ferrous sulfate (FE TABS) 325 (65 Fe) MG EC tablet Take 1 tablet (325 mg) by mouth every other day 100 tablet 0     Glucagon, rDNA, (GLUCAGON EMERGENCY) 1 MG KIT Inject 1 mg as directed daily as needed (hypoglycemia) 2 kit 11     ibuprofen (ADVIL/MOTRIN) 600 MG tablet Take 1 tablet (600 mg) by mouth every 6 hours as needed for moderate pain (4-6) Start after delivery 60 tablet 0     Insulin Disposable Pump (OMNIPOD 5 G6 POD, GEN 5,) MISC 1 each every other day 45 each 11     insulin lispro (HUMALOG VIAL) 100 UNIT/ML vial INJECT 70 UNITS UNDER THE SKIN EVERY DAY VIA INSULIN PUMP 80 mL 3     NIFEdipine ER OSMOTIC (ADALAT CC) 60 MG 24 hr tablet Take 2 tablets (120 mg) by mouth daily 100 tablet 0     senna-docusate (SENOKOT-S/PERICOLACE) 8.6-50 MG tablet Take 1 tablet by mouth daily Start after delivery. 100 tablet 0     sertraline (ZOLOFT) 100 MG tablet TAKE 2 TABLETS(200 MG) BY MOUTH DAILY 180 tablet 0          REVIEW OF SYSTEMS:   ROS: 10 point ROS neg other than the symptoms noted above in the HPI.      EXAM:  Physical Exam (visual exam)  VS:  no vital signs taken for video visit  CONSTITUTIONAL: healthy, alert and NAD, responding appropriately  ENT: normocephalic, no visual evidence of trauma, normal nose and oral mucosa  EYES: conjunctivae and sclerae normal, no exophthalmos or proptosis  THYROID:  no visualized nodules or goiter  LUNGS: no audible wheeze, cough or visible cyanosis, no visible retractions or increased work of breathing  EXTREMITIES: no hand tremors  NEUROLOGY: cranial nerves grossly intact with no obvious deficit.  SKIN:  no visualized skin lesions or rash, no edema visualized  PSYCH: mentation appears normal, normal judgement        ASSESSMENT/PLAN:    1) Diabetes Mellitus Type 1- now 1 week post partum  - Glucose  Control- NOT at goal, significant hypoglycemia   - Have noted that running with low basal-- approx 20u per day (although frequently suspended) Omnipod 5 is needing to relearn her patterns/body outside of pregnancy. She is having significant lows, corrects frequently with uncorrected snacks/juices. Exacerbated in setting of breastfeeding   - Reduce all basal rates by 30-40%    - 12a- 0.75-->0.45 (40% reduction)    - 9a- 1.05--> 0.60 (30% reduction)   - Reduce correction factor 45--> 50   - Continue carb ratio at 1:14   - Currently BG target set at 120, if continuing after 2 days of these settings to notice low bg, she will increase her target to 130.    - We will focus on eliminating lows now, which will hopefully lower her need for correction snacks. She will need increased intake during breast feeding and risk of lows will remain.    - Elected to NOT move forward with hard reset for algorithm, instead will continue pump learning her patterns.     Update screening/health maintenance at next visit in February    RTC-6-8 weeks, message DM education in 1-2 weeks      A total of 20 minutes were spent today 12/06/22 on this visit including chart review, history and counseling, documentation and other activities as detailed above.       Answers for HPI/ROS submitted by the patient on 12/6/2022  General Symptoms: No  Skin Symptoms: No  HENT Symptoms: No  EYE SYMPTOMS: No  HEART SYMPTOMS: No  LUNG SYMPTOMS: No  INTESTINAL SYMPTOMS: No  URINARY SYMPTOMS: No  GYNECOLOGIC SYMPTOMS: No  BREAST SYMPTOMS: No  SKELETAL SYMPTOMS: No  BLOOD SYMPTOMS: No  NERVOUS SYSTEM SYMPTOMS: No  MENTAL HEALTH SYMPTOMS: No

## 2022-12-20 ENCOUNTER — OFFICE VISIT (OUTPATIENT)
Dept: OBGYN | Facility: CLINIC | Age: 32
End: 2022-12-20
Payer: COMMERCIAL

## 2022-12-20 VITALS
DIASTOLIC BLOOD PRESSURE: 70 MMHG | HEART RATE: 100 BPM | WEIGHT: 161 LBS | BODY MASS INDEX: 22.45 KG/M2 | OXYGEN SATURATION: 100 % | SYSTOLIC BLOOD PRESSURE: 108 MMHG

## 2022-12-20 DIAGNOSIS — O14.93 PRE-ECLAMPSIA IN THIRD TRIMESTER: Primary | ICD-10-CM

## 2022-12-20 PROCEDURE — 99212 OFFICE O/P EST SF 10 MIN: CPT | Mod: 24 | Performed by: OBSTETRICS & GYNECOLOGY

## 2022-12-21 NOTE — PROGRESS NOTES
"  Assessment & Plan     Pre-eclampsia in third trimester  Doing well  BP normal off medications.   RTC for PP visit.              MED REC REQUIRED  Post Medication Reconciliation Status: discharge medications reconciled and changed, per note/orders  BMI:   Estimated body mass index is 22.45 kg/m  as calculated from the following:    Height as of 22: 1.803 m (5' 11\").    Weight as of this encounter: 73 kg (161 lb).           No follow-ups on file.    Lubna Glynn MD  [facility] is a 32 year [facility], presenting for the following health issues:  Hypertension      HPI     Past Medical History:   Diagnosis Date     Anxiety      Depressive disorder      Type 1 diabetes mellitus with hyperglycemia (H)      Varicella        Past Surgical History:   Procedure Laterality Date      SECTION N/A 2022    Procedure:  SECTION;  Surgeon: Lubna Glynn MD;  Location: [facility] L[facility]     INGUINAL HERNIA REPAIR         Family History   Problem Relation Age of Onset     Thyroid Disease Mother      Anxiety Disorder Mother      Depression Mother         Mother     Skin Cancer Mother      Melanoma Mother      Lung Cancer Father      Melanoma Maternal Grandmother      Skin Cancer Maternal Grandmother      Melanoma Paternal Grandmother      Skin Cancer Paternal Grandmother      Melanoma Maternal Uncle      Skin Cancer Maternal Uncle      Glaucoma No family hx of      Macular Degeneration No family hx of        Social History     Socioeconomic History     Marital status: Single     Spouse name: Not on file     Number of children: Not on file     Years of education: Not on file     Highest education level: Not on file   Occupational History     Not on file   Tobacco Use     Smoking status: Never     Smokeless tobacco: Never   Vaping Use     Vaping Use: Never used   Substance and Sexual Activity     Alcohol use: Not Currently     Comment:  5 drinks per week      " Drug use: Never     Sexual activity: Yes     Partners: Male   Other Topics Concern     Parent/sibling w/ CABG, MI or angioplasty before 65F 55M? No   Social History Narrative     Not on file     Social Determinants of Health     Financial Resource Strain: Not on file   Food Insecurity: Not on file   Transportation Needs: Not on file   Physical Activity: Not on file   Stress: Not on file   Social Connections: Not on file   Intimate Partner Violence: Not on file   Housing Stability: Not on file       Current Outpatient Medications   Medication     acetaminophen (TYLENOL) 325 MG tablet     Continuous Blood Gluc Sensor (DEXCOM G6 SENSOR) MISC     Continuous Blood Gluc Transmit (DEXCOM G6 TRANSMITTER) MISC     Glucagon, rDNA, (GLUCAGON EMERGENCY) 1 MG KIT     Insulin Disposable Pump (OMNIPOD 5 G6 POD, GEN 5,) MISC     insulin lispro (HUMALOG VIAL) 100 UNIT/ML vial     sertraline (ZOLOFT) 100 MG tablet     No current facility-administered medications for this visit.        No Known Allergies    Review of Systems   Constitutional, HEENT, cardiovascular, pulmonary, gi and gu systems are negative, except as otherwise noted.      Objective    /70   Pulse 100   Wt 73 kg (161 lb)   SpO2 100%   BMI 22.45 kg/m    Body mass index is 22.45 kg/m .  Physical Exam   GENERAL: healthy, alert and no distress  ABDOMEN: soft, nontender, no hepatosplenomegaly, no masses and bowel sounds normal  MS: no gross musculoskeletal defects noted, no edema  SKIN: no suspicious lesions or rashes  PSYCH: mentation appears normal, affect normal/bright

## 2023-01-08 NOTE — PROGRESS NOTES
CC: postpartum visit    SUBJECTIVE:  Preeti Leiva is a 32 year old female  here for a postpartum visit.  She had a  Section  on 22 delivering a healthy baby girl weighing 9 lbs 10 oz at 36w6d.    T1DM with EFW >4500g and pre-e without SF.    PHQ-9 SCORE 6/15/2022 2022 2023   PHQ-9 Total Score MyChart 11 (Moderate depression) - -   PHQ-9 Total Score 11 9 11   Some encounter information is confidential and restricted. Go to Review Flowsheets activity to see all data.     ROSANNA-7 SCORE 3/26/2022 5/10/2022 5/10/2022   Total Score 11 (moderate anxiety) - 10 (moderate anxiety)   Total Score 11 10 10   Some encounter information is confidential and restricted. Go to Review Flowsheets activity to see all data.     Feeling a bit overwhelmed and having concerns about pp depression.  Denies SI/HI.  Does have hx of depression and is on Zoloft 200mg daily.  Has been on this dose for quite some time.  Feeling exhausted, like baby is crying all the time and she's not as successful in soothing baby as her  is.  He does provide all cares for baby from dinner time until about midnight, so she tries to rest during this time.    Doing mostly pumping.  Feels like this is working better for her since she's able to know much baby is getting.  Has some concerns about over-supply.  Also worries that baby is getting too much because she was big.  Baby also seemingly struggles with gas pains.  Has   [facility], weekly Zoom classes for lactation and has gotten some tips about decreasing supply.  Open to seeing Divya Perez and has appointment with peds later this week.    delivery complications:  No  breast feeding:  Mostly pumping.    bladder problems:  No  bowel problems/hemorrhoids:  No  episiotomy/laceration/incision healed? Yes, no concerns  vaginal flow:  Once in a while, gets a little bit of old jose m colored blood.  Amanda Park:  Yes, once with some discomfort  contraception:  Condoms.  Has had IUD  before, but doesn't want that now.  emotional adjustment:  tired and having some difficulties adjusting      Current Outpatient Medications   Medication     acetaminophen (TYLENOL) 325 MG tablet     Continuous Blood Gluc Sensor (DEXCOM G6 SENSOR) MISC     Continuous Blood Gluc Transmit (DEXCOM G6 TRANSMITTER) MISC     Glucagon, rDNA, (GLUCAGON EMERGENCY) 1 MG KIT     Insulin Disposable Pump (OMNIPOD 5 G6 POD, GEN 5,) MISC     insulin lispro (HUMALOG VIAL) 100 UNIT/ML vial     sertraline (ZOLOFT) 100 MG tablet     No current facility-administered medications for this visit.       OBJECTIVE:  Blood pressure 101/72, pulse 102, weight 71.2 kg (157 lb), last menstrual period 03/15/2022, SpO2 98 %, currently breastfeeding.   General - pleasant female in no acute distress.  Abdomen - soft, nontender, nondistended, no hepatosplenomegaly.  Incision well healed  Pelvic - deferred  Rectovaginal - deferred.    ASSESSMENT:  32 year old  with normal postpartum exam    PLAN:  May resume normal activities without restrictions  Pap smear was not done today.  Due 2024    The patient will use condoms for birth control.  Discussed could call for IUD insert appointment anytime, if she desires.  Reminded her that she shouldn't have unprotected sex for at least 2 weeks prior to insertion.  Also discussed that she can potentially conceive even if not menstruating regularly.    Post-partum depression:  Denies any safety concerns at this time.  Declines discussing medication adjustments/additions and therapy, even if virtual.  Will make appointment with lactation and discuss some concerns with pediatrician and feels comfortable reaching out if she has any concerns after that.  Aware I'll be on maternity leave and ok with me updating delivering MD so she's aware; this was done same day as appointment.  Full counseling was provided, and all questions answered. Compliance is strongly emphasized.    Return to clinic in one year for an  annual, when due for a pap smear or PRN.    dEel Manning MD

## 2023-01-09 ENCOUNTER — PRENATAL OFFICE VISIT (OUTPATIENT)
Dept: OBGYN | Facility: CLINIC | Age: 33
End: 2023-01-09
Payer: COMMERCIAL

## 2023-01-09 VITALS
SYSTOLIC BLOOD PRESSURE: 101 MMHG | OXYGEN SATURATION: 98 % | DIASTOLIC BLOOD PRESSURE: 72 MMHG | HEART RATE: 102 BPM | BODY MASS INDEX: 21.9 KG/M2 | WEIGHT: 157 LBS

## 2023-01-09 PROCEDURE — 99207 PR POST PARTUM EXAM: CPT | Performed by: OBSTETRICS & GYNECOLOGY

## 2023-01-09 ASSESSMENT — PATIENT HEALTH QUESTIONNAIRE - PHQ9: SUM OF ALL RESPONSES TO PHQ QUESTIONS 1-9: 11

## 2023-01-15 DIAGNOSIS — N61.0 MASTITIS: Primary | ICD-10-CM

## 2023-01-15 RX ORDER — DICLOXACILLIN SODIUM 500 MG
500 CAPSULE ORAL 4 TIMES DAILY
Qty: 28 CAPSULE | Refills: 0 | Status: SHIPPED | OUTPATIENT
Start: 2023-01-15 | End: 2023-01-22

## 2023-01-15 NOTE — PROGRESS NOTES
TC to patient to discuss concerns. Patient reports that she has right breast pain and tenderness and feels fevers/chills but has not checked a temp. Thought she had a clog yesterday but then it was getting progressively worse. Has not had mastitis but has had clogged ducts. Rx for Dicloxacillin 500 mg QID for 7 days. She has a visit scheduled with lactation on Wednesday which she was encouraged to keep and she was asked to notify us if she is not improving within 48 hours.     Silvia Cronin MD

## 2023-01-17 PROBLEM — O40.3XX0 POLYHYDRAMNIOS IN THIRD TRIMESTER: Status: RESOLVED | Noted: 2022-10-12 | Resolved: 2023-01-17

## 2023-01-17 NOTE — PROGRESS NOTES
Assessment:   1.  7 1/2 week old infant gaining weight well on breastmilk feeding (combination of breast and bottle)--average of 1.4 oz/day over last six weeks  2.  Baby with some difficulty maintaining due to overabundant milk supply and strong letdown reflex  3.  Milk transfer adequate to baby's needs during observed feeding in office today  4.  Mother with overabundant milk supply and overactive letdown reflex  5.  Mother with postpartum depression    Plan:   1.  Use good positioning for deep latch, with baby held close to body and baby's head/shoulders/hips in good alignment.  When in a seated position, use a pillow to help bring baby close to breasts, and stepstool to elevate your knees above hips.   2.  For the fast milk letdown that makes it difficult for Shirin to stay latched to the breast, try using the laid-back nursing position.  You can also use one hand to gently block some milk ducts during letdown and help baby more easily cope with flow.  You can also try taking baby off of breast when the strong milk letdown starts, and allow milk to flow out into burp cloth, re-latching baby after flow has slowed.  3. To reduce your oversupply, decrease your pumping.  When you pump,  instead of pumping until 5-6 oz come, stop after 4-5 oz.  Then drop back by another ounce, and then another, until you are pumping just around 2-3 oz each breast.  Once you are at that point, choose the pumping session that you most want to eliminate, and instead of pumping until 2-3 oz come at that session, stop after 2.  Then drop back by another ounce, and then another, until you can comfortably drop that pumping entirely.  You can drop back on each session this way, until you are just pumping once or twice.   4.  Ideally you would be producing just the amount that Shirin needs in a day, which is 25-30 oz/day.  This where her needs will stay for their entire first year,  so you don't need to keep producing more and more milk  "as they grow.  5.  If you get a \"plugged duct,\" or firm, painful area in your breast, use ice, ibuprofen and lecithin to reduce inflammation.  Avoid increasing your pumping or feeding, because this will increase your supply and worsen the problem.   6.  If it is helpful to keep Shirin upright after feeding, then do that, but if it does not reduce her spitting up, then you don't need to bother.   7.  Know that babies have different sleep patterns that adults--right when they go to sleep, they go into a light sleep pattern rather than a deep sleep.  This can mean that if you nurse them to sleep and then lay them down, they may wake and be distressed, and seem like they are still hungry.  Try holding your baby for a little longer after feeding, giving her a chance to move into deeper sleep, before putting her down, and she may be content for a little longer.   8.  Consider restarting sessions with your therapist, or also possibly changing to a different provider who can accommodate your schedule a bit better.  You may also be able to find a provider who can manage your medications as well, so that you find an effective dose/combination for you.  9.  See pediatric provider as planned, and lactation as needed.  [employer] can be used for brief questions, but it's important to know that messages are not seen Friday through Sunday. If urgent help is needed, Monday through Friday you can call [phone] and one of our lactation consultants will get the message and respond; if you need a rapid response over a weekend or holiday, it is best to call your on-call maternity or pediatric provider.  Please feel free to schedule a return visit if the concern is more detailed;  telephone visits are also an option if you don't feel you need to be seen in person.      Subjective: Ewn is here today with concerns of oversupply and fast letdown--baby coughs/sputters with fast milk flow, has frequent reflux, and Wen is often very full " and uncomfortable, needing to take breaks to pump even shortly after baby has nursed. Because of this Wen has been mostly pumping and bottlefeeding--giving breast a few times/day.  She would ultimately like to do primarily breastfeeding, with inclusion of some bottles so that she is prepared to go back to work.  Shirin is also very gassy and fussy, and awakens frequently at night, sometimes every 1-2 hours, which is exhausting.  Additionally, Preeti had mastitis last week.  She is feeling better and has been taking sunflower lecithin to prevent further inflammation.  She is completing her course of Dicloxacillin now.     Preeti is vaccinated for Covid-19 and has received boosters, including the bivalent    Hospital Course: Planned  for Type I diabetes with expected macrosomia and pre-eclampsia without severe features.  Infant to NICU x 2 days for persistent hypoglycemia, requiring IV glucose.  Seen by NICU IBCLC for assistance with pumping and initiating breastfeeding--using 20mm nipple shield    Mother's Relevant Med/Surg History: Depression/anxiety managed with Zoloft, Type I diabetes using insulin pump and managed by [facility] (Dr. Darlene Rivero);  Non-severe pre-eclampsia on nifedipine; Covid in pregnancy    Breast Surgery: none    Breastfeeding Goals: to be able to give both breast and bottle;  Reducing milk supply    Previous Breastfeeding Experience: first baby    Infant's name: Shirin  Infant's bday: 22  Gestational age: 36w6d  Infant's birth weight: 9 # 10 oz  Discharge weight: 9 # 3.1 oz  Recent weights:  22: 9 # 7.5 oz  23:  12 # 15.5 oz    Mode of delivery:   Pediatric Provider: Dr. Williamson, JULIO CESAR Chappell      Frequency and duration of feedings: every 2-3 hours, offering breast 4-5 times/day  Swallows audible per mother: yes  Numbers of feedings in 24 hours: 10   Number urines per day: 6  Number of stools per day and their color: 4, yellow  orange    Supplementation: with about 4 oz during the daytime feedings and about 3 - 3 1/2 oz at night.    Pumping: About six times/day using Spectra or Mom Cozy pump, yielding about 10 -12 oz.  pumping as often as breasts feel full    Objective/Physical exam:   Mother: Noticed breasts grew larger and areolas darkened during pregnancy and she noticed primary engorgement when her milk came in.   Her nipples are everted, the areola is compressible, the breast is full, with copious leaking of milk.    Sore nipples: no  EPDS: 14.  Preeti shares that she is feeling very anxious and is unsure if her current dosage of Zoloft is giving good relief.  She has a therapist whom she saw during pregnancy, but stopped going due to very frequent OB appointments--feeling overwhelmed by so many appointments.  She denies any thoughts of self-harm or harming infant.  Would be interested in a therapist who could also manage her medications.    Assessment of infant: 96.12% Weight for age percentile   Age today: 7 1/2 weeks  Today's weight: 13 # 6.2  (goal 13# 4.5 oz)  Amount of milk transferred from LEFT side: 1.6 oz  Amount of milk transferred from RIGHT side: 2.2 oz    Baby has full flexion of arms and legs, normal tone, behavior is alert and active, respirations are normal, skin is normal, hydration is normal, jaw is normal size and alignment, palate is slightly high-arched but frenulum is normal, baby can lateralize tongue, has adequate tongue lift, and tongue can protrude past bottom gum line. Upper labial frenulum is normal.    Suck exam:  Baby has strong, coordinated suck with good tongue cupping    Baby thrush: none   Jaundice: none     Feeding assessment: Baby can hold suction with tongue while at the breast, but does frequently come off the breast with copious amounts of milk leaking from breast and around mouth.      Alignment: The baby was flex relaxed. Baby's head was aligned with its trunk. Baby did face mother. Baby was  in cross-cradle, laid-back and sidelying positions today.   Areolar Grasp: Baby was able to open mouth widely. Baby's lips were not pursed. Baby's lips did flange outward. Tongue was visible over bottom gum. Baby had intermittent seal.     Areolar Compression: Baby made rhythmic motion. There were occasional clicking or smacking sounds early in feeding. There was no severe nipple discomfort. Nipples appeared rounded after feeding.    Audible swallowing: Baby made quiet sounds of swallowing: There was an increase in frequency after milk ejection reflex. The milk ejection reflex is normal and milk supply is overabundant.     /85   Pulse 119   Wt 73 kg (161 lb)   SpO2 95%   Breastfeeding Yes   BMI 22.45 kg/m    OB History    Para Term  AB Living   1 1 0 1 0 1   SAB IAB Ectopic Multiple Live Births   0 0 0 0 1      # Outcome Date GA Lbr Ar/2nd Weight Sex Delivery Anes PTL Lv   1  22 36w6d  4.366 kg (9 lb 10 oz) F CS-LTranv Spinal  MELIA      Name: CONNOR MONSON-VENKATA      Apgar1: 8  Apgar5: 9       Current Outpatient Medications:      acetaminophen (TYLENOL) 325 MG tablet, Take 2 tablets (650 mg) by mouth every 6 hours as needed for mild pain Start after Delivery., Disp: 100 tablet, Rfl: 0     Continuous Blood Gluc Sensor (DEXCOM G6 SENSOR) MISC, Change every 10 days., Disp: 9 each, Rfl: 3     Continuous Blood Gluc Transmit (DEXCOM G6 TRANSMITTER) MISC, Change every 3 months., Disp: 1 each, Rfl: 3     dicloxacillin (DYNAPEN) 500 MG capsule, Take 1 capsule (500 mg) by mouth 4 times daily for 7 days, Disp: 28 capsule, Rfl: 0     Glucagon, rDNA, (GLUCAGON EMERGENCY) 1 MG KIT, Inject 1 mg as directed daily as needed (hypoglycemia), Disp: 2 kit, Rfl: 11     Insulin Disposable Pump (OMNIPOD 5 G6 POD, GEN 5,) MISC, 1 each every other day, Disp: 45 each, Rfl: 11     insulin lispro (HUMALOG VIAL) 100 UNIT/ML vial, INJECT 70 UNITS UNDER THE SKIN EVERY DAY VIA INSULIN PUMP, Disp: 80 mL, Rfl:  3     Prenatal Vit-Fe Fumarate-FA (PRENATAL MULTIVITAMIN W/IRON) 27-0.8 MG tablet, Take 1 tablet by mouth daily, Disp: , Rfl:      sertraline (ZOLOFT) 100 MG tablet, TAKE 2 TABLETS(200 MG) BY MOUTH DAILY, Disp: 180 tablet, Rfl: 0  Past Medical History:   Diagnosis Date     Anxiety      Depressive disorder      Polyhydramnios in third trimester 10/12/2022    30 weeks     Type 1 diabetes mellitus with hyperglycemia (H)      Varicella      Past Surgical History:   Procedure Laterality Date      SECTION N/A 2022    Procedure:  SECTION;  Surgeon: Lubna Glynn MD;  Location: [facility]     INGUINAL HERNIA REPAIR       Family History   Problem Relation Age of Onset     Thyroid Disease Mother      Anxiety Disorder Mother      Depression Mother         Mother     Skin Cancer Mother      Melanoma Mother      Lung Cancer Father      Melanoma Maternal Grandmother      Skin Cancer Maternal Grandmother      Melanoma Paternal Grandmother      Skin Cancer Paternal Grandmother      Melanoma Maternal Uncle      Skin Cancer Maternal Uncle      Glaucoma No family hx of      Macular Degeneration No family hx of        Time spent:  Chart review/prechartin min prior to day of service  Face-to-face visit:   71 min   Documentation:  20 min   Total time spent on day of service: 91 min    ETHAN Renae, CNM, IBCLC

## 2023-01-18 ENCOUNTER — OFFICE VISIT (OUTPATIENT)
Dept: OBGYN | Facility: CLINIC | Age: 33
End: 2023-01-18
Payer: COMMERCIAL

## 2023-01-18 VITALS
OXYGEN SATURATION: 95 % | DIASTOLIC BLOOD PRESSURE: 85 MMHG | HEART RATE: 119 BPM | SYSTOLIC BLOOD PRESSURE: 136 MMHG | BODY MASS INDEX: 22.45 KG/M2 | WEIGHT: 161 LBS

## 2023-01-18 DIAGNOSIS — N64.3 HYPERLACTATION: Primary | ICD-10-CM

## 2023-01-18 DIAGNOSIS — F33.9 EPISODE OF RECURRENT MAJOR DEPRESSIVE DISORDER, UNSPECIFIED DEPRESSION EPISODE SEVERITY (H): ICD-10-CM

## 2023-01-18 DIAGNOSIS — E10.65 TYPE 1 DIABETES MELLITUS WITH HYPERGLYCEMIA (H): ICD-10-CM

## 2023-01-18 PROCEDURE — 99417 PROLNG OP E/M EACH 15 MIN: CPT | Performed by: ADVANCED PRACTICE MIDWIFE

## 2023-01-18 PROCEDURE — 99215 OFFICE O/P EST HI 40 MIN: CPT | Performed by: ADVANCED PRACTICE MIDWIFE

## 2023-01-18 RX ORDER — PRENATAL VIT/IRON FUM/FOLIC AC 27MG-0.8MG
1 TABLET ORAL DAILY
COMMUNITY
End: 2023-07-17

## 2023-01-18 ASSESSMENT — EDINBURGH POSTNATAL DEPRESSION SCALE (EPDS)
I HAVE BLAMED MYSELF UNNECESSARILY WHEN THINGS WENT WRONG: YES, SOME OF THE TIME
I HAVE BEEN SO UNHAPPY THAT I HAVE BEEN CRYING: ONLY OCCASIONALLY
I HAVE FELT SAD OR MISERABLE: YES, QUITE OFTEN
I HAVE BEEN ABLE TO LAUGH AND SEE THE FUNNY SIDE OF THINGS: NOT QUITE SO MUCH NOW
I HAVE FELT SCARED OR PANICKY FOR NO GOOD REASON: YES, SOMETIMES
THE THOUGHT OF HARMING MYSELF HAS OCCURRED TO ME: NEVER
I HAVE BEEN SO UNHAPPY THAT I HAVE HAD DIFFICULTY SLEEPING: NOT VERY OFTEN
I HAVE LOOKED FORWARD WITH ENJOYMENT TO THINGS: RATHER LESS THAN I USED TO
THINGS HAVE BEEN GETTING ON TOP OF ME: YES, SOMETIMES I HAVEN'T BEEN COPING AS WELL AS USUAL
TOTAL SCORE: 14
I HAVE BEEN ANXIOUS OR WORRIED FOR NO GOOD REASON: YES, SOMETIMES

## 2023-01-18 NOTE — PATIENT INSTRUCTIONS
"  1.  Use good positioning for deep latch, with baby held close to body and baby's head/shoulders/hips in good alignment.  When in a seated position, use a pillow to help bring baby close to breasts, and stepstool to elevate your knees above hips.   2.  For the fast milk letdown that makes it difficult for Shirin to stay latched to the breast, try using the laid-back nursing position.  You can also use one hand to gently block some milk ducts during letdown and help baby more easily cope with flow.  You can also try taking baby off of breast when the strong milk letdown starts, and allow milk to flow out into burp cloth, re-latching baby after flow has slowed.  3. To reduce your oversupply, decrease your pumping.  When you pump,  instead of pumping until 5-6 oz come, stop after 4-5 oz.  Then drop back by another ounce, and then another, until you are pumping just around 2-3 oz each breast.  Once you are at that point, choose the pumping session that you most want to eliminate, and instead of pumping until 2-3 oz come at that session, stop after 2.  Then drop back by another ounce, and then another, until you can comfortably drop that pumping entirely.  You can drop back on each session this way, until you are just pumping once or twice.   4.  Ideally you would be producing just the amount that Shirin needs in a day, which is 25-30 oz/day.  This where her needs will stay for their entire first year,  so you don't need to keep producing more and more milk as they grow.  5.  If you get a \"plugged duct,\" or firm, painful area in your breast, use ice, ibuprofen and lecithin to reduce inflammation.  Avoid increasing your pumping or feeding, because this will increase your supply and worsen the problem.   6.  If it is helpful to keep Shirin upright after feeding, then do that, but if it does not reduce her spitting up, then you don't need to bother.   7.  Know that babies have different sleep patterns that " adults--right when they go to sleep, they go into a light sleep pattern rather than a deep sleep.  This can mean that if you nurse them to sleep and then lay them down, they may wake and be distressed, and seem like they are still hungry.  Try holding your baby for a little longer after feeding, giving her a chance to move into deeper sleep, before putting her down, and she may be content for a little longer.   8.  Consider restarting sessions with your therapist, or also possibly changing to a different provider who can accommodate your schedule a bit better.  You may also be able to find a provider who can manage your medications as well, so that you find an effective dose/combination for you.  9.  See pediatric provider as planned, and lactation as needed.  [employer] can be used for brief questions, but it's important to know that messages are not seen Friday through Sunday. If urgent help is needed, Monday through Friday you can call [phone] and one of our lactation consultants will get the message and respond; if you need a rapid response over a weekend or holiday, it is best to call your on-call maternity or pediatric provider.  Please feel free to schedule a return visit if the concern is more detailed;  telephone visits are also an option if you don't feel you need to be seen in person.    __________________      Good breastfeeding resources:    Websites:  www.[employer]  www.[employer]/blog/  www.llli.org/breastfeeding-info/    Books:   The Womanly Art of Breastfeeding by [employer]  Breastfeeding Doesn't Have to Suck, by Cha Johnson      For help with using baby carriers:  https://babywearingtwincities.org/     ___________________    Supplying milk when you return to work    It can sometimes be very stressful to pump the amount of milk needed to supply your baby at  while you are at work.  It's also a vicious [place], because the more you worry about it (sit at the  pump and worry that not enough is being released) the less likely you are to have a good letdown reflex and release lots of milk!  A few things that have worked for other mothers:      1.  Many  babies do not need as much milk is being offered to them, so check with your childcare provider to see how much milk is being warmed up vs. how much milk your baby is actually drinking.  Sometimes on further questioning of the childcare providers, we discover that 5 oz were warmed up, the baby took 3 oz, and the extra 2 were tossed, which is a frustrating waste of your hard-won breastmilk!   If that is the issue, this can be solved by putting smaller amounts of milk in the bottles. If you want to leave, say, 15 oz of milk for the day, you could leave 5 bottles of 3 oz each rather than 3 bottles of 5 oz each. This can help because then there isn't so much in each bottle to warm up and waste--any bottles that are not used can then just be saved until the next day and you have less pressure to produce more.      This technique can also work even if you discover that your baby has been taking the entire bottle--It's important to remember that when taking a bottle, a baby often doesn't have a lot of choice about whether or not to finish a bottle.  If they are awake they have to keep swallowing while the nipple is in their mouth, unlike the breast, where they can control the flow. By using smaller amounts in the bottle, providers may find that your baby is actually satisfied after, say,  3 oz rather than just continuing until the 5 oz is gone.  If baby takes the 3 oz and is still obviously hungry, then another bottle can be warmed, but if she is content, then the bottle is saved.   So those are a few thoughts on how to make the best use of the milk that you have.     2.  Another approach is to try to increase the amount of milk to send to childcare.  One way to do that is to pump a little bit while you are at home.  If  your baby does not always take both breasts, you could pump the breast that she is not nursing from while she is feeding from the other.  This is a great way to get a good letdown and get more milk, because we often have a better letdown to our baby than we do to the pump (because pumping is not as inspiring and we often do not let down as well to our pump as we do to our baby).   Some women add a pumping first thing in the morning before the baby has fed, because our breasts are often most full in the mornings.   Other women add a few pumpings over the weekends, just to have that extra.   It may not be feasible in your workday, but some women can  squeeze in another pumping session at work. Another idea is to pump one breast at a time rather double pumping, and use the free hand to massage the breast being pumped--research has shown that that can significantly increase   the output of milk.  You can also do a little hand expression after the electric pumping and that has been shown can get some additional milk as well.       3.  A third angle is to minimize the number of bottles your baby needs while you are gone.  Try nursing immediately before going to work, either at home right before going out the door, or even at the childcare if possible, and asking your childcare provider not to feed her for the last hour before your arrive back, so that you can nurse again immediately upon your return.  Sometimes this can save on an entire bottle during the day.  For older babies, you can also have the childcare provider offer solid foods instead of a bottle feeding of milk.  This can decrease this amount of milk that is needed during the day, either by substituting the feeding of solids for a milk feeding, or by decreasing the amount of milk that is needed at that feeding.     ________________    Postpartum Emotional Support/Mental Health Resources:    Ashley Ybarra:  lactation consultant, midwife, psychiatric nurse  practitioner:  [email or web address] [email or web address].com/our-providers/denise/    Postpartum Support [place]:  Https://www.ppsupport[place].org/get_help   Helpline:  316.887.6899;  Email:  Maryam@[employer].com    [facility] Mother-Baby Program   [place]:  201-456-YOXF:  Leave message, will call back within 2 days   Availability of multiple types of therapy programs    [facility]:  Postpartum mental health services offered free of charge to Medicaid clients.  Offering video visits, and some in-office or in-home visits.   Phone:  820.267.8327   Email:  info@[employer]   Website:  [employer]      Emergency Resource Phone Numbers for [place]:    Crisis Connection:  655.694.3242   National Suicide Prevention Line:  3-775-944-AIGA   [facility] Crisis Program:  553.188.4677   [facility] Crisis Line:  329.156.6859   [facility] Services:  968.589.7787

## 2023-01-30 ENCOUNTER — OFFICE VISIT (OUTPATIENT)
Dept: BEHAVIORAL HEALTH | Facility: CLINIC | Age: 33
End: 2023-01-30
Payer: COMMERCIAL

## 2023-01-30 PROCEDURE — 90832 PSYTX W PT 30 MINUTES: CPT

## 2023-01-30 NOTE — PROGRESS NOTES
[facility]  January 30, 2023  Behavioral Health Clinician Progress Note    Patient Name: Pretei Leiva           Service Type:  Individual      Service Location:   Face to Face in Clinic     Session Start Time: 3:00 PM  Session End Time: 3:30 PM      Session Length: 16 - 37      Attendees: Patient     Service Modality:  In-person    Visit Activities (Refresh list every visit): NEW and [facility] Only    Diagnostic Assessment Date: Deferred - patient declined further [facility] services at this time and will have psychiatry intake in March 2023  Treatment Plan Review Date: N/a  See Flowsheets for today's PHQ-9 and ROSANNA-7 results  Previous PHQ-9:   PHQ-9 SCORE 6/15/2022 11/25/2022 1/9/2023   PHQ-9 Total Score MyChart 11 (Moderate depression) - -   PHQ-9 Total Score 11 9 11   Some encounter information is confidential and restricted. Go to Review Flowsheets activity to see all data.     Previous ROSANNA-7:   ROSANNA-7 SCORE 3/26/2022 5/10/2022 5/10/2022   Total Score 11 (moderate anxiety) - 10 (moderate anxiety)   Total Score 11 10 10   Some encounter information is confidential and restricted. Go to Review Flowsheets activity to see all data.       MEJIA LEVEL:  No flowsheet data found.    DATA  Extended Session (60+ minutes): No  Interactive Complexity: No  Crisis: No  [facility] Patient: No    Treatment Objective(s) Addressed in This Session:  Target Behavior(s): management of postpartum depression symptoms    Depressed Mood: Increase interest, engagement, and pleasure in doing things  Decrease frequency and intensity of feeling down, depressed, hopeless  Identify negative self-talk and behaviors: challenge core beliefs, myths, and actions    Current Stressors / Issues:  Patient was referred by nurse midwife/lactation consultant to discuss support options for concerns of postpartum depression. Patient's first child, a daughter, was born in November 2022. She stated that she has been experiencing depressive  symptoms including fatigue, loneliness due to sense of social isolation, and guilt that she is not enjoying this time. She expressed interest in establishing care with a psychiatry provider to see whether medication adjustment will promote symptom improvement.   [facility] validated patient's experience and provided psycho-education on postpartum depression. Affirmed patient's insight and steps she is taking to address symptoms. Acknowledged patient's perspective that she does not want to try therapy at this time (she reported she has engaged in therapy previously and is not interested now due to scheduling, desire not to begin new provider relationship and review trauma/MH history, etc.), while encouraging patient to reconsider if symptoms worsen.   [facility] and patient reviewed support interests and discussed treatment options.   Denied SI, SIB, safety concerns and no indications of thought disturbance.     Progress on Treatment Objective(s) / Homework:  New Objective established this session - PREPARATION (Decided to change - considering how); Intervened by negotiating a change plan and determining options / strategies for behavior change, identifying triggers, exploring social supports, and working towards setting a date to begin behavior change - establish care with psychiatry provider, explore medication adjustment options, consider establishing with therapist if symptoms persist/worsen    Motivational Interviewing    MI Intervention: Expressed Empathy/Understanding, Supported Autonomy, Collaboration, Evocation, Permission to raise concern or advise, Open-ended questions, Reflections: simple and complex and Change talk (evoked)     Change Talk Expressed by the Patient: Desire to change Reasons to change Taking steps    Provider Response to Change Talk: E - Evoked more info from patient about behavior change, A - Affirmed patient's thoughts, decisions, or attempts at behavior change, R - Reflected patient's change talk and  S - Summarized patient's change talk statements    Also provided psychoeducation about behavioral health condition, symptoms, and treatment options    Care Plan review completed: No    Medication Review:  No changes to current psychiatric medication(s) - Currently taking zoloft, but unsure if dosage is sufficient. Interested in connecting with psychiatry provider to discuss options for adjustment. Psych referral completed.     Medication Compliance:  Yes    Changes in Health Issues:  None reported    Chemical Use Review:   Substance Use: Chemical use reviewed, no active concerns identified      Tobacco Use: No current tobacco use.      Assessment: Current Emotional / Mental Status (status of significant symptoms):  Risk status (Self / Other harm or suicidal ideation)  Patient denies a history of suicidal ideation, suicide attempts, self-injurious behavior, homicidal ideation, homicidal behavior and and other safety concerns. C-SSRS completed in 2020 - no changes since then.  Patient denies current fears or concerns for personal safety.  Patient denies current or recent suicidal ideation or behaviors.  Patient denies current or recent homicidal ideation or behaviors.  Patient denies current or recent self injurious behavior or ideation.  Patient denies other safety concerns.  A safety and risk management plan has not been developed at this time, however patient was encouraged to call [facility] / [facility] should there be a change in any of these risk factors.    Appearance:   Appropriate   Eye Contact:   Good   Psychomotor Behavior: Normal   Attitude:   Cooperative  Interested Pleasant  Orientation:   All  Speech   Rate / Production: Normal/ Responsive   Volume:  Normal   Mood:    Depressed   Affect:    Appropriate   Thought Content:  Clear   Thought Form:  Coherent  Goal Directed  Logical   Insight:    Good     Diagnoses:  1. Postpartum depression    Provisional, DA not yet completed    Collateral Reports  Completed:  Coordinated referral/intake scheduling with referring provider, prospective providers    Plan: (Homework, other):  No [facility] follow-up scheduled; patient declined offer for further [facility] services/therapy referral at this time, but would like to establish care with a psychiatry provider. [facility] discussed options with patient and initiated referral process. Patient has intake with psychiatry provider in March 2023. She is welcome to schedule with [facility] as needed if support interests change. CD Recommendations: No indications of CD issues.     ILYA Chahal, [facility]

## 2023-02-02 ENCOUNTER — TELEPHONE (OUTPATIENT)
Dept: PSYCHIATRY | Facility: CLINIC | Age: 33
End: 2023-02-02
Payer: COMMERCIAL

## 2023-02-02 NOTE — TELEPHONE ENCOUNTER
PSYCHIATRY CLINIC PHONE INTAKE     SERVICES REQUESTED / INTERESTED IN          Med Management    Presenting Problem and Brief History                              What would you like to be seen for? (brief description):    Pt was diagnosed at 19. Pt currently takes sertraline 200mg a day. Pt believes she's at the maximum dose of the medication and wants to look at other options. She has prescription for xanax but hasn't been taking it since being pregnant. Pt has postpartum sleep issues with the baby.     Have you received a mental health diagnosis? Yes   Which one (s): Anixety and depression  Is there any history of developmental delay?  No   Are you currently seeing a mental health provider?  No            Who / month last seen:  Past provider - Quin Monroy, [facility] therapy provider  Do you have mental health records elsewhere?  No  Will you sign a release so we can obtain them?  No    Have you ever been hospitalized for psychiatric reasons?  No  Describe:  NA    Do you have current thoughts of self-harm?  No    Do you currently have thoughts of harming others?  No    Do you have any safety concerns? No   If yes to these, offer to reach out to a  for follow up.      Substance Use History     Do you have any history of alcohol / illicit drug use?  No  Describe:  NA  Have you ever received treatment for this?  No    Describe:  NA     Social History     Who is the patient's a guardian?  No    Name / number: NA  Have you had an ACT team in last 12 months?  No  Describe: NA   OK to leave a detailed voicemail?  Yes    Would you be interested in learning more about research opportunities for which you or your child may qualify? We can connect you with a team member for more information.  Yes  If yes, send an Tealet message to Twila Henao    Medical/ Surgical History                                   Patient Active Problem List   Diagnosis     Type 1 diabetes mellitus with hyperglycemia (H)     Anxiety      Recurrent major depressive disorder (H)     Status post  delivery     Status post primary low transverse  section          Medications             Current Outpatient Medications   Medication Sig Dispense Refill     acetaminophen (TYLENOL) 325 MG tablet Take 2 tablets (650 mg) by mouth every 6 hours as needed for mild pain Start after Delivery. 100 tablet 0     Continuous Blood Gluc Sensor (DEXCOM G6 SENSOR) MISC Change every 10 days. 9 each 3     Continuous Blood Gluc Transmit (DEXCOM G6 TRANSMITTER) MISC Change every 3 months. 1 each 3     Glucagon, rDNA, (GLUCAGON EMERGENCY) 1 MG KIT Inject 1 mg as directed daily as needed (hypoglycemia) 2 kit 11     Insulin Disposable Pump (OMNIPOD 5 G6 POD, GEN 5,) MISC 1 each every other day 45 each 11     insulin lispro (HUMALOG VIAL) 100 UNIT/ML vial INJECT 70 UNITS UNDER THE SKIN EVERY DAY VIA INSULIN PUMP 80 mL 3     Prenatal Vit-Fe Fumarate-FA (PRENATAL MULTIVITAMIN W/IRON) 27-0.8 MG tablet Take 1 tablet by mouth daily       sertraline (ZOLOFT) 100 MG tablet TAKE 2 TABLETS(200 MG) BY MOUTH DAILY 180 tablet 0         DISPOSITION      23 Intake complete. Pt prefers long-term care. Scheduled for MINE turner/ Priscilla Lim on 3/2/23 at 2:00pm and Elisabeth Brown on 3/9/23 at 2:30pm.     Bernie Gardner Sr. - Lead

## 2023-02-08 ENCOUNTER — VIRTUAL VISIT (OUTPATIENT)
Dept: ENDOCRINOLOGY | Facility: CLINIC | Age: 33
End: 2023-02-08
Payer: COMMERCIAL

## 2023-02-08 DIAGNOSIS — E10.65 TYPE 1 DIABETES MELLITUS WITH HYPERGLYCEMIA (H): Primary | ICD-10-CM

## 2023-02-08 PROCEDURE — 99213 OFFICE O/P EST LOW 20 MIN: CPT | Mod: 95 | Performed by: INTERNAL MEDICINE

## 2023-02-08 NOTE — LETTER
2/8/2023         RE: Preeti Leiva  [address]  [place] [address]        Dear Colleague,    Thank you for referring your patient, Preeti Leiva, to the [facility] [place]. Please see a copy of my visit note below.      Video-Visit Details    Type of service:  Video Visit  Video Start Time: 8:45  Video End Time: 9:02  Originating Location (pt. Location): [place], [place]  Distant Location (provider location):  Home  Platform used for Video Visit: Norm Thomas MD    Preeti Leiva is a 32 year old year old female here for evaluation of  diabetes type 1 now post partum via a billable video visit. She was last seen by me December 2022.    INTERVAL HISTORY:  - Back at work TODAY  - Eating late meals, bolusing after starting eating, difficult to coordinate everything  - Continuing to breastfeed, baby doing well, staying with mom--baby girl Shirin Talley-      1) Diabetes Mellitus in pregnancy    Diabetes History:  Diagnosis: Age 6,  Pregnancy- Baby girl born at 36w- spent a few days in NICU for hypoglyemia, 9lb 10oz. Scheduled csection in setting of fetal macrosomia.   Hospitalizations: DKA, 2008 most recently, difficult time in college managing   Previous Regimens: almost always on pump, most recently Medtronic/Guardian, but didn't use automode-- kicked out frequently and didn't work through this  Current Regimen: OMNIPOD 5/DEXCOM    Pattern of hyperglycemia over lunch/dinner time  Corrects nicely overnight  Minimal lows  Delayed bolusing  TIR- 30%  Ave glucose 188      Last eye exam- July 2022-- mild NPDR    BP Readings from Last 3 Encounters:   01/18/23 136/85   01/09/23 101/72   12/20/22 108/70       Lab Results   Component Value Date    A1C 6.1 10/04/2022    A1C 6.5 06/10/2022    A1C 8.6 02/10/2022    A1C 9.4 08/20/2020    A1C 8.4 01/02/2020    A1C 7.8 01/29/2019       Wt Readings from Last 3 Encounters:   01/18/23 73 kg (161 lb)   01/09/23 71.2 kg (157 lb)    12/20/22 73 kg (161 lb)       Current Outpatient Medications   Medication Sig Dispense Refill     Continuous Blood Gluc Sensor (DEXCOM G6 SENSOR) MISC Change every 10 days. 9 each 3     Continuous Blood Gluc Transmit (DEXCOM G6 TRANSMITTER) MISC Change every 3 months. 1 each 3     Glucagon, rDNA, (GLUCAGON EMERGENCY) 1 MG KIT Inject 1 mg as directed daily as needed (hypoglycemia) 2 kit 11     Insulin Disposable Pump (OMNIPOD 5 G6 POD, GEN 5,) MISC 1 each every other day 45 each 11     insulin lispro (HUMALOG VIAL) 100 UNIT/ML vial INJECT 70 UNITS UNDER THE SKIN EVERY DAY VIA INSULIN PUMP 80 mL 3     sertraline (ZOLOFT) 100 MG tablet TAKE 2 TABLETS(200 MG) BY MOUTH DAILY 180 tablet 0     acetaminophen (TYLENOL) 325 MG tablet Take 2 tablets (650 mg) by mouth every 6 hours as needed for mild pain Start after Delivery. (Patient not taking: Reported on 2/8/2023) 100 tablet 0     Prenatal Vit-Fe Fumarate-FA (PRENATAL MULTIVITAMIN W/IRON) 27-0.8 MG tablet Take 1 tablet by mouth daily (Patient not taking: Reported on 2/8/2023)            REVIEW OF SYSTEMS:   ROS: 10 point ROS neg other than the symptoms noted above in the HPI.      EXAM:  Physical Exam (visual exam)  VS:  no vital signs taken for video visit  CONSTITUTIONAL: healthy, alert and NAD, responding appropriately  ENT: normocephalic, no visual evidence of trauma, normal nose and oral mucosa  EYES: conjunctivae and sclerae normal, no exophthalmos or proptosis  THYROID:  no visualized nodules or goiter  LUNGS: no audible wheeze, cough or visible cyanosis, no visible retractions or increased work of breathing  EXTREMITIES: no hand tremors  NEUROLOGY: cranial nerves grossly intact with no obvious deficit.  SKIN:  no visualized skin lesions or rash, no edema visualized  PSYCH: mentation appears normal, normal judgement        ASSESSMENT/PLAN:    1) Diabetes Mellitus Type 1- now 1 week post partum  - Glucose Control- NOT at goal, significant hypoglycemia   - Have  noted that running with low basal-- approx 20u per day (although frequently suspended) Omnipod 5 is needing to relearn her patterns/body outside of pregnancy. She is having significant lows, corrects frequently with uncorrected snacks/juices. Exacerbated in setting of breastfeeding   - Increase basal rates - 20%    - 12a- 0.45-->0.45     - 9a- 0.6--> 0.75    - Reduce correction factor 46--> 41   - Continue carb ratio at 1:12 most times, but at 5:30a and 6p--> strengthen to 1:10,    - Lower BG target to 110   - Eye exam- July 2023 due (mild NPDR)   - Updated labs ordered to be completed prior to next visit.     RTC 3-6 months    A total of 21 minutes were spent today 02/08/23 on this visit including chart review, history and counseling, documentation and other activities as detailed above.         Again, thank you for allowing me to participate in the care of your patient.        Sincerely,        Shanon Thomas MD

## 2023-02-08 NOTE — PROGRESS NOTES
Video-Visit Details    Type of service:  Video Visit  Video Start Time: 8:45  Video End Time: 9:02  Originating Location (pt. Location): [place]  Distant Location (provider location):  Home  Platform used for Video Visit: Norm Thomas MD    Preeti Leiva is a 32 year old year old female here for evaluation of  diabetes type 1 now post partum via a billable video visit. She was last seen by me December 2022.    INTERVAL HISTORY:  - Back at work TODAY  - Eating late meals, bolusing after starting eating, difficult to coordinate everything  - Continuing to breastfeed, baby doing well, staying with mom--baby girl Shirin Talley-      1) Diabetes Mellitus in pregnancy    Diabetes History:  Diagnosis: Age 6,  Pregnancy- Baby girl born at 36w- spent a few days in NICU for hypoglyemia, 9lb 10oz. Scheduled csection in setting of fetal macrosomia.   Hospitalizations: DKA, 2008 most recently, difficult time in college managing   Previous Regimens: almost always on pump, most recently Medtronic/Guardian, but didn't use automode-- kicked out frequently and didn't work through this  Current Regimen: OMNIPOD 5/DEXCOM    Pattern of hyperglycemia over lunch/dinner time  Corrects nicely overnight  Minimal lows  Delayed bolusing  TIR- 30%  Ave glucose 188      Last eye exam- July 2022-- mild NPDR    BP Readings from Last 3 Encounters:   01/18/23 136/85   01/09/23 101/72   12/20/22 108/70       Lab Results   Component Value Date    A1C 6.1 10/04/2022    A1C 6.5 06/10/2022    A1C 8.6 02/10/2022    A1C 9.4 08/20/2020    A1C 8.4 01/02/2020    A1C 7.8 01/29/2019       Wt Readings from Last 3 Encounters:   01/18/23 73 kg (161 lb)   01/09/23 71.2 kg (157 lb)   12/20/22 73 kg (161 lb)       Current Outpatient Medications   Medication Sig Dispense Refill     Continuous Blood Gluc Sensor (DEXCOM G6 SENSOR) MISC Change every 10 days. 9 each 3     Continuous Blood Gluc Transmit (DEXCOM G6 TRANSMITTER) MISC Change every 3  months. 1 each 3     Glucagon, rDNA, (GLUCAGON EMERGENCY) 1 MG KIT Inject 1 mg as directed daily as needed (hypoglycemia) 2 kit 11     Insulin Disposable Pump (OMNIPOD 5 G6 POD, GEN 5,) MISC 1 each every other day 45 each 11     insulin lispro (HUMALOG VIAL) 100 UNIT/ML vial INJECT 70 UNITS UNDER THE SKIN EVERY DAY VIA INSULIN PUMP 80 mL 3     sertraline (ZOLOFT) 100 MG tablet TAKE 2 TABLETS(200 MG) BY MOUTH DAILY 180 tablet 0     acetaminophen (TYLENOL) 325 MG tablet Take 2 tablets (650 mg) by mouth every 6 hours as needed for mild pain Start after Delivery. (Patient not taking: Reported on 2/8/2023) 100 tablet 0     Prenatal Vit-Fe Fumarate-FA (PRENATAL MULTIVITAMIN W/IRON) 27-0.8 MG tablet Take 1 tablet by mouth daily (Patient not taking: Reported on 2/8/2023)            REVIEW OF SYSTEMS:   ROS: 10 point ROS neg other than the symptoms noted above in the HPI.      EXAM:  Physical Exam (visual exam)  VS:  no vital signs taken for video visit  CONSTITUTIONAL: healthy, alert and NAD, responding appropriately  ENT: normocephalic, no visual evidence of trauma, normal nose and oral mucosa  EYES: conjunctivae and sclerae normal, no exophthalmos or proptosis  THYROID:  no visualized nodules or goiter  LUNGS: no audible wheeze, cough or visible cyanosis, no visible retractions or increased work of breathing  EXTREMITIES: no hand tremors  NEUROLOGY: cranial nerves grossly intact with no obvious deficit.  SKIN:  no visualized skin lesions or rash, no edema visualized  PSYCH: mentation appears normal, normal judgement        ASSESSMENT/PLAN:    1) Diabetes Mellitus Type 1- now 1 week post partum  - Glucose Control- NOT at goal, significant hypoglycemia   - Have noted that running with low basal-- approx 20u per day (although frequently suspended) Omnipod 5 is needing to relearn her patterns/body outside of pregnancy. She is having significant lows, corrects frequently with uncorrected snacks/juices. Exacerbated in setting  of breastfeeding   - Increase basal rates - 20%    - 12a- 0.45-->0.45     - 9a- 0.6--> 0.75    - Reduce correction factor 46--> 41   - Continue carb ratio at 1:12 most times, but at 5:30a and 6p--> strengthen to 1:10,    - Lower BG target to 110   - Eye exam- July 2023 due (mild NPDR)   - Updated labs ordered to be completed prior to next visit.     RTC 3-6 months    A total of 21 minutes were spent today 02/08/23 on this visit including chart review, history and counseling, documentation and other activities as detailed above.

## 2023-02-08 NOTE — NURSING NOTE
Chief Complaint   Patient presents with     Follow Up     Controlled diabetes mellitus type 1 without complications       There were no vitals filed for this visit.    There is no height or weight on file to calculate BMI.     Darlene Harris, [facility]

## 2023-02-11 DIAGNOSIS — F33.42 RECURRENT MAJOR DEPRESSIVE DISORDER, IN FULL REMISSION (H): ICD-10-CM

## 2023-02-11 DIAGNOSIS — F41.9 ANXIETY: ICD-10-CM

## 2023-02-15 RX ORDER — SERTRALINE HYDROCHLORIDE 100 MG/1
TABLET, FILM COATED ORAL
Qty: 60 TABLET | Refills: 0 | Status: SHIPPED | OUTPATIENT
Start: 2023-02-15 | End: 2023-03-09

## 2023-02-15 NOTE — TELEPHONE ENCOUNTER
Noted that she is seeing psychiatrist next month.   And has been following with OB/Gyn and [facility] for mental health treatment and support.  Will provide refill at this time for 30 days, in anticipation of upcoming specialty visit.

## 2023-03-02 ENCOUNTER — VIRTUAL VISIT (OUTPATIENT)
Dept: PSYCHIATRY | Facility: CLINIC | Age: 33
End: 2023-03-02
Attending: SOCIAL WORKER
Payer: COMMERCIAL

## 2023-03-02 DIAGNOSIS — F43.10 POSTTRAUMATIC STRESS DISORDER: ICD-10-CM

## 2023-03-02 DIAGNOSIS — F33.41 MAJOR DEPRESSIVE DISORDER, RECURRENT EPISODE, IN PARTIAL REMISSION (H): ICD-10-CM

## 2023-03-02 DIAGNOSIS — F41.1 GENERALIZED ANXIETY DISORDER: Primary | ICD-10-CM

## 2023-03-02 PROCEDURE — 90791 PSYCH DIAGNOSTIC EVALUATION: CPT | Mod: VID | Performed by: SOCIAL WORKER

## 2023-03-02 ASSESSMENT — ANXIETY QUESTIONNAIRES
7. FEELING AFRAID AS IF SOMETHING AWFUL MIGHT HAPPEN: NOT AT ALL
2. NOT BEING ABLE TO STOP OR CONTROL WORRYING: MORE THAN HALF THE DAYS
GAD7 TOTAL SCORE: 9
8. IF YOU CHECKED OFF ANY PROBLEMS, HOW DIFFICULT HAVE THESE MADE IT FOR YOU TO DO YOUR WORK, TAKE CARE OF THINGS AT HOME, OR GET ALONG WITH OTHER PEOPLE?: SOMEWHAT DIFFICULT
GAD7 TOTAL SCORE: 9
1. FEELING NERVOUS, ANXIOUS, OR ON EDGE: SEVERAL DAYS
6. BECOMING EASILY ANNOYED OR IRRITABLE: MORE THAN HALF THE DAYS
4. TROUBLE RELAXING: SEVERAL DAYS
5. BEING SO RESTLESS THAT IT IS HARD TO SIT STILL: SEVERAL DAYS
7. FEELING AFRAID AS IF SOMETHING AWFUL MIGHT HAPPEN: NOT AT ALL
GAD7 TOTAL SCORE: 9
IF YOU CHECKED OFF ANY PROBLEMS ON THIS QUESTIONNAIRE, HOW DIFFICULT HAVE THESE PROBLEMS MADE IT FOR YOU TO DO YOUR WORK, TAKE CARE OF THINGS AT HOME, OR GET ALONG WITH OTHER PEOPLE: SOMEWHAT DIFFICULT
3. WORRYING TOO MUCH ABOUT DIFFERENT THINGS: MORE THAN HALF THE DAYS

## 2023-03-02 ASSESSMENT — PATIENT HEALTH QUESTIONNAIRE - PHQ9
10. IF YOU CHECKED OFF ANY PROBLEMS, HOW DIFFICULT HAVE THESE PROBLEMS MADE IT FOR YOU TO DO YOUR WORK, TAKE CARE OF THINGS AT HOME, OR GET ALONG WITH OTHER PEOPLE: SOMEWHAT DIFFICULT
SUM OF ALL RESPONSES TO PHQ QUESTIONS 1-9: 5
SUM OF ALL RESPONSES TO PHQ QUESTIONS 1-9: 5
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
## 2023-03-09 ENCOUNTER — VIRTUAL VISIT (OUTPATIENT)
Dept: PSYCHIATRY | Facility: CLINIC | Age: 33
End: 2023-03-09
Attending: NURSE PRACTITIONER
Payer: COMMERCIAL

## 2023-03-09 DIAGNOSIS — F41.9 ANXIETY: ICD-10-CM

## 2023-03-09 DIAGNOSIS — F33.42 RECURRENT MAJOR DEPRESSIVE DISORDER, IN FULL REMISSION (H): ICD-10-CM

## 2023-03-09 PROCEDURE — 99214 OFFICE O/P EST MOD 30 MIN: CPT | Mod: VID | Performed by: NURSE PRACTITIONER

## 2023-03-09 RX ORDER — SERTRALINE HYDROCHLORIDE 25 MG/1
TABLET, FILM COATED ORAL
Qty: 30 TABLET | Refills: 0 | Status: SHIPPED | OUTPATIENT
Start: 2023-03-09 | End: 2023-04-10

## 2023-03-09 RX ORDER — SERTRALINE HYDROCHLORIDE 100 MG/1
200 TABLET, FILM COATED ORAL DAILY
Qty: 60 TABLET | Refills: 0 | Status: SHIPPED | OUTPATIENT
Start: 2023-03-09 | End: 2023-04-10

## 2023-03-09 ASSESSMENT — PATIENT HEALTH QUESTIONNAIRE - PHQ9
SUM OF ALL RESPONSES TO PHQ QUESTIONS 1-9: 9
10. IF YOU CHECKED OFF ANY PROBLEMS, HOW DIFFICULT HAVE THESE PROBLEMS MADE IT FOR YOU TO DO YOUR WORK, TAKE CARE OF THINGS AT HOME, OR GET ALONG WITH OTHER PEOPLE: SOMEWHAT DIFFICULT
SUM OF ALL RESPONSES TO PHQ QUESTIONS 1-9: 9

## 2023-03-09 ASSESSMENT — PAIN SCALES - GENERAL: PAINLEVEL: NO PAIN (0)

## 2023-03-09 NOTE — PATIENT INSTRUCTIONS
**For crisis resources, please see the information at the end of this document**   Patient Education    Thank you for coming to the [facility] [place] [facility].     Lab Testing:  If you had lab testing today and your results are reassuring or normal they will be mailed to you or sent through [employer] within 7 days. If the lab tests need quick action we will call you with the results. The phone number we will call with results is # [phone]. If this is not the best number please call our clinic and change the number.     Medication Refills:  If you need any refills please call your pharmacy and they will contact us. Our fax number for refills is [phone].   Three business days of notice are needed for general medication refill requests.   Five business days of notice are needed for controlled substance refill requests.   If you need to change to a different pharmacy, please contact the new pharmacy directly. The new pharmacy will help you get your medications transferred.     Contact Us:  Please call [phone] during business hours (8-5:00 M-F).   If you have medication related questions after clinic hours, or on the weekend, please call [phone].     Financial Assistance [phone]   Medical Records [phone]       MENTAL HEALTH CRISIS RESOURCES:  For a emergency help, please call 911 or go to the nearest Emergency Department.     Emergency Walk-In Options:   EmPATH Unit @ [facility] ([place]): [phone] - Specialized mental health emergency area designed to be calming  [facility] ([place]): [phone]  [facility] Acute Psychiat[facility] Services ([place]): [phone]  [facility]): [phone]    [facility] Crisis Information:   [place]: [phone]  [place]: [phone]  [place] ([place]) - Adult: [phone]     Child: [phone]  [place] - Adult: [phone]     Child: [phone]  [place]:  [phone]  List of all [facility] resources:   https://[place].gov/dhs/people-we-serve/adults/health-care/mental-health/resources/crisis-contacts.jsp    National Crisis Information:   Crisis Text Line: Text  [place]  to 678791  Suicide & Crisis Lifeline: 988  National Suicide Prevention Lifeline: 0-290-186-TALK (1-793.589.3415)       For online chat options, visit https://suicidepreventionlifeline.org/chat/  Poison Control Center: 1-539-911-8576  Trans Lifeline: 5-445-647-3353 - Hotline for transgender people of all ages  The [place] Project: 8-944-209-3466 - Hotline for LGBT youth     For Non-Emergency Support:   Fast Tracker: Mental Health & Substance Use Disorder Resources -   https://www.[employer].org/
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
## 2023-03-09 NOTE — NURSING NOTE
Is the patient currently in the state of [place]? NO    Visit mode:VIDEO    If the visit is dropped, the patient can be reconnected by: VIDEO VISIT: Text to cell phone: [phone]    Will anyone else be joining the visit? NO      How would you like to obtain your AVS? MyChart    Are changes needed to the allergy or medication list? NO    Reason for visit:

## 2023-03-30 ENCOUNTER — MEDICAL CORRESPONDENCE (OUTPATIENT)
Dept: HEALTH INFORMATION MANAGEMENT | Facility: CLINIC | Age: 33
End: 2023-03-30
Payer: COMMERCIAL

## 2023-04-09 DIAGNOSIS — F33.42 RECURRENT MAJOR DEPRESSIVE DISORDER, IN FULL REMISSION (H): ICD-10-CM

## 2023-04-09 DIAGNOSIS — F41.9 ANXIETY: ICD-10-CM

## 2023-04-10 ENCOUNTER — VIRTUAL VISIT (OUTPATIENT)
Dept: PSYCHIATRY | Facility: CLINIC | Age: 33
End: 2023-04-10
Attending: NURSE PRACTITIONER
Payer: COMMERCIAL

## 2023-04-10 DIAGNOSIS — F33.42 RECURRENT MAJOR DEPRESSIVE DISORDER, IN FULL REMISSION (H): ICD-10-CM

## 2023-04-10 DIAGNOSIS — F41.9 ANXIETY: ICD-10-CM

## 2023-04-10 PROCEDURE — 99214 OFFICE O/P EST MOD 30 MIN: CPT | Mod: 95 | Performed by: NURSE PRACTITIONER

## 2023-04-10 RX ORDER — SERTRALINE HYDROCHLORIDE 100 MG/1
TABLET, FILM COATED ORAL
Qty: 60 TABLET | Refills: 0 | OUTPATIENT
Start: 2023-04-10

## 2023-04-10 RX ORDER — SERTRALINE HYDROCHLORIDE 25 MG/1
TABLET, FILM COATED ORAL
Qty: 30 TABLET | Refills: 1 | Status: SHIPPED | OUTPATIENT
Start: 2023-04-10 | End: 2023-04-12

## 2023-04-10 RX ORDER — SERTRALINE HYDROCHLORIDE 100 MG/1
200 TABLET, FILM COATED ORAL DAILY
Qty: 60 TABLET | Refills: 1 | Status: SHIPPED | OUTPATIENT
Start: 2023-04-10 | End: 2023-06-14

## 2023-04-10 ASSESSMENT — PATIENT HEALTH QUESTIONNAIRE - PHQ9
SUM OF ALL RESPONSES TO PHQ QUESTIONS 1-9: 4
SUM OF ALL RESPONSES TO PHQ QUESTIONS 1-9: 4
10. IF YOU CHECKED OFF ANY PROBLEMS, HOW DIFFICULT HAVE THESE PROBLEMS MADE IT FOR YOU TO DO YOUR WORK, TAKE CARE OF THINGS AT HOME, OR GET ALONG WITH OTHER PEOPLE: NOT DIFFICULT AT ALL
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
## 2023-04-10 NOTE — NURSING NOTE
Is the patient currently in the state of [place]? YES    Visit mode:TELEPHONE    If the visit is dropped, the patient can be reconnected by: TELEPHONE VISIT: Phone number: [phone]    Will anyone else be joining the visit? NO      How would you like to obtain your AVS? MyChart    Are changes needed to the allergy or medication list? NO    Reason for visit: Follow-up

## 2023-04-12 DIAGNOSIS — F41.9 ANXIETY: ICD-10-CM

## 2023-04-12 DIAGNOSIS — F33.42 RECURRENT MAJOR DEPRESSIVE DISORDER, IN FULL REMISSION (H): ICD-10-CM

## 2023-04-12 RX ORDER — SERTRALINE HYDROCHLORIDE 25 MG/1
TABLET, FILM COATED ORAL
Qty: 30 TABLET | Refills: 0 | Status: SHIPPED | OUTPATIENT
Start: 2023-04-12 | End: 2023-06-26

## 2023-04-12 NOTE — TELEPHONE ENCOUNTER
ZOLOFT  25 MG   Last refilled: 12/14/22  Qty: 60:1    Last seen: 4/10/23   RTC: 1 MOS  Cancel: 0  No-show: 0  Next appt: 5/22/23

## 2023-04-19 ENCOUNTER — E-VISIT (OUTPATIENT)
Dept: URGENT CARE | Facility: CLINIC | Age: 33
End: 2023-04-19
Payer: COMMERCIAL

## 2023-04-19 DIAGNOSIS — R35.0 URINARY FREQUENCY: Primary | ICD-10-CM

## 2023-04-19 PROCEDURE — 99421 OL DIG E/M SVC 5-10 MIN: CPT | Performed by: PREVENTIVE MEDICINE

## 2023-04-19 NOTE — PATIENT INSTRUCTIONS
Dear Preeti Leiva,     After reviewing your responses, I would like you to come in for a urine test to make sure we treat you correctly. This urine test is to evaluate you for a possible urinary tract infection, and should be scheduled for today or tomorrow. Schedule a Lab Only appointment here.     Lab appointments are not available at most locations on the weekends. If no Lab Only appointment is available, you should be seen in any of our convenient Walk-in or Urgent Care Centers, which can be found on our website here.     You will receive instructions with your results in [employer] once they are available.     If your symptoms worsen, you develop pain in your back or stomach, develop fevers, or are not improving in 5 days, please contact your primary care provider for an appointment or visit a Walk-in or Urgent Care Center to be seen.     Thanks again for choosing us as your health care partner,     Tai Oneill MD, MD    Dysuria with Uncertain Cause (Adult)    The urethra is the tube that allows urine to pass out of the body. In a woman, the urethra is the opening above the vagina. In men, the urethra is the opening on the tip of the penis. Dysuria is the feeling of pain or burning in the urethra when passing urine.   Dysuria can be caused by anything that irritates or inflames the urethra. An infection or chemical irritation can cause this reaction. A bladder infection is the most common cause of dysuria in adults. A urine test can diagnose this. A bladder infection needs antibiotic treatment.   Soaps, lotions, colognes, and feminine hygiene products can cause dysuria. So can birth control jellies, creams, and foams. It will go away 1 to 3 days after discontinuing the use of these irritants.   Sexually transmitted infections (STIs), such as chlamydia or gonorrhea, can cause dysuria. Your healthcare provider may take a culture sample. Your provider may start you on antibiotic medicine  before the culture test returns.   In women who have gone through menopause, dysuria can be from dryness in the lining of the urethra. This can be treated with hormones. Dysuria becomes long-term (chronic) when it lasts for weeks or months. You may need to see a specialist (urologist) to diagnose and treat chronic dysuria.   Home care  These home care tips may help:    Don't use any chemicals or products that you think may be causing your symptoms.    If you were given a prescription medicine, take as directed. Take it until it's all used up.    If a culture was taken, don't have sex until you have been told that it is negative. A negative culture means you don't have an infection. Then follow your healthcare provider's advice to treat your condition.  If a culture was done and it is positive:     Both you and your sexual partner may need to be treated. This is true even if your partner has no symptoms.    Contact your healthcare provider or go to an urgent care clinic or the public health department to be looked at and treated.    Don't have sex until both you and your partner have finished all antibiotics and your healthcare provider says you are no longer contagious.    Learn about and use safe sex practices. The safest sex is with a partner who has tested negative and only has sex with you. Condoms can prevent STIs from spreading, but they aren't a guarantee.  Follow-up care  Follow up with your healthcare provider, or as advised. If a culture was taken, you may call as directed for the results. If you have an STI, follow up with your provider or the public health department for a complete STI screening, including HIV testing. For more information, contact CDC-INFO at 826-368-9321.   When to call your healthcare provider   Call your healthcare provider right away if any of these occur:    You aren't better after 3 days of treatment    Fever of 100.4 F (38 C) or higher, or as directed by your healthcare  provider    Back or belly pain that gets worse    You can't urinate because of pain    New discharge from the urethra, vagina, or penis    Painful sores on the penis    Rash or joint pain    Painful lumps (lymph nodes) in the groin    Testicle pain or swelling of the scrotum  [place] last reviewed this educational content on 6/1/2022 2000-2022 [employer]. All rights reserved. This information is not intended as a substitute for professional medical care. Always follow your healthcare professional's instructions.

## 2023-05-01 ENCOUNTER — LAB (OUTPATIENT)
Dept: LAB | Facility: CLINIC | Age: 33
End: 2023-05-01
Payer: COMMERCIAL

## 2023-05-01 DIAGNOSIS — B96.89 BACTERIAL VAGINITIS: Primary | ICD-10-CM

## 2023-05-01 DIAGNOSIS — R35.0 URINARY FREQUENCY: ICD-10-CM

## 2023-05-01 DIAGNOSIS — N76.0 BACTERIAL VAGINITIS: Primary | ICD-10-CM

## 2023-05-01 DIAGNOSIS — E10.65 TYPE 1 DIABETES MELLITUS WITH HYPERGLYCEMIA (H): ICD-10-CM

## 2023-05-01 LAB
ALBUMIN UR-MCNC: 30 MG/DL
ANION GAP SERPL CALCULATED.3IONS-SCNC: 10 MMOL/L (ref 7–15)
APPEARANCE UR: CLEAR
BACTERIA #/AREA URNS HPF: ABNORMAL /HPF
BILIRUB UR QL STRIP: ABNORMAL
BUN SERPL-MCNC: 11 MG/DL (ref 6–20)
CALCIUM SERPL-MCNC: 9.1 MG/DL (ref 8.6–10)
CHLORIDE SERPL-SCNC: 104 MMOL/L (ref 98–107)
CHOLEST SERPL-MCNC: 198 MG/DL
CLUE CELLS: PRESENT
COLOR UR AUTO: YELLOW
CREAT SERPL-MCNC: 0.72 MG/DL (ref 0.51–0.95)
CREAT UR-MCNC: 412 MG/DL
DEPRECATED CALCIDIOL+CALCIFEROL SERPL-MC: 27 UG/L (ref 20–75)
DEPRECATED HCO3 PLAS-SCNC: 26 MMOL/L (ref 22–29)
GFR SERPL CREATININE-BSD FRML MDRD: >90 ML/MIN/1.73M2
GLUCOSE SERPL-MCNC: 162 MG/DL (ref 70–99)
GLUCOSE UR STRIP-MCNC: NEGATIVE MG/DL
HBA1C MFR BLD: 7.9 % (ref 0–5.6)
HCG UR QL: NEGATIVE
HDLC SERPL-MCNC: 63 MG/DL
HGB UR QL STRIP: NEGATIVE
KETONES UR STRIP-MCNC: NEGATIVE MG/DL
LDLC SERPL CALC-MCNC: 120 MG/DL
LEUKOCYTE ESTERASE UR QL STRIP: ABNORMAL
MICROALBUMIN UR-MCNC: 50.4 MG/L
MICROALBUMIN/CREAT UR: 12.23 MG/G CR (ref 0–25)
MUCOUS THREADS #/AREA URNS LPF: PRESENT /LPF
NITRATE UR QL: NEGATIVE
NONHDLC SERPL-MCNC: 135 MG/DL
PH UR STRIP: 6 [PH] (ref 5–7)
POTASSIUM SERPL-SCNC: 3.8 MMOL/L (ref 3.4–5.3)
RBC #/AREA URNS AUTO: ABNORMAL /HPF
SODIUM SERPL-SCNC: 140 MMOL/L (ref 136–145)
SP GR UR STRIP: >=1.03 (ref 1–1.03)
TRICHOMONAS, WET PREP: ABNORMAL
TRIGL SERPL-MCNC: 73 MG/DL
TSH SERPL DL<=0.005 MIU/L-ACNC: 1.8 UIU/ML (ref 0.3–4.2)
UROBILINOGEN UR STRIP-ACNC: 0.2 E.U./DL
WBC #/AREA URNS AUTO: ABNORMAL /HPF
WBC'S/HIGH POWER FIELD, WET PREP: ABNORMAL
YEAST, WET PREP: ABNORMAL

## 2023-05-01 PROCEDURE — 80048 BASIC METABOLIC PNL TOTAL CA: CPT

## 2023-05-01 PROCEDURE — 87491 CHLMYD TRACH DNA AMP PROBE: CPT

## 2023-05-01 PROCEDURE — 84443 ASSAY THYROID STIM HORMONE: CPT

## 2023-05-01 PROCEDURE — 83036 HEMOGLOBIN GLYCOSYLATED A1C: CPT

## 2023-05-01 PROCEDURE — 87591 N.GONORRHOEAE DNA AMP PROB: CPT

## 2023-05-01 PROCEDURE — 80061 LIPID PANEL: CPT

## 2023-05-01 PROCEDURE — 87210 SMEAR WET MOUNT SALINE/INK: CPT | Performed by: PREVENTIVE MEDICINE

## 2023-05-01 PROCEDURE — 82570 ASSAY OF URINE CREATININE: CPT

## 2023-05-01 PROCEDURE — 81001 URINALYSIS AUTO W/SCOPE: CPT

## 2023-05-01 PROCEDURE — 81025 URINE PREGNANCY TEST: CPT

## 2023-05-01 PROCEDURE — 36415 COLL VENOUS BLD VENIPUNCTURE: CPT

## 2023-05-01 PROCEDURE — 82306 VITAMIN D 25 HYDROXY: CPT

## 2023-05-01 PROCEDURE — 82043 UR ALBUMIN QUANTITATIVE: CPT

## 2023-05-01 RX ORDER — METRONIDAZOLE 7.5 MG/G
1 GEL VAGINAL DAILY
Qty: 70 G | Refills: 0 | Status: SHIPPED | OUTPATIENT
Start: 2023-05-01 | End: 2023-05-08

## 2023-05-02 LAB
C TRACH DNA SPEC QL NAA+PROBE: NEGATIVE
N GONORRHOEA DNA SPEC QL NAA+PROBE: NEGATIVE

## 2023-05-07 ENCOUNTER — HEALTH MAINTENANCE LETTER (OUTPATIENT)
Age: 33
End: 2023-05-07

## 2023-05-09 ENCOUNTER — VIRTUAL VISIT (OUTPATIENT)
Dept: ENDOCRINOLOGY | Facility: CLINIC | Age: 33
End: 2023-05-09
Payer: COMMERCIAL

## 2023-05-09 DIAGNOSIS — E10.65 TYPE 1 DIABETES MELLITUS WITH HYPERGLYCEMIA (H): Primary | ICD-10-CM

## 2023-05-09 PROCEDURE — 99214 OFFICE O/P EST MOD 30 MIN: CPT | Mod: VID | Performed by: INTERNAL MEDICINE

## 2023-05-09 ASSESSMENT — PAIN SCALES - GENERAL: PAINLEVEL: NO PAIN (0)

## 2023-05-09 NOTE — PROGRESS NOTES
Video-Visit Details    Type of service:  Video Visit  Video Start Time: 7:34  Video End Time: 7:52  Originating Location (pt. Location): Home, [place]  Distant Location (provider location):  Home  Platform used for Video Visit: Norm Thomas MD    Preeti Leiva is a 32 year old year old female here for evaluation of  diabetes type 1 via a billable video visit. She was last seen by me February 2023.    INTERVAL HISTORY:  - Started new role at work, more responsibility, finding more time for balance  - Missing motivation that had during pregnancy  - Missing boluses, bolusing after eating, still struggling with routine  - Continuing to breastfeed, feels hungry all the time,  baby doing well, staying with mom--baby girl Shirin Talley-      1) Diabetes Mellitus in pregnancy    Diabetes History:  Diagnosis: Age 6,  Pregnancy- Baby girl born at 36w- spent a few days in NICU for hypoglyemia, 9lb 10oz. Scheduled csection in setting of fetal macrosomia.   Hospitalizations: DKA, 2008 most recently, difficult time in college managing   Previous Regimens: almost always on pump, most recently Medtronic/Guardian, but didn't use automode-- kicked out frequently and didn't work through this  Current Regimen: OMNIPOD 5/DEXCOM                Kicked out of automated mode more-- now 19% in this setting  Missed boluses, high BG      Last eye exam- July 2022-- mild NPDR    BP Readings from Last 3 Encounters:   01/18/23 136/85   01/09/23 101/72   12/20/22 108/70       Lab Results   Component Value Date    A1C 6.1 10/04/2022    A1C 6.5 06/10/2022    A1C 8.6 02/10/2022    A1C 9.4 08/20/2020    A1C 8.4 01/02/2020    A1C 7.8 01/29/2019       Wt Readings from Last 3 Encounters:   01/18/23 73 kg (161 lb)   01/09/23 71.2 kg (157 lb)   12/20/22 73 kg (161 lb)       Current Outpatient Medications   Medication Sig Dispense Refill     acetaminophen (TYLENOL) 325 MG tablet Take 2 tablets (650 mg) by mouth every 6 hours as needed for mild  pain Start after Delivery. (Patient not taking: Reported on 2/8/2023) 100 tablet 0     Continuous Blood Gluc Sensor (DEXCOM G6 SENSOR) MISC Change every 10 days. 9 each 3     Continuous Blood Gluc Transmit (DEXCOM G6 TRANSMITTER) MISC Change every 3 months. 1 each 3     Glucagon, rDNA, (GLUCAGON EMERGENCY) 1 MG KIT Inject 1 mg as directed daily as needed (hypoglycemia) 2 kit 11     Insulin Disposable Pump (OMNIPOD 5 G6 POD, GEN 5,) MISC 1 each every other day 45 each 11     insulin lispro (HUMALOG VIAL) 100 UNIT/ML vial INJECT 70 UNITS UNDER THE SKIN EVERY DAY VIA INSULIN PUMP 80 mL 3     Prenatal Vit-Fe Fumarate-FA (PRENATAL MULTIVITAMIN W/IRON) 27-0.8 MG tablet Take 1 tablet by mouth daily (Patient not taking: Reported on 2/8/2023)       sertraline (ZOLOFT) 100 MG tablet Take 2 tablets (200 mg) by mouth daily 60 tablet 1     sertraline (ZOLOFT) 25 MG tablet TAKE 1 TABLET BY MOUTH DAILY WITH  MG TABLETS OFR A TOTAL DAILY DOSE  MG 30 tablet 0     sulfamethoxazole-trimethoprim (BACTRIM DS) 800-160 MG tablet Take 1 tablet by mouth 2 times daily 6 tablet 0          REVIEW OF SYSTEMS:   ROS: 10 point ROS neg other than the symptoms noted above in the HPI.      EXAM:  Physical Exam (visual exam)  VS:  no vital signs taken for video visit  CONSTITUTIONAL: healthy, alert and NAD, responding appropriately  ENT: normocephalic, no visual evidence of trauma, normal nose and oral mucosa  EYES: conjunctivae and sclerae normal, no exophthalmos or proptosis  THYROID:  no visualized nodules or goiter  LUNGS: no audible wheeze, cough or visible cyanosis, no visible retractions or increased work of breathing  EXTREMITIES: no hand tremors  NEUROLOGY: cranial nerves grossly intact with no obvious deficit.  SKIN:  no visualized skin lesions or rash, no edema visualized  PSYCH: mentation appears normal, normal judgement        ASSESSMENT/PLAN:    1) Diabetes Mellitus Type 1-   - Glucose Control- NOT at goal, improved  hypoglycemia   - Focus on bolusing with 9a snack and 6p dinner- at least 15 min before. Make these two areas our target areas   - Strengthen correction factor at 7a and 6p- 41-->35   - Remaining setting stable, mostly bolus timing needs adjustment   - Will get reconnected with DM education  2.  Lipids:  Meets criteria per ASCVD risk %, no statin at this time given breast feeding  3.  Blood Pressure:  Not evaluated for this virtual visit   4.  CV prevention:  Does not meet criteria for antiplatelet therapy  5.  Diabetic Nephropathy screening:  Up to date / repeat LAURITA due 5/2024  6.  Diabetic Retinopathy screening:  Up to date, continue annual dilated eye exams July 2023  7.  Diabetic Neuropathy screening:  Up to date.  Instructed on routine foot care, follow-up with podiatry as needed.       RTC 3  months    A total of 30 minutes were spent today 05/09/23 on this visit including chart review, history and counseling, documentation and other activities as detailed above.

## 2023-05-09 NOTE — LETTER
5/9/2023         RE: Preeti Leiva  [address]  [place] [address]        Dear Colleague,    Thank you for referring your patient, Preeti Leiva, to the [facility] [place]. Please see a copy of my visit note below.      Video-Visit Details    Type of service:  Video Visit  Video Start Time: 7:34  Video End Time: 7:52  Originating Location (pt. Location): [place], [place]  Distant Location (provider location):  Home  Platform used for Video Visit: Norm Thomas MD    Preeti Leiva is a 32 year old year old female here for evaluation of  diabetes type 1 via a billable video visit. She was last seen by me February 2023.    INTERVAL HISTORY:  - Started new role at work, more responsibility, finding more time for balance  - Missing motivation that had during pregnancy  - Missing boluses, bolusing after eating, still struggling with routine  - Continuing to breastfeed, feels hungry all the time,  baby doing well, staying with mom--baby girl Shirin Talley-      1) Diabetes Mellitus in pregnancy    Diabetes History:  Diagnosis: Age 6,  Pregnancy- Baby girl born at 36w- spent a few days in NICU for hypoglyemia, 9lb 10oz. Scheduled csection in setting of fetal macrosomia.   Hospitalizations: DKA, 2008 most recently, difficult time in college managing   Previous Regimens: almost always on pump, most recently Medtronic/Guardian, but didn't use automode-- kicked out frequently and didn't work through this  Current Regimen: OMNIPOD 5/DEXCOM                Kicked out of automated mode more-- now 19% in this setting  Missed boluses, high BG      Last eye exam- July 2022-- mild NPDR    BP Readings from Last 3 Encounters:   01/18/23 136/85   01/09/23 101/72   12/20/22 108/70       Lab Results   Component Value Date    A1C 6.1 10/04/2022    A1C 6.5 06/10/2022    A1C 8.6 02/10/2022    A1C 9.4 08/20/2020    A1C 8.4 01/02/2020    A1C 7.8 01/29/2019       Wt Readings from Last 3  Encounters:   01/18/23 73 kg (161 lb)   01/09/23 71.2 kg (157 lb)   12/20/22 73 kg (161 lb)       Current Outpatient Medications   Medication Sig Dispense Refill     acetaminophen (TYLENOL) 325 MG tablet Take 2 tablets (650 mg) by mouth every 6 hours as needed for mild pain Start after Delivery. (Patient not taking: Reported on 2/8/2023) 100 tablet 0     Continuous Blood Gluc Sensor (DEXCOM G6 SENSOR) MISC Change every 10 days. 9 each 3     Continuous Blood Gluc Transmit (DEXCOM G6 TRANSMITTER) MISC Change every 3 months. 1 each 3     Glucagon, rDNA, (GLUCAGON EMERGENCY) 1 MG KIT Inject 1 mg as directed daily as needed (hypoglycemia) 2 kit 11     Insulin Disposable Pump (OMNIPOD 5 G6 POD, GEN 5,) MISC 1 each every other day 45 each 11     insulin lispro (HUMALOG VIAL) 100 UNIT/ML vial INJECT 70 UNITS UNDER THE SKIN EVERY DAY VIA INSULIN PUMP 80 mL 3     Prenatal Vit-Fe Fumarate-FA (PRENATAL MULTIVITAMIN W/IRON) 27-0.8 MG tablet Take 1 tablet by mouth daily (Patient not taking: Reported on 2/8/2023)       sertraline (ZOLOFT) 100 MG tablet Take 2 tablets (200 mg) by mouth daily 60 tablet 1     sertraline (ZOLOFT) 25 MG tablet TAKE 1 TABLET BY MOUTH DAILY WITH  MG TABLETS OFR A TOTAL DAILY DOSE  MG 30 tablet 0     sulfamethoxazole-trimethoprim (BACTRIM DS) 800-160 MG tablet Take 1 tablet by mouth 2 times daily 6 tablet 0          REVIEW OF SYSTEMS:   ROS: 10 point ROS neg other than the symptoms noted above in the HPI.      EXAM:  Physical Exam (visual exam)  VS:  no vital signs taken for video visit  CONSTITUTIONAL: healthy, alert and NAD, responding appropriately  ENT: normocephalic, no visual evidence of trauma, normal nose and oral mucosa  EYES: conjunctivae and sclerae normal, no exophthalmos or proptosis  THYROID:  no visualized nodules or goiter  LUNGS: no audible wheeze, cough or visible cyanosis, no visible retractions or increased work of breathing  EXTREMITIES: no hand tremors  NEUROLOGY:  cranial nerves grossly intact with no obvious deficit.  SKIN:  no visualized skin lesions or rash, no edema visualized  PSYCH: mentation appears normal, normal judgement        ASSESSMENT/PLAN:    1) Diabetes Mellitus Type 1-   - Glucose Control- NOT at goal, improved hypoglycemia   - Focus on bolusing with 9a snack and 6p dinner- at least 15 min before. Make these two areas our target areas   - Strengthen correction factor at 7a and 6p- 41-->35   - Remaining setting stable, mostly bolus timing needs adjustment   - Will get reconnected with DM education  2.  Lipids:  Meets criteria per ASCVD risk %, no statin at this time given breast feeding  3.  Blood Pressure:  Not evaluated for this virtual visit   4.  CV prevention:  Does not meet criteria for antiplatelet therapy  5.  Diabetic Nephropathy screening:  Up to date / repeat LAURITA due 5/2024  6.  Diabetic Retinopathy screening:  Up to date, continue annual dilated eye exams July 2023  7.  Diabetic Neuropathy screening:  Up to date.  Instructed on routine foot care, follow-up with podiatry as needed.       RTC 3  months    A total of 30 minutes were spent today 05/09/23 on this visit including chart review, history and counseling, documentation and other activities as detailed above.           Again, thank you for allowing me to participate in the care of your patient.        Sincerely,        Shanon Thomas MD

## 2023-05-09 NOTE — NURSING NOTE
Chief Complaint   Patient presents with     Follow Up     Type 1 diabetes mellitus with hyperglycemia       There were no vitals filed for this visit.    There is no height or weight on file to calculate BMI.    Darlene Harris, [facility]

## 2023-05-09 NOTE — Clinical Note
Has been a while since she's see you-- can you get reconnected with her? Overall doing ok, needs to focus on less snacking/more bolusing

## 2023-05-10 ENCOUNTER — TELEPHONE (OUTPATIENT)
Dept: ENDOCRINOLOGY | Facility: CLINIC | Age: 33
End: 2023-05-10
Payer: COMMERCIAL

## 2023-05-10 DIAGNOSIS — E10.65 TYPE 1 DIABETES MELLITUS WITH HYPERGLYCEMIA (H): Primary | ICD-10-CM

## 2023-06-14 DIAGNOSIS — F41.9 ANXIETY: ICD-10-CM

## 2023-06-14 DIAGNOSIS — F33.42 RECURRENT MAJOR DEPRESSIVE DISORDER, IN FULL REMISSION (H): ICD-10-CM

## 2023-06-14 RX ORDER — SERTRALINE HYDROCHLORIDE 25 MG/1
TABLET, FILM COATED ORAL
Qty: 30 TABLET | Refills: 0 | OUTPATIENT
Start: 2023-06-14

## 2023-06-14 RX ORDER — SERTRALINE HYDROCHLORIDE 100 MG/1
200 TABLET, FILM COATED ORAL DAILY
Qty: 60 TABLET | Refills: 0 | Status: SHIPPED | OUTPATIENT
Start: 2023-06-14 | End: 2023-06-26

## 2023-06-14 NOTE — TELEPHONE ENCOUNTER
Medication requested: sertraline (ZOLOFT) 100 MG tablet  Last refilled: 4/10/23  Qty: 60/1      Last seen: 4/10/23  RTC: 4 weeks  Cancel: x 1 by clinician on 5/22/23  No-show: 0  Next appt: 6/26/23    Refill decision:   Refilled for 30 days per protocol.

## 2023-06-26 ENCOUNTER — VIRTUAL VISIT (OUTPATIENT)
Dept: PSYCHIATRY | Facility: CLINIC | Age: 33
End: 2023-06-26
Attending: NURSE PRACTITIONER
Payer: COMMERCIAL

## 2023-06-26 DIAGNOSIS — F33.42 RECURRENT MAJOR DEPRESSIVE DISORDER, IN FULL REMISSION (H): ICD-10-CM

## 2023-06-26 DIAGNOSIS — F41.9 ANXIETY: ICD-10-CM

## 2023-06-26 PROCEDURE — 99214 OFFICE O/P EST MOD 30 MIN: CPT | Mod: VID | Performed by: NURSE PRACTITIONER

## 2023-06-26 RX ORDER — SERTRALINE HYDROCHLORIDE 100 MG/1
200 TABLET, FILM COATED ORAL DAILY
Qty: 180 TABLET | Refills: 0 | Status: SHIPPED | OUTPATIENT
Start: 2023-06-26 | End: 2023-10-09

## 2023-06-26 RX ORDER — SERTRALINE HYDROCHLORIDE 25 MG/1
TABLET, FILM COATED ORAL
Qty: 90 TABLET | Refills: 0 | Status: SHIPPED | OUTPATIENT
Start: 2023-06-26 | End: 2023-10-09

## 2023-06-26 ASSESSMENT — ANXIETY QUESTIONNAIRES
6. BECOMING EASILY ANNOYED OR IRRITABLE: SEVERAL DAYS
7. FEELING AFRAID AS IF SOMETHING AWFUL MIGHT HAPPEN: SEVERAL DAYS
GAD7 TOTAL SCORE: 14
7. FEELING AFRAID AS IF SOMETHING AWFUL MIGHT HAPPEN: SEVERAL DAYS
3. WORRYING TOO MUCH ABOUT DIFFERENT THINGS: MORE THAN HALF THE DAYS
GAD7 TOTAL SCORE: 14
2. NOT BEING ABLE TO STOP OR CONTROL WORRYING: NEARLY EVERY DAY
GAD7 TOTAL SCORE: 14
IF YOU CHECKED OFF ANY PROBLEMS ON THIS QUESTIONNAIRE, HOW DIFFICULT HAVE THESE PROBLEMS MADE IT FOR YOU TO DO YOUR WORK, TAKE CARE OF THINGS AT HOME, OR GET ALONG WITH OTHER PEOPLE: SOMEWHAT DIFFICULT
5. BEING SO RESTLESS THAT IT IS HARD TO SIT STILL: NEARLY EVERY DAY
1. FEELING NERVOUS, ANXIOUS, OR ON EDGE: MORE THAN HALF THE DAYS
4. TROUBLE RELAXING: MORE THAN HALF THE DAYS
8. IF YOU CHECKED OFF ANY PROBLEMS, HOW DIFFICULT HAVE THESE MADE IT FOR YOU TO DO YOUR WORK, TAKE CARE OF THINGS AT HOME, OR GET ALONG WITH OTHER PEOPLE?: SOMEWHAT DIFFICULT

## 2023-06-26 ASSESSMENT — PATIENT HEALTH QUESTIONNAIRE - PHQ9
SUM OF ALL RESPONSES TO PHQ QUESTIONS 1-9: 6
10. IF YOU CHECKED OFF ANY PROBLEMS, HOW DIFFICULT HAVE THESE PROBLEMS MADE IT FOR YOU TO DO YOUR WORK, TAKE CARE OF THINGS AT HOME, OR GET ALONG WITH OTHER PEOPLE: NOT DIFFICULT AT ALL
SUM OF ALL RESPONSES TO PHQ QUESTIONS 1-9: 6
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
## 2023-06-26 NOTE — PATIENT INSTRUCTIONS
**For crisis resources, please see the information at the end of this document**   Patient Education    Thank you for coming to the [facility] [place] [facility].     Lab Testing:  If you had lab testing today and your results are reassuring or normal they will be mailed to you or sent through "[employer] ([employer] within 7 days. If the lab tests need quick action we will call you with the results. The phone number we will call with results is # [phone]. If this is not the best number please call our clinic and change the number.     Medication Refills:  If you need any refills please call your pharmacy and they will contact us. Our fax number for refills is [phone].   Three business days of notice are needed for general medication refill requests.   Five business days of notice are needed for controlled substance refill requests.   If you need to change to a different pharmacy, please contact the new pharmacy directly. The new pharmacy will help you get your medications transferred.     Contact Us:  Please call [phone] during business hours (8-5:00 M-F).   If you have medication related questions after clinic hours, or on the weekend, please call [phone].     Financial Assistance [phone]   Medical Records [phone]       MENTAL HEALTH CRISIS RESOURCES:  For a emergency help, please call 911 or go to the nearest Emergency Department.     Emergency Walk-In Options:   EmPATH Unit @ [facility] ([place]): [phone] - Specialized mental health emergency area designed to be calming  [facility] ([place]): [phone]  [facility] ([place]): [phone]  [facility]): [phone]    [facility] Crisis Information:   [place]: [phone]  [place]: [phone]  [place] ([place]) - Adult: [phone]     Child: [phone]  [place] - Adult: [phone]     Child: [phone]  [place]:  [phone]  List of all [facility] resources:   https://[place].gov/dhs/people-we-serve/adults/health-care/mental-health/resources/crisis-contacts.jsp    National Crisis Information:   Crisis Text Line: Text  [place]  to 291125  Suicide & Crisis Lifeline: 988  National Suicide Prevention Lifeline: 0-557-004-TALK (1-678.752.7053)       For online chat options, visit https://suicidepreventionlifeline.org/chat/  Poison Control Center: 8-660-637-1988  Trans Lifeline: 5-427-915-5508 - Hotline for transgender people of all ages  The [employer]: 8-812-253-3483 - Hotline for LGBT youth     For Non-Emergency Support:   Fast Tracker: Mental Health & Substance Use Disorder Resources -   https://www.[employer]ilotn.org/

## 2023-06-26 NOTE — NURSING NOTE
Is the patient currently in the state of [place]? YES    Visit mode:VIDEO    If the visit is dropped, the patient can be reconnected by: VIDEO VISIT: Text to cell phone: [phone]    Will anyone else be joining the visit? NO      How would you like to obtain your AVS? MyChart    Are changes needed to the allergy or medication list? NO    Reason for visit: RECHECK

## 2023-07-06 DIAGNOSIS — E10.65 TYPE 1 DIABETES MELLITUS WITH HYPERGLYCEMIA (H): ICD-10-CM

## 2023-07-06 RX ORDER — PROCHLORPERAZINE 25 MG/1
SUPPOSITORY RECTAL
Qty: 9 EACH | Refills: 3 | Status: SHIPPED | OUTPATIENT
Start: 2023-07-06 | End: 2023-07-16

## 2023-07-06 NOTE — TELEPHONE ENCOUNTER
Requested Prescriptions   Pending Prescriptions Disp Refills     Continuous Blood Gluc Sensor (DEXCOM G6 SENSOR) MISC 9 each 3     Sig: Change every 10 days.       There is no refill protocol information for this order        Last Written Prescription Date:  06/21/2022  Last Fill Quantity: 9 each,  # refills: 3   Last office visit: 5/18/2022 ; last virtual visit: 5/9/2023 with prescribing provider:  Dr. Shanon Thomas   Future Office Visit:  08/29/2023    VEENA Estevez

## 2023-07-16 ENCOUNTER — NURSE TRIAGE (OUTPATIENT)
Dept: NURSING | Facility: CLINIC | Age: 33
End: 2023-07-16
Payer: COMMERCIAL

## 2023-07-16 DIAGNOSIS — E10.65 TYPE 1 DIABETES MELLITUS WITH HYPERGLYCEMIA (H): ICD-10-CM

## 2023-07-16 RX ORDER — PROCHLORPERAZINE 25 MG/1
SUPPOSITORY RECTAL
Qty: 9 EACH | Refills: 2 | Status: SHIPPED | OUTPATIENT
Start: 2023-07-16 | End: 2023-07-17

## 2023-07-16 RX ORDER — PROCHLORPERAZINE 25 MG/1
SUPPOSITORY RECTAL
Qty: 1 EACH | Refills: 3 | Status: SHIPPED | OUTPATIENT
Start: 2023-07-16 | End: 2023-07-17

## 2023-07-16 RX ORDER — PROCHLORPERAZINE 25 MG/1
SUPPOSITORY RECTAL
Qty: 9 EACH | Refills: 2 | Status: SHIPPED | OUTPATIENT
Start: 2023-07-16 | End: 2023-07-16

## 2023-07-16 NOTE — TELEPHONE ENCOUNTER
Nurse Triage SBAR        Situation:     Patient calling back reporting pharmacy is unable to transfer Dexcom refills from [facility] as they are stating there are no refills remaining.    Background:     Type 1 diabetic    Assessment:     Denies symptoms to triage. Requesting refill only.  Original Rx through [facility].    Protocol Recommended Disposition:   Page PCP    Recommendation:      Paged to provider  128 pm paged on call provider Dr Edel Oneill through [employer] to call Edel at [facility].  Dr Oneill returned page advised ok to refill Dexcom G6 Sensor 9 each with 2 refills.  Change every 10 days.    Provider Recommendation Follow Up:   Reached patient/caregiver. Informed of provider's recommendations. Patient verbalized understanding and agrees with the plan.

## 2023-07-16 NOTE — TELEPHONE ENCOUNTER
Patient calling requesting refills of Dexcom G6 Sensors.    Stating she had spoken to her pharmacy and was advised they did not have refills remaining.  Last filled 7/6/23. Patient prefers to have prescription filled at [facility] location.    Continuous Blood Gluc Sensor (DEXCOM G6 SENSOR) MISC 9 each 3 7/6/2023  No   Sig: Change every 10 days.   Sent to pharmacy as: Dexcom G6 Sensor         [facility] and was advised patient had 3 refills remaining.    Patient notified and agrees to contact [facility] to request transfer of prescription.    Caller verbalized understanding. Denies further questions.    Edel Epstein RN  [facility]          Reason for Disposition    Patient has refills remaining on their prescription    Additional Information    Negative: New-onset or worsening symptoms, see that guideline (e.g., diarrhea, runny nose, sore throat)    Negative: Medicine question not related to refill or renewal    Negative: Caller (e.g., patient or pharmacist) requesting information about a new medicine    Negative: Caller requesting information unrelated to medicine    Negative: [1] Prescription refill request for ESSENTIAL medicine (i.e., likelihood of harm to patient if not taken) AND [2] triager unable to refill per department policy    Negative: [1] Prescription not at pharmacy AND [2] was prescribed by PCP recently  (Exception: triager has access to EMR and prescription is recorded there. Go to Home Care and confirm for pharmacy.)    Negative: [1] Pharmacy calling with prescription questions AND [2] triager unable to answer question    Negative: Prescription request for new medicine (not a refill)    Negative: Caller requesting a CONTROLLED substance prescription refill (e.g., narcotics, ADHD medicines)    Negative: [1] Prescription refill request for NON-ESSENTIAL medicine (i.e., no harm to patient if med not taken) AND [2] triager unable to refill per  department policy    Negative: [1] Caller has NON-URGENT medicine question about med that PCP prescribed AND [2] triager unable to answer question    Negative: [1] Prescription prescribed recently is not at pharmacy AND [2] triager has access to patient's EMR AND [3] prescription is recorded in the EMR    Negative: [1] Caller requesting a prescription renewal (no refills left), no triage required, AND [2] triager able to renew prescription per department policy    Protocols used: MEDICATION REFILL AND RENEWAL CALL-A-AH

## 2023-07-16 NOTE — TELEPHONE ENCOUNTER
Pt needs Transmitter.      Nurse Triage SBAR    Is this a 2nd Level Triage? YES, LICENSED PRACTITIONER REVIEW IS REQUIRED    Pt is calling states she asked for her Rx for transmitter.  She had talked to another nurse this afternoon and states that is what she asked for.  But a Rx was sent over for Continuous Blood Glu. Sensor.     Note from this afternoon.    Situation:      Patient calling back reporting pharmacy is unable to transfer Dexcom refills from [facility] as they are stating there are no refills remaining.     Background:      Type 1 diabetic     Assessment:      Denies symptoms to triage. Requesting refill only.  Original Rx through [facility].     Protocol Recommended Disposition:   Page PCP     Recommendation:       Paged to provider  128 pm paged on call provider Dr Edel Oneill through [employer] to call Edel at [facility].  Dr Oneill returned page advised ok to refill Dexcom G6 Sensor 9 each with 2 refills.  Change every 10 days.      Disp Refills Start End AUGUSTUS   Continuous Blood Gluc Transmit (DEXCOM G6 TRANSMITTER) MISC 1 each 3 6/21/2022  No   Sig: Change every 3 months.   Sent to pharmacy as: Dexcom G6 Transmitter   Class: E-Prescribe   Order: [id number]   E-Prescribing Status: Receipt confirmed by pharmacy (6/21/2022  4:03 PM CDT       Pt calling at 4:45 stating she needs the transmitter.  Called [facility].  States they did not receive the Rx for the Sensor.    I Paged the on call MD for AMINAH Palacios per advice of supervisor of [facility].      Spoke with Dr. Leiva.  She ordered both the Rx for the sensor and transmitter.      Disp Refills Start End AUGUSTUS   Continuous Blood Gluc Transmit (DEXCOM G6 TRANSMITTER) MISC 1 each 3 7/16/2023  No   Sig: Change every 3 months.   Sent to pharmacy as: Dexcom G6 Transmitter   Class: E-Prescribe   Order: [id number]   E-Prescribing Status: Receipt confirmed by pharmacy (7/16/2023  5:45 PM CDT     Continuous Blood Gluc Sensor (DEXCOM  G6 SENSOR) MISC 9 each 2 7/16/2023  No   Sig: Change every 10 days.   Sent to pharmacy as: Dexcom G6 Sensor   Class: E-Prescribe   Order: [id number]   E-Prescribing Status: Receipt confirmed by pharmacy (7/16/2023  5:46 PM CDT)          Protocol Recommended Disposition:   No disposition on file.    Recommendation:  Home care.

## 2023-07-17 DIAGNOSIS — E10.65 TYPE 1 DIABETES MELLITUS WITH HYPERGLYCEMIA (H): ICD-10-CM

## 2023-07-17 RX ORDER — PROCHLORPERAZINE 25 MG/1
SUPPOSITORY RECTAL
Qty: 9 EACH | Refills: 4 | Status: SHIPPED | OUTPATIENT
Start: 2023-07-17 | End: 2023-08-29

## 2023-07-17 RX ORDER — PROCHLORPERAZINE 25 MG/1
SUPPOSITORY RECTAL
Qty: 1 EACH | Refills: 3 | Status: SHIPPED | OUTPATIENT
Start: 2023-07-17 | End: 2023-08-29

## 2023-07-26 ENCOUNTER — FCC EXTENDED DOCUMENTATION (OUTPATIENT)
Dept: PSYCHOLOGY | Facility: CLINIC | Age: 33
End: 2023-07-26
Payer: COMMERCIAL

## 2023-07-26 DIAGNOSIS — Z03.89 NO DIAGNOSIS ON AXIS I: Primary | ICD-10-CM

## 2023-07-26 NOTE — PROGRESS NOTES
Discharge Summary  Multiple Sessions    Client Name: Preeti Leiva MRN#: [id number] YOB: 1990      Intake / Discharge Date: Intake: 3/2022 Discharged: 6/2022    DSM5 Diagnoses: (Sustained by DSM5 Criteria Listed Above)  Diagnoses: 300.00 (F41.9) Unspecified Anxiety Disorder  Psychosocial & Contextual Factors: See chart  WHODAS 2.0 (12 item) Score: NA          Presenting Concern:  See chart    Reason for Discharge:  Client did not return   A message was sent to the client regarding whether or not they would like ongoing therapy services and the client did not respond.       Disposition at Time of Last Encounter:   Comments:   No comments     Risk Management:   Client denies a history of suicidal ideation, suicide attempts, self-injurious behavior, homicidal ideation, homicidal behavior and and other safety concerns  Recommended that patient call 911 or go to the local ED should there be a change in any of these risk factors.      Referred To:  Primary Care        FE Kirkland, SHIVAM   7/26/2023

## 2023-08-14 DIAGNOSIS — E10.65 TYPE 1 DIABETES MELLITUS WITH HYPERGLYCEMIA (H): ICD-10-CM

## 2023-08-14 RX ORDER — INSULIN LISPRO 100 [IU]/ML
INJECTION, SOLUTION INTRAVENOUS; SUBCUTANEOUS
Qty: 80 ML | Refills: 1 | Status: SHIPPED | OUTPATIENT
Start: 2023-08-14 | End: 2023-08-16

## 2023-08-14 NOTE — TELEPHONE ENCOUNTER
"Requested Prescriptions   Pending Prescriptions Disp Refills    insulin lispro (HUMALOG) 100 UNIT/ML vial [Pharmacy Med Name: HUMALOG INSULIN ([id number]) 10ML] 80 mL      Sig: INJECT 70 UNITS UNDER THE SKIN EVERY DAY VIA INSULIN PUM       Short Acting Insulin Protocol Passed - 8/14/2023  8:14 AM        Passed - Serum creatinine on file in past 12 months     Recent Labs   Lab Test 05/01/23  0731   CR 0.72       Ok to refill medication if creatinine is low          Passed - HgbA1C in past 3 or 6 months     If HgbA1C is 8 or greater, it needs to be on file within the past 3 months.  If less than 8, must be on file within the past 6 months.     Recent Labs   Lab Test 05/01/23  0731   A1C 7.9*             Passed - Medication is active on med list        Passed - Patient is age 18 or older        Passed - Recent (6 mo) or future (30 days) visit within the authorizing provider's specialty     Patient had office visit in the last 6 months or has a visit in the next 30 days with authorizing provider or within the authorizing provider's specialty.  See \"Patient Info\" tab in inbasket, or \"Choose Columns\" in Meds & Orders section of the refill encounter.              Last Written Prescription Date:  5/18/22  Last Fill Quantity: 80mL,  # refills: 3   Last office visit: 5/18/2022 ; last virtual visit: 5/9/2023 with prescribing provider:  Dr Thomas   Future Office Visit:  8/29/23    Refill sent.  Caden Garza RN               "

## 2023-08-16 ENCOUNTER — MYC MEDICAL ADVICE (OUTPATIENT)
Dept: EDUCATION SERVICES | Facility: CLINIC | Age: 33
End: 2023-08-16
Payer: COMMERCIAL

## 2023-08-16 DIAGNOSIS — E10.65 TYPE 1 DIABETES MELLITUS WITH HYPERGLYCEMIA (H): ICD-10-CM

## 2023-08-16 RX ORDER — INSULIN LISPRO 100 [IU]/ML
INJECTION, SOLUTION INTRAVENOUS; SUBCUTANEOUS
Qty: 80 ML | Refills: 1 | Status: SHIPPED | OUTPATIENT
Start: 2023-08-16 | End: 2023-08-29

## 2023-08-29 ENCOUNTER — VIRTUAL VISIT (OUTPATIENT)
Dept: ENDOCRINOLOGY | Facility: CLINIC | Age: 33
End: 2023-08-29
Payer: COMMERCIAL

## 2023-08-29 DIAGNOSIS — E10.65 TYPE 1 DIABETES MELLITUS WITH HYPERGLYCEMIA (H): ICD-10-CM

## 2023-08-29 PROCEDURE — 99214 OFFICE O/P EST MOD 30 MIN: CPT | Mod: VID | Performed by: INTERNAL MEDICINE

## 2023-08-29 RX ORDER — PROCHLORPERAZINE 25 MG/1
SUPPOSITORY RECTAL
Qty: 9 EACH | Refills: 4 | Status: SHIPPED | OUTPATIENT
Start: 2023-08-29 | End: 2024-08-29

## 2023-08-29 RX ORDER — INSULIN PMP CART,AUT,G6/7,CNTR
1 EACH SUBCUTANEOUS EVERY OTHER DAY
Qty: 45 EACH | Refills: 11 | Status: SHIPPED | OUTPATIENT
Start: 2023-08-29 | End: 2024-08-29

## 2023-08-29 RX ORDER — PROCHLORPERAZINE 25 MG/1
SUPPOSITORY RECTAL
Qty: 1 EACH | Refills: 3 | Status: SHIPPED | OUTPATIENT
Start: 2023-08-29

## 2023-08-29 RX ORDER — INSULIN LISPRO 100 [IU]/ML
INJECTION, SOLUTION INTRAVENOUS; SUBCUTANEOUS
Qty: 80 ML | Refills: 1 | Status: SHIPPED | OUTPATIENT
Start: 2023-08-29 | End: 2024-08-29

## 2023-08-29 ASSESSMENT — ENCOUNTER SYMPTOMS
DECREASED CONCENTRATION: 1
INSOMNIA: 0
PANIC: 0
DEPRESSION: 1
NERVOUS/ANXIOUS: 1

## 2023-08-29 NOTE — PROGRESS NOTES
Video-Visit Details    Type of service:  Video Visit  Video Start Time: 3:08  Video End Time: 3:35  Originating Location (pt. Location): [place], [place]  Distant Location (provider location):  Home  Platform used for Video Visit: Norm Thomas MD    Preeti Leiva is a 33 year old year old female here for evaluation of  diabetes type 1 via a billable video visit. She was last seen by me May 2023.    INTERVAL HISTORY:  - Busy at work  - Feels less motivation, more pulls/demands on time to find it hard to remember to bolus, frequently bolusing after meals  - Continuing to breastfeed, feels hungry all the time,  baby doing well, staying with mom--baby girl Shirin Talley-      1) Diabetes Mellitus in pregnancy    Diabetes History:  Diagnosis: Age 6,  Pregnancy- Baby girl born at 36w- spent a few days in NICU for hypoglyemia, 9lb 10oz. Scheduled csection in setting of fetal macrosomia.   Hospitalizations: DKA, 2008 most recently, difficult time in college managing   Previous Regimens: almost always on pump, most recently Medtronic/Guardian, but didn't use automode-- kicked out frequently and didn't work through this  Current Regimen: OMNIPOD 5/DEXCOM                    Kicked out of automated mode more-- now 19% in this setting  Missed boluses, high BG      Last eye exam- July 2022-- mild NPDR    BP Readings from Last 3 Encounters:   01/18/23 136/85   01/09/23 101/72   12/20/22 108/70       Lab Results   Component Value Date    A1C 6.1 10/04/2022    A1C 6.5 06/10/2022    A1C 8.6 02/10/2022    A1C 9.4 08/20/2020    A1C 8.4 01/02/2020    A1C 7.8 01/29/2019       Wt Readings from Last 3 Encounters:   01/18/23 73 kg (161 lb)   01/09/23 71.2 kg (157 lb)   12/20/22 73 kg (161 lb)       Current Outpatient Medications   Medication Sig Dispense Refill    HUMALOG 100 UNIT/ML injection INJECT 70 UNITS UNDER THE SKIN EVERY DAY VIA INSULIN PUMP. HUMALOG BRAND ONLY. 80 mL 1    sertraline (ZOLOFT) 100 MG tablet Take 2 tablets  (200 mg) by mouth daily 180 tablet 0    sertraline (ZOLOFT) 25 MG tablet TAKE 1 TABLET BY MOUTH DAILY WITH  MG TABLETS OFR A TOTAL DAILY DOSE  MG 90 tablet 0    Continuous Blood Gluc Sensor (DEXCOM G6 SENSOR) MISC Change every 10 days. 9 each 4    Continuous Blood Gluc Transmit (DEXCOM G6 TRANSMITTER) MISC Change every 3 months. 1 each 3    Glucagon, rDNA, (GLUCAGON EMERGENCY) 1 MG KIT Inject 1 mg as directed daily as needed (hypoglycemia) 2 kit 11    Insulin Disposable Pump (OMNIPOD 5 G6 POD, GEN 5,) MISC 1 each every other day 45 each 11    sulfamethoxazole-trimethoprim (BACTRIM DS) 800-160 MG tablet Take 1 tablet by mouth 2 times daily (Patient not taking: Reported on 8/29/2023) 6 tablet 0          REVIEW OF SYSTEMS:   ROS: 10 point ROS neg other than the symptoms noted above in the HPI.      EXAM:  Physical Exam (visual exam)  VS:  no vital signs taken for video visit  CONSTITUTIONAL: healthy, alert and NAD, responding appropriately  ENT: normocephalic, no visual evidence of trauma, normal nose and oral mucosa  EYES: conjunctivae and sclerae normal, no exophthalmos or proptosis  THYROID:  no visualized nodules or goiter  LUNGS: no audible wheeze, cough or visible cyanosis, no visible retractions or increased work of breathing  EXTREMITIES: no hand tremors  NEUROLOGY: cranial nerves grossly intact with no obvious deficit.  SKIN:  no visualized skin lesions or rash, no edema visualized  PSYCH: mentation appears normal, normal judgement        ASSESSMENT/PLAN:    1) Diabetes Mellitus Type 1-   - Glucose Control- NOT at goal, improved hypoglycemia   Basal: (significant increase compared to previous)  2:00 AM (9 hr) 0.55 Units/hr  > 1.0  9:00 AM (15 hr) 0.75 Units/hr > 1.0  3p-[place] 0.75 > 1.35    CHO  12:00 AM (5.5 hr) 12 g/Unit  5:30 AM (5.5 hr) 10 g/Unit  11:00 AM (5 hr) 12 g/Unit > 11  4:00 PM (2 hr) 12 g/Unit >11  6:00 PM (3 hr) 10 g/Unit  9:00 PM (3 hr) 12 g/Unit    Sensitivity factor  12:00 AM (7 hr)  41 mg/dL >35  7:00 AM (8 hr) 35 mg/dL > 30 (15% increase)  3:00 PM (3 hr) 41 mg/dL >35  6:00 PM (6 hr) 35 mg/dL >30 (15% increase)    - Focus on bolusing 15 min in advance with every meal.   - Goal- stay in automated mode    2.  Lipids:  Meets criteria per ASCVD risk %, no statin at this time given breast feeding  3.  Blood Pressure:  Not evaluated for this virtual visit   4.  CV prevention:  Does not meet criteria for antiplatelet therapy  5.  Diabetic Nephropathy screening:  Up to date / repeat LAURITA due 5/2024  6.  Diabetic Retinopathy screening:  Up to date, continue annual dilated eye exams July 2023  7.  Diabetic Neuropathy screening:  Up to date.  Instructed on routine foot care, follow-up with podiatry as needed.       RTC 3  months    A total of 30 minutes were spent today 08/29/23 on this visit including chart review, history and counseling, documentation and other activities as detailed above.     Answers submitted by the patient for this visit:  Symptoms you have experienced in the last 30 days (Submitted on 8/29/2023)  General Symptoms: No  Skin Symptoms: No  HENT Symptoms: No  EYE SYMPTOMS: No  HEART SYMPTOMS: No  LUNG SYMPTOMS: No  INTESTINAL SYMPTOMS: No  URINARY SYMPTOMS: No  GYNECOLOGIC SYMPTOMS: No  BREAST SYMPTOMS: No  SKELETAL SYMPTOMS: No  BLOOD SYMPTOMS: No  NERVOUS SYSTEM SYMPTOMS: No  MENTAL HEALTH SYMPTOMS: Yes  Please answer the questions below to tell us what condition you are experiencing: (Submitted on 8/29/2023)  Nervous or Anxious: Yes  Depression: Yes  Trouble sleeping: No  Trouble thinking or concentrating: Yes  Mood changes: No  Panic attacks: No

## 2023-08-29 NOTE — LETTER
8/29/2023         RE: Preeti Leiva  [address]  [place] [address]        Dear Colleague,    Thank you for referring your patient, Preeti Leiva, to the [facility] [place]. Please see a copy of my visit note below.      Video-Visit Details    Type of service:  Video Visit  Video Start Time: 3:08  Video End Time: 3:35  Originating Location (pt. Location): [place], [place]  Distant Location (provider location):  Home  Platform used for Video Visit: Norm Thomas MD    Preeti Leiva is a 33 year old year old female here for evaluation of  diabetes type 1 via a billable video visit. She was last seen by me May 2023.    INTERVAL HISTORY:  - Busy at work  - Feels less motivation, more pulls/demands on time to find it hard to remember to bolus, frequently bolusing after meals  - Continuing to breastfeed, feels hungry all the time,  baby doing well, staying with mom--baby girl Shirin Talley-      1) Diabetes Mellitus in pregnancy    Diabetes History:  Diagnosis: Age 6,  Pregnancy- Baby girl born at 36w- spent a few days in NICU for hypoglyemia, 9lb 10oz. Scheduled csection in setting of fetal macrosomia.   Hospitalizations: DKA, 2008 most recently, difficult time in college managing   Previous Regimens: almost always on pump, most recently Medtronic/Guardian, but didn't use automode-- kicked out frequently and didn't work through this  Current Regimen: OMNIPOD 5/DEXCOM                    Kicked out of automated mode more-- now 19% in this setting  Missed boluses, high BG      Last eye exam- July 2022-- mild NPDR    BP Readings from Last 3 Encounters:   01/18/23 136/85   01/09/23 101/72   12/20/22 108/70       Lab Results   Component Value Date    A1C 6.1 10/04/2022    A1C 6.5 06/10/2022    A1C 8.6 02/10/2022    A1C 9.4 08/20/2020    A1C 8.4 01/02/2020    A1C 7.8 01/29/2019       Wt Readings from Last 3 Encounters:   01/18/23 73 kg (161 lb)   01/09/23 71.2 kg (157 lb)    12/20/22 73 kg (161 lb)       Current Outpatient Medications   Medication Sig Dispense Refill     HUMALOG 100 UNIT/ML injection INJECT 70 UNITS UNDER THE SKIN EVERY DAY VIA INSULIN PUMP. HUMALOG BRAND ONLY. 80 mL 1     sertraline (ZOLOFT) 100 MG tablet Take 2 tablets (200 mg) by mouth daily 180 tablet 0     sertraline (ZOLOFT) 25 MG tablet TAKE 1 TABLET BY MOUTH DAILY WITH  MG TABLETS OFR A TOTAL DAILY DOSE  MG 90 tablet 0     Continuous Blood Gluc Sensor (DEXCOM G6 SENSOR) MISC Change every 10 days. 9 each 4     Continuous Blood Gluc Transmit (DEXCOM G6 TRANSMITTER) MISC Change every 3 months. 1 each 3     Glucagon, rDNA, (GLUCAGON EMERGENCY) 1 MG KIT Inject 1 mg as directed daily as needed (hypoglycemia) 2 kit 11     Insulin Disposable Pump (OMNIPOD 5 G6 POD, GEN 5,) MISC 1 each every other day 45 each 11     sulfamethoxazole-trimethoprim (BACTRIM DS) 800-160 MG tablet Take 1 tablet by mouth 2 times daily (Patient not taking: Reported on 8/29/2023) 6 tablet 0          REVIEW OF SYSTEMS:   ROS: 10 point ROS neg other than the symptoms noted above in the HPI.      EXAM:  Physical Exam (visual exam)  VS:  no vital signs taken for video visit  CONSTITUTIONAL: healthy, alert and NAD, responding appropriately  ENT: normocephalic, no visual evidence of trauma, normal nose and oral mucosa  EYES: conjunctivae and sclerae normal, no exophthalmos or proptosis  THYROID:  no visualized nodules or goiter  LUNGS: no audible wheeze, cough or visible cyanosis, no visible retractions or increased work of breathing  EXTREMITIES: no hand tremors  NEUROLOGY: cranial nerves grossly intact with no obvious deficit.  SKIN:  no visualized skin lesions or rash, no edema visualized  PSYCH: mentation appears normal, normal judgement        ASSESSMENT/PLAN:    1) Diabetes Mellitus Type 1-   - Glucose Control- NOT at goal, improved hypoglycemia   Basal: (significant increase compared to previous)  2:00 AM (9 hr) 0.55 Units/hr  >  1.0  9:00 AM (15 hr) 0.75 Units/hr > 1.0  3p-[place] 0.75 > 1.35    CHO  12:00 AM (5.5 hr) 12 g/Unit  5:30 AM (5.5 hr) 10 g/Unit  11:00 AM (5 hr) 12 g/Unit > 11  4:00 PM (2 hr) 12 g/Unit >11  6:00 PM (3 hr) 10 g/Unit  9:00 PM (3 hr) 12 g/Unit    Sensitivity factor  12:00 AM (7 hr) 41 mg/dL >35  7:00 AM (8 hr) 35 mg/dL > 30 (15% increase)  3:00 PM (3 hr) 41 mg/dL >35  6:00 PM (6 hr) 35 mg/dL >30 (15% increase)    - Focus on bolusing 15 min in advance with every meal.   - Goal- stay in automated mode    2.  Lipids:  Meets criteria per ASCVD risk %, no statin at this time given breast feeding  3.  Blood Pressure:  Not evaluated for this virtual visit   4.  CV prevention:  Does not meet criteria for antiplatelet therapy  5.  Diabetic Nephropathy screening:  Up to date / repeat LAURITA due 5/2024  6.  Diabetic Retinopathy screening:  Up to date, continue annual dilated eye exams July 2023  7.  Diabetic Neuropathy screening:  Up to date.  Instructed on routine foot care, follow-up with podiatry as needed.       RTC 3  months    A total of 30 minutes were spent today 08/29/23 on this visit including chart review, history and counseling, documentation and other activities as detailed above.     Answers submitted by the patient for this visit:  Symptoms you have experienced in the last 30 days (Submitted on 8/29/2023)  General Symptoms: No  Skin Symptoms: No  HENT Symptoms: No  EYE SYMPTOMS: No  HEART SYMPTOMS: No  LUNG SYMPTOMS: No  INTESTINAL SYMPTOMS: No  URINARY SYMPTOMS: No  GYNECOLOGIC SYMPTOMS: No  BREAST SYMPTOMS: No  SKELETAL SYMPTOMS: No  BLOOD SYMPTOMS: No  NERVOUS SYSTEM SYMPTOMS: No  MENTAL HEALTH SYMPTOMS: Yes  Please answer the questions below to tell us what condition you are experiencing: (Submitted on 8/29/2023)  Nervous or Anxious: Yes  Depression: Yes  Trouble sleeping: No  Trouble thinking or concentrating: Yes  Mood changes: No  Panic attacks: No      Again, thank you for allowing me to participate in the care  of your patient.        Sincerely,        Shanon Thomas MD

## 2023-08-30 ENCOUNTER — TELEPHONE (OUTPATIENT)
Dept: ENDOCRINOLOGY | Facility: CLINIC | Age: 33
End: 2023-08-30
Payer: COMMERCIAL

## 2023-10-06 DIAGNOSIS — F41.9 ANXIETY: ICD-10-CM

## 2023-10-06 DIAGNOSIS — F33.42 RECURRENT MAJOR DEPRESSIVE DISORDER, IN FULL REMISSION (H): ICD-10-CM

## 2023-10-08 ENCOUNTER — HEALTH MAINTENANCE LETTER (OUTPATIENT)
Age: 33
End: 2023-10-08

## 2023-10-09 RX ORDER — SERTRALINE HYDROCHLORIDE 25 MG/1
25 TABLET, FILM COATED ORAL DAILY
Qty: 30 TABLET | Refills: 0 | Status: SHIPPED | OUTPATIENT
Start: 2023-10-09 | End: 2023-10-13

## 2023-10-09 RX ORDER — SERTRALINE HYDROCHLORIDE 100 MG/1
200 TABLET, FILM COATED ORAL DAILY
Qty: 60 TABLET | Refills: 0 | Status: SHIPPED | OUTPATIENT
Start: 2023-10-09 | End: 2023-10-13

## 2023-10-09 NOTE — TELEPHONE ENCOUNTER
Medication requested: sertraline (ZOLOFT) 100 MG tablet   Last refilled: 6/26/23  Qty: 180    Medication requested: sertraline (ZOLOFT) 25 MG tablet   Last refilled: 6/26/23  Qty: 90    Last seen: 6/26/23  RTC: 12 weeks  Cancel: x 1 on 9/20/23  No-show: 0  Next appt: 0    Refill decision:   Refill pended and routed to the provider for review/determination due to   Cancel x 1  Scheduling has been notified to contact the pt for appointment.

## 2023-10-11 DIAGNOSIS — F33.42 RECURRENT MAJOR DEPRESSIVE DISORDER, IN FULL REMISSION (H): ICD-10-CM

## 2023-10-11 DIAGNOSIS — F41.9 ANXIETY: ICD-10-CM

## 2023-10-11 NOTE — TELEPHONE ENCOUNTER
Elisabeth,     A refill was just sent on 10-6-2023. Pharmacy/ patient is now requesting for 90 days supply, qty 180.    Upcoming appointment with you on 10-.    Approve or deny ? Let me know and I will pend it for you.    Sari Marmolejo on 10/11/2023 at 7:33 AM

## 2023-10-13 RX ORDER — SERTRALINE HYDROCHLORIDE 25 MG/1
25 TABLET, FILM COATED ORAL DAILY
Qty: 90 TABLET | Refills: 0 | Status: SHIPPED | OUTPATIENT
Start: 2023-10-13 | End: 2023-10-27

## 2023-10-13 RX ORDER — SERTRALINE HYDROCHLORIDE 100 MG/1
200 TABLET, FILM COATED ORAL DAILY
Qty: 180 TABLET | Refills: 0 | Status: SHIPPED | OUTPATIENT
Start: 2023-10-13 | End: 2024-01-15

## 2023-10-18 ENCOUNTER — OFFICE VISIT (OUTPATIENT)
Dept: OBGYN | Facility: CLINIC | Age: 33
End: 2023-10-18
Payer: COMMERCIAL

## 2023-10-18 VITALS
HEART RATE: 98 BPM | OXYGEN SATURATION: 98 % | WEIGHT: 168.7 LBS | BODY MASS INDEX: 23.53 KG/M2 | DIASTOLIC BLOOD PRESSURE: 81 MMHG | SYSTOLIC BLOOD PRESSURE: 119 MMHG

## 2023-10-18 DIAGNOSIS — Z30.430 ENCOUNTER FOR INSERTION OF INTRAUTERINE CONTRACEPTIVE DEVICE: ICD-10-CM

## 2023-10-18 DIAGNOSIS — Z30.49 ENCOUNTER FOR SURVEILLANCE OF OTHER CONTRACEPTIVE: Primary | ICD-10-CM

## 2023-10-18 LAB — HCG UR QL: NEGATIVE

## 2023-10-18 PROCEDURE — 58300 INSERT INTRAUTERINE DEVICE: CPT | Performed by: OBSTETRICS & GYNECOLOGY

## 2023-10-18 PROCEDURE — 81025 URINE PREGNANCY TEST: CPT | Performed by: OBSTETRICS & GYNECOLOGY

## 2023-10-18 ASSESSMENT — PATIENT HEALTH QUESTIONNAIRE - PHQ9: SUM OF ALL RESPONSES TO PHQ QUESTIONS 1-9: 3

## 2023-10-18 NOTE — PROGRESS NOTES
IUD Insertion:  CONSULT:    Is a pregnancy test required: Yes.  Was it positive or negative?  Negative  Was a consent obtained?  Yes    Subjective: Preeti Leiva is a 33 year old  presents for IUD and desires Mirena type IUD.    Patient has been given the opportunity to ask questions about all forms of birth control, including all options appropriate for Preeti Leiva. Discussed that no method of birth control, except abstinence is 100% effective against pregnancy or sexually transmitted infection.     Preeti Leiva understands she may have the IUD removed at any time. IUD should be removed by a health care provider.    The entire insertion procedure was reviewed with the patient, including care after placement.    Patient's last menstrual period was 10/12/2023 (exact date). Has current contraception. No allergy to betadine or shellfish. Patient declines STD screening  HCG Qual Urine   Date Value Ref Range Status   2019 Negative NEG^Negative Final     Comment:     This test is for screening purposes.  Results should be interpreted along with   the clinical picture.  Confirmation testing is available if warranted by   ordering [id number], HCG Quantitative Pregnancy.       hCG Urine Qualitative   Date Value Ref Range Status   10/18/2023 Negative Negative Final     Comment:     This test is for screening purposes.  Results should be interpreted along with the clinical picture.  Confirmation testing is available if warranted by ordering [id number], HCG Quantitative Pregnancy.         /81 (BP Location: Left arm, Patient Position: Sitting, Cuff Size: Adult Regular)   Pulse 98   Wt 76.5 kg (168 lb 11.2 oz)   LMP 10/12/2023 (Exact Date)   SpO2 98%   BMI 23.53 kg/m      Pelvic Exam:   EG/BUS: normal genital architecture without lesions, erythema or abnormal secretions.   Vagina: moist, pink, rugae with physiologic discharge and secretions  Cervix: nulliparous no lesions and pink, moist, closed,  without lesion or CMT  Uterus: anteverted position, mobile, no pain  Adnexa: within normal limits and no masses, nodularity, tenderness    PROCEDURE NOTE: -- IUD Insertion    Reason for Insertion: contraception    Premedicated with ibuprofen. Paracervical block performed.  Under sterile technique, cervix was visualized with speculum and prepped with Betadine solution swab x 3. Tenaculum was placed for stability. The uterus was gently straightened and sounded to 8.0 cm. IUD prepared for placement, and IUD inserted according to 's instructions without difficulty or significant resitance, and deployed at the fundus. The strings were visualized and trimmed to 3.0 cm from the external os. Tenaculum was removed and hemostasis noted. Speculum removed.  Patient tolerated procedure well.    Lot # see MAR  Exp: see MAR    EBL: minimal    Complications: none    ASSESSMENT:     ICD-10-CM    1. Encounter for surveillance of other contraceptive  Z30.49 HCG Qual, Urine ([id number])      2. Encounter for insertion of intrauterine contraceptive device  Z30.430 levonorgestrel (MIRENA) 52 MG (20 mcg/day) IUD 1 each     INSERTION INTRAUTERINE DEVICE             PLAN:    Given 's handouts, including when to have IUD removed, list of danger s/sx, side effects and follow up recommended. Encouraged condom use for prevention of STD. Back up contraception advised for 7 days if progestin method. Advised to call for any fever, for prolonged or severe pain or bleeding, abnormal vaginal discharge, or unable to palpate strings. She was advised to use pain medications (ibuprofen) as needed for mild to moderate pain. Advised to follow-up in clinic in 4-6 weeks for IUD string check if unable to find strings or as directed by provider.     Lubna Glynn MD

## 2023-10-27 ENCOUNTER — VIRTUAL VISIT (OUTPATIENT)
Dept: PSYCHIATRY | Facility: CLINIC | Age: 33
End: 2023-10-27
Attending: NURSE PRACTITIONER
Payer: COMMERCIAL

## 2023-10-27 DIAGNOSIS — F33.42 RECURRENT MAJOR DEPRESSIVE DISORDER, IN FULL REMISSION (H): ICD-10-CM

## 2023-10-27 DIAGNOSIS — F41.9 ANXIETY: ICD-10-CM

## 2023-10-27 PROCEDURE — 99214 OFFICE O/P EST MOD 30 MIN: CPT | Mod: VID | Performed by: NURSE PRACTITIONER

## 2023-10-27 RX ORDER — BUPROPION HYDROCHLORIDE 100 MG/1
50 TABLET ORAL EVERY MORNING
Qty: 30 TABLET | Refills: 1 | Status: SHIPPED | OUTPATIENT
Start: 2023-10-27 | End: 2023-12-04

## 2023-10-27 ASSESSMENT — PATIENT HEALTH QUESTIONNAIRE - PHQ9
10. IF YOU CHECKED OFF ANY PROBLEMS, HOW DIFFICULT HAVE THESE PROBLEMS MADE IT FOR YOU TO DO YOUR WORK, TAKE CARE OF THINGS AT HOME, OR GET ALONG WITH OTHER PEOPLE: SOMEWHAT DIFFICULT
SUM OF ALL RESPONSES TO PHQ QUESTIONS 1-9: 4
SUM OF ALL RESPONSES TO PHQ QUESTIONS 1-9: 4

## 2023-10-27 ASSESSMENT — PAIN SCALES - GENERAL: PAINLEVEL: NO PAIN (0)

## 2023-10-27 NOTE — PATIENT INSTRUCTIONS
**For crisis resources, please see the information at the end of this document**   Patient Education    Thank you for coming to the [facility] [place] [facility].     Lab Testing:  If you had lab testing today and your results are reassuring or normal they will be mailed to you or sent through [facility] within 7 days. If the lab tests need quick action we will call you with the results. The phone number we will call with results is # [phone]. If this is not the best number please call our clinic and change the number.     Medication Refills:  If you need any refills please call your pharmacy and they will contact us. Our fax number for refills is [phone].   Three business days of notice are needed for general medication refill requests.   Five business days of notice are needed for controlled substance refill requests.   If you need to change to a different pharmacy, please contact the new pharmacy directly. The new pharmacy will help you get your medications transferred.     Contact Us:  Please call [phone] during business hours (8-5:00 M-F).   If you have medication related questions after clinic hours, or on the weekend, please call [phone].     Financial Assistance [phone]   Medical Records [phone]       MENTAL HEALTH CRISIS RESOURCES:  For a emergency help, please call 911 or go to the nearest Emergency Department.     Emergency Walk-In Options:   EmPATH Unit @ [facility] ([place]): [phone] - Specialized mental health emergency area designed to be calming  [facility] ([place]): [phone]  [facility] ([place]): [phone]  [facility]): [phone]    [facility] Crisis Information:   [place]: [phone]  [place]: [phone]  [place] ([place]) - Adult: [phone]     Child: [phone]  [place] - Adult: [phone]     Child: [phone]  [place]:  [phone]  List of all [facility] resources:   https://[place].gov/dhs/people-we-serve/adults/health-care/mental-health/resources/crisis-contacts.jsp    National Crisis Information:   Crisis Text Line: Text  [place]  to 120907  Suicide & Crisis Lifeline: 988  National Suicide Prevention Lifeline: 0-524-911-TALK (1-828.423.7174)       For online chat options, visit https://suicidepreventionlifeline.org/chat/  Poison Control Center: 2-734-266-1106  Trans Lifeline: 5-830-119-6827 - Hotline for transgender people of all ages  [employer]: 7-087-739-7373 - Hotline for LGBT youth     For Non-Emergency Support:   Fast Tracker: Mental Health & Substance Use Disorder Resources -   https://www.[employer].org/

## 2023-10-27 NOTE — NURSING NOTE
Is the patient currently in the state of [place]? YES    Visit mode:VIDEO    If the visit is dropped, the patient can be reconnected by: VIDEO VISIT: Text to cell phone:   Telephone Information:   Mobile [phone]       Will anyone else be joining the visit? NO  (If patient encounters technical issues they should call [phone])    How would you like to obtain your AVS? MyChart    Are changes needed to the allergy or medication list? No    Reason for visit: RECHECK    Rukhsana BLAND
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
## 2023-12-04 ENCOUNTER — VIRTUAL VISIT (OUTPATIENT)
Dept: PSYCHIATRY | Facility: CLINIC | Age: 33
End: 2023-12-04
Attending: NURSE PRACTITIONER
Payer: COMMERCIAL

## 2023-12-04 DIAGNOSIS — F33.42 RECURRENT MAJOR DEPRESSIVE DISORDER, IN FULL REMISSION (H): ICD-10-CM

## 2023-12-04 DIAGNOSIS — F41.9 ANXIETY: ICD-10-CM

## 2023-12-04 PROCEDURE — 99214 OFFICE O/P EST MOD 30 MIN: CPT | Mod: VID | Performed by: NURSE PRACTITIONER

## 2023-12-04 RX ORDER — BUPROPION HYDROCHLORIDE 100 MG/1
100 TABLET ORAL EVERY MORNING
Qty: 30 TABLET | Refills: 0 | Status: SHIPPED | OUTPATIENT
Start: 2023-12-04 | End: 2024-01-15

## 2023-12-04 ASSESSMENT — PAIN SCALES - GENERAL: PAINLEVEL: NO PAIN (0)

## 2023-12-04 ASSESSMENT — PATIENT HEALTH QUESTIONNAIRE - PHQ9
SUM OF ALL RESPONSES TO PHQ QUESTIONS 1-9: 5
10. IF YOU CHECKED OFF ANY PROBLEMS, HOW DIFFICULT HAVE THESE PROBLEMS MADE IT FOR YOU TO DO YOUR WORK, TAKE CARE OF THINGS AT HOME, OR GET ALONG WITH OTHER PEOPLE: SOMEWHAT DIFFICULT
SUM OF ALL RESPONSES TO PHQ QUESTIONS 1-9: 5

## 2023-12-04 NOTE — NURSING NOTE
Is the patient currently in the state of [place]? YES    Visit mode:VIDEO    If the visit is dropped, the patient can be reconnected by: VIDEO VISIT: Text to cell phone:   Telephone Information:   Mobile [phone]       Will anyone else be joining the visit? NO  (If patient encounters technical issues they should call [phone]    How would you like to obtain your AVS? MyChart    Are changes needed to the allergy or medication list? No  Patient denies any changes since echeck-in regarding medication and allergies and states all information entered during echeck-in remains accurate.     Reason for visit: RECHECK    Roxanne BLAND

## 2023-12-04 NOTE — PATIENT INSTRUCTIONS
**For crisis resources, please see the information at the end of this document**   Patient Education    Thank you for coming to the [facility] [place] [facility].     Lab Testing:  If you had lab testing today and your results are reassuring or normal they will be mailed to you or sent through [email or web address] within 7 days. If the lab tests need quick action we will call you with the results. The phone number we will call with results is # [phone]. If this is not the best number please call our clinic and change the number.     Medication Refills:  If you need any refills please call your pharmacy and they will contact us. Our fax number for refills is [phone].   Three business days of notice are needed for general medication refill requests.   Five business days of notice are needed for controlled substance refill requests.   If you need to change to a different pharmacy, please contact the new pharmacy directly. The new pharmacy will help you get your medications transferred.     Contact Us:  Please call [phone] during business hours (8-5:00 M-F).   If you have medication related questions after clinic hours, or on the weekend, please call [phone].     Financial Assistance [phone]   Medical Records [phone]       MENTAL HEALTH CRISIS RESOURCES:  For a emergency help, please call 911 or go to the nearest Emergency Department.     Emergency Walk-In Options:   EmPATH Unit @ [facility] ([place]): [phone] - Specialized mental health emergency area designed to be calming  [facility] ([place]): [phone]  [facility] ([place]): [phone]  [facility]): [phone]    [facility] Crisis Information:   [place]: [phone]  [place]: [phone]  [place] ([place]) - Adult: [phone]     Child: [phone]  [place] - Adult: [phone]     Child: [phone]  [place]:  [phone]  List of all [facility] resources:   https://[place].gov/dhs/people-we-serve/adults/health-care/mental-health/resources/crisis-contacts.jsp    National Crisis Information:   Crisis Text Line: Text  [place]  to 747392  Suicide & Crisis Lifeline: 988  National Suicide Prevention Lifeline: 8-137-245-TALK (1-661.164.1971)       For online chat options, visit https://suicidepreventionlifeline.org/chat/  Poison Control Center: 4-570-613-9306  Trans Lifeline: 9-811-840-7266 - Hotline for transgender people of all ages  The [place] Project: 9-184-866-9590 - Hotline for LGBT youth     For Non-Emergency Support:   Fast Tracker: Mental Health & Substance Use Disorder Resources -   https://www.[place].org/
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
## 2023-12-15 ENCOUNTER — TELEPHONE (OUTPATIENT)
Dept: BEHAVIORAL HEALTH | Facility: CLINIC | Age: 33
End: 2023-12-15

## 2023-12-15 NOTE — TELEPHONE ENCOUNTER
Called pt at [phone] and spoke to Preeti GUERRERO She agreed to schedule an intake appointment w/ Liana Pelaez on 12/21 at 8am in Person at [facility] clinic for psychotherapy. Sent request to schedule to .

## 2023-12-21 ENCOUNTER — OFFICE VISIT (OUTPATIENT)
Dept: BEHAVIORAL HEALTH | Facility: CLINIC | Age: 33
End: 2023-12-21
Payer: COMMERCIAL

## 2023-12-21 DIAGNOSIS — F43.10 PTSD (POST-TRAUMATIC STRESS DISORDER): Primary | ICD-10-CM

## 2023-12-21 DIAGNOSIS — F41.0 GENERALIZED ANXIETY DISORDER WITH PANIC ATTACKS: ICD-10-CM

## 2023-12-21 DIAGNOSIS — F32.A DEPRESSION, UNSPECIFIED: ICD-10-CM

## 2023-12-21 DIAGNOSIS — F41.1 GENERALIZED ANXIETY DISORDER WITH PANIC ATTACKS: ICD-10-CM
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
## 2024-01-04 ENCOUNTER — VIRTUAL VISIT (OUTPATIENT)
Dept: BEHAVIORAL HEALTH | Facility: CLINIC | Age: 34
End: 2024-01-04
Payer: COMMERCIAL

## 2024-01-04 DIAGNOSIS — F32.A DEPRESSION, UNSPECIFIED DEPRESSION TYPE: ICD-10-CM

## 2024-01-04 DIAGNOSIS — F41.0 GENERALIZED ANXIETY DISORDER WITH PANIC ATTACKS: ICD-10-CM

## 2024-01-04 DIAGNOSIS — F43.10 PTSD (POST-TRAUMATIC STRESS DISORDER): Primary | ICD-10-CM

## 2024-01-04 DIAGNOSIS — F41.1 GENERALIZED ANXIETY DISORDER WITH PANIC ATTACKS: ICD-10-CM

## 2024-01-06 NOTE — PROGRESS NOTES
Clinician Contact & Progress Note  Department of Psychiatry & Behavioral Sciences  [facility]    Patient: Preeti Leiva (1990)     MRN: [id number]  Date of Treatment:  Jan 4, 2024  Duration of Treatment: Start Time: 0800 End Time: 0820  Provider: Liana Meier present:   Provider: Liana Pelaez  Patient: Preeti Leiva  Others: n/a      Telehealth Video Visit AddOn  Type of service: Telemedicine Office Visit for individual psychotherapy .  The patient's condition can be safely assessed and treated via synchronous audio and visual telemedicine encounter.    Mode of Communication:  Video Conference via [employer]  Reason for Telemedicine Visit: Patient has requested telehealth visit   Originating Site (Patient Location): Patient's place of employment  Distant Site (Provider Location):  [facility]  Consent:  The patient has verbally consented to: the potential risks and benefits of telemedicine versus in person care with special emphasis on confidentiality given family members in the home and work place is not always private.  Attestation: As the provider I attest to compliance with applicable laws and regulations related to telemedicine.       Encounter Diagnoses   Name Primary?    PTSD (post-traumatic stress disorder) Yes    Generalized anxiety disorder with panic attacks     Depression, unspecified depression type        Assessment (current symptoms):      10/18/2023     9:01 AM 10/27/2023     7:18 AM 12/4/2023     7:48 AM   PHQ   PHQ-9 Total Score 3 4 5   Q9: Thoughts of better off dead/self-harm past 2 weeks Not at all Not at all Not at all         5/10/2022    11:17 AM 3/2/2023     1:50 PM 6/26/2023     3:58 PM   ROSANNA-7 SCORE   Total Score 10 (moderate anxiety) 9 (mild anxiety) 14 (moderate anxiety)   Total Score 10    10 9 14       Patient reports feeling overwhelmed, ruminations, self-criticism, guilt, concentration impairment, anxiety.   Patient denies suicide  "ideation, plan, and intent.    Patient did not report any changes to medications    Updates since last visit:   Pt reported that she spent holidays with her 's side family. Her mood has not changed much.   The pt stated that she feels uncomfortable with the setting of the therapy today. She is at her work office and stated that she is distracted and preoccupied with work-related thoughts.     Treatment strategies:   This writer provided breathing and relaxation technique to see if it would help the pt feel ease. The pt tried however still not comfortable enough to continue therapy session.   Discussed confidentiality during telehealth.  We decided to reschedule it to next week. The pt would like to try online from home.       Treatment Plan/ Disposition:   Continue with weekly to bi-weekly) CBT with this writer  Next appointment:  1/11/2024 at 8:00a      Mental Status Exam:  Alertness: alert   Appearance: adequately groomed  Behavior/Demeanor: guarded, with fair  eye contact   Speech: normal  Language: intact. Preferred language identified as English.  Psychomotor: normal or unremarkable  Mood: description consistent with dysthymia    Affect: full range, congruent to mood; was congruent to content  Thought Process/Associations: distracted due to work setting (rescheduled for this reason)  Thought Content:  Reports none;  Denies suicidal and violent ideation, delusions, preoccupations, obsessions , phobia , magical thinking, over-valued ideas, and paranoid ideation  -Suicidal ideation: denies SI, denies intent, and denies plan  -Homicidal Ideation: denies  Perception:  Reports none;  Denies auditory hallucinations, visual hallucinations, visual distortion seen as shadows , depersonalization, and derealization; intact  Insight: good  Judgment: good  Cognition: does  appear grossly intact    Billing for \"Interactive Complexity\"?    No    Liana Pelaez      I did not directly participate in this patient visit. I " discussed this patient with the above trainee in individual supervision and agree with the note and plan as documented.    Supervisor: Kirti Stafford, PhD, LP

## 2024-01-15 ENCOUNTER — VIRTUAL VISIT (OUTPATIENT)
Dept: PSYCHIATRY | Facility: CLINIC | Age: 34
End: 2024-01-15
Attending: NURSE PRACTITIONER
Payer: COMMERCIAL

## 2024-01-15 DIAGNOSIS — F41.9 ANXIETY: ICD-10-CM

## 2024-01-15 DIAGNOSIS — F33.42 RECURRENT MAJOR DEPRESSIVE DISORDER, IN FULL REMISSION (H): ICD-10-CM

## 2024-01-15 PROCEDURE — 99214 OFFICE O/P EST MOD 30 MIN: CPT | Mod: 95 | Performed by: NURSE PRACTITIONER

## 2024-01-15 RX ORDER — SERTRALINE HYDROCHLORIDE 100 MG/1
150 TABLET, FILM COATED ORAL DAILY
Qty: 135 TABLET | Refills: 0 | Status: SHIPPED | OUTPATIENT
Start: 2024-01-15 | End: 2024-03-01

## 2024-01-15 RX ORDER — BUPROPION HYDROCHLORIDE 100 MG/1
100 TABLET ORAL EVERY MORNING
Qty: 90 TABLET | Refills: 0 | Status: SHIPPED | OUTPATIENT
Start: 2024-01-15 | End: 2024-03-01

## 2024-01-15 ASSESSMENT — PATIENT HEALTH QUESTIONNAIRE - PHQ9
SUM OF ALL RESPONSES TO PHQ QUESTIONS 1-9: 9
SUM OF ALL RESPONSES TO PHQ QUESTIONS 1-9: 9
10. IF YOU CHECKED OFF ANY PROBLEMS, HOW DIFFICULT HAVE THESE PROBLEMS MADE IT FOR YOU TO DO YOUR WORK, TAKE CARE OF THINGS AT HOME, OR GET ALONG WITH OTHER PEOPLE: SOMEWHAT DIFFICULT
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
## 2024-02-25 ENCOUNTER — HEALTH MAINTENANCE LETTER (OUTPATIENT)
Age: 34
End: 2024-02-25

## 2024-03-01 ENCOUNTER — VIRTUAL VISIT (OUTPATIENT)
Dept: PSYCHIATRY | Facility: CLINIC | Age: 34
End: 2024-03-01
Attending: NURSE PRACTITIONER
Payer: COMMERCIAL

## 2024-03-01 VITALS — BODY MASS INDEX: 21.47 KG/M2 | HEIGHT: 70 IN | WEIGHT: 150 LBS

## 2024-03-01 DIAGNOSIS — F41.9 ANXIETY: ICD-10-CM

## 2024-03-01 DIAGNOSIS — F33.42 RECURRENT MAJOR DEPRESSIVE DISORDER, IN FULL REMISSION (H): ICD-10-CM

## 2024-03-01 PROCEDURE — 99214 OFFICE O/P EST MOD 30 MIN: CPT | Mod: 95 | Performed by: NURSE PRACTITIONER

## 2024-03-01 RX ORDER — SERTRALINE HYDROCHLORIDE 100 MG/1
150 TABLET, FILM COATED ORAL DAILY
Qty: 135 TABLET | Refills: 0 | Status: SHIPPED | OUTPATIENT
Start: 2024-03-01 | End: 2024-05-31

## 2024-03-01 RX ORDER — BUPROPION HYDROCHLORIDE 100 MG/1
100 TABLET ORAL EVERY MORNING
Qty: 90 TABLET | Refills: 0 | Status: SHIPPED | OUTPATIENT
Start: 2024-03-01 | End: 2024-05-31

## 2024-03-01 ASSESSMENT — PAIN SCALES - GENERAL: PAINLEVEL: NO PAIN (0)

## 2024-03-01 ASSESSMENT — PATIENT HEALTH QUESTIONNAIRE - PHQ9
SUM OF ALL RESPONSES TO PHQ QUESTIONS 1-9: 7
10. IF YOU CHECKED OFF ANY PROBLEMS, HOW DIFFICULT HAVE THESE PROBLEMS MADE IT FOR YOU TO DO YOUR WORK, TAKE CARE OF THINGS AT HOME, OR GET ALONG WITH OTHER PEOPLE: SOMEWHAT DIFFICULT
SUM OF ALL RESPONSES TO PHQ QUESTIONS 1-9: 7

## 2024-03-01 NOTE — NURSING NOTE
Is the patient currently in the state of [place]? YES    Visit mode:VIDEO    If the visit is dropped, the patient can be reconnected by: VIDEO VISIT: Text to cell phone:   Telephone Information:   Mobile [phone]       Will anyone else be joining the visit? NO  (If patient encounters technical issues they should call [phone]    How would you like to obtain your AVS? MyChart    Are changes needed to the allergy or medication list? No    Reason for visit: RECHECK    Rukhsana BLAND

## 2024-03-01 NOTE — PATIENT INSTRUCTIONS
**For crisis resources, please see the information at the end of this document**   Patient Education    Thank you for coming to the [facility] [place] [facility].     Lab Testing:  If you had lab testing today and your results are reassuring or normal they will be mailed to you or sent through [employer] within 7 days. If the lab tests need quick action we will call you with the results. The phone number we will call with results is # [phone]. If this is not the best number please call our clinic and change the number.     Medication Refills:  If you need any refills please call your pharmacy and they will contact us. Our fax number for refills is [phone].   Three business days of notice are needed for general medication refill requests.   Five business days of notice are needed for controlled substance refill requests.   If you need to change to a different pharmacy, please contact the new pharmacy directly. The new pharmacy will help you get your medications transferred.     Contact Us:  Please call [phone] during business hours (8-5:00 M-F).   If you have medication related questions after clinic hours, or on the weekend, please call [phone].     Financial Assistance [phone]   Medical Records [phone]       MENTAL HEALTH CRISIS RESOURCES:  For a emergency help, please call 911 or go to the nearest Emergency Department.     Emergency Walk-In Options:   EmPATH Unit @ [facility] ([place]): [phone] - Specialized mental health emergency area designed to be calming  [facility] ([place]): [phone]  [facility] ([place]): [phone]  [facility]): [phone]    [facility] Crisis Information:   [place]: [phone]  [place]: [phone]  [place] ([place]) - Adult: [phone]     Child: [phone]  [place] - Adult: [phone]     Child: [phone]  [place]:  [phone]  List of all [facility] resources:   https://[place].gov/dhs/people-we-serve/adults/health-care/mental-health/resources/crisis-contacts.jsp    National Crisis Information:   Crisis Text Line: Text  [place]  to 544781  Suicide & Crisis Lifeline: 988  National Suicide Prevention Lifeline: 3-461-516-TALK (1-372.522.7463)       For online chat options, visit https://suicidepreventionlifeline.org/chat/  Poison Control Center: 8-938-839-9178  Trans Lifeline: 8-288-122-9892 - Hotline for transgender people of all ages  The [employer] Project: 2-198-192-7367 - Hotline for LGBT youth     For Non-Emergency Support:   Fast Tracker: Mental Health & Substance Use Disorder Resources -   https://www.[facility] [place].org/
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
## 2024-05-24 ENCOUNTER — TELEPHONE (OUTPATIENT)
Dept: FAMILY MEDICINE | Facility: CLINIC | Age: 34
End: 2024-05-24
Payer: COMMERCIAL

## 2024-05-24 NOTE — TELEPHONE ENCOUNTER
Patient Quality Outreach    Patient is due for the following:   Diabetes -  A1C, LDL (Fasting), Microalbumin, and Diabetic Follow-Up Visit  Cervical Cancer Screening - PAP Needed  Physical Preventive Adult Physical    Next Steps:   Schedule a Adult Preventative    Type of outreach:    Sent [employer] ICT message.      Questions for provider review:    None           Ariana Harris CMA  Chart routed to Care Team.

## 2024-05-31 ENCOUNTER — VIRTUAL VISIT (OUTPATIENT)
Dept: PSYCHIATRY | Facility: CLINIC | Age: 34
End: 2024-05-31
Attending: NURSE PRACTITIONER
Payer: COMMERCIAL

## 2024-05-31 VITALS — BODY MASS INDEX: 21 KG/M2 | WEIGHT: 150 LBS | HEIGHT: 71 IN

## 2024-05-31 DIAGNOSIS — F41.9 ANXIETY: ICD-10-CM

## 2024-05-31 DIAGNOSIS — F33.42 RECURRENT MAJOR DEPRESSIVE DISORDER, IN FULL REMISSION (H): ICD-10-CM

## 2024-05-31 PROCEDURE — 99214 OFFICE O/P EST MOD 30 MIN: CPT | Mod: 95 | Performed by: NURSE PRACTITIONER

## 2024-05-31 RX ORDER — BUPROPION HYDROCHLORIDE 100 MG/1
100 TABLET ORAL EVERY MORNING
Qty: 90 TABLET | Refills: 0 | Status: SHIPPED | OUTPATIENT
Start: 2024-05-31

## 2024-05-31 RX ORDER — SERTRALINE HYDROCHLORIDE 100 MG/1
200 TABLET, FILM COATED ORAL DAILY
Qty: 180 TABLET | Refills: 0 | Status: SHIPPED | OUTPATIENT
Start: 2024-05-31 | End: 2024-09-03
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
## 2024-05-31 NOTE — PATIENT INSTRUCTIONS
**For crisis resources, please see the information at the end of this document**   Patient Education    Thank you for coming to the [facility] [place] [facility].     Lab Testing:  If you had lab testing today and your results are reassuring or normal they will be mailed to you or sent through [employer] within 7 days. If the lab tests need quick action we will call you with the results. The phone number we will call with results is # [phone]. If this is not the best number please call our clinic and change the number.     Medication Refills:  If you need any refills please call your pharmacy and they will contact us. Our fax number for refills is [phone].   Three business days of notice are needed for general medication refill requests.   Five business days of notice are needed for controlled substance refill requests.   If you need to change to a different pharmacy, please contact the new pharmacy directly. The new pharmacy will help you get your medications transferred.     Contact Us:  Please call [phone] during business hours (8-5:00 M-F).   If you have medication related questions after clinic hours, or on the weekend, please call [phone].     Financial Assistance [phone]   Medical Records [phone]       MENTAL HEALTH CRISIS RESOURCES:  For a emergency help, please call 911 or go to the nearest Emergency Department.     Emergency Walk-In Options:   EmPATH Unit @ [facility] ([place]): [phone] - Specialized mental health emergency area designed to be calming  [facility] ([place]): [phone]  [facility] ([place]): [phone]  [facility]): [phone]    [facility] Crisis Information:   [place]: [phone]  [place]: [phone]  [place] ([place]) - Adult: [phone]     Child: [phone]  [place] - Adult: [phone]     Child: [phone]  [place]:  [phone]  List of all [facility] resources:   https://[place].gov/dhs/people-we-serve/adults/health-care/mental-health/resources/crisis-contacts.jsp    National Crisis Information:   Crisis Text Line: Text  [place]  to 538974  Suicide & Crisis Lifeline: 988  National Suicide Prevention Lifeline: 9-247-359-TALK (1-970.809.3576)       For online chat options, visit https://suicidepreventionlifeline.org/chat/  Poison Control Center: 9-380-556-0658  Trans Lifeline: 5-963-162-1758 - Hotline for transgender people of all ages  The [employer]: 3-484-300-0932 - Hotline for LGBT youth     For Non-Emergency Support:   Fast Tracker: Mental Health & Substance Use Disorder Resources -   https://www.[employer].org/

## 2024-05-31 NOTE — NURSING NOTE
Is the patient currently in the state of [place]? YES    Visit mode:VIDEO    If the visit is dropped, the patient can be reconnected by: VIDEO VISIT: Text to cell phone:   Telephone Information:   Mobile [phone]       Will anyone else be joining the visit? NO  (If patient encounters technical issues they should call [phone])    How would you like to obtain your AVS? MyChart    Are changes needed to the allergy or medication list? No    Are refills needed on medications prescribed by this physician? YES    Reason for visit: RECHECK    Rukhsana BLAND

## 2024-07-14 ENCOUNTER — HEALTH MAINTENANCE LETTER (OUTPATIENT)
Age: 34
End: 2024-07-14

## 2024-08-15 ENCOUNTER — PATIENT OUTREACH (OUTPATIENT)
Dept: CARE COORDINATION | Facility: CLINIC | Age: 34
End: 2024-08-15
Payer: COMMERCIAL

## 2024-08-29 DIAGNOSIS — E10.65 TYPE 1 DIABETES MELLITUS WITH HYPERGLYCEMIA (H): ICD-10-CM

## 2024-08-29 RX ORDER — PROCHLORPERAZINE 25 MG/1
SUPPOSITORY RECTAL
Qty: 99 EACH | Refills: 0 | Status: SHIPPED | OUTPATIENT
Start: 2024-08-29

## 2024-08-29 RX ORDER — INSULIN LISPRO 100 [IU]/ML
INJECTION, SOLUTION INTRAVENOUS; SUBCUTANEOUS
Qty: 80 ML | Refills: 0 | Status: SHIPPED | OUTPATIENT
Start: 2024-08-29

## 2024-08-29 RX ORDER — INSULIN PMP CART,AUT,G6/7,CNTR
EACH SUBCUTANEOUS
Qty: 45 EACH | Refills: 5 | Status: SHIPPED | OUTPATIENT
Start: 2024-08-29

## 2024-08-29 NOTE — TELEPHONE ENCOUNTER
Last Written Prescription Date:  8/29/24  Last Fill Quantity: 80/1 and 45/11   Last office visit: Visit date not found ; last virtual visit: 8/29/2023 with prescribing provider:  Dr. Thomas   Future Office Visit: 3/18/25    Requested Prescriptions   Pending Prescriptions Disp Refills    HUMALOG 100 UNIT/ML injection [Pharmacy Med Name: HUMALOG INSULIN ([id number]) 10ML] 80 mL 1     Sig: INJECT 70 UNITS SUBCUTANEOUS EVERY DAY VIA INSULIN PUMP       Insulin Protocol Failed - 8/29/2024  9:38 AM        Failed - Has GFR on file in past 12 months and most recent value is normal        Failed - Recent (6 mo) or future (90 days) visit within the authorizing provider's specialty     The patient must have completed an in-person or virtual visit within the past 6 months or has a future visit scheduled within the next 90 days with the authorizing provider s specialty.  Urgent care and e-visits do not quality as an office visit for this protocol.          Failed - Chart Review Required     Review Chart.    Do not approve if insulin is used in a pump.  Instead, direct refill request to the patient's endocrinologist.  If the patient doesn't have an endocrinologist, then send the refill to the patient's PCP for review            Passed - Medication is active on med list        Passed - Medication indicated for associated diagnosis     Medication is associated with one or more of the following diagnoses:   - Type 1 diabetes mellitus  - Type 2 diabetes mellitus  - Diabetic nephropathy; Prophylaxis  - Neuropathy due to diabetes mellitus; Prophylaxis  - Retinopathy due to diabetes mellitus; Prophylaxis  - Diabetes mellitus associated with cystic fibrosis  - Disorder of cardiovascular system; Prophylaxis - Type 1 diabetes mellitus   - Disorder of cardiovascular system; Prophylaxis - Type 2 diabetes mellitus            Passed - Patient is 18 years of age or older          Insulin Disposable Pump (OMNIPOD 5 PODS (GEN 5)) MISC [Pharmacy Med  Name: OMNIPOD 5 G6 REFILL PODS 5PK (GEN5)]       Sig: USE 1 POD EVERY OTHER DAY AS DIRECTED       There is no refill protocol information for this order        Refills sent  Carisa Cota RN

## 2024-09-03 DIAGNOSIS — F33.42 RECURRENT MAJOR DEPRESSIVE DISORDER, IN FULL REMISSION (H): ICD-10-CM

## 2024-09-03 DIAGNOSIS — F41.9 ANXIETY: ICD-10-CM

## 2024-09-03 RX ORDER — SERTRALINE HYDROCHLORIDE 100 MG/1
200 TABLET, FILM COATED ORAL DAILY
Qty: 60 TABLET | Refills: 0 | Status: SHIPPED | OUTPATIENT
Start: 2024-09-03 | End: 2024-09-12

## 2024-09-04 NOTE — TELEPHONE ENCOUNTER
"Date of Last Office Visit: 5/31/2024  [facility]      RTC: 12 weeks, sooner as needed   No shows: 0  Cancellations: 0  Date of Next Office Visit: 0  ------------------------------    Medication requested:    sertraline (ZOLOFT) 100 MG tablet 180 tablet 0 5/31/2024 -- No   Sig - Route: Take 2 tablets (200 mg) by mouth daily - Oral     ------------------------------  From last visit note:   \"monitoring adherence, libido, she chooses to continue sertraline 200mg daily, Wellbutrin 100mg at bed \"       Refill decision: Medication refilled per protocol.    Scheduling has been notified to contact the pt for appointment.                         "

## 2024-09-12 ENCOUNTER — VIRTUAL VISIT (OUTPATIENT)
Dept: PSYCHIATRY | Facility: CLINIC | Age: 34
End: 2024-09-12
Attending: NURSE PRACTITIONER
Payer: COMMERCIAL

## 2024-09-12 DIAGNOSIS — F41.9 ANXIETY: ICD-10-CM

## 2024-09-12 DIAGNOSIS — F33.42 RECURRENT MAJOR DEPRESSIVE DISORDER, IN FULL REMISSION (H): ICD-10-CM

## 2024-09-12 PROCEDURE — 99214 OFFICE O/P EST MOD 30 MIN: CPT | Mod: FQ | Performed by: NURSE PRACTITIONER

## 2024-09-12 RX ORDER — SERTRALINE HYDROCHLORIDE 100 MG/1
200 TABLET, FILM COATED ORAL DAILY
Qty: 180 TABLET | Refills: 1 | Status: SHIPPED | OUTPATIENT
Start: 2024-09-12

## 2024-09-12 RX ORDER — BUPROPION HYDROCHLORIDE 150 MG/1
150 TABLET ORAL EVERY MORNING
Qty: 90 TABLET | Refills: 1 | Status: SHIPPED | OUTPATIENT
Start: 2024-09-12
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
## 2024-09-12 NOTE — NURSING NOTE
Current patient location: [address] [place] [id number]    Is the patient currently in the state of [place]? YES    Visit mode:VIDEO    If the visit is dropped, the patient can be reconnected by: VIDEO VISIT: Text to cell phone:   Telephone Information:   Mobile [phone]       Will anyone else be joining the visit? NO  (If patient encounters technical issues they should call [phone]    How would you like to obtain your AVS? MyChart    Are changes needed to the allergy or medication list? Pt stated no changes to allergies and Pt stated no med changes    Are refills needed on medications prescribed by this physician? NO    Rooming Documentation:  Patient will complete questionnaire(s) in MyChart      Reason for visit: RECHJOANNA Quintana VVF

## 2024-10-01 ENCOUNTER — OFFICE VISIT (OUTPATIENT)
Dept: OBGYN | Facility: CLINIC | Age: 34
End: 2024-10-01
Payer: COMMERCIAL

## 2024-10-01 VITALS
OXYGEN SATURATION: 98 % | WEIGHT: 165 LBS | SYSTOLIC BLOOD PRESSURE: 121 MMHG | HEART RATE: 103 BPM | DIASTOLIC BLOOD PRESSURE: 79 MMHG | BODY MASS INDEX: 23.1 KG/M2 | HEIGHT: 71 IN

## 2024-10-01 DIAGNOSIS — Z00.00 ANNUAL PHYSICAL EXAM: Primary | ICD-10-CM

## 2024-10-01 PROCEDURE — G0145 SCR C/V CYTO,THINLAYER,RESCR: HCPCS | Performed by: OBSTETRICS & GYNECOLOGY

## 2024-10-01 PROCEDURE — 99395 PREV VISIT EST AGE 18-39: CPT | Performed by: OBSTETRICS & GYNECOLOGY

## 2024-10-01 PROCEDURE — G0124 SCREEN C/V THIN LAYER BY MD: HCPCS | Performed by: PATHOLOGY

## 2024-10-01 PROCEDURE — 87624 HPV HI-RISK TYP POOLED RSLT: CPT | Performed by: OBSTETRICS & GYNECOLOGY

## 2024-10-01 NOTE — PROGRESS NOTES
Preeti is a 34 year old  female who presents for annual exam.     Menses are rare and variable lasting  0  days.  Menses flow: light.  No LMP recorded.. Using IUD for contraception.  She is not currently considering pregnancy.  Besides routine health maintenance, she has no other health concerns today .  Friend recently diagnosed with cervical cancer.  Abnormal pap history.  Had part of cervix removed.  Bleeding consistently for months, now planning on hyst.  Abnormal pap in early 20s.  Had annual paps for a period of time, but normal follow up.  No colp.  Daughter will be 2 on Thanksgiving.  No ; cared for in home by Preeti's inlaws.  Not sure they'll have more children due to difficult pregnancy with T1DM.  GYNECOLOGIC HISTORY:  Menarche: 17  Age at first intercourse: 19 Number of lifetime partners: just 1  Preeti is sexually active with 1male partner(s) and is currently in monogamous relationship with .    History sexually transmitted infections:No STD history  STI testing offered?  Declined  TYSON exposure: No  History of abnormal Pap smear: No - age 30-64 HPV with reflex Pap every 5 years recommended  Family history of breast CA: Yes (Please explain): p grandmother, p aunt  Family history of uterine/ovarian CA: Yes (Please explain): m side    Family history of colon CA: Yes (Please explain): m aide    HEALTH MAINTENANCE:  Cholesterol: (  Cholesterol   Date Value Ref Range Status   2023 198 <200 mg/dL Final   2021 174 <200 mg/dL Final   2020 193 <200 mg/dL Final   2019 169 <=199 mg/dL Final    History of abnormal lipids: No  Mammo:  . History of abnormal Mammo: Not applicable.  Regular Self Breast Exams: Yes  Calcium/Vitamin D intake: source:  dairy, dietary supplement(s) Adequate? Yes  TSH: (  TSH   Date Value Ref Range Status   2023 1.80 0.30 - 4.20 uIU/mL Final   02/10/2022 1.20 0.40 - 4.00 mU/L Final   2020 1.11 0.40 - 4.00 mU/L Final    )  Pap; (No  "results found for: \"PAP\" )    HISTORY:  OB History    Para Term  AB Living   1 1 0 1 0 1   SAB IAB Ectopic Multiple Live Births   0 0 0 0 1      # Outcome Date GA Lbr Ar/2nd Weight Sex Type Anes PTL Lv   1  22 36w6d  4.366 kg (9 lb 10 oz) F CS-LTranv Spinal  MELIA      Name: CONNOR MONSON-VENKATA      Apgar1: 8  Apgar5: 9     Past Medical History:   Diagnosis Date    Anxiety     Depressive disorder     Polyhydramnios in third trimester 10/12/2022    30 weeks    Type 1 diabetes mellitus with hyperglycemia (H)     Varicella      Past Surgical History:   Procedure Laterality Date     SECTION N/A 2022    Procedure:  SECTION;  Surgeon: Lubna Glynn MD;  [facility]    INGUINAL HERNIA REPAIR       Family History   Problem Relation Age of Onset    Thyroid Disease Mother     Anxiety Disorder Mother     Depression Mother         Mother    Skin Cancer Mother     Melanoma Mother     Lung Cancer Father     Melanoma Maternal Grandmother     Skin Cancer Maternal Grandmother     Melanoma Paternal Grandmother     Skin Cancer Paternal Grandmother     Melanoma Maternal Uncle     Skin Cancer Maternal Uncle     Glaucoma No family hx of     Macular Degeneration No family hx of      Social History     Socioeconomic History    Marital status:      Spouse name: None    Number of children: None    Years of education: None    Highest education level: None   Tobacco Use    Smoking status: Never     Passive exposure: Never    Smokeless tobacco: Never   Vaping Use    Vaping status: Never Used   Substance and Sexual Activity    Alcohol use: Not Currently     Comment:  5 drinks per week     Drug use: Never    Sexual activity: Yes     Partners: Male   Other Topics Concern    Parent/sibling w/ CABG, MI or angioplasty before 65F 55M? No       Current Outpatient Medications:     buPROPion (WELLBUTRIN XL) 150 MG 24 hr tablet, Take 1 tablet (150 mg) by mouth every morning., Disp: " "90 tablet, Rfl: 1    buPROPion (WELLBUTRIN) 100 MG tablet, Take 1 tablet (100 mg) by mouth every morning, Disp: 90 tablet, Rfl: 0    Continuous Blood Gluc Transmit (DEXCOM G6 TRANSMITTER) MISC, Change every 3 months., Disp: 1 each, Rfl: 3    Continuous Glucose Sensor (DEXCOM G6 SENSOR) MISC, CHANGE EVERY 10 DAYS, Disp: 99 each, Rfl: 0    Glucagon, rDNA, (GLUCAGON EMERGENCY) 1 MG KIT, Inject 1 mg as directed daily as needed (hypoglycemia), Disp: 2 kit, Rfl: 11    HUMALOG 100 UNIT/ML injection, INJECT 70 UNITS SUBCUTANEOUS EVERY DAY VIA INSULIN PUMP, Disp: 80 mL, Rfl: 0    Insulin Disposable Pump (OMNIPOD 5 PODS, GEN 5,) MISC, USE 1 POD EVERY OTHER DAY AS DIRECTED, Disp: 45 each, Rfl: 5    levonorgestrel (MIRENA) 52 MG (20 mcg/day) IUD, 1 each by Intrauterine route once, Disp: , Rfl:     sertraline (ZOLOFT) 100 MG tablet, Take 2 tablets (200 mg) by mouth daily., Disp: 180 tablet, Rfl: 1   No Known Allergies    Past medical, surgical, social and family history were reviewed and updated in EPIC.    ROS:   C:     NEGATIVE for fever, chills, change in weight  I:       NEGATIVE for worrisome rashes, moles or lesions  E:     NEGATIVE for vision changes or irritation  E/M: NEGATIVE for ear, mouth and throat problems  R:     NEGATIVE for significant cough or SOB  CV:   NEGATIVE for chest pain, palpitations or peripheral edema  GI:     NEGATIVE for nausea, abdominal pain, heartburn, or change in bowel habits  :   NEGATIVE for frequency, dysuria, hematuria, vaginal discharge, or irregular bleeding  M:     NEGATIVE for significant arthralgias or myalgia  N:      NEGATIVE for weakness, dizziness or paresthesias  E:      NEGATIVE for temperature intolerance, skin/hair changes  P:      NEGATIVE for changes in mood or affect.    EXAM:  /79   Pulse 103   Ht 1.803 m (5' 11\")   Wt 74.8 kg (165 lb)   SpO2 98%   BMI 23.01 kg/m     BMI: Body mass index is 23.01 kg/m .  Constitutional: healthy, alert and no distress  Head: " Normocephalic. No masses, lesions, tenderness or abnormalities  Neck: Neck supple. Trachea midline. No adenopathy. Thyroid symmetric, normal size.   Cardiovascular: RRR.   Respiratory: Negative.   Breast: No nodularity, asymmetry or nipple discharge bilaterally.  Gastrointestinal: Abdomen soft, non-tender, non-distended. No masses, organomegaly.  :  Vulva:  No external lesions, normal female hair distribution, no inguinal adenopathy.    Urethra:  Midline, non-tender, well supported, no discharge  Vagina:  Moist, pink, no abnormal discharge, no lesions  Rectal Exam: deferred  Musculoskeletal: extremities normal  Skin: no suspicious lesions or rashes  Psychiatric: Affect appropriate, cooperative,mentation appears normal.     COUNSELING:   Reviewed preventive health counseling, as reflected in patient instructions       Contraception       Family planning   reports that she has never smoked. She has never been exposed to tobacco smoke. She has never used smokeless tobacco.    Body mass index is 23.01 kg/m .    FRAX Risk Assessment    ASSESSMENT:  34 year old female with satisfactory annual exam  (Z00.00) Annual physical exam  (primary encounter diagnosis)  Plan: HPV and Gynecologic Cytology Panel -         Recommended Age 30-65 Years          RTC 1 year and prn.    Edel Manning MD

## 2024-10-02 LAB
HPV HR 12 DNA CVX QL NAA+PROBE: NEGATIVE
HPV16 DNA CVX QL NAA+PROBE: NEGATIVE
HPV18 DNA CVX QL NAA+PROBE: NEGATIVE
HUMAN PAPILLOMA VIRUS FINAL DIAGNOSIS: NORMAL

## 2024-10-04 DIAGNOSIS — E10.65 TYPE 1 DIABETES MELLITUS WITH HYPERGLYCEMIA (H): ICD-10-CM

## 2024-10-07 ENCOUNTER — MYC MEDICAL ADVICE (OUTPATIENT)
Dept: OBGYN | Facility: CLINIC | Age: 34
End: 2024-10-07
Payer: COMMERCIAL

## 2024-10-07 LAB
BKR AP ASSOCIATED HPV REPORT: ABNORMAL
BKR LAB AP GYN ADEQUACY: ABNORMAL
BKR LAB AP GYN INTERPRETATION: ABNORMAL
BKR LAB AP PREVIOUS ABNORMAL: ABNORMAL
PATH REPORT.COMMENTS IMP SPEC: ABNORMAL
PATH REPORT.COMMENTS IMP SPEC: ABNORMAL
PATH REPORT.RELEVANT HX SPEC: ABNORMAL

## 2024-10-07 RX ORDER — PROCHLORPERAZINE 25 MG/1
SUPPOSITORY RECTAL
Qty: 1 EACH | Refills: 1 | Status: SHIPPED | OUTPATIENT
Start: 2024-10-07 | End: 2024-10-29

## 2024-10-07 NOTE — TELEPHONE ENCOUNTER
Last Written Prescription Date:  8/29/23  Last Fill Quantity: 1,  # refills: 3   Last office visit: Visit date not found ; last virtual visit: 8/29/2023 with prescribing provider:  Dr. Thomas   Future Office Visit: 3/18/25    Requested Prescriptions   Pending Prescriptions Disp Refills    Continuous Glucose Transmitter (DEXCOM G6 TRANSMITTER) MISC [Pharmacy Med Name: DEXCOM G6 TRANSMITTER]       Sig: CHANGE EVERY 3 MONTHS AS DIRECTED       Diabetic Supplies Protocol Failed - 10/4/2024  3:59 PM        Failed - Recent (12 month) or future (90 days) visit with authorizing provider s specialty     The patient must have completed an in-person or virtual visit within the past 12 months or has a future visit scheduled within the next 90 days with the authorizing provider s specialty.  Urgent care and e-visits do not quality as an office visit for this protocol.          Passed - Medication is active on med list        Passed - Medication indicated for associated diagnosis        Passed - Patient is 18 years of age or older           Refills sent  Carisa Cota RN

## 2024-10-07 NOTE — TELEPHONE ENCOUNTER
Atypical squamous cells of undetermined significance (ASC-US)   Pt had neg HPV but +ASCUS  With pt concerns do you have a good explanation?  Routing to provider to advise.  Astrid Nuñez RN on 10/7/2024 at 4:04 PM

## 2024-10-07 NOTE — TELEPHONE ENCOUNTER
It's the most common abnormal pap finding.  Often the result of non-specific inflammation or irritation to the cervix.  Can be seen more with infection, like a yeast infection.  However, with HPV neg, very VERY low risk.  No concern for cervical cancer at this point.  Often, this resolves with next pap.  Most important thing for her to do is follow-up with paps as recommended.    Edel Manning MD

## 2024-10-08 NOTE — TELEPHONE ENCOUNTER
I called and spoke with the patient about her ASCUS, Neg HPV result. Recommendations per ASCCP guidelines would be for 3 yr co-testing.  Patient states she may discuss with her provider next year at her physical and would be more comfortable possibly repeating in 1 year rather than in 3.  Questions answered and nature of HPV discussed.

## 2024-10-15 DIAGNOSIS — E10.65 TYPE 1 DIABETES MELLITUS WITH HYPERGLYCEMIA (H): ICD-10-CM

## 2024-10-16 RX ORDER — PROCHLORPERAZINE 25 MG/1
SUPPOSITORY RECTAL
Qty: 9 EACH | Refills: 1 | Status: SHIPPED | OUTPATIENT
Start: 2024-10-16 | End: 2024-10-29

## 2024-10-16 NOTE — TELEPHONE ENCOUNTER
Last Written Prescription Date:  8/29/24  Last Fill Quantity: 9,  # refills: 0   Last office visit: 8/29/23   Future Office Visit:  3/18/25    Requested Prescriptions   Pending Prescriptions Disp Refills    Continuous Glucose Sensor (DEXCOM G6 SENSOR) MISC [Pharmacy Med Name: DEXCOM G6 SENSOR (3 PACK)]       Sig: CHANGE EVERY 10 DAYS       There is no refill protocol information for this order        Refills sent  Carisa Cota RN

## 2024-10-29 ENCOUNTER — VIRTUAL VISIT (OUTPATIENT)
Dept: ENDOCRINOLOGY | Facility: CLINIC | Age: 34
End: 2024-10-29
Payer: COMMERCIAL

## 2024-10-29 DIAGNOSIS — E10.65 TYPE 1 DIABETES MELLITUS WITH HYPERGLYCEMIA (H): Primary | ICD-10-CM

## 2024-10-29 PROCEDURE — 99214 OFFICE O/P EST MOD 30 MIN: CPT | Mod: 95 | Performed by: INTERNAL MEDICINE

## 2024-10-29 RX ORDER — INSULIN PMP CART,AUT,G6/7,CNTR
1 EACH SUBCUTANEOUS
Qty: 45 EACH | Refills: 5 | Status: SHIPPED | OUTPATIENT
Start: 2024-10-29

## 2024-10-29 RX ORDER — PROCHLORPERAZINE 25 MG/1
SUPPOSITORY RECTAL
Qty: 9 EACH | Refills: 3 | Status: SHIPPED | OUTPATIENT
Start: 2024-10-29

## 2024-10-29 RX ORDER — PROCHLORPERAZINE 25 MG/1
SUPPOSITORY RECTAL
Qty: 1 EACH | Refills: 3 | Status: SHIPPED | OUTPATIENT
Start: 2024-10-29

## 2024-10-29 RX ORDER — INSULIN LISPRO 100 [IU]/ML
INJECTION, SOLUTION INTRAVENOUS; SUBCUTANEOUS
Qty: 80 ML | Refills: 3 | Status: SHIPPED | OUTPATIENT
Start: 2024-10-29

## 2024-10-29 NOTE — PATIENT INSTRUCTIONS
Patient support for transferring settings.    Patients can visit here [email or web address]  or call customer care team at [phone] for assistance 24 hours a day.

## 2024-10-29 NOTE — PROGRESS NOTES
Video-Visit Details    Type of service:  Video Visit  Video Start Time: 3:08  Video End Time: 3:34  Originating Location (pt. Location): [place], [place]  Distant Location (provider location):  Home  Platform used for Video Visit: Norm Thomas MD    Preeti Leiva is a 34 year old year old female here for evaluation of  diabetes type 1 via a billable video visit. She was last seen by me August 2023.    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 Scheduled csection in setting of fetal macrosomia.   Hospitalizations: DKA, 2008 most recently, difficult time in college managing   Previous Regimens: almost always on pump, most recently Medtronic/Guardian, but didn't use automode-- kicked out frequently and didn't work through this  Current Regimen: OMNIPOD 5/DEXCOM                Kicked out of automated mode more-- now 25% in this setting/manual mode  Missed boluses, high BG      Last eye exam- July 2022-- mild NPDR    BP Readings from Last 3 Encounters:   10/01/24 121/79   10/18/23 119/81   01/18/23 136/85       Lab Results   Component Value Date    A1C 6.1 10/04/2022    A1C 6.5 06/10/2022    A1C 8.6 02/10/2022    A1C 9.4 08/20/2020    A1C 8.4 01/02/2020    A1C 7.8 01/29/2019       Wt Readings from Last 3 Encounters:   10/01/24 74.8 kg (165 lb)   10/18/23 76.5 kg (168 lb 11.2 oz)   01/18/23 73 kg (161 lb)       Current Outpatient Medications   Medication Sig Dispense Refill    buPROPion (WELLBUTRIN XL) 150 MG 24 hr tablet Take 1 tablet (150  mg) by mouth every morning. 90 tablet 1    HUMALOG 100 UNIT/ML injection INJECT 70 UNITS SUBCUTANEOUS EVERY DAY VIA INSULIN PUMP 80 mL 0    levonorgestrel (MIRENA) 52 MG (20 mcg/day) IUD 1 each by Intrauterine route once      sertraline (ZOLOFT) 100 MG tablet Take 2 tablets (200 mg) by mouth daily. 180 tablet 1    buPROPion (WELLBUTRIN) 100 MG tablet Take 1 tablet (100 mg) by mouth every morning 90 tablet 0    Continuous Glucose Sensor (DEXCOM G6 SENSOR) MISC CHANGE EVERY 10 DAYS 9 each 1    Continuous Glucose Transmitter (DEXCOM G6 TRANSMITTER) MISC CHANGE EVERY 3 MONTHS AS DIRECTED 1 each 1    Glucagon, rDNA, (GLUCAGON EMERGENCY) 1 MG KIT Inject 1 mg as directed daily as needed (hypoglycemia) 2 kit 11    Insulin Disposable Pump (OMNIPOD 5 PODS, GEN 5,) MISC USE 1 POD EVERY OTHER DAY AS DIRECTED 45 each 5          REVIEW OF SYSTEMS:   ROS: 10 point ROS neg other than the symptoms noted above in the HPI.      EXAM:  Physical Exam (visual exam)  VS:  no vital signs taken for video visit  CONSTITUTIONAL: healthy, alert and NAD, responding appropriately  ENT: normocephalic, no visual evidence of trauma, normal nose and oral mucosa  EYES: conjunctivae and sclerae normal, no exophthalmos or proptosis  THYROID:  no visualized nodules or goiter  LUNGS: no audible wheeze, cough or visible cyanosis, no visible retractions or increased work of breathing  EXTREMITIES: no hand tremors  NEUROLOGY: cranial nerves grossly intact with no obvious deficit.  SKIN:  no visualized skin lesions or rash, no edema visualized  PSYCH: mentation appears normal, normal judgement        ASSESSMENT/PLAN:    1) Diabetes Mellitus Type 1-   - Glucose Control- NOT at goal, improved hypoglycemia   - Continue current settings with following goals:  1) Bolus 3 times daily-- use quick carbs for mental shortcuts if can't carb count-- 15g snack, 30g small meal, 60g large meal  2) Stay in automated mode as much as possible- will raise basal max to 4u (from  3u per hour)  3) Download iphone marlys/upgrade to integrated marlys-- this should allow her to bolus easier      2.  Lipids:  Meets criteria per ASCVD risk %, no statin at this time given breast feeding  3.  Blood Pressure:  Not evaluated for this virtual visit   4.  CV prevention:  Does not meet criteria for antiplatelet therapy  5.  Diabetic Nephropathy screening:  Up to date / repeat LAURITA due now  6.  Diabetic Retinopathy screening:  Up to date, continue annual dilated eye exams now  7.  Diabetic Neuropathy screening:  Up to date.  Instructed on routine foot care, follow-up with podiatry as needed.       RTC 2  months as overbook    A total of 38 minutes were spent today 10/29/24 on this visit including chart review, history and counseling, documentation and other activities as detailed above.

## 2024-10-29 NOTE — LETTER
10/29/2024      Preeti Leiva  [address]  [place] [address]      Dear Colleague,    Thank you for referring your patient, Preeti Leiva, to the [facility] [place]. Please see a copy of my visit note below.      Video-Visit Details    Type of service:  Video Visit  Video Start Time: 3:08  Video End Time: 3:34  Originating Location (pt. Location): [place], [place]  Distant Location (provider location):  Home  Platform used for Video Visit: Norm Thomas MD    Preeti Leiva is a 34 year old year old female here for evaluation of  diabetes type 1 via a billable video visit. She was last seen by me August 2023.    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 Scheduled csection in setting of fetal macrosomia.   Hospitalizations: DKA, 2008 most recently, difficult time in college managing   Previous Regimens: almost always on pump, most recently Medtronic/Guardian, but didn't use automode-- kicked out frequently and didn't work through this  Current Regimen: OMNIPOD 5/DEXCOM                Kicked out of automated mode more-- now 25% in this setting/manual mode  Missed boluses, high BG      Last eye exam- July 2022-- mild NPDR    BP Readings from Last 3 Encounters:   10/01/24 121/79   10/18/23 119/81   01/18/23 136/85       Lab Results   Component Value Date    A1C 6.1 10/04/2022    A1C 6.5 06/10/2022    A1C 8.6 02/10/2022    A1C 9.4 08/20/2020    A1C 8.4 01/02/2020    A1C 7.8 01/29/2019       Wt Readings from  Last 3 Encounters:   10/01/24 74.8 kg (165 lb)   10/18/23 76.5 kg (168 lb 11.2 oz)   01/18/23 73 kg (161 lb)       Current Outpatient Medications   Medication Sig Dispense Refill     buPROPion (WELLBUTRIN XL) 150 MG 24 hr tablet Take 1 tablet (150 mg) by mouth every morning. 90 tablet 1     HUMALOG 100 UNIT/ML injection INJECT 70 UNITS SUBCUTANEOUS EVERY DAY VIA INSULIN PUMP 80 mL 0     levonorgestrel (MIRENA) 52 MG (20 mcg/day) IUD 1 each by Intrauterine route once       sertraline (ZOLOFT) 100 MG tablet Take 2 tablets (200 mg) by mouth daily. 180 tablet 1     buPROPion (WELLBUTRIN) 100 MG tablet Take 1 tablet (100 mg) by mouth every morning 90 tablet 0     Continuous Glucose Sensor (DEXCOM G6 SENSOR) MISC CHANGE EVERY 10 DAYS 9 each 1     Continuous Glucose Transmitter (DEXCOM G6 TRANSMITTER) MISC CHANGE EVERY 3 MONTHS AS DIRECTED 1 each 1     Glucagon, rDNA, (GLUCAGON EMERGENCY) 1 MG KIT Inject 1 mg as directed daily as needed (hypoglycemia) 2 kit 11     Insulin Disposable Pump (OMNIPOD 5 PODS, GEN 5,) MISC USE 1 POD EVERY OTHER DAY AS DIRECTED 45 each 5          REVIEW OF SYSTEMS:   ROS: 10 point ROS neg other than the symptoms noted above in the HPI.      EXAM:  Physical Exam (visual exam)  VS:  no vital signs taken for video visit  CONSTITUTIONAL: healthy, alert and NAD, responding appropriately  ENT: normocephalic, no visual evidence of trauma, normal nose and oral mucosa  EYES: conjunctivae and sclerae normal, no exophthalmos or proptosis  THYROID:  no visualized nodules or goiter  LUNGS: no audible wheeze, cough or visible cyanosis, no visible retractions or increased work of breathing  EXTREMITIES: no hand tremors  NEUROLOGY: cranial nerves grossly intact with no obvious deficit.  SKIN:  no visualized skin lesions or rash, no edema visualized  PSYCH: mentation appears normal, normal judgement        ASSESSMENT/PLAN:    1) Diabetes Mellitus Type 1-   - Glucose Control- NOT at goal, improved hypoglycemia   -  Continue current settings with following goals:  1) Bolus 3 times daily-- use quick carbs for mental shortcuts if can't carb count-- 15g snack, 30g small meal, 60g large meal  2) Stay in automated mode as much as possible- will raise basal max to 4u (from 3u per hour)  3) Download iphone marlys/upgrade to integrated marlys-- this should allow her to bolus easier      2.  Lipids:  Meets criteria per ASCVD risk %, no statin at this time given breast feeding  3.  Blood Pressure:  Not evaluated for this virtual visit   4.  CV prevention:  Does not meet criteria for antiplatelet therapy  5.  Diabetic Nephropathy screening:  Up to date / repeat LAURITA due now  6.  Diabetic Retinopathy screening:  Up to date, continue annual dilated eye exams now  7.  Diabetic Neuropathy screening:  Up to date.  Instructed on routine foot care, follow-up with podiatry as needed.       RTC 2  months as overbook    A total of 38 minutes were spent today 10/29/24 on this visit including chart review, history and counseling, documentation and other activities as detailed above.         Again, thank you for allowing me to participate in the care of your patient.        Sincerely,        Shanon Thomas MD

## 2024-10-30 DIAGNOSIS — E10.65 TYPE 1 DIABETES MELLITUS WITH HYPERGLYCEMIA (H): ICD-10-CM

## 2024-10-30 NOTE — TELEPHONE ENCOUNTER
Rec'd fax from pharmacy.  States Gvoke Hypopen was sent for a single pack (0.2 ml), but the directions indicate maybe having 2 doses on hand.  They want to know if a new rx can be sent for the TWO PACK (0.4ml).  Routing to provider. Carisa Cota RN

## 2024-11-06 ENCOUNTER — TELEPHONE (OUTPATIENT)
Dept: ENDOCRINOLOGY | Facility: CLINIC | Age: 34
End: 2024-11-06
Payer: COMMERCIAL

## 2024-11-06 DIAGNOSIS — E10.65 TYPE 1 DIABETES MELLITUS WITH HYPERGLYCEMIA (H): ICD-10-CM

## 2024-11-06 NOTE — TELEPHONE ENCOUNTER
Rec'd fax from pharmacy that the Gvoke Hypopen should be dispensed for 0.4 ml as a 2 pack to match the directions. Will resend rx with 0.4 ml as dispense amount.  Carisa Cota RN

## 2024-12-30 ENCOUNTER — LAB (OUTPATIENT)
Dept: LAB | Facility: CLINIC | Age: 34
End: 2024-12-30
Payer: COMMERCIAL

## 2024-12-30 DIAGNOSIS — E10.65 TYPE 1 DIABETES MELLITUS WITH HYPERGLYCEMIA (H): ICD-10-CM

## 2024-12-30 LAB
ANION GAP SERPL CALCULATED.3IONS-SCNC: 11 MMOL/L (ref 7–15)
BUN SERPL-MCNC: 16.1 MG/DL (ref 6–20)
CALCIUM SERPL-MCNC: 9.1 MG/DL (ref 8.8–10.4)
CHLORIDE SERPL-SCNC: 106 MMOL/L (ref 98–107)
CHOLEST SERPL-MCNC: 168 MG/DL
CREAT SERPL-MCNC: 0.88 MG/DL (ref 0.51–0.95)
CREAT UR-MCNC: 119 MG/DL
EGFRCR SERPLBLD CKD-EPI 2021: 88 ML/MIN/1.73M2
EST. AVERAGE GLUCOSE BLD GHB EST-MCNC: 180 MG/DL
FASTING STATUS PATIENT QL REPORTED: YES
FASTING STATUS PATIENT QL REPORTED: YES
GLUCOSE SERPL-MCNC: 192 MG/DL (ref 70–99)
HBA1C MFR BLD: 7.9 % (ref 0–5.6)
HCO3 SERPL-SCNC: 23 MMOL/L (ref 22–29)
HDLC SERPL-MCNC: 45 MG/DL
LDLC SERPL CALC-MCNC: 108 MG/DL
MICROALBUMIN UR-MCNC: 55.1 MG/L
MICROALBUMIN/CREAT UR: 46.3 MG/G CR (ref 0–25)
NONHDLC SERPL-MCNC: 123 MG/DL
POTASSIUM SERPL-SCNC: 4.3 MMOL/L (ref 3.4–5.3)
SODIUM SERPL-SCNC: 140 MMOL/L (ref 135–145)
TRIGL SERPL-MCNC: 75 MG/DL
TSH SERPL DL<=0.005 MIU/L-ACNC: 1.2 UIU/ML (ref 0.3–4.2)
VIT D+METAB SERPL-MCNC: 27 NG/ML (ref 20–50)

## 2024-12-30 PROCEDURE — 80061 LIPID PANEL: CPT

## 2024-12-30 PROCEDURE — 82043 UR ALBUMIN QUANTITATIVE: CPT

## 2024-12-30 PROCEDURE — 36415 COLL VENOUS BLD VENIPUNCTURE: CPT

## 2024-12-30 PROCEDURE — 80048 BASIC METABOLIC PNL TOTAL CA: CPT

## 2024-12-30 PROCEDURE — 84443 ASSAY THYROID STIM HORMONE: CPT

## 2024-12-30 PROCEDURE — 82306 VITAMIN D 25 HYDROXY: CPT

## 2024-12-30 PROCEDURE — 82570 ASSAY OF URINE CREATININE: CPT

## 2024-12-30 PROCEDURE — 83036 HEMOGLOBIN GLYCOSYLATED A1C: CPT

## 2025-01-07 ENCOUNTER — VIRTUAL VISIT (OUTPATIENT)
Dept: ENDOCRINOLOGY | Facility: CLINIC | Age: 35
End: 2025-01-07
Payer: COMMERCIAL

## 2025-01-07 DIAGNOSIS — E10.65 TYPE 1 DIABETES MELLITUS WITH HYPERGLYCEMIA (H): Primary | ICD-10-CM

## 2025-01-07 PROCEDURE — 98005 SYNCH AUDIO-VIDEO EST LOW 20: CPT | Performed by: INTERNAL MEDICINE

## 2025-01-07 NOTE — LETTER
1/7/2025      Preeti Leiva  [address]  [place] [address]      Dear Colleague,    Thank you for referring your patient, Preeti Leiva, to the [facility] [place]. Please see a copy of my visit note below.                                Video-Visit Details    Type of service:  Video Visit  Video Start Time: 3:08  Video End Time: 3:34  Originating Location (pt. Location): [place], [place]  Distant Location (provider location):  Home  Platform used for Video Visit: Norm Thomas MD    Preeti Leiva is a 34 year old year old female here for evaluation of  diabetes type 1 via a billable video visit. She was last seen by me Oct 2023.    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 Scheduled csection in setting of fetal macrosomia.   Hospitalizations: DKA, 2008 most recently, difficult time in college managing   Previous Regimens: almost always on pump, most recently Medtronic/Guardian, but didn't use automode-- kicked out frequently and didn't work through this  Current Regimen: OMNIPOD 5/DEXCOM        Kicked out of automated mode less since last visit-- now 25%>15%  in this setting/manual mode  Missed boluses, high BG      Last eye exam- July 2022-- mild NPDR    BP Readings from Last 3 Encounters:   10/01/24 121/79   10/18/23 119/81   01/18/23 136/85       Lab Results   Component Value Date    A1C 6.1 10/04/2022    A1C 6.5 06/10/2022    A1C 8.6 02/10/2022    A1C 9.4 08/20/2020    A1C 8.4 01/02/2020    A1C 7.8 01/29/2019       Wt Readings from Last 3 Encounters:   10/01/24 74.8 kg (165 lb)    10/18/23 76.5 kg (168 lb 11.2 oz)   01/18/23 73 kg (161 lb)       Current Outpatient Medications   Medication Sig Dispense Refill     buPROPion (WELLBUTRIN XL) 150 MG 24 hr tablet Take 1 tablet (150 mg) by mouth every morning. 90 tablet 1     HUMALOG 100 UNIT/ML injection INJECT 70 UNITS SUBCUTANEOUS EVERY DAY VIA INSULIN PUMP 80 mL 3     sertraline (ZOLOFT) 100 MG tablet Take 2 tablets (200 mg) by mouth daily. 180 tablet 1     buPROPion (WELLBUTRIN) 100 MG tablet Take 1 tablet (100 mg) by mouth every morning (Patient not taking: Reported on 1/7/2025) 90 tablet 0     Continuous Glucose Sensor (DEXCOM G6 SENSOR) MISC Change every 10 days. 9 each 3     Continuous Glucose Transmitter (DEXCOM G6 TRANSMITTER) MISC CHANGE EVERY 3 MONTHS AS DIRECTED 1 each 3     Glucagon (GVOKE HYPOPEN) 1 MG/0.2ML pen Inject the contents of 1 device under the skin into lower abdomen, outer thigh, or outer upper arm as needed for hypoglycemia. If no response after 15 minutes, additional 1 mg dose from a new device may be injected while waiting for emergency assistance. 0.4 mL 1     Insulin Disposable Pump (OMNIPOD 5 PODS, GEN 5,) MISC Inject 1 each subcutaneously every 3 days. 45 each 5     levonorgestrel (MIRENA) 52 MG (20 mcg/day) IUD 1 each by Intrauterine route once            REVIEW OF SYSTEMS:   ROS: 10 point ROS neg other than the symptoms noted above in the HPI.      EXAM:  Physical Exam (visual exam)  VS:  no vital signs taken for video visit  CONSTITUTIONAL: healthy, alert and NAD, responding appropriately  ENT: normocephalic, no visual evidence of trauma, normal nose and oral mucosa  EYES: conjunctivae and sclerae normal, no exophthalmos or proptosis  THYROID:  no visualized nodules or goiter  LUNGS: no audible wheeze, cough or visible cyanosis, no visible retractions or increased work of breathing  EXTREMITIES: no hand tremors  NEUROLOGY: cranial nerves grossly intact with no obvious deficit.  SKIN:  no visualized skin lesions  or rash, no edema visualized  PSYCH: mentation appears normal, normal judgement        ASSESSMENT/PLAN:    1) Diabetes Mellitus Type 1-   - Glucose Control- NOT at goal, improved hypoglycemia   - Increase AM CHO ratio- 5:30a- 10 > 8.5   - Increase ISF 6p- 30 >25   - Raise max basal to 5 (from 4u)  Following goals:  1) Bolus EVERY DAY for dinner-- use quick carbs for mental shortcuts if can't carb count-- 15g snack, 30g small meal, 60g large meal  2) Stay in automated mode as much as possible- will raise basal max to 5u (from 4u per hour)  3) Use marlys to bolus, should be easier than PDM    2.  Lipids:  Meets criteria per ASCVD risk %, no statin at this time given pending pregnancy  3.  Blood Pressure:  Not evaluated for this virtual visit   4.  CV prevention:  Does not meet criteria for antiplatelet therapy  5.  Diabetic Nephropathy screening:  Up to date / repeat LAURITA due now  6.  Diabetic Retinopathy screening:  Up to date, continue annual dilated eye exams now  7.  Diabetic Neuropathy screening:  Up to date.  Instructed on routine foot care, follow-up with podiatry as needed.       RTC 3  months as overbook    A total of 18 minutes were spent today 01/07/25 on this visit including chart review, history and counseling, documentation and other activities as detailed above.           Again, thank you for allowing me to participate in the care of your patient.        Sincerely,        Shanon Thomas MD    Electronically signed

## 2025-01-07 NOTE — PROGRESS NOTES
Video-Visit Details    Type of service:  Video Visit  Video Start Time: 3:08  Video End Time: 3:34  Originating Location (pt. Location): [place], [place]  Distant Location (provider location):  Home  Platform used for Video Visit: Norm Thomas MD    Preeti Leiva is a 34 year old year old female here for evaluation of  diabetes type 1 via a billable video visit. She was last seen by me Oct 2023.    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 Scheduled csection in setting of fetal macrosomia.   Hospitalizations: DKA, 2008 most recently, difficult time in college managing   Previous Regimens: almost always on pump, most recently Medtronic/Guardian, but didn't use automode-- kicked out frequently and didn't work through this  Current Regimen: OMNIPOD 5/DEXCOM        Kicked out of automated mode less since last visit-- now 25%>15%  in this setting/manual mode  Missed boluses, high BG      Last eye exam- July 2022-- mild NPDR    BP Readings from Last 3 Encounters:   10/01/24 121/79   10/18/23 119/81   01/18/23 136/85       Lab Results   Component Value Date    A1C 6.1 10/04/2022    A1C 6.5 06/10/2022    A1C 8.6 02/10/2022    A1C 9.4 08/20/2020    A1C 8.4 01/02/2020    A1C 7.8 01/29/2019       Wt Readings from Last 3 Encounters:   10/01/24 74.8 kg (165 lb)   10/18/23 76.5 kg (168 lb 11.2 oz)   01/18/23 73 kg (161 lb)       Current Outpatient Medications   Medication Sig Dispense Refill    buPROPion (WELLBUTRIN XL) 150 MG 24 hr tablet Take 1 tablet (150 mg) by mouth every morning. 90 tablet 1    HUMALOG 100 UNIT/ML injection  INJECT 70 UNITS SUBCUTANEOUS EVERY DAY VIA INSULIN PUMP 80 mL 3    sertraline (ZOLOFT) 100 MG tablet Take 2 tablets (200 mg) by mouth daily. 180 tablet 1    buPROPion (WELLBUTRIN) 100 MG tablet Take 1 tablet (100 mg) by mouth every morning (Patient not taking: Reported on 1/7/2025) 90 tablet 0    Continuous Glucose Sensor (DEXCOM G6 SENSOR) MISC Change every 10 days. 9 each 3    Continuous Glucose Transmitter (DEXCOM G6 TRANSMITTER) MISC CHANGE EVERY 3 MONTHS AS DIRECTED 1 each 3    Glucagon (GVOKE HYPOPEN) 1 MG/0.2ML pen Inject the contents of 1 device under the skin into lower abdomen, outer thigh, or outer upper arm as needed for hypoglycemia. If no response after 15 minutes, additional 1 mg dose from a new device may be injected while waiting for emergency assistance. 0.4 mL 1    Insulin Disposable Pump (OMNIPOD 5 PODS, GEN 5,) MISC Inject 1 each subcutaneously every 3 days. 45 each 5    levonorgestrel (MIRENA) 52 MG (20 mcg/day) IUD 1 each by Intrauterine route once            REVIEW OF SYSTEMS:   ROS: 10 point ROS neg other than the symptoms noted above in the HPI.      EXAM:  Physical Exam (visual exam)  VS:  no vital signs taken for video visit  CONSTITUTIONAL: healthy, alert and NAD, responding appropriately  ENT: normocephalic, no visual evidence of trauma, normal nose and oral mucosa  EYES: conjunctivae and sclerae normal, no exophthalmos or proptosis  THYROID:  no visualized nodules or goiter  LUNGS: no audible wheeze, cough or visible cyanosis, no visible retractions or increased work of breathing  EXTREMITIES: no hand tremors  NEUROLOGY: cranial nerves grossly intact with no obvious deficit.  SKIN:  no visualized skin lesions or rash, no edema visualized  PSYCH: mentation appears normal, normal judgement        ASSESSMENT/PLAN:    1) Diabetes Mellitus Type 1-   - Glucose Control- NOT at goal, improved hypoglycemia   - Increase AM CHO ratio- 5:30a- 10 > 8.5   - Increase ISF 6p- 30 >25   - Raise max  basal to 5 (from 4u)  Following goals:  1) Bolus EVERY DAY for dinner-- use quick carbs for mental shortcuts if can't carb count-- 15g snack, 30g small meal, 60g large meal  2) Stay in automated mode as much as possible- will raise basal max to 5u (from 4u per hour)  3) Use marlys to bolus, should be easier than PDM    2.  Lipids:  Meets criteria per ASCVD risk %, no statin at this time given pending pregnancy  3.  Blood Pressure:  Not evaluated for this virtual visit   4.  CV prevention:  Does not meet criteria for antiplatelet therapy  5.  Diabetic Nephropathy screening:  Up to date / repeat LAURITA due now  6.  Diabetic Retinopathy screening:  Up to date, continue annual dilated eye exams now  7.  Diabetic Neuropathy screening:  Up to date.  Instructed on routine foot care, follow-up with podiatry as needed.       RTC 3  months as overbook    A total of 18 minutes were spent today 01/07/25 on this visit including chart review, history and counseling, documentation and other activities as detailed above.

## 2025-01-23 ENCOUNTER — MYC MEDICAL ADVICE (OUTPATIENT)
Dept: PSYCHIATRY | Facility: CLINIC | Age: 35
End: 2025-01-23
Payer: COMMERCIAL

## 2025-02-21 DIAGNOSIS — F41.1 GENERALIZED ANXIETY DISORDER: ICD-10-CM

## 2025-02-21 DIAGNOSIS — F33.42 RECURRENT MAJOR DEPRESSIVE DISORDER, IN FULL REMISSION: ICD-10-CM

## 2025-02-24 RX ORDER — FLUOXETINE 10 MG/1
10 CAPSULE ORAL DAILY
Qty: 30 CAPSULE | Refills: 0 | OUTPATIENT
Start: 2025-02-24

## 2025-03-19 DIAGNOSIS — F33.42 RECURRENT MAJOR DEPRESSIVE DISORDER, IN FULL REMISSION: ICD-10-CM

## 2025-03-19 NOTE — TELEPHONE ENCOUNTER
Last seen: 2/21  RTC: 1 month  Any patient initiated cancellations or no shows since last visit? No  Next appt: 3/26  Last filled: 12/10 for 90 day supply      Incoming refill from pharmacy via fax by pharmacy    Medication requested:   buPROPion (WELLBUTRIN XL) 150 MG 24 hr tablet     From chart note:   1) discussed options, risks, benefits, her med history, libido, work stress, today, she chooses to continue cross titrate plan: starting 2/22, she'll reduce sertraline to 50mg daily, increase fluoxetine to 40mg daily, continue Wellbutrin XL 150mg at bed     Is note signed/closed? Yes    Is this a 90 day request for a psych medication? YES - 90 day supply    LPNs - Please check  if controlled and send to provider  Others - Send to RNs

## 2025-03-20 RX ORDER — BUPROPION HYDROCHLORIDE 150 MG/1
150 TABLET ORAL EVERY MORNING
Qty: 90 TABLET | Refills: 1 | Status: SHIPPED | OUTPATIENT
Start: 2025-03-20

## 2025-03-26 ENCOUNTER — VIRTUAL VISIT (OUTPATIENT)
Dept: PSYCHIATRY | Facility: CLINIC | Age: 35
End: 2025-03-26
Attending: NURSE PRACTITIONER
Payer: COMMERCIAL

## 2025-03-26 DIAGNOSIS — F33.42 RECURRENT MAJOR DEPRESSIVE DISORDER, IN FULL REMISSION: ICD-10-CM

## 2025-03-26 DIAGNOSIS — F41.1 GENERALIZED ANXIETY DISORDER: ICD-10-CM

## 2025-03-26 RX ORDER — FLUOXETINE HYDROCHLORIDE 40 MG/1
40 CAPSULE ORAL DAILY
Qty: 90 CAPSULE | Refills: 0 | Status: SHIPPED | OUTPATIENT
Start: 2025-03-26

## 2025-03-26 ASSESSMENT — PAIN SCALES - GENERAL: PAINLEVEL_OUTOF10: NO PAIN (0)

## 2025-03-26 NOTE — PATIENT INSTRUCTIONS
**For crisis resources, please see the information at the end of this document**   Patient Education    Thank you for coming to the [facility] [place] [facility].     Lab Testing:  If you had lab testing today and your results are reassuring or normal they will be mailed to you or sent through [employer] within 7 days. If the lab tests need quick action we will call you with the results. The phone number we will call with results is # [phone]. If this is not the best number please call our clinic and change the number.     Medication Refills:  If you need any refills please call your pharmacy and they will contact us. Our fax number for refills is [phone].   Three business days of notice are needed for general medication refill requests.   Five business days of notice are needed for controlled substance refill requests.   If you need to change to a different pharmacy, please contact the new pharmacy directly. The new pharmacy will help you get your medications transferred.     Contact Us:  Please call [phone] during business hours (8-5:00 M-F).   If you have medication related questions after clinic hours, or on the weekend, please call [phone].     Financial Assistance [phone]   Medical Records [phone]       MENTAL HEALTH CRISIS RESOURCES:  For a emergency help, please call 911 or go to the nearest Emergency Department.     Emergency Walk-In Options:   EmPATH Unit @ [facility] ([place]): [phone] - Specialized mental health emergency area designed to be calming  [facility] ([place]): [phone]  [facility] ([place]): [phone]  [facility]): [phone]    [facility] Crisis Information:   [place]: [phone]  [place]: [phone]  [place] ([place]) - Adult: [phone]     Child: [phone]  [place] - Adult: [phone]     Child: [phone]  [place]:  [phone]  List of all [facility] resources:   https://[place].gov/dhs/people-we-serve/adults/health-care/mental-health/resources/crisis-contacts.jsp    National Crisis Information:   Crisis Text Line: Text  [place]  to 905213  Suicide & Crisis Lifeline: 988  National Suicide Prevention Lifeline: 2-300-786-TALK (1-204.530.9352)       For online chat options, visit https://suicidepreventionlifeline.org/chat/  Poison Control Center: 7-469-184-3348  Trans Lifeline: 1-824-430-4183 - Hotline for transgender people of all ages  The [employer]: 1-602-644-3961 - Hotline for LGBT youth     For Non-Emergency Support:   Fast Tracker: Mental Health & Substance Use Disorder Resources -   https://www.[employer].org/
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
## 2025-03-26 NOTE — NURSING NOTE
Current patient location:  work    Is the patient currently in the state of [place]? YES    Visit mode: VIDEO    If the visit is dropped, the patient can be reconnected by:VIDEO VISIT: Send to e-mail at: [email or web address]    Will anyone else be joining the visit? NO  (If patient encounters technical issues they should call [phone])    Are changes needed to the allergy or medication list? Yes please remove med flagged for removal: sertraline (ZOLOFT) 100 MG tablet    Are refills needed on medications prescribed by this physician? YES    Rooming Documentation:  Patient will complete questionnaire(s) in [facility]    Reason for visit: CONCHITA RICHF

## 2025-04-05 ENCOUNTER — HEALTH MAINTENANCE LETTER (OUTPATIENT)
Age: 35
End: 2025-04-05

## 2025-04-15 ENCOUNTER — VIRTUAL VISIT (OUTPATIENT)
Dept: ENDOCRINOLOGY | Facility: CLINIC | Age: 35
End: 2025-04-15
Payer: COMMERCIAL

## 2025-04-15 DIAGNOSIS — E10.65 TYPE 1 DIABETES MELLITUS WITH HYPERGLYCEMIA (H): Primary | ICD-10-CM

## 2025-04-15 RX ORDER — INSULIN ASPART INJECTION 100 [IU]/ML
80 INJECTION, SOLUTION SUBCUTANEOUS DAILY
Qty: 60 ML | Refills: 3 | Status: SHIPPED | OUTPATIENT
Start: 2025-04-15

## 2025-04-15 NOTE — PROGRESS NOTES
Video-Visit Details    Type of service:  Video Visit  Video Start Time: 8:02  Video End Time: 8:25  Originating Location (pt. Location): Home, [place]  Distant Location (provider location):  Home  Platform used for Video Visit: Norm Thomas MD    Preeti Leiva is a 34 year old year old female here for evaluation of  diabetes type 1 via a billable video visit. She was last seen by me Jan 2025    INTERVAL HISTORY:  - Better with time in automated mode  - Meal boluses 0-1x daily, will sometimes put in correction when she realizes she is going high, sometime reverse correction as well  - Interested in different type of insulin, something more fast acting  - Struggles with bolusing, mental block of carb counting    1) Diabetes Mellitus in pregnancy    Diabetes History:  Diagnosis: Age 6,  Pregnancy- Baby girl born at 36w- spent a few days in NICU for hypoglyemia, 9lb 10oz. Scheduled csection in setting of fetal macrosomia.   Hospitalizations: DKA, 2008 most recently, difficult time in college managing   Previous Regimens: almost always on pump, most recently Medtronic/Guardian, but didn't use automode-- kicked out frequently and didn't work through this  Current Regimen: OMNIPOD 5/DEXCOM        Kicked out of automated mode less since last visit-- now 25%>15%>1%  in this setting/manual mode  Missed boluses, high BG      Last eye exam- July 2022-- mild NPDR    BP Readings from Last 3 Encounters:   10/01/24 121/79   10/18/23 119/81   01/18/23 136/85       Lab Results   Component Value Date    A1C 6.1 10/04/2022    A1C 6.5 06/10/2022    A1C 8.6 02/10/2022    A1C 9.4 08/20/2020    A1C 8.4 01/02/2020    A1C 7.8 01/29/2019       Wt Readings from Last 3 Encounters:   10/01/24 74.8 kg (165 lb)   10/18/23 76.5 kg (168 lb 11.2 oz)   01/18/23 73 kg (161 lb)       Current Outpatient Medications   Medication Sig Dispense Refill    buPROPion (WELLBUTRIN XL) 150 MG 24 hr tablet Take 1 tablet (150 mg) by mouth every morning.  90 tablet 1    Continuous Glucose Sensor (DEXCOM G6 SENSOR) MISC Change every 10 days. 9 each 3    Continuous Glucose Transmitter (DEXCOM G6 TRANSMITTER) MISC CHANGE EVERY 3 MONTHS AS DIRECTED 1 each 3    FLUoxetine (PROZAC) 20 MG capsule Take one 20mg with one 40mg cap for a total daily dose of 60mg 90 capsule 0    FLUoxetine (PROZAC) 40 MG capsule Take 1 capsule (40 mg) by mouth daily. 90 capsule 0    HUMALOG 100 UNIT/ML injection INJECT 70 UNITS SUBCUTANEOUS EVERY DAY VIA INSULIN PUMP 80 mL 3    Insulin Disposable Pump (OMNIPOD 5 PODS, GEN 5,) MISC Inject 1 each subcutaneously every 3 days. 45 each 5    levonorgestrel (MIRENA) 52 MG (20 mcg/day) IUD 1 each by Intrauterine route once      Glucagon (GVOKE HYPOPEN) 1 MG/0.2ML pen Inject the contents of 1 device under the skin into lower abdomen, outer thigh, or outer upper arm as needed for hypoglycemia. If no response after 15 minutes, additional 1 mg dose from a new device may be injected while waiting for emergency assistance. 0.4 mL 1    insulin lispro (HUMALOG VIAL) 100 UNIT/ML vial Use up to 80u per day in insulin pump 70 mL 3          REVIEW OF SYSTEMS:   ROS: 10 point ROS neg other than the symptoms noted above in the HPI.      EXAM:  Physical Exam (visual exam)  VS:  no vital signs taken for video visit  CONSTITUTIONAL: healthy, alert and NAD, responding appropriately  ENT: normocephalic, no visual evidence of trauma, normal nose and oral mucosa  EYES: conjunctivae and sclerae normal, no exophthalmos or proptosis  THYROID:  no visualized nodules or goiter  LUNGS: no audible wheeze, cough or visible cyanosis, no visible retractions or increased work of breathing  EXTREMITIES: no hand tremors  NEUROLOGY: cranial nerves grossly intact with no obvious deficit.  SKIN:  no visualized skin lesions or rash, no edema visualized  PSYCH: mentation appears normal, normal judgement        ASSESSMENT/PLAN:    1) Diabetes Mellitus Type 1-   - Glucose Control- NOT at goal,  "improved hypoglycemia   - Discussed creating custom food choices-- she notes mental block from carb counting given spending so much time on this as a child, did great in pregnancy but can't seem to get herself back to this   - Reviewed custom food menu-- will plug in a few foods she eats regularly, and \"dinner\" with 60g cho as a base   - Will not adjust carb ratios, as I worry she will start bolusing more and then have low blood sugars   - Transition from humalog to fiasp when able, fiasp on backorder   - Raise daytime basal to 1.95 > 2.2 (15%)   - Increase ISF 6p- 30 >25   - Raise max basal to 7 (from 5u)    Following goals:  1) Bolus EVERY DAY for BREAKFAST, eats the same thing, set alarm on phone  2) Set up food library/custom food for quick bolusing    2.  Lipids:  Meets criteria per ASCVD risk %, no statin at this time given pending pregnancy  3.  Blood Pressure:  Not evaluated for this virtual visit   4.  CV prevention:  Does not meet criteria for antiplatelet therapy  5.  Diabetic Nephropathy screening:  Up to date / repeat LAURITA due now  6.  Diabetic Retinopathy screening:  Up to date, continue annual dilated eye exams now  7.  Diabetic Neuropathy screening:  Up to date.  Instructed on routine foot care, follow-up with podiatry as needed.       RTC 3  months as overbook or with isabel gonzaelz    A total of 32 minutes were spent today 04/15/25 on this visit including chart review, history and counseling, documentation and other activities as detailed above excluding time spent reviewing CGM.      "

## 2025-04-15 NOTE — LETTER
4/15/2025      Preeti Leiva  [address] [facility] [facility]  [place] [address]      Dear Colleague,    Thank you for referring your patient, Preeti Leiva, to the [facility] [place]. Please see a copy of my visit note below.                                                                        Video-Visit Details    Type of service:  Video Visit  Video Start Time: 8:02  Video End Time: 8:25  Originating Location (pt. Location): [place], [place]  Distant Location (provider location):  Home  Platform used for Video Visit: Norm Thomas MD    Preeti Leiva is a 34 year old year old female here for evaluation of  diabetes type 1 via a billable video visit. She was last seen by me Jan 2025    INTERVAL HISTORY:  - Better with time in automated mode  - Meal boluses 0-1x daily, will sometimes put in correction when she realizes she is going high, sometime reverse correction as well  - Interested in different type of insulin, something more fast acting  - Struggles with bolusing, mental block of carb counting    1) Diabetes Mellitus in pregnancy    Diabetes History:  Diagnosis: Age 6,  Pregnancy- Baby girl born at 36w- spent a few days in NICU for hypoglyemia, 9lb 10oz. Scheduled csection in setting of fetal macrosomia.   Hospitalizations: DKA, 2008 most recently, difficult time in college managing   Previous Regimens: almost always on pump, most recently Medtronic/Guardian, but didn't use automode-- kicked out frequently and didn't work through this  Current Regimen: OMNIPOD 5/DEXCOM        Kicked out of automated mode less since last visit-- now 25%>15%>1%  in this setting/manual mode  Missed boluses, high BG      Last eye exam- July 2022-- mild NPDR    BP Readings from Last 3 Encounters:   10/01/24 121/79   10/18/23 119/81   01/18/23 136/85       Lab Results   Component Value Date    A1C 6.1 10/04/2022    A1C 6.5 06/10/2022    A1C 8.6 02/10/2022    A1C 9.4 08/20/2020    A1C 8.4 01/02/2020    A1C  7.8 01/29/2019       Wt Readings from Last 3 Encounters:   10/01/24 74.8 kg (165 lb)   10/18/23 76.5 kg (168 lb 11.2 oz)   01/18/23 73 kg (161 lb)       Current Outpatient Medications   Medication Sig Dispense Refill     buPROPion (WELLBUTRIN XL) 150 MG 24 hr tablet Take 1 tablet (150 mg) by mouth every morning. 90 tablet 1     Continuous Glucose Sensor (DEXCOM G6 SENSOR) MISC Change every 10 days. 9 each 3     Continuous Glucose Transmitter (DEXCOM G6 TRANSMITTER) MISC CHANGE EVERY 3 MONTHS AS DIRECTED 1 each 3     FLUoxetine (PROZAC) 20 MG capsule Take one 20mg with one 40mg cap for a total daily dose of 60mg 90 capsule 0     FLUoxetine (PROZAC) 40 MG capsule Take 1 capsule (40 mg) by mouth daily. 90 capsule 0     HUMALOG 100 UNIT/ML injection INJECT 70 UNITS SUBCUTANEOUS EVERY DAY VIA INSULIN PUMP 80 mL 3     Insulin Disposable Pump (OMNIPOD 5 PODS, GEN 5,) MISC Inject 1 each subcutaneously every 3 days. 45 each 5     levonorgestrel (MIRENA) 52 MG (20 mcg/day) IUD 1 each by Intrauterine route once       Glucagon (GVOKE HYPOPEN) 1 MG/0.2ML pen Inject the contents of 1 device under the skin into lower abdomen, outer thigh, or outer upper arm as needed for hypoglycemia. If no response after 15 minutes, additional 1 mg dose from a new device may be injected while waiting for emergency assistance. 0.4 mL 1     insulin lispro (HUMALOG VIAL) 100 UNIT/ML vial Use up to 80u per day in insulin pump 70 mL 3          REVIEW OF SYSTEMS:   ROS: 10 point ROS neg other than the symptoms noted above in the HPI.      EXAM:  Physical Exam (visual exam)  VS:  no vital signs taken for video visit  CONSTITUTIONAL: healthy, alert and NAD, responding appropriately  ENT: normocephalic, no visual evidence of trauma, normal nose and oral mucosa  EYES: conjunctivae and sclerae normal, no exophthalmos or proptosis  THYROID:  no visualized nodules or goiter  LUNGS: no audible wheeze, cough or visible cyanosis, no visible retractions or  "increased work of breathing  EXTREMITIES: no hand tremors  NEUROLOGY: cranial nerves grossly intact with no obvious deficit.  SKIN:  no visualized skin lesions or rash, no edema visualized  PSYCH: mentation appears normal, normal judgement        ASSESSMENT/PLAN:    1) Diabetes Mellitus Type 1-   - Glucose Control- NOT at goal, improved hypoglycemia   - Discussed creating custom food choices-- she notes mental block from carb counting given spending so much time on this as a child, did great in pregnancy but can't seem to get herself back to this   - Reviewed custom food menu-- will plug in a few foods she eats regularly, and \"dinner\" with 60g cho as a base   - Will not adjust carb ratios, as I worry she will start bolusing more and then have low blood sugars   - Transition from humalog to fiasp when able, fiasp on backorder   - Raise daytime basal to 1.95 > 2.2 (15%)   - Increase ISF 6p- 30 >25   - Raise max basal to 7 (from 5u)    Following goals:  1) Bolus EVERY DAY for BREAKFAST, eats the same thing, set alarm on phone  2) Set up food library/custom food for quick bolusing    2.  Lipids:  Meets criteria per ASCVD risk %, no statin at this time given pending pregnancy  3.  Blood Pressure:  Not evaluated for this virtual visit   4.  CV prevention:  Does not meet criteria for antiplatelet therapy  5.  Diabetic Nephropathy screening:  Up to date / repeat LAURITA due now  6.  Diabetic Retinopathy screening:  Up to date, continue annual dilated eye exams now  7.  Diabetic Neuropathy screening:  Up to date.  Instructed on routine foot care, follow-up with podiatry as needed.       RTC 3  months as overbook or with isabel gonzalez    A total of 32 minutes were spent today 04/15/25 on this visit including chart review, history and counseling, documentation and other activities as detailed above excluding time spent reviewing CGM.        Again, thank you for allowing me to participate in the care of your patient.  "       Sincerely,        Shanon Thomas MD    Electronically signed

## 2025-05-27 ENCOUNTER — MYC MEDICAL ADVICE (OUTPATIENT)
Dept: ENDOCRINOLOGY | Facility: CLINIC | Age: 35
End: 2025-05-27
Payer: COMMERCIAL

## 2025-05-27 DIAGNOSIS — E10.65 TYPE 1 DIABETES MELLITUS WITH HYPERGLYCEMIA (H): ICD-10-CM

## 2025-05-29 RX ORDER — INSULIN ASPART INJECTION 100 [IU]/ML
80 INJECTION, SOLUTION SUBCUTANEOUS DAILY
Qty: 80 ML | Refills: 3 | Status: SHIPPED | OUTPATIENT
Start: 2025-05-29 | End: 2025-06-02

## 2025-06-02 RX ORDER — INSULIN LISPRO 100 [IU]/ML
INJECTION, SOLUTION INTRAVENOUS; SUBCUTANEOUS
Qty: 70 ML | Refills: 3 | Status: SHIPPED | OUTPATIENT
Start: 2025-06-02

## 2025-07-08 ENCOUNTER — VIRTUAL VISIT (OUTPATIENT)
Dept: ENDOCRINOLOGY | Facility: CLINIC | Age: 35
End: 2025-07-08
Payer: COMMERCIAL

## 2025-07-08 DIAGNOSIS — E10.65 TYPE 1 DIABETES MELLITUS WITH HYPERGLYCEMIA (H): Primary | ICD-10-CM

## 2025-07-08 RX ORDER — METFORMIN HYDROCHLORIDE 750 MG/1
1500 TABLET, EXTENDED RELEASE ORAL
Qty: 180 TABLET | Refills: 1 | Status: SHIPPED | OUTPATIENT
Start: 2025-07-08

## 2025-07-08 NOTE — PROGRESS NOTES
Video-Visit Details    Type of service:  Video Visit  Video Start Time: 2:05  Video End Time: 2:33  Originating Location (pt. Location): [place], [place]  Distant Location (provider location):  Home  Platform used for Video Visit: Norm Thomas MD    Preeti Leiva is a 34 year old year old female here for evaluation of  diabetes type 1 via a billable video visit. She was last seen by me April 2025    INTERVAL HISTORY:  - No longer planning pregnancy  - Traveling much over last 2 weeks   - Better with time in automated mode  - Doing new workout class- HIIT-- using activity mode since last time seeing me   - Fiasp cost-prohibitive, will continue with humalog  -  is on GLP1 compounded,   - Does have some insulin resistance     1) Diabetes Mellitus Type I     Diabetes History:  Diagnosis: Age 6,  Pregnancy- Baby girl born at 36w- spent a few days in NICU for hypoglyemia, 9lb 10oz. Scheduled csection in setting of fetal macrosomia.   Hospitalizations: DKA, 2008 most recently, difficult time in college managing   Previous Regimens: almost always on pump, most recently Medtronic/Guardian, but didn't use automode-- kicked out frequently and didn't work through this  Current Regimen: OMNIPOD 5/DEXCOM      Trend:                Kicked out of automated mode less since last visit-- now 25%>15%>1%  in this setting/manual mode  Missed boluses, high BG      Last eye exam- July 2022-- mild NPDR-- may have completed end 2023    BP Readings from Last 3 Encounters:   10/01/24 121/79   10/18/23 119/81   01/18/23 136/85       Lab Results   Component Value Date    A1C 6.1 10/04/2022    A1C 6.5 06/10/2022    A1C 8.6 02/10/2022    A1C 9.4 08/20/2020    A1C 8.4 01/02/2020    A1C 7.8 01/29/2019       Wt Readings from Last 3 Encounters:   10/01/24 74.8 kg (165 lb)   10/18/23 76.5 kg (168 lb 11.2 oz)   01/18/23 73 kg (161 lb)       Current Outpatient Medications   Medication Sig Dispense Refill    buPROPion (WELLBUTRIN XL) 150  MG 24 hr tablet Take 1 tablet (150 mg) by mouth every morning. 90 tablet 1    Continuous Glucose Sensor (DEXCOM G6 SENSOR) MISC Change every 10 days. 9 each 3    Continuous Glucose Transmitter (DEXCOM G6 TRANSMITTER) MISC CHANGE EVERY 3 MONTHS AS DIRECTED 1 each 3    FLUoxetine (PROZAC) 20 MG capsule Take one 20mg with one 40mg cap for a total daily dose of 60mg 30 capsule 0    FLUoxetine (PROZAC) 40 MG capsule Take 1 capsule (40 mg) by mouth daily. 30 capsule 0    Glucagon (GVOKE HYPOPEN) 1 MG/0.2ML pen Inject the contents of 1 device under the skin into lower abdomen, outer thigh, or outer upper arm as needed for hypoglycemia. If no response after 15 minutes, additional 1 mg dose from a new device may be injected while waiting for emergency assistance. 0.4 mL 1    Insulin Disposable Pump (OMNIPOD 5 PODS, GEN 5,) MISC Inject 1 each subcutaneously every 3 days. 45 each 5    insulin lispro (HUMALOG VIAL) 100 UNIT/ML vial Use up to 80u per day in insulin pump 70 mL 3    levonorgestrel (MIRENA) 52 MG (20 mcg/day) IUD 1 each by Intrauterine route once            REVIEW OF SYSTEMS:   ROS: 10 point ROS neg other than the symptoms noted above in the HPI.      EXAM:  Physical Exam (visual exam)  VS:  no vital signs taken for video visit  CONSTITUTIONAL: healthy, alert and NAD, responding appropriately  ENT: normocephalic, no visual evidence of trauma, normal nose and oral mucosa  EYES: conjunctivae and sclerae normal, no exophthalmos or proptosis  THYROID:  no visualized nodules or goiter  LUNGS: no audible wheeze, cough or visible cyanosis, no visible retractions or increased work of breathing  EXTREMITIES: no hand tremors  NEUROLOGY: cranial nerves grossly intact with no obvious deficit.  SKIN:  no visualized skin lesions or rash, no edema visualized  PSYCH: mentation appears normal, normal judgement        ASSESSMENT/PLAN:    1) Diabetes Mellitus Type 1-   - Glucose Control- NOT at goal, improved hypoglycemia   -  "Discussed creating custom food choices-- she notes mental block from carb counting given spending so much time on this as a child, did great in pregnancy but can't seem to get herself back to this   - Reviewed custom food menu-- will plug in a few foods she eats regularly, and \"dinner\" with 60g cho as a base   - reduce evening basal to minimize hypoglycemia. Focus on bolusing for breakfast, increase CHO ratio at this time   - START metformin  Start taking metformin 750mg XR daily, then increase in one week to 1500mg xr qevening    Basal Rate: Basal 1  12a 1.25  9a 2.2  9p  1.95 (NEW TIME)    ISF:  12a 35  7a 30  3p 35  6p 25    CHO:  12 12  5:30a 8.5 >8  11a 11  6p 10  9p 12       Following goals:  1) Bolus EVERY DAY for BREAKFAST, eats the same thing, set alarm on phone  2) Set up food library/custom food for quick bolusing    2.  Lipids:  Meets criteria per ASCVD risk %, no statin at this time given pending pregnancy  3.  Blood Pressure:  Not evaluated for this virtual visit   4.  CV prevention:  Does not meet criteria for antiplatelet therapy  5.  Diabetic Nephropathy screening:  Up to date / repeat LAURITA due now  6.  Diabetic Retinopathy screening:  Up to date, continue annual dilated eye exams now  7.  Diabetic Neuropathy screening:  Up to date.  Instructed on routine foot care, follow-up with podiatry as needed.       RTC 3  months as overbook or with isabel gonzalez  A total of 35 minutes were spent today 07/08/25 on this visit including chart review, history and counseling, documentation and other activities as detailed above excluding time spent reviewing CGM.        "

## 2025-07-08 NOTE — LETTER
7/8/2025      Preeti Leiva  [address] [address] [facility]  [place] [address]      Dear Colleague,    Thank you for referring your patient, Preeti Leiva, to the [facility] [place]. Please see a copy of my visit note below.                                          Video-Visit Details    Type of service:  Video Visit  Video Start Time: 2:05  Video End Time: 2:33  Originating Location (pt. Location): [place], [place]  Distant Location (provider location):  Home  Platform used for Video Visit: Norm Thomas MD    Preeti Leiva is a 34 year old year old female here for evaluation of  diabetes type 1 via a billable video visit. She was last seen by me April 2025    INTERVAL HISTORY:  - No longer planning pregnancy  - Traveling much over last 2 weeks   - Better with time in automated mode  - Doing new workout class- HIIT-- using activity mode since last time seeing me   - Fiasp cost-prohibitive, will continue with humalog  -  is on GLP1 compounded,   - Does have some insulin resistance     1) Diabetes Mellitus Type I     Diabetes History:  Diagnosis: Age 6,  Pregnancy- Baby girl born at 36w- spent a few days in NICU for hypoglyemia, 9lb 10oz. Scheduled csection in setting of fetal macrosomia.   Hospitalizations: DKA, 2008 most recently, difficult time in college managing   Previous Regimens: almost always on pump, most recently Medtronic/Guardian, but didn't use automode-- kicked out frequently and didn't work through this  Current Regimen: OMNIPOD 5/DEXCOM      Trend:                Kicked out of automated mode less since last visit-- now 25%>15%>1%  in this setting/manual mode  Missed boluses, high BG      Last eye exam- July 2022-- mild NPDR-- may have completed end 2023    BP Readings from Last 3 Encounters:   10/01/24 121/79   10/18/23 119/81   01/18/23 136/85       Lab Results   Component Value Date    A1C 6.1 10/04/2022    A1C 6.5 06/10/2022    A1C 8.6 02/10/2022    A1C 9.4  08/20/2020    A1C 8.4 01/02/2020    A1C 7.8 01/29/2019       Wt Readings from Last 3 Encounters:   10/01/24 74.8 kg (165 lb)   10/18/23 76.5 kg (168 lb 11.2 oz)   01/18/23 73 kg (161 lb)       Current Outpatient Medications   Medication Sig Dispense Refill     buPROPion (WELLBUTRIN XL) 150 MG 24 hr tablet Take 1 tablet (150 mg) by mouth every morning. 90 tablet 1     Continuous Glucose Sensor (DEXCOM G6 SENSOR) MISC Change every 10 days. 9 each 3     Continuous Glucose Transmitter (DEXCOM G6 TRANSMITTER) MISC CHANGE EVERY 3 MONTHS AS DIRECTED 1 each 3     FLUoxetine (PROZAC) 20 MG capsule Take one 20mg with one 40mg cap for a total daily dose of 60mg 30 capsule 0     FLUoxetine (PROZAC) 40 MG capsule Take 1 capsule (40 mg) by mouth daily. 30 capsule 0     Glucagon (GVOKE HYPOPEN) 1 MG/0.2ML pen Inject the contents of 1 device under the skin into lower abdomen, outer thigh, or outer upper arm as needed for hypoglycemia. If no response after 15 minutes, additional 1 mg dose from a new device may be injected while waiting for emergency assistance. 0.4 mL 1     Insulin Disposable Pump (OMNIPOD 5 PODS, GEN 5,) MISC Inject 1 each subcutaneously every 3 days. 45 each 5     insulin lispro (HUMALOG VIAL) 100 UNIT/ML vial Use up to 80u per day in insulin pump 70 mL 3     levonorgestrel (MIRENA) 52 MG (20 mcg/day) IUD 1 each by Intrauterine route once            REVIEW OF SYSTEMS:   ROS: 10 point ROS neg other than the symptoms noted above in the HPI.      EXAM:  Physical Exam (visual exam)  VS:  no vital signs taken for video visit  CONSTITUTIONAL: healthy, alert and NAD, responding appropriately  ENT: normocephalic, no visual evidence of trauma, normal nose and oral mucosa  EYES: conjunctivae and sclerae normal, no exophthalmos or proptosis  THYROID:  no visualized nodules or goiter  LUNGS: no audible wheeze, cough or visible cyanosis, no visible retractions or increased work of breathing  EXTREMITIES: no hand  "tremors  NEUROLOGY: cranial nerves grossly intact with no obvious deficit.  SKIN:  no visualized skin lesions or rash, no edema visualized  PSYCH: mentation appears normal, normal judgement        ASSESSMENT/PLAN:    1) Diabetes Mellitus Type 1-   - Glucose Control- NOT at goal, improved hypoglycemia   - Discussed creating custom food choices-- she notes mental block from carb counting given spending so much time on this as a child, did great in pregnancy but can't seem to get herself back to this   - Reviewed custom food menu-- will plug in a few foods she eats regularly, and \"dinner\" with 60g cho as a base   - reduce evening basal to minimize hypoglycemia. Focus on bolusing for breakfast, increase CHO ratio at this time   - START metformin  Start taking metformin 750mg XR daily, then increase in one week to 1500mg xr qevening    Basal Rate: Basal 1  12a 1.25  9a 2.2  9p  1.95 (NEW TIME)    ISF:  12a 35  7a 30  3p 35  6p 25    CHO:  12 12  5:30a 8.5 >8  11a 11  6p 10  9p 12       Following goals:  1) Bolus EVERY DAY for BREAKFAST, eats the same thing, set alarm on phone  2) Set up food library/custom food for quick bolusing    2.  Lipids:  Meets criteria per ASCVD risk %, no statin at this time given pending pregnancy  3.  Blood Pressure:  Not evaluated for this virtual visit   4.  CV prevention:  Does not meet criteria for antiplatelet therapy  5.  Diabetic Nephropathy screening:  Up to date / repeat LAURITA due now  6.  Diabetic Retinopathy screening:  Up to date, continue annual dilated eye exams now  7.  Diabetic Neuropathy screening:  Up to date.  Instructed on routine foot care, follow-up with podiatry as needed.       RTC 3  months as overbook or with isabel gonzalez  A total of 35 minutes were spent today 07/08/25 on this visit including chart review, history and counseling, documentation and other activities as detailed above excluding time spent reviewing CGM.          Again, thank you for allowing me to " participate in the care of your patient.        Sincerely,        Shanon Thomas MD    Electronically signed

## 2025-07-19 ENCOUNTER — HEALTH MAINTENANCE LETTER (OUTPATIENT)
Age: 35
End: 2025-07-19

## 2025-07-22 ENCOUNTER — VIRTUAL VISIT (OUTPATIENT)
Dept: PSYCHIATRY | Facility: CLINIC | Age: 35
End: 2025-07-22
Attending: NURSE PRACTITIONER
Payer: COMMERCIAL

## 2025-07-22 DIAGNOSIS — F33.42 RECURRENT MAJOR DEPRESSIVE DISORDER, IN FULL REMISSION: ICD-10-CM

## 2025-07-22 DIAGNOSIS — F41.1 GENERALIZED ANXIETY DISORDER: ICD-10-CM

## 2025-07-22 PROCEDURE — 1126F AMNT PAIN NOTED NONE PRSNT: CPT | Mod: 95 | Performed by: NURSE PRACTITIONER

## 2025-07-22 PROCEDURE — G2211 COMPLEX E/M VISIT ADD ON: HCPCS | Mod: 95 | Performed by: NURSE PRACTITIONER

## 2025-07-22 PROCEDURE — 98006 SYNCH AUDIO-VIDEO EST MOD 30: CPT | Performed by: NURSE PRACTITIONER

## 2025-07-22 RX ORDER — BUPROPION HYDROCHLORIDE 150 MG/1
150 TABLET ORAL EVERY MORNING
Qty: 90 TABLET | Refills: 1 | Status: SHIPPED | OUTPATIENT
Start: 2025-07-22

## 2025-07-22 RX ORDER — FLUOXETINE HYDROCHLORIDE 40 MG/1
40 CAPSULE ORAL DAILY
Qty: 90 CAPSULE | Refills: 1 | Status: SHIPPED | OUTPATIENT
Start: 2025-07-22

## 2025-07-22 ASSESSMENT — PATIENT HEALTH QUESTIONNAIRE - PHQ9
SUM OF ALL RESPONSES TO PHQ QUESTIONS 1-9: 4
SUM OF ALL RESPONSES TO PHQ QUESTIONS 1-9: 4
10. IF YOU CHECKED OFF ANY PROBLEMS, HOW DIFFICULT HAVE THESE PROBLEMS MADE IT FOR YOU TO DO YOUR WORK, TAKE CARE OF THINGS AT HOME, OR GET ALONG WITH OTHER PEOPLE: SOMEWHAT DIFFICULT

## 2025-07-22 NOTE — PATIENT INSTRUCTIONS
**For crisis resources, please see the information at the end of this document**   Patient Education    Thank you for coming to the [facility] [place] [facility].     Lab Testing:  If you had lab testing today and your results are reassuring or normal they will be mailed to you or sent through [employer] within 7 days. If the lab tests need quick action we will call you with the results. The phone number we will call with results is # [phone]. If this is not the best number please call our clinic and change the number.     Medication Refills:  If you need any refills please call your pharmacy and they will contact us. Our fax number for refills is [phone].   Three business days of notice are needed for general medication refill requests.   Five business days of notice are needed for controlled substance refill requests.   If you need to change to a different pharmacy, please contact the new pharmacy directly. The new pharmacy will help you get your medications transferred.     Contact Us:  Please call [phone] during business hours (8-5:00 M-F).   If you have medication related questions after clinic hours, or on the weekend, please call [phone].     Financial Assistance [phone]   Medical Records [phone]       MENTAL HEALTH CRISIS RESOURCES:  For a emergency help, please call 911 or go to the nearest Emergency Department.     Emergency Walk-In Options:   EmPATH Unit @ [facility] ([place]): [phone] - Specialized mental health emergency area designed to be calming  [facility] ([place]): [phone]  [facility] ([place]): [phone]  [facility]): [phone]    [facility] Crisis Information:   [place]: [phone]  [place]: [phone]  [place] ([place]) - Adult: [phone]     Child: [phone]  [place] - Adult: [phone]     Child: [phone]  [place]:  [phone]  List of all [facility] resources:   https://[place].gov/dhs/people-we-serve/adults/health-care/mental-health/resources/crisis-contacts.jsp    National Crisis Information:   Crisis Text Line: Text  [place]  to 007455  Suicide & Crisis Lifeline: 988  National Suicide Prevention Lifeline: 4-956-621-TALK (1-880.391.4958)       For online chat options, visit https://suicidepreventionlifeline.org/chat/  Poison Control Center: 2-737-528-7697  Trans Lifeline: 7-945-794-3717 - Hotline for transgender people of all ages  [employer]: 7-658-796-5602 - Hotline for LGBT youth     For Non-Emergency Support:   Fast Tracker: Mental Health & Substance Use Disorder Resources -   https://www.[employer].org/
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
## 2025-08-25 DIAGNOSIS — E10.65 TYPE 1 DIABETES MELLITUS WITH HYPERGLYCEMIA (H): ICD-10-CM

## 2025-08-26 DIAGNOSIS — E10.65 TYPE 1 DIABETES MELLITUS WITH HYPERGLYCEMIA (H): ICD-10-CM

## 2025-08-26 RX ORDER — PROCHLORPERAZINE 25 MG/1
SUPPOSITORY RECTAL
OUTPATIENT
Start: 2025-08-26

## 2025-08-26 RX ORDER — PROCHLORPERAZINE 25 MG/1
SUPPOSITORY RECTAL
Qty: 9 EACH | Refills: 0 | Status: SHIPPED | OUTPATIENT
Start: 2025-08-26

## 2025-08-30 DIAGNOSIS — E10.65 TYPE 1 DIABETES MELLITUS WITH HYPERGLYCEMIA (H): ICD-10-CM

## 2025-09-01 ENCOUNTER — PATIENT OUTREACH (OUTPATIENT)
Dept: CARE COORDINATION | Facility: CLINIC | Age: 35
End: 2025-09-01
Payer: COMMERCIAL

## 2025-09-02 RX ORDER — PROCHLORPERAZINE 25 MG/1
SUPPOSITORY RECTAL
Qty: 9 EACH | Refills: 0 | Status: SHIPPED | OUTPATIENT
Start: 2025-09-02

## 2025-09-04 ENCOUNTER — MYC MEDICAL ADVICE (OUTPATIENT)
Dept: ENDOCRINOLOGY | Facility: CLINIC | Age: 35
End: 2025-09-04
Payer: COMMERCIAL

## 2025-09-04 ENCOUNTER — TELEPHONE (OUTPATIENT)
Dept: ENDOCRINOLOGY | Facility: CLINIC | Age: 35
End: 2025-09-04
Payer: COMMERCIAL

## 2025-09-04 DIAGNOSIS — E10.65 TYPE 1 DIABETES MELLITUS WITH HYPERGLYCEMIA (H): Primary | ICD-10-CM

## 2025-09-04 RX ORDER — PROCHLORPERAZINE 25 MG/1
SUPPOSITORY RECTAL
Qty: 3 EACH | Refills: 0 | Status: SHIPPED | OUTPATIENT
Start: 2025-09-04

## (undated) DEVICE — SOL WATER IRRIG 1000ML BOTTLE 07139-09

## (undated) DEVICE — PREP CHLORAPREP 26ML TINTED ORANGE  260815

## (undated) DEVICE — STRAP KNEE/BODY 31143004

## (undated) DEVICE — SOL ADH LIQUID BENZOIN SWAB 0.6ML C1544

## (undated) DEVICE — CATH TRAY FOLEY 16FR BARDEX W/DRAIN BAG STATLOCK 300316A

## (undated) DEVICE — ESU GROUND PAD UNIVERSAL W/O CORD

## (undated) DEVICE — SU VICRYL 0 CT-1 36" J346H

## (undated) DEVICE — STOCKING SLEEVE COMPRESSION CALF LG

## (undated) DEVICE — SU VICRYL 4-0 KS 27" UND J662H

## (undated) DEVICE — PACK C-SECTION LF PL15OTA83B

## (undated) DEVICE — DRSG STERI STRIP 1/4X3" R1541

## (undated) DEVICE — SOL NACL 0.9% IRRIG 1000ML BOTTLE 07138-09

## (undated) DEVICE — GLOVE PROTEXIS BLUE W/NEU-THERA 7.0  2D73EB70

## (undated) DEVICE — BNDG ABDOMINAL BINDER 10X26-50" 08140145

## (undated) DEVICE — GLOVE ESTEEM POWDER FREE SMT 6.5  2D72PT65

## (undated) DEVICE — SU VICRYL 3-0 CTX 36" UND J980H

## (undated) RX ORDER — OXYTOCIN/0.9 % SODIUM CHLORIDE 30/500 ML
PLASTIC BAG, INJECTION (ML) INTRAVENOUS
Status: DISPENSED
Start: 2022-11-28

## (undated) RX ORDER — FENTANYL CITRATE 50 UG/ML
INJECTION, SOLUTION INTRAMUSCULAR; INTRAVENOUS
Status: DISPENSED
Start: 2022-11-28

## (undated) RX ORDER — MORPHINE SULFATE 1 MG/ML
INJECTION, SOLUTION EPIDURAL; INTRATHECAL; INTRAVENOUS
Status: DISPENSED
Start: 2022-11-28